# Patient Record
Sex: FEMALE | Race: WHITE | NOT HISPANIC OR LATINO | Employment: OTHER | URBAN - METROPOLITAN AREA
[De-identification: names, ages, dates, MRNs, and addresses within clinical notes are randomized per-mention and may not be internally consistent; named-entity substitution may affect disease eponyms.]

---

## 2017-01-25 ENCOUNTER — OFFICE VISIT - HEALTHEAST (OUTPATIENT)
Dept: INTERNAL MEDICINE | Facility: CLINIC | Age: 82
End: 2017-01-25

## 2017-01-25 DIAGNOSIS — I10 ESSENTIAL HYPERTENSION WITH GOAL BLOOD PRESSURE LESS THAN 140/90: ICD-10-CM

## 2017-01-25 DIAGNOSIS — E21.3 MILD HYPERPARATHYROIDISM (H): ICD-10-CM

## 2017-01-25 DIAGNOSIS — M47.816 OSTEOARTHRITIS OF LUMBAR SPINE: ICD-10-CM

## 2017-01-25 ASSESSMENT — MIFFLIN-ST. JEOR: SCORE: 981.21

## 2017-02-13 ENCOUNTER — COMMUNICATION - HEALTHEAST (OUTPATIENT)
Dept: INTERNAL MEDICINE | Facility: CLINIC | Age: 82
End: 2017-02-13

## 2017-03-06 ENCOUNTER — COMMUNICATION - HEALTHEAST (OUTPATIENT)
Dept: INTERNAL MEDICINE | Facility: CLINIC | Age: 82
End: 2017-03-06

## 2017-03-06 ENCOUNTER — RECORDS - HEALTHEAST (OUTPATIENT)
Dept: ADMINISTRATIVE | Facility: OTHER | Age: 82
End: 2017-03-06

## 2017-03-26 ENCOUNTER — COMMUNICATION - HEALTHEAST (OUTPATIENT)
Dept: INTERNAL MEDICINE | Facility: CLINIC | Age: 82
End: 2017-03-26

## 2017-03-27 ENCOUNTER — COMMUNICATION - HEALTHEAST (OUTPATIENT)
Dept: INTERNAL MEDICINE | Facility: CLINIC | Age: 82
End: 2017-03-27

## 2017-05-13 ENCOUNTER — COMMUNICATION - HEALTHEAST (OUTPATIENT)
Dept: INTERNAL MEDICINE | Facility: CLINIC | Age: 82
End: 2017-05-13

## 2017-05-28 ENCOUNTER — COMMUNICATION - HEALTHEAST (OUTPATIENT)
Dept: INTERNAL MEDICINE | Facility: CLINIC | Age: 82
End: 2017-05-28

## 2017-05-28 DIAGNOSIS — F43.21 SITUATIONAL DEPRESSION: ICD-10-CM

## 2017-07-17 ENCOUNTER — COMMUNICATION - HEALTHEAST (OUTPATIENT)
Dept: INTERNAL MEDICINE | Facility: CLINIC | Age: 82
End: 2017-07-17

## 2017-07-26 ENCOUNTER — OFFICE VISIT - HEALTHEAST (OUTPATIENT)
Dept: INTERNAL MEDICINE | Facility: CLINIC | Age: 82
End: 2017-07-26

## 2017-07-26 DIAGNOSIS — I10 ESSENTIAL HYPERTENSION WITH GOAL BLOOD PRESSURE LESS THAN 140/90: ICD-10-CM

## 2017-07-26 DIAGNOSIS — Z00.00 WELLNESS EXAMINATION: ICD-10-CM

## 2017-07-26 DIAGNOSIS — G45.3 AMAUROSIS FUGAX: ICD-10-CM

## 2017-07-26 DIAGNOSIS — H34.8192 RETINAL VEIN OCCLUSION (H): ICD-10-CM

## 2017-07-26 DIAGNOSIS — F41.9 CHRONIC ANXIETY: ICD-10-CM

## 2017-07-26 DIAGNOSIS — E03.9 HYPOTHYROIDISM: ICD-10-CM

## 2017-07-26 DIAGNOSIS — M85.80 OSTEOPENIA: ICD-10-CM

## 2017-07-26 DIAGNOSIS — E21.3 MILD HYPERPARATHYROIDISM (H): ICD-10-CM

## 2017-07-26 DIAGNOSIS — M83.9 ADULT OSTEOMALACIA: ICD-10-CM

## 2017-07-26 DIAGNOSIS — H61.22 CERUMINOSIS, LEFT: ICD-10-CM

## 2017-07-26 LAB
ATRIAL RATE - MUSE: 52 BPM
CHOLEST SERPL-MCNC: 244 MG/DL
DIASTOLIC BLOOD PRESSURE - MUSE: NORMAL MMHG
FASTING STATUS PATIENT QL REPORTED: YES
HDLC SERPL-MCNC: 48 MG/DL
INTERPRETATION ECG - MUSE: NORMAL
LDLC SERPL CALC-MCNC: 163 MG/DL
P AXIS - MUSE: 20 DEGREES
PR INTERVAL - MUSE: 152 MS
QRS DURATION - MUSE: 82 MS
QT - MUSE: 572 MS
QTC - MUSE: 531 MS
R AXIS - MUSE: -38 DEGREES
SYSTOLIC BLOOD PRESSURE - MUSE: NORMAL MMHG
T AXIS - MUSE: 75 DEGREES
TRIGL SERPL-MCNC: 163 MG/DL
VENTRICULAR RATE- MUSE: 52 BPM

## 2017-07-26 ASSESSMENT — MIFFLIN-ST. JEOR: SCORE: 996.54

## 2017-07-27 ENCOUNTER — COMMUNICATION - HEALTHEAST (OUTPATIENT)
Dept: INTERNAL MEDICINE | Facility: CLINIC | Age: 82
End: 2017-07-27

## 2017-07-31 ENCOUNTER — HOSPITAL ENCOUNTER (OUTPATIENT)
Dept: ULTRASOUND IMAGING | Facility: CLINIC | Age: 82
Discharge: HOME | End: 2017-07-31
Attending: INTERNAL MEDICINE

## 2017-07-31 DIAGNOSIS — H34.8192 RETINAL VEIN OCCLUSION (H): ICD-10-CM

## 2017-07-31 DIAGNOSIS — G45.3 AMAUROSIS FUGAX: ICD-10-CM

## 2017-07-31 DIAGNOSIS — H34.9 UNSPECIFIED RETINAL VASCULAR OCCLUSION: ICD-10-CM

## 2017-08-15 ENCOUNTER — COMMUNICATION - HEALTHEAST (OUTPATIENT)
Dept: INTERNAL MEDICINE | Facility: CLINIC | Age: 82
End: 2017-08-15

## 2017-08-17 ENCOUNTER — COMMUNICATION - HEALTHEAST (OUTPATIENT)
Dept: INTERNAL MEDICINE | Facility: CLINIC | Age: 82
End: 2017-08-17

## 2017-08-17 DIAGNOSIS — I10 HTN (HYPERTENSION): ICD-10-CM

## 2017-09-06 ENCOUNTER — OFFICE VISIT - HEALTHEAST (OUTPATIENT)
Dept: OTOLARYNGOLOGY | Facility: CLINIC | Age: 82
End: 2017-09-06

## 2017-09-06 DIAGNOSIS — H61.23 BILATERAL IMPACTED CERUMEN: ICD-10-CM

## 2017-09-08 ENCOUNTER — COMMUNICATION - HEALTHEAST (OUTPATIENT)
Dept: INTERNAL MEDICINE | Facility: CLINIC | Age: 82
End: 2017-09-08

## 2017-09-08 DIAGNOSIS — F43.21 SITUATIONAL DEPRESSION: ICD-10-CM

## 2017-10-14 ENCOUNTER — COMMUNICATION - HEALTHEAST (OUTPATIENT)
Dept: INTERNAL MEDICINE | Facility: CLINIC | Age: 82
End: 2017-10-14

## 2017-10-14 DIAGNOSIS — I10 ESSENTIAL HYPERTENSION: ICD-10-CM

## 2017-10-16 ENCOUNTER — COMMUNICATION - HEALTHEAST (OUTPATIENT)
Dept: INTERNAL MEDICINE | Facility: CLINIC | Age: 82
End: 2017-10-16

## 2017-11-11 ENCOUNTER — COMMUNICATION - HEALTHEAST (OUTPATIENT)
Dept: INTERNAL MEDICINE | Facility: CLINIC | Age: 82
End: 2017-11-11

## 2017-11-29 ENCOUNTER — OFFICE VISIT - HEALTHEAST (OUTPATIENT)
Dept: INTERNAL MEDICINE | Facility: CLINIC | Age: 82
End: 2017-11-29

## 2017-11-29 DIAGNOSIS — I10 ESSENTIAL HYPERTENSION: ICD-10-CM

## 2017-11-29 DIAGNOSIS — M54.50 LOW BACK PAIN: ICD-10-CM

## 2017-11-29 DIAGNOSIS — I10 HTN (HYPERTENSION): ICD-10-CM

## 2017-11-29 DIAGNOSIS — Z23 NEED FOR PROPHYLACTIC VACCINATION AND INOCULATION AGAINST INFLUENZA: ICD-10-CM

## 2017-11-29 ASSESSMENT — MIFFLIN-ST. JEOR: SCORE: 992.18

## 2018-01-21 ENCOUNTER — COMMUNICATION - HEALTHEAST (OUTPATIENT)
Dept: INTERNAL MEDICINE | Facility: CLINIC | Age: 83
End: 2018-01-21

## 2018-01-21 DIAGNOSIS — I10 ESSENTIAL HYPERTENSION: ICD-10-CM

## 2018-03-18 ENCOUNTER — COMMUNICATION - HEALTHEAST (OUTPATIENT)
Dept: INTERNAL MEDICINE | Facility: CLINIC | Age: 83
End: 2018-03-18

## 2018-03-18 DIAGNOSIS — I10 HTN (HYPERTENSION): ICD-10-CM

## 2018-05-14 ENCOUNTER — COMMUNICATION - HEALTHEAST (OUTPATIENT)
Dept: INTERNAL MEDICINE | Facility: CLINIC | Age: 83
End: 2018-05-14

## 2018-05-14 DIAGNOSIS — I10 HTN (HYPERTENSION): ICD-10-CM

## 2018-05-16 ENCOUNTER — OFFICE VISIT - HEALTHEAST (OUTPATIENT)
Dept: INTERNAL MEDICINE | Facility: CLINIC | Age: 83
End: 2018-05-16

## 2018-05-16 DIAGNOSIS — I10 ESSENTIAL HYPERTENSION: ICD-10-CM

## 2018-05-16 DIAGNOSIS — D58.2 ELEVATED HEMOGLOBIN (H): ICD-10-CM

## 2018-05-16 DIAGNOSIS — M54.50 LOW BACK PAIN: ICD-10-CM

## 2018-05-16 DIAGNOSIS — E21.3 MILD HYPERPARATHYROIDISM (H): ICD-10-CM

## 2018-05-16 DIAGNOSIS — F43.21 SITUATIONAL DEPRESSION: ICD-10-CM

## 2018-05-16 DIAGNOSIS — M47.816 OSTEOARTHRITIS OF LUMBAR SPINE: ICD-10-CM

## 2018-05-16 LAB
ALBUMIN SERPL-MCNC: 3.7 G/DL (ref 3.5–5)
ANION GAP SERPL CALCULATED.3IONS-SCNC: 13 MMOL/L (ref 5–18)
BASOPHILS # BLD AUTO: 0 THOU/UL (ref 0–0.2)
BASOPHILS NFR BLD AUTO: 0 % (ref 0–2)
BUN SERPL-MCNC: 15 MG/DL (ref 8–28)
CALCIUM SERPL-MCNC: 10.8 MG/DL (ref 8.5–10.5)
CHLORIDE BLD-SCNC: 104 MMOL/L (ref 98–107)
CO2 SERPL-SCNC: 21 MMOL/L (ref 22–31)
CREAT SERPL-MCNC: 1.03 MG/DL (ref 0.6–1.1)
EOSINOPHIL # BLD AUTO: 0.2 THOU/UL (ref 0–0.4)
EOSINOPHIL NFR BLD AUTO: 3 % (ref 0–6)
ERYTHROCYTE [DISTWIDTH] IN BLOOD BY AUTOMATED COUNT: 12.2 % (ref 11–14.5)
ERYTHROCYTE [SEDIMENTATION RATE] IN BLOOD BY WESTERGREN METHOD: 13 MM/HR (ref 0–20)
GFR SERPL CREATININE-BSD FRML MDRD: 51 ML/MIN/1.73M2
GLUCOSE BLD-MCNC: 121 MG/DL (ref 70–125)
HCT VFR BLD AUTO: 50.4 % (ref 35–47)
HGB BLD-MCNC: 17.3 G/DL (ref 12–16)
LYMPHOCYTES # BLD AUTO: 1.9 THOU/UL (ref 0.8–4.4)
LYMPHOCYTES NFR BLD AUTO: 27 % (ref 20–40)
MCH RBC QN AUTO: 31.1 PG (ref 27–34)
MCHC RBC AUTO-ENTMCNC: 34.3 G/DL (ref 32–36)
MCV RBC AUTO: 91 FL (ref 80–100)
MONOCYTES # BLD AUTO: 0.5 THOU/UL (ref 0–0.9)
MONOCYTES NFR BLD AUTO: 7 % (ref 2–10)
NEUTROPHILS # BLD AUTO: 4.3 THOU/UL (ref 2–7.7)
NEUTROPHILS NFR BLD AUTO: 62 % (ref 50–70)
PHOSPHATE SERPL-MCNC: 2.9 MG/DL (ref 2.5–4.5)
PLATELET # BLD AUTO: 290 THOU/UL (ref 140–440)
PMV BLD AUTO: 10.9 FL (ref 8.5–12.5)
POTASSIUM BLD-SCNC: 3.8 MMOL/L (ref 3.5–5)
RBC # BLD AUTO: 5.57 MILL/UL (ref 3.8–5.4)
SODIUM SERPL-SCNC: 138 MMOL/L (ref 136–145)
WBC: 7 THOU/UL (ref 4–11)

## 2018-05-16 ASSESSMENT — MIFFLIN-ST. JEOR: SCORE: 973.86

## 2018-06-03 ENCOUNTER — COMMUNICATION - HEALTHEAST (OUTPATIENT)
Dept: INTERNAL MEDICINE | Facility: CLINIC | Age: 83
End: 2018-06-03

## 2018-06-03 DIAGNOSIS — F43.21 SITUATIONAL DEPRESSION: ICD-10-CM

## 2018-10-01 ENCOUNTER — OFFICE VISIT - HEALTHEAST (OUTPATIENT)
Dept: INTERNAL MEDICINE | Facility: CLINIC | Age: 83
End: 2018-10-01

## 2018-10-01 DIAGNOSIS — E21.3 MILD HYPERPARATHYROIDISM (H): ICD-10-CM

## 2018-10-01 DIAGNOSIS — D58.2 ELEVATED HEMOGLOBIN (H): ICD-10-CM

## 2018-10-01 DIAGNOSIS — I10 ESSENTIAL HYPERTENSION: ICD-10-CM

## 2018-10-01 DIAGNOSIS — M47.816 OSTEOARTHRITIS OF LUMBAR SPINE: ICD-10-CM

## 2018-10-01 DIAGNOSIS — Z23 NEED FOR PROPHYLACTIC VACCINATION AND INOCULATION AGAINST INFLUENZA: ICD-10-CM

## 2018-10-01 DIAGNOSIS — M54.50 LOW BACK PAIN: ICD-10-CM

## 2018-10-01 LAB
ALBUMIN SERPL-MCNC: 3.7 G/DL (ref 3.5–5)
ANION GAP SERPL CALCULATED.3IONS-SCNC: 10 MMOL/L (ref 5–18)
BASOPHILS # BLD AUTO: 0.1 THOU/UL (ref 0–0.2)
BASOPHILS NFR BLD AUTO: 1 % (ref 0–2)
BUN SERPL-MCNC: 17 MG/DL (ref 8–28)
CALCIUM SERPL-MCNC: 10.9 MG/DL (ref 8.5–10.5)
CHLORIDE BLD-SCNC: 106 MMOL/L (ref 98–107)
CO2 SERPL-SCNC: 24 MMOL/L (ref 22–31)
CREAT SERPL-MCNC: 0.94 MG/DL (ref 0.6–1.1)
EOSINOPHIL # BLD AUTO: 0.2 THOU/UL (ref 0–0.4)
EOSINOPHIL NFR BLD AUTO: 3 % (ref 0–6)
ERYTHROCYTE [DISTWIDTH] IN BLOOD BY AUTOMATED COUNT: 11.5 % (ref 11–14.5)
GFR SERPL CREATININE-BSD FRML MDRD: 57 ML/MIN/1.73M2
GLUCOSE BLD-MCNC: 122 MG/DL (ref 70–125)
HCT VFR BLD AUTO: 47.5 % (ref 35–47)
HGB BLD-MCNC: 15.9 G/DL (ref 12–16)
LYMPHOCYTES # BLD AUTO: 2.2 THOU/UL (ref 0.8–4.4)
LYMPHOCYTES NFR BLD AUTO: 30 % (ref 20–40)
MCH RBC QN AUTO: 31 PG (ref 27–34)
MCHC RBC AUTO-ENTMCNC: 33.5 G/DL (ref 32–36)
MCV RBC AUTO: 93 FL (ref 80–100)
MONOCYTES # BLD AUTO: 0.6 THOU/UL (ref 0–0.9)
MONOCYTES NFR BLD AUTO: 8 % (ref 2–10)
NEUTROPHILS # BLD AUTO: 4.4 THOU/UL (ref 2–7.7)
NEUTROPHILS NFR BLD AUTO: 59 % (ref 50–70)
PHOSPHATE SERPL-MCNC: 3.2 MG/DL (ref 2.5–4.5)
PLATELET # BLD AUTO: 257 THOU/UL (ref 140–440)
PMV BLD AUTO: 7.9 FL (ref 7–10)
POTASSIUM BLD-SCNC: 4.3 MMOL/L (ref 3.5–5)
RBC # BLD AUTO: 5.13 MILL/UL (ref 3.8–5.4)
SODIUM SERPL-SCNC: 140 MMOL/L (ref 136–145)
WBC: 7.5 THOU/UL (ref 4–11)

## 2018-10-01 ASSESSMENT — MIFFLIN-ST. JEOR: SCORE: 987.65

## 2018-12-19 ENCOUNTER — COMMUNICATION - HEALTHEAST (OUTPATIENT)
Dept: INTERNAL MEDICINE | Facility: CLINIC | Age: 83
End: 2018-12-19

## 2018-12-19 DIAGNOSIS — I10 HTN (HYPERTENSION): ICD-10-CM

## 2019-01-20 ENCOUNTER — COMMUNICATION - HEALTHEAST (OUTPATIENT)
Dept: INTERNAL MEDICINE | Facility: CLINIC | Age: 84
End: 2019-01-20

## 2019-01-20 DIAGNOSIS — I10 ESSENTIAL HYPERTENSION: ICD-10-CM

## 2019-01-23 ENCOUNTER — COMMUNICATION - HEALTHEAST (OUTPATIENT)
Dept: INTERNAL MEDICINE | Facility: CLINIC | Age: 84
End: 2019-01-23

## 2019-01-23 DIAGNOSIS — M54.50 LOW BACK PAIN: ICD-10-CM

## 2019-01-23 DIAGNOSIS — I10 HTN (HYPERTENSION): ICD-10-CM

## 2019-03-26 ENCOUNTER — RECORDS - HEALTHEAST (OUTPATIENT)
Dept: ADMINISTRATIVE | Facility: OTHER | Age: 84
End: 2019-03-26

## 2019-04-03 ENCOUNTER — OFFICE VISIT - HEALTHEAST (OUTPATIENT)
Dept: INTERNAL MEDICINE | Facility: CLINIC | Age: 84
End: 2019-04-03

## 2019-04-03 DIAGNOSIS — H61.23 BILATERAL IMPACTED CERUMEN: ICD-10-CM

## 2019-04-03 DIAGNOSIS — F43.21 SITUATIONAL DEPRESSION: ICD-10-CM

## 2019-04-03 DIAGNOSIS — I10 ESSENTIAL HYPERTENSION: ICD-10-CM

## 2019-04-03 DIAGNOSIS — R41.89 COMPLAINT RELATED TO DREAMS: ICD-10-CM

## 2019-04-03 DIAGNOSIS — I95.1 ORTHOSTASIS: ICD-10-CM

## 2019-04-03 LAB
ALBUMIN SERPL-MCNC: 4.1 G/DL (ref 3.5–5)
ANION GAP SERPL CALCULATED.3IONS-SCNC: 12 MMOL/L (ref 5–18)
BASOPHILS # BLD AUTO: 0 THOU/UL (ref 0–0.2)
BASOPHILS NFR BLD AUTO: 0 % (ref 0–2)
BUN SERPL-MCNC: 22 MG/DL (ref 8–28)
CALCIUM SERPL-MCNC: 11.5 MG/DL (ref 8.5–10.5)
CHLORIDE BLD-SCNC: 102 MMOL/L (ref 98–107)
CO2 SERPL-SCNC: 26 MMOL/L (ref 22–31)
CREAT SERPL-MCNC: 1.25 MG/DL (ref 0.6–1.1)
EOSINOPHIL # BLD AUTO: 0 THOU/UL (ref 0–0.4)
EOSINOPHIL NFR BLD AUTO: 0 % (ref 0–6)
ERYTHROCYTE [DISTWIDTH] IN BLOOD BY AUTOMATED COUNT: 12.7 % (ref 11–14.5)
GFR SERPL CREATININE-BSD FRML MDRD: 41 ML/MIN/1.73M2
GLUCOSE BLD-MCNC: 109 MG/DL (ref 70–125)
HCT VFR BLD AUTO: 48.8 % (ref 35–47)
HGB BLD-MCNC: 16.8 G/DL (ref 12–16)
LYMPHOCYTES # BLD AUTO: 0 THOU/UL (ref 0.8–4.4)
LYMPHOCYTES NFR BLD AUTO: 0 % (ref 20–40)
MCH RBC QN AUTO: 30.4 PG (ref 27–34)
MCHC RBC AUTO-ENTMCNC: 34.5 G/DL (ref 32–36)
MCV RBC AUTO: 88 FL (ref 80–100)
MONOCYTES # BLD AUTO: 0 THOU/UL (ref 0–0.9)
MONOCYTES NFR BLD AUTO: 0 % (ref 2–10)
NEUTROPHILS # BLD AUTO: 0 THOU/UL (ref 2–7.7)
NEUTROPHILS NFR BLD AUTO: 0 % (ref 50–70)
PHOSPHATE SERPL-MCNC: 3.3 MG/DL (ref 2.5–4.5)
PLATELET # BLD AUTO: 289 THOU/UL (ref 140–440)
PMV BLD AUTO: 8.5 FL (ref 7–10)
POTASSIUM BLD-SCNC: 4.4 MMOL/L (ref 3.5–5)
RBC # BLD AUTO: 5.53 MILL/UL (ref 3.8–5.4)
SODIUM SERPL-SCNC: 140 MMOL/L (ref 136–145)
WBC: 7.7 THOU/UL (ref 4–11)

## 2019-04-03 RX ORDER — TIMOLOL MALEATE 5 MG/ML
SOLUTION/ DROPS OPHTHALMIC
Refills: 3 | Status: SHIPPED | COMMUNITY
Start: 2018-12-02

## 2019-04-03 ASSESSMENT — MIFFLIN-ST. JEOR: SCORE: 951.36

## 2019-04-05 ENCOUNTER — AMBULATORY - HEALTHEAST (OUTPATIENT)
Dept: INTERNAL MEDICINE | Facility: CLINIC | Age: 84
End: 2019-04-05

## 2019-04-05 DIAGNOSIS — E21.3 MILD HYPERPARATHYROIDISM (H): ICD-10-CM

## 2019-04-08 ENCOUNTER — COMMUNICATION - HEALTHEAST (OUTPATIENT)
Dept: INTERNAL MEDICINE | Facility: CLINIC | Age: 84
End: 2019-04-08

## 2019-04-10 ENCOUNTER — AMBULATORY - HEALTHEAST (OUTPATIENT)
Dept: LAB | Facility: CLINIC | Age: 84
End: 2019-04-10

## 2019-04-10 DIAGNOSIS — E21.3 MILD HYPERPARATHYROIDISM (H): ICD-10-CM

## 2019-04-10 LAB
CALCIUM SERPL-MCNC: 10.7 MG/DL (ref 8.5–10.5)
PHOSPHATE SERPL-MCNC: 3.6 MG/DL (ref 2.5–4.5)
PTH-INTACT SERPL-MCNC: 221 PG/ML (ref 10–86)

## 2019-04-17 ENCOUNTER — OFFICE VISIT - HEALTHEAST (OUTPATIENT)
Dept: OTOLARYNGOLOGY | Facility: CLINIC | Age: 84
End: 2019-04-17

## 2019-04-17 DIAGNOSIS — H61.23 BILATERAL IMPACTED CERUMEN: ICD-10-CM

## 2019-04-23 ENCOUNTER — COMMUNICATION - HEALTHEAST (OUTPATIENT)
Dept: INTERNAL MEDICINE | Facility: CLINIC | Age: 84
End: 2019-04-23

## 2019-04-23 ENCOUNTER — AMBULATORY - HEALTHEAST (OUTPATIENT)
Dept: INTERNAL MEDICINE | Facility: CLINIC | Age: 84
End: 2019-04-23

## 2019-04-23 DIAGNOSIS — M54.50 LOW BACK PAIN: ICD-10-CM

## 2019-04-23 DIAGNOSIS — E21.3 MILD HYPERPARATHYROIDISM (H): ICD-10-CM

## 2019-05-31 ENCOUNTER — COMMUNICATION - HEALTHEAST (OUTPATIENT)
Dept: INTERNAL MEDICINE | Facility: CLINIC | Age: 84
End: 2019-05-31

## 2019-05-31 ENCOUNTER — OFFICE VISIT - HEALTHEAST (OUTPATIENT)
Dept: INTERNAL MEDICINE | Facility: CLINIC | Age: 84
End: 2019-05-31

## 2019-05-31 DIAGNOSIS — E21.3 MILD HYPERPARATHYROIDISM (H): ICD-10-CM

## 2019-05-31 DIAGNOSIS — F43.21 SITUATIONAL DEPRESSION: ICD-10-CM

## 2019-05-31 DIAGNOSIS — M54.50 LOW BACK PAIN: ICD-10-CM

## 2019-05-31 DIAGNOSIS — D58.2 ELEVATED HEMOGLOBIN (H): ICD-10-CM

## 2019-05-31 DIAGNOSIS — I10 HTN (HYPERTENSION): ICD-10-CM

## 2019-05-31 DIAGNOSIS — E83.52 HYPERCALCEMIA: ICD-10-CM

## 2019-05-31 DIAGNOSIS — F32.A CHRONIC DEPRESSION: ICD-10-CM

## 2019-05-31 LAB
ALBUMIN SERPL-MCNC: 3.6 G/DL (ref 3.5–5)
ANION GAP SERPL CALCULATED.3IONS-SCNC: 10 MMOL/L (ref 5–18)
BUN SERPL-MCNC: 17 MG/DL (ref 8–28)
CALCIUM SERPL-MCNC: 10.6 MG/DL (ref 8.5–10.5)
CHLORIDE BLD-SCNC: 109 MMOL/L (ref 98–107)
CO2 SERPL-SCNC: 24 MMOL/L (ref 22–31)
CREAT SERPL-MCNC: 0.87 MG/DL (ref 0.6–1.1)
GFR SERPL CREATININE-BSD FRML MDRD: >60 ML/MIN/1.73M2
GLUCOSE BLD-MCNC: 110 MG/DL (ref 70–125)
PHOSPHATE SERPL-MCNC: 2.9 MG/DL (ref 2.5–4.5)
POTASSIUM BLD-SCNC: 3.7 MMOL/L (ref 3.5–5)
SODIUM SERPL-SCNC: 143 MMOL/L (ref 136–145)

## 2019-05-31 ASSESSMENT — MIFFLIN-ST. JEOR: SCORE: 983.29

## 2019-07-10 ENCOUNTER — OFFICE VISIT - HEALTHEAST (OUTPATIENT)
Dept: INTERNAL MEDICINE | Facility: CLINIC | Age: 84
End: 2019-07-10

## 2019-07-10 DIAGNOSIS — I10 HTN (HYPERTENSION): ICD-10-CM

## 2019-07-10 DIAGNOSIS — E21.3 MILD HYPERPARATHYROIDISM (H): ICD-10-CM

## 2019-07-10 DIAGNOSIS — D58.2 ELEVATED HEMOGLOBIN (H): ICD-10-CM

## 2019-07-10 DIAGNOSIS — I10 ESSENTIAL HYPERTENSION: ICD-10-CM

## 2019-07-10 DIAGNOSIS — R60.9 EDEMA, UNSPECIFIED TYPE: ICD-10-CM

## 2019-07-10 LAB
BASOPHILS # BLD AUTO: 0.1 THOU/UL (ref 0–0.2)
BASOPHILS NFR BLD AUTO: 1 % (ref 0–2)
CALCIUM SERPL-MCNC: 10.6 MG/DL (ref 8.5–10.5)
EOSINOPHIL # BLD AUTO: 0.2 THOU/UL (ref 0–0.4)
EOSINOPHIL NFR BLD AUTO: 2 % (ref 0–6)
ERYTHROCYTE [DISTWIDTH] IN BLOOD BY AUTOMATED COUNT: 11.5 % (ref 11–14.5)
HCT VFR BLD AUTO: 45 % (ref 35–47)
HGB BLD-MCNC: 15.3 G/DL (ref 12–16)
LYMPHOCYTES # BLD AUTO: 2.6 THOU/UL (ref 0.8–4.4)
LYMPHOCYTES NFR BLD AUTO: 34 % (ref 20–40)
MCH RBC QN AUTO: 30.9 PG (ref 27–34)
MCHC RBC AUTO-ENTMCNC: 33.9 G/DL (ref 32–36)
MCV RBC AUTO: 91 FL (ref 80–100)
MONOCYTES # BLD AUTO: 0.6 THOU/UL (ref 0–0.9)
MONOCYTES NFR BLD AUTO: 8 % (ref 2–10)
NEUTROPHILS # BLD AUTO: 4.2 THOU/UL (ref 2–7.7)
NEUTROPHILS NFR BLD AUTO: 55 % (ref 50–70)
PLATELET # BLD AUTO: 243 THOU/UL (ref 140–440)
PMV BLD AUTO: 8.8 FL (ref 7–10)
RBC # BLD AUTO: 4.95 MILL/UL (ref 3.8–5.4)
WBC: 7.6 THOU/UL (ref 4–11)

## 2019-07-10 ASSESSMENT — MIFFLIN-ST. JEOR: SCORE: 988.01

## 2019-07-12 ENCOUNTER — COMMUNICATION - HEALTHEAST (OUTPATIENT)
Dept: INTERNAL MEDICINE | Facility: CLINIC | Age: 84
End: 2019-07-12

## 2019-07-16 ENCOUNTER — COMMUNICATION - HEALTHEAST (OUTPATIENT)
Dept: INTERNAL MEDICINE | Facility: CLINIC | Age: 84
End: 2019-07-16

## 2019-07-16 DIAGNOSIS — F43.21 SITUATIONAL DEPRESSION: ICD-10-CM

## 2019-10-17 ENCOUNTER — OFFICE VISIT - HEALTHEAST (OUTPATIENT)
Dept: INTERNAL MEDICINE | Facility: CLINIC | Age: 84
End: 2019-10-17

## 2019-10-17 DIAGNOSIS — M47.816 OSTEOARTHRITIS OF LUMBAR SPINE, UNSPECIFIED SPINAL OSTEOARTHRITIS COMPLICATION STATUS: ICD-10-CM

## 2019-10-17 DIAGNOSIS — F43.21 SITUATIONAL DEPRESSION: ICD-10-CM

## 2019-10-17 DIAGNOSIS — E21.3 MILD HYPERPARATHYROIDISM (H): ICD-10-CM

## 2019-10-17 DIAGNOSIS — R60.9 EDEMA, UNSPECIFIED TYPE: ICD-10-CM

## 2019-10-17 DIAGNOSIS — I10 ESSENTIAL HYPERTENSION: ICD-10-CM

## 2019-10-17 LAB
ALBUMIN SERPL-MCNC: 3.9 G/DL (ref 3.5–5)
ANION GAP SERPL CALCULATED.3IONS-SCNC: 11 MMOL/L (ref 5–18)
BUN SERPL-MCNC: 15 MG/DL (ref 8–28)
CALCIUM SERPL-MCNC: 11.2 MG/DL (ref 8.5–10.5)
CHLORIDE BLD-SCNC: 102 MMOL/L (ref 98–107)
CO2 SERPL-SCNC: 28 MMOL/L (ref 22–31)
CREAT SERPL-MCNC: 0.92 MG/DL (ref 0.6–1.1)
GFR SERPL CREATININE-BSD FRML MDRD: 58 ML/MIN/1.73M2
GLUCOSE BLD-MCNC: 114 MG/DL (ref 70–125)
PHOSPHATE SERPL-MCNC: 3.1 MG/DL (ref 2.5–4.5)
POTASSIUM BLD-SCNC: 3.7 MMOL/L (ref 3.5–5)
SODIUM SERPL-SCNC: 141 MMOL/L (ref 136–145)
TSH SERPL DL<=0.005 MIU/L-ACNC: 0.45 UIU/ML (ref 0.3–5)

## 2019-10-17 ASSESSMENT — MIFFLIN-ST. JEOR: SCORE: 978.58

## 2019-10-18 ENCOUNTER — COMMUNICATION - HEALTHEAST (OUTPATIENT)
Dept: INTERNAL MEDICINE | Facility: CLINIC | Age: 84
End: 2019-10-18

## 2019-11-01 ENCOUNTER — COMMUNICATION - HEALTHEAST (OUTPATIENT)
Dept: INTERNAL MEDICINE | Facility: CLINIC | Age: 84
End: 2019-11-01

## 2019-11-01 DIAGNOSIS — I10 HTN (HYPERTENSION): ICD-10-CM

## 2020-01-27 ENCOUNTER — COMMUNICATION - HEALTHEAST (OUTPATIENT)
Dept: INTERNAL MEDICINE | Facility: CLINIC | Age: 85
End: 2020-01-27

## 2020-01-27 DIAGNOSIS — I10 ESSENTIAL HYPERTENSION: ICD-10-CM

## 2020-01-31 ENCOUNTER — COMMUNICATION - HEALTHEAST (OUTPATIENT)
Dept: INTERNAL MEDICINE | Facility: CLINIC | Age: 85
End: 2020-01-31

## 2020-01-31 DIAGNOSIS — F43.21 SITUATIONAL DEPRESSION: ICD-10-CM

## 2020-03-18 ENCOUNTER — COMMUNICATION - HEALTHEAST (OUTPATIENT)
Dept: INTERNAL MEDICINE | Facility: CLINIC | Age: 85
End: 2020-03-18

## 2020-03-24 ENCOUNTER — OFFICE VISIT - HEALTHEAST (OUTPATIENT)
Dept: INTERNAL MEDICINE | Facility: CLINIC | Age: 85
End: 2020-03-24

## 2020-03-24 DIAGNOSIS — E21.3 MILD HYPERPARATHYROIDISM (H): ICD-10-CM

## 2020-03-24 DIAGNOSIS — F43.21 SITUATIONAL DEPRESSION: ICD-10-CM

## 2020-03-24 DIAGNOSIS — M85.80 OSTEOPENIA, UNSPECIFIED LOCATION: ICD-10-CM

## 2020-03-24 DIAGNOSIS — I10 HTN (HYPERTENSION): ICD-10-CM

## 2020-03-24 DIAGNOSIS — E83.52 HYPERCALCEMIA: ICD-10-CM

## 2020-05-24 ENCOUNTER — COMMUNICATION - HEALTHEAST (OUTPATIENT)
Dept: INTERNAL MEDICINE | Facility: CLINIC | Age: 85
End: 2020-05-24

## 2020-05-24 DIAGNOSIS — I10 HTN (HYPERTENSION): ICD-10-CM

## 2020-06-08 ENCOUNTER — AMBULATORY - HEALTHEAST (OUTPATIENT)
Dept: INTERNAL MEDICINE | Facility: CLINIC | Age: 85
End: 2020-06-08

## 2020-06-08 ENCOUNTER — COMMUNICATION - HEALTHEAST (OUTPATIENT)
Dept: INTERNAL MEDICINE | Facility: CLINIC | Age: 85
End: 2020-06-08

## 2020-06-08 DIAGNOSIS — E21.3 MILD HYPERPARATHYROIDISM (H): ICD-10-CM

## 2020-06-08 DIAGNOSIS — M85.80 OSTEOPENIA: ICD-10-CM

## 2020-06-08 DIAGNOSIS — E83.52 HYPERCALCEMIA: ICD-10-CM

## 2020-06-08 DIAGNOSIS — M47.816 OSTEOARTHRITIS OF LUMBAR SPINE: ICD-10-CM

## 2020-07-30 ENCOUNTER — COMMUNICATION - HEALTHEAST (OUTPATIENT)
Dept: INTERNAL MEDICINE | Facility: CLINIC | Age: 85
End: 2020-07-30

## 2020-07-30 DIAGNOSIS — M54.50 LOW BACK PAIN: ICD-10-CM

## 2020-07-30 RX ORDER — CELECOXIB 200 MG/1
200 CAPSULE ORAL DAILY
Qty: 90 CAPSULE | Refills: 3 | Status: SHIPPED | OUTPATIENT
Start: 2020-07-30 | End: 2021-07-24

## 2020-09-02 ENCOUNTER — COMMUNICATION - HEALTHEAST (OUTPATIENT)
Dept: INTERNAL MEDICINE | Facility: CLINIC | Age: 85
End: 2020-09-02

## 2020-09-02 DIAGNOSIS — I10 ESSENTIAL HYPERTENSION: ICD-10-CM

## 2020-09-05 RX ORDER — FUROSEMIDE 20 MG
TABLET ORAL
Qty: 64 TABLET | Refills: 1 | Status: SHIPPED | OUTPATIENT
Start: 2020-09-05 | End: 2023-08-10

## 2020-09-22 ENCOUNTER — COMMUNICATION - HEALTHEAST (OUTPATIENT)
Dept: INTERNAL MEDICINE | Facility: CLINIC | Age: 85
End: 2020-09-22

## 2020-09-22 DIAGNOSIS — F43.21 SITUATIONAL DEPRESSION: ICD-10-CM

## 2020-10-25 ENCOUNTER — COMMUNICATION - HEALTHEAST (OUTPATIENT)
Dept: INTERNAL MEDICINE | Facility: CLINIC | Age: 85
End: 2020-10-25

## 2020-10-25 DIAGNOSIS — I10 HTN (HYPERTENSION): ICD-10-CM

## 2020-11-24 ENCOUNTER — COMMUNICATION - HEALTHEAST (OUTPATIENT)
Dept: INTERNAL MEDICINE | Facility: CLINIC | Age: 85
End: 2020-11-24

## 2020-11-24 DIAGNOSIS — I10 HTN (HYPERTENSION): ICD-10-CM

## 2020-12-12 ENCOUNTER — COMMUNICATION - HEALTHEAST (OUTPATIENT)
Dept: INTERNAL MEDICINE | Facility: CLINIC | Age: 85
End: 2020-12-12

## 2020-12-12 DIAGNOSIS — I10 HTN (HYPERTENSION): ICD-10-CM

## 2021-02-02 ENCOUNTER — COMMUNICATION - HEALTHEAST (OUTPATIENT)
Dept: INTERNAL MEDICINE | Facility: CLINIC | Age: 86
End: 2021-02-02

## 2021-02-02 DIAGNOSIS — I10 ESSENTIAL HYPERTENSION: ICD-10-CM

## 2021-02-05 ENCOUNTER — OFFICE VISIT - HEALTHEAST (OUTPATIENT)
Dept: INTERNAL MEDICINE | Facility: CLINIC | Age: 86
End: 2021-02-05

## 2021-02-05 DIAGNOSIS — M81.0 AGE-RELATED OSTEOPOROSIS WITHOUT CURRENT PATHOLOGICAL FRACTURE: ICD-10-CM

## 2021-02-05 DIAGNOSIS — M47.816 OSTEOARTHRITIS OF LUMBAR SPINE, UNSPECIFIED SPINAL OSTEOARTHRITIS COMPLICATION STATUS: ICD-10-CM

## 2021-02-05 DIAGNOSIS — E83.52 HYPERCALCEMIA: ICD-10-CM

## 2021-02-05 DIAGNOSIS — F43.21 SITUATIONAL DEPRESSION: ICD-10-CM

## 2021-02-05 DIAGNOSIS — N18.31 STAGE 3A CHRONIC KIDNEY DISEASE (H): ICD-10-CM

## 2021-02-05 DIAGNOSIS — I10 ESSENTIAL HYPERTENSION: ICD-10-CM

## 2021-02-05 DIAGNOSIS — E21.3 MILD HYPERPARATHYROIDISM (H): ICD-10-CM

## 2021-02-05 DIAGNOSIS — M89.9 DISORDER OF BONE, UNSPECIFIED: ICD-10-CM

## 2021-02-05 LAB
ALBUMIN SERPL-MCNC: 3.8 G/DL (ref 3.5–5)
ALP SERPL-CCNC: 158 U/L (ref 45–120)
ALT SERPL W P-5'-P-CCNC: 13 U/L (ref 0–45)
ANION GAP SERPL CALCULATED.3IONS-SCNC: 14 MMOL/L (ref 5–18)
AST SERPL W P-5'-P-CCNC: 17 U/L (ref 0–40)
BASOPHILS # BLD AUTO: 0.1 THOU/UL (ref 0–0.2)
BASOPHILS NFR BLD AUTO: 1 % (ref 0–2)
BILIRUB SERPL-MCNC: 1.4 MG/DL (ref 0–1)
BUN SERPL-MCNC: 15 MG/DL (ref 8–28)
CALCIUM SERPL-MCNC: 10.2 MG/DL (ref 8.5–10.5)
CHLORIDE BLD-SCNC: 104 MMOL/L (ref 98–107)
CO2 SERPL-SCNC: 25 MMOL/L (ref 22–31)
CREAT SERPL-MCNC: 1.06 MG/DL (ref 0.6–1.1)
EOSINOPHIL # BLD AUTO: 0.2 THOU/UL (ref 0–0.4)
EOSINOPHIL NFR BLD AUTO: 2 % (ref 0–6)
ERYTHROCYTE [DISTWIDTH] IN BLOOD BY AUTOMATED COUNT: 12.5 % (ref 11–14.5)
GFR SERPL CREATININE-BSD FRML MDRD: 49 ML/MIN/1.73M2
GLUCOSE BLD-MCNC: 124 MG/DL (ref 70–125)
HCT VFR BLD AUTO: 44.4 % (ref 35–47)
HGB BLD-MCNC: 15.1 G/DL (ref 12–16)
IMM GRANULOCYTES # BLD: 0 THOU/UL
IMM GRANULOCYTES NFR BLD: 0 %
LYMPHOCYTES # BLD AUTO: 2.5 THOU/UL (ref 0.8–4.4)
LYMPHOCYTES NFR BLD AUTO: 31 % (ref 20–40)
MCH RBC QN AUTO: 29.5 PG (ref 27–34)
MCHC RBC AUTO-ENTMCNC: 34 G/DL (ref 32–36)
MCV RBC AUTO: 87 FL (ref 80–100)
MONOCYTES # BLD AUTO: 0.7 THOU/UL (ref 0–0.9)
MONOCYTES NFR BLD AUTO: 8 % (ref 2–10)
NEUTROPHILS # BLD AUTO: 4.5 THOU/UL (ref 2–7.7)
NEUTROPHILS NFR BLD AUTO: 57 % (ref 50–70)
PHOSPHATE SERPL-MCNC: 2.9 MG/DL (ref 2.5–4.5)
PLATELET # BLD AUTO: 269 THOU/UL (ref 140–440)
PMV BLD AUTO: 10.2 FL (ref 7–10)
POTASSIUM BLD-SCNC: 3.3 MMOL/L (ref 3.5–5)
PROT SERPL-MCNC: 7.1 G/DL (ref 6–8)
PTH-INTACT SERPL-MCNC: 227 PG/ML (ref 10–86)
RBC # BLD AUTO: 5.12 MILL/UL (ref 3.8–5.4)
SODIUM SERPL-SCNC: 143 MMOL/L (ref 136–145)
TSH SERPL DL<=0.005 MIU/L-ACNC: 0.33 UIU/ML (ref 0.3–5)
WBC: 7.8 THOU/UL (ref 4–11)

## 2021-02-05 ASSESSMENT — PATIENT HEALTH QUESTIONNAIRE - PHQ9: SUM OF ALL RESPONSES TO PHQ QUESTIONS 1-9: 1

## 2021-02-05 ASSESSMENT — MIFFLIN-ST. JEOR: SCORE: 987.47

## 2021-02-06 ENCOUNTER — COMMUNICATION - HEALTHEAST (OUTPATIENT)
Dept: INTERNAL MEDICINE | Facility: CLINIC | Age: 86
End: 2021-02-06

## 2021-02-06 DIAGNOSIS — I10 HTN (HYPERTENSION): ICD-10-CM

## 2021-02-06 DIAGNOSIS — F43.21 SITUATIONAL DEPRESSION: ICD-10-CM

## 2021-02-06 RX ORDER — AMLODIPINE BESYLATE 10 MG/1
10 TABLET ORAL DAILY
Qty: 90 TABLET | Refills: 3 | Status: SHIPPED | OUTPATIENT
Start: 2021-02-06 | End: 2022-05-13

## 2021-02-06 RX ORDER — PAROXETINE 10 MG/1
10 TABLET, FILM COATED ORAL DAILY
Qty: 90 TABLET | Refills: 3 | Status: SHIPPED | OUTPATIENT
Start: 2021-02-06 | End: 2022-03-16

## 2021-02-08 LAB — 25(OH)D3 SERPL-MCNC: 10.2 NG/ML (ref 30–80)

## 2021-02-09 ENCOUNTER — COMMUNICATION - HEALTHEAST (OUTPATIENT)
Dept: INTERNAL MEDICINE | Facility: CLINIC | Age: 86
End: 2021-02-09

## 2021-02-09 ENCOUNTER — RECORDS - HEALTHEAST (OUTPATIENT)
Dept: BONE DENSITY | Facility: CLINIC | Age: 86
End: 2021-02-09

## 2021-02-09 DIAGNOSIS — E56.9 VITAMIN DEFICIENCY: ICD-10-CM

## 2021-02-09 DIAGNOSIS — M81.0 AGE-RELATED OSTEOPOROSIS WITHOUT CURRENT PATHOLOGICAL FRACTURE: ICD-10-CM

## 2021-02-09 DIAGNOSIS — M89.9 DISORDER OF BONE, UNSPECIFIED: ICD-10-CM

## 2021-02-09 DIAGNOSIS — E83.52 HYPERCALCEMIA: ICD-10-CM

## 2021-02-10 ENCOUNTER — RECORDS - HEALTHEAST (OUTPATIENT)
Dept: ADMINISTRATIVE | Facility: OTHER | Age: 86
End: 2021-02-10

## 2021-02-10 LAB
ALBUMIN PERCENT: 63.7 % (ref 51–67)
ALBUMIN SERPL ELPH-MCNC: 4.5 G/DL (ref 3.2–4.7)
ALPHA 1 PERCENT: 2.6 % (ref 2–4)
ALPHA 2 PERCENT: 9.4 % (ref 5–13)
ALPHA1 GLOB SERPL ELPH-MCNC: 0.2 G/DL (ref 0.1–0.3)
ALPHA2 GLOB SERPL ELPH-MCNC: 0.7 G/DL (ref 0.4–0.9)
B-GLOBULIN SERPL ELPH-MCNC: 1 G/DL (ref 0.7–1.2)
BETA PERCENT: 14.9 % (ref 10–17)
GAMMA GLOB SERPL ELPH-MCNC: 0.7 G/DL (ref 0.6–1.4)
GAMMA GLOBULIN PERCENT: 9.4 % (ref 9–20)
PATH ICD:: NORMAL
PROT PATTERN SERPL ELPH-IMP: NORMAL
PROT SERPL-MCNC: 7 G/DL (ref 6–8)
REVIEWING PATHOLOGIST: NORMAL

## 2021-02-19 ENCOUNTER — COMMUNICATION - HEALTHEAST (OUTPATIENT)
Dept: INTERNAL MEDICINE | Facility: CLINIC | Age: 86
End: 2021-02-19

## 2021-02-19 ENCOUNTER — AMBULATORY - HEALTHEAST (OUTPATIENT)
Dept: INTERNAL MEDICINE | Facility: CLINIC | Age: 86
End: 2021-02-19

## 2021-02-19 DIAGNOSIS — M81.0 OSTEOPOROSIS: ICD-10-CM

## 2021-02-19 DIAGNOSIS — M81.0 AGE-RELATED OSTEOPOROSIS WITHOUT CURRENT PATHOLOGICAL FRACTURE: ICD-10-CM

## 2021-02-19 DIAGNOSIS — Q78.2 OSTEOPETROSIS: ICD-10-CM

## 2021-02-19 RX ORDER — ALENDRONATE SODIUM 70 MG/1
70 TABLET ORAL
Qty: 12 TABLET | Refills: 3 | Status: SHIPPED | OUTPATIENT
Start: 2021-02-19 | End: 2023-08-10

## 2021-02-19 RX ORDER — ALENDRONATE SODIUM 70 MG/1
70 TABLET ORAL
Qty: 12 TABLET | Refills: 11 | Status: SHIPPED | OUTPATIENT
Start: 2021-02-19 | End: 2022-08-23

## 2021-04-17 ENCOUNTER — COMMUNICATION - HEALTHEAST (OUTPATIENT)
Dept: INTERNAL MEDICINE | Facility: CLINIC | Age: 86
End: 2021-04-17

## 2021-04-17 DIAGNOSIS — I10 HTN (HYPERTENSION): ICD-10-CM

## 2021-04-29 ENCOUNTER — COMMUNICATION - HEALTHEAST (OUTPATIENT)
Dept: INTERNAL MEDICINE | Facility: CLINIC | Age: 86
End: 2021-04-29

## 2021-04-29 DIAGNOSIS — E56.9 VITAMIN DEFICIENCY: ICD-10-CM

## 2021-04-30 RX ORDER — ERGOCALCIFEROL 1.25 MG/1
50000 CAPSULE ORAL WEEKLY
Qty: 12 CAPSULE | Refills: 0 | Status: SHIPPED | OUTPATIENT
Start: 2021-04-30 | End: 2021-07-17

## 2021-05-25 ENCOUNTER — RECORDS - HEALTHEAST (OUTPATIENT)
Dept: ADMINISTRATIVE | Facility: CLINIC | Age: 86
End: 2021-05-25

## 2021-05-26 ENCOUNTER — RECORDS - HEALTHEAST (OUTPATIENT)
Dept: ADMINISTRATIVE | Facility: CLINIC | Age: 86
End: 2021-05-26

## 2021-05-27 ENCOUNTER — RECORDS - HEALTHEAST (OUTPATIENT)
Dept: ADMINISTRATIVE | Facility: CLINIC | Age: 86
End: 2021-05-27

## 2021-05-27 ASSESSMENT — PATIENT HEALTH QUESTIONNAIRE - PHQ9: SUM OF ALL RESPONSES TO PHQ QUESTIONS 1-9: 1

## 2021-05-27 NOTE — PROGRESS NOTES
Call patient  Calcium quite high  Stop vitamin d  Stop any calcium tablets  Stop spironolactone    Come in Wednesday-any he office for lab only  Want to recheck calcium

## 2021-05-27 NOTE — PROGRESS NOTES
HPI: This patient presents to clinic today for cerumen removal. Has noticed some fullness of the ears and muffled hearing, but denies otalgia, otorrhea, vertigo, change in true hearing or tinnitus. Denies other symptoms.    Past medical history, surgical history, family history, social history, medications, and allergies have been reviewed and are documented above.     Review of Systems: a 10-system review was performed. Symptoms are noted in the HPI and on a scanned document in the computer chart.    Physical Examination:   GEN: no acute distress, alert and oriented  EYES: extraocular movements intact, pupils equal and round. Sclera clear.   EARS: bilateral cerumen impactions cleared under binocular microscopy using microdissection. The tympanic membranes are noted to be intact and clear with no signs of infections, effusions, perforations, or retractions.  PULM: breathing comfortably on room air with no stertor or stridor. Chest expansion symmetric.  HEART: regular rate and rhythm, no peripheral edema    MEDICAL DECISION-MAKING: cerumen impactions cleared under binocular microscopy without difficulty. Hearing restored to baseline. Follow-up PRN.

## 2021-05-27 NOTE — TELEPHONE ENCOUNTER
----- Message from Ron Crabtree MD sent at 4/5/2019  4:29 PM CDT -----  Call patient  Calcium quite high  Stop vitamin d  Stop any calcium tablets  Stop spironolactone    Come in Wednesday-any he office for lab only  Want to recheck calcium

## 2021-05-27 NOTE — PROGRESS NOTES
OFFICE VISIT NOTE  Alma Johns   83 y.o. female            Assessment/Plan for  Alma Johns is a 83 y.o. female.  No Patient Care Coordination Note on file.       1. Essential hypertension   Controlled without documented orthostasis.  Some clinically however.  Hydration.  Caution with position change  2. Situational depression  Excessive dreams-disturbing.  Decrease Paxil to 10 mg.  Update me 1 month  If dreaming still problematic to change Rx  - PARoxetine (PAXIL) 10 MG tablet; Take 1 tablet (10 mg total) by mouth daily.  Dispense: 90 tablet; Refill: 1    3. Bilateral impacted cerumen  Left greater than right   - Ambulatory referral to ENT    4. Orthostasis  Clinically.  Probably due to blood pressure medication.  No documentation.  Consider lowering amlodipine down to 5 mg    5. Complaint related to dreams  The above       Plan:  Same BP meds  Decrease Paxil  Update me 1 month  Consider change Wellbutrin  Laboratory  3-month RTC    There are no Patient Instructions on file for this visit.    Diagnoses and all orders for this visit:    Essential hypertension    Situational depression  -     PARoxetine (PAXIL) 10 MG tablet  Dispense: 90 tablet; Refill: 1    Bilateral impacted cerumen  -     Ambulatory referral to ENT    Orthostasis    Complaint related to dreams        Medications after visit  Current Outpatient Medications   Medication Sig Dispense Refill     amLODIPine (NORVASC) 10 MG tablet TAKE 1 TABLET BY MOUTH EVERY DAY 90 tablet 1     aspirin 81 MG EC tablet Take 81 mg by mouth daily.       celecoxib (CELEBREX) 200 MG capsule TAKE 1 CAPSULE(200 MG) BY MOUTH DAILY 90 capsule 0     cholecalciferol, vitamin D3, 1,000 unit tablet Take 2,000 Units by mouth daily.        doxazosin (CARDURA) 2 MG tablet TAKE 1 TABLET BY MOUTH EVERY NIGHT AT BEDTIME 90 tablet 2     furosemide (LASIX) 20 MG tablet TAKE 1 TABLET BY MOUTH 5 DAYS PER WEEK EXCEPT SUNDAY AND THURSDAY 64 tablet 2     latanoprost (XALATAN) 0.005 %  ophthalmic solution Administer 1 drop to both eyes bedtime.       PARoxetine (PAXIL) 20 MG tablet Take 1 tablet (20 mg total) by mouth daily. 90 tablet 3     spironolactone (ALDACTONE) 50 MG tablet TAKE 1 TABLET(50 MG) BY MOUTH DAILY 90 tablet 1     therapeutic multivitamin (THERAGRAN) tablet Take 1 tablet by mouth daily.       timolol maleate (TIMOPTIC) 0.5 % ophthalmic solution INSTILL 1 DROP INTO BOTH EYES Q EVENING  3     PARoxetine (PAXIL) 10 MG tablet Take 1 tablet (10 mg total) by mouth daily. 90 tablet 1     No current facility-administered medications for this visit.                       Ron Crabtree MD  Internal medicine  [facility]  [phone]  [email or web address]    Much or all of the text in this note was generated through the use of Dragon Dictate voice-to-text software. Errors in spelling or words which seem out of context are unintentional.   Sound alike errors, in particular, may have escaped editing.                 Subjective:   Chief Complaint:  Hypertension; Follow-up (Routine 6m visit); and Balance Issues (Gets dizzy with moving to fast)    Clinically doing well from a mood standpoint.  Depression well controlled on Paxil 20.  She is having disturbing dreams  Took her off Paxil once in the past.  Dreams resolved but depression recurred and went back on Paxil.  She is open other blood pressure medication if needed    Hypertension-There are no cardiovascular, respiratory, neurologic complaints.No claudication.There is mild orthostasis.Patient is compliant with medications.  Medications reviewed.   No side effects from medication.  Clinically he is describing some mild orthostasis.  There have been no falls.  No other neurologic symptomatology.    She is having some hearing issues  She is using Q-tips  Review of Systems:     Extensive 10-point review of systems was performed. Please see the HPI for problem specific pertinent review of systems.      Patient does note that her lavage in the past    Otherwise, the following systems are noncontributory including constitutional, eyes, ears, nose and throat, cardiovascular, respiratory, gastrointestinal, genitourinary, musculoskeletal,neurological, skin and/or breast, endocrine, hematologic/lymph, allergic/immunologic and psychiatric.              Medications:  Current Outpatient Medications on File Prior to Visit   Medication Sig     amLODIPine (NORVASC) 10 MG tablet TAKE 1 TABLET BY MOUTH EVERY DAY     aspirin 81 MG EC tablet Take 81 mg by mouth daily.     celecoxib (CELEBREX) 200 MG capsule TAKE 1 CAPSULE(200 MG) BY MOUTH DAILY     cholecalciferol, vitamin D3, 1,000 unit tablet Take 2,000 Units by mouth daily.      doxazosin (CARDURA) 2 MG tablet TAKE 1 TABLET BY MOUTH EVERY NIGHT AT BEDTIME     furosemide (LASIX) 20 MG tablet TAKE 1 TABLET BY MOUTH 5 DAYS PER WEEK EXCEPT SUNDAY AND THURSDAY     latanoprost (XALATAN) 0.005 % ophthalmic solution Administer 1 drop to both eyes bedtime.     PARoxetine (PAXIL) 20 MG tablet Take 1 tablet (20 mg total) by mouth daily.     spironolactone (ALDACTONE) 50 MG tablet TAKE 1 TABLET(50 MG) BY MOUTH DAILY     therapeutic multivitamin (THERAGRAN) tablet Take 1 tablet by mouth daily.     timolol maleate (TIMOPTIC) 0.5 % ophthalmic solution INSTILL 1 DROP INTO BOTH EYES Q EVENING     No current facility-administered medications on file prior to visit.             Allergies:  Allergies   Allergen Reactions     Dust [Other Environmental Allergy]      congestion     Narcotic Antagonist Other (See Comments)     Dysphoria, hallucinations     Pravastatin      myalgia     Triamterene Other (See Comments)     stones     Vasotec [Enalapril Maleate] Other (See Comments)     cough       PSFHx: Tobacco Status:  She  reports that  has never smoked. she has never used smokeless tobacco.   Alcohol Status:    Social History     Substance and Sexual Activity   Alcohol Use No       reports that   "has never smoked. she has never used smokeless tobacco. She reports that she does not drink alcohol or use drugs.    Objective:    /80 (Patient Position: Standing)   Pulse (!) 51   Ht 4' 10\" (1.473 m)   Wt 137 lb 0.6 oz (62.2 kg)   LMP 08/18/1985 (Approximate)   SpO2 96%   Breastfeeding? No   BMI 28.64 kg/m    Weight:   Wt Readings from Last 3 Encounters:   04/03/19 137 lb 0.6 oz (62.2 kg)   10/01/18 145 lb 0.6 oz (65.8 kg)   05/16/18 142 lb (64.4 kg)     BP Readings from Last 10 Encounters:   04/03/19 122/80   10/01/18 130/62   05/16/18 138/60   11/29/17 132/70   07/26/17 138/82   01/25/17 134/68   09/21/16 126/60   04/13/16 146/66   01/13/16 142/68   12/18/15 162/78         General-appears well, no acute distress.  j   Vitals:    04/03/19 1140 04/03/19 1152 04/03/19 1153   BP: 126/66 120/78 122/80   Patient Site: Left Arm     Patient Position: Sitting  Standing   Cuff Size: Adult Regular     Pulse: (!) 51     SpO2: 96%     Weight: 137 lb 0.6 oz (62.2 kg)     Height: 4' 10\" (1.473 m)       Pulse regular  Bilateral ceruminosis left ear greater than right  Neck without adenopathy  Clear lungs  No murmur  Tremor.  No rigidity  Gait unremarkable  No edema  No ataxia        Review of clinical lab tests  Lab Results   Component Value Date    WBC 7.5 10/01/2018    HGB 15.9 10/01/2018    HCT 47.5 (H) 10/01/2018     10/01/2018    CHOL 244 (H) 07/26/2017    TRIG 163 (H) 07/26/2017    HDL 48 (L) 07/26/2017    ALT 14 07/26/2017    AST 18 07/26/2017     10/01/2018    K 4.3 10/01/2018     10/01/2018    CREATININE 0.94 10/01/2018    BUN 17 10/01/2018    CO2 24 10/01/2018    TSH 1.08 07/26/2017    INR 1.05 08/18/2014       Glucose   Date/Time Value Ref Range Status   10/01/2018 08:53  70 - 125 mg/dL Final   05/16/2018 10:36  70 - 125 mg/dL Final   11/29/2017 10:11  70 - 125 mg/dL Final   07/26/2017 09:42  70 - 125 mg/dL Final   01/25/2017 10:01  74 - 125 mg/dL Final "   09/21/2016 09:41  74 - 125 mg/dL Final   04/13/2016 09:47  74 - 125 mg/dL Final   12/18/2015 09:21  74 - 125 mg/dL Final   08/18/2014 11:06  70 - 125 mg/dL Final     Comment:       Fasting Glucose reference range is 70-99 mg/dL per  American Diabetes Association (ADA) guidelines.   08/18/2014 05:58  (H) 70 - 125 mg/dL Final     Comment:       Fasting Glucose reference range is 70-99 mg/dL per  American Diabetes Association (ADA) guidelines.     No results found for this or any previous visit (from the past 24 hour(s)).    RADIOLOGY: No results found.    Review of recent consultation-

## 2021-05-28 ENCOUNTER — RECORDS - HEALTHEAST (OUTPATIENT)
Dept: ADMINISTRATIVE | Facility: CLINIC | Age: 86
End: 2021-05-28

## 2021-05-28 NOTE — TELEPHONE ENCOUNTER
RN cannot approve Refill Request    RN can NOT refill this medication med is not covered by policy/route to provider.    Napoleon Fernandez, [facility] Triage/Med Refill 4/23/2019    Requested Prescriptions   Pending Prescriptions Disp Refills     celecoxib (CELEBREX) 200 MG capsule [Pharmacy Med Name: CELECOXIB 200MG CAPSULES] 90 capsule 0     Sig: TAKE 1 CAPSULE(200 MG) BY MOUTH DAILY       There is no refill protocol information for this order

## 2021-05-28 NOTE — PROGRESS NOTES
Call      Calcium improved  Lets recheck calcium in 6 weeks  Stay off supplemental calcium  You have mild hyperparathyroidism which we are managing medically  If calcium stays above 11 would consider surgery    Ron Crabtree MD  Internal medicine  [facility]  [phone]  [email or web address]

## 2021-05-29 ENCOUNTER — RECORDS - HEALTHEAST (OUTPATIENT)
Dept: ADMINISTRATIVE | Facility: CLINIC | Age: 86
End: 2021-05-29

## 2021-05-29 NOTE — TELEPHONE ENCOUNTER
Reason contacted:  Results  Information relayed:  See below message from PCP  Additional questions:  No  Further follow-up needed:  No  Okay to leave a detailed message:  No

## 2021-05-29 NOTE — PROGRESS NOTES
OFFICE VISIT NOTE  Alma Johns   84 y.o. female            Assessment/Plan for  Alma Johns is a 84 y.o. female.  No Patient Care Coordination Note on file.              1.  Hypertension-needs improved control.  Increase doxazosin 4 mg S.  Off Aldactone due to hypercalcemia  2.  Mild hyperparathyroidism/hypercalcemia exacerbated by diuretic.  Patient is off spironolactone.  She has a history of triamterene stones.  She has no fracture.  She has low bone mass on last DEXA.  No clinical fracture.  No ulcer disease.  Off oral calcium her calcium is reverted to normal  3.  Chronic low back pain on Celebrex  4.  Situational depression with adverse reaction SSRI-dreams.  Will change to Effexor or.  Lower dose 10 mg Paxil did not help.  The past when she went off it totally her dreams went away but her depression  5.  Elevated hemoglobin without of apnea.  Is off diuretic now   -We will recheck hemoglobin.  I do not think she has a myeloproliferative disease.  Consider checking Fred status    plan:  As above  Laboratory-calcium  Clinic follow-up 6 weeks on Effexor    Cardura increase for  DC Paxil 10  Start Effexor 37.5  Follow-up 6 weeks    There are no Patient Instructions on file for this visit.    Diagnoses and all orders for this visit:    Hypercalcemia    HTN (hypertension)  -     amLODIPine (NORVASC) 10 MG tablet; TAKE 1 TABLET BY MOUTH EVERY DAY  Dispense: 90 tablet; Refill: 3  -     Renal Function Profile  -     doxazosin (CARDURA) 4 MG tablet; Take 1 tablet (4 mg total) by mouth daily.  Dispense: 30 tablet; Refill: 11    Situational depression  -     venlafaxine (EFFEXOR XR) 37.5 MG 24 hr capsule; Take 1 capsule (37.5 mg total) by mouth daily.  Dispense: 30 capsule; Refill: 2    Low back pain  -     celecoxib (CELEBREX) 200 MG capsule; Take 1 capsule (200 mg total) by mouth daily.  Dispense: 90 capsule; Refill: 3    Mild hyperparathyroidism (H)  -     DXA Bone Density Scan; Future; Expected date: 05/31/2019  -      Cancel: Calcium    Elevated hemoglobin (H)    Chronic depression    Other orders  -     Cancel: PARoxetine (PAXIL) 10 MG tablet; Take 1 tablet (10 mg total) by mouth daily.  Dispense: 90 tablet; Refill: 3  -     Cancel: doxazosin (CARDURA) 2 MG tablet; Take 1 tablet (2 mg total) by mouth at bedtime.  Dispense: 90 tablet; Refill: 3        Medications after visit  Current Outpatient Medications   Medication Sig Dispense Refill     aspirin 81 MG EC tablet Take 81 mg by mouth daily.       latanoprost (XALATAN) 0.005 % ophthalmic solution Administer 1 drop to both eyes bedtime.       therapeutic multivitamin (THERAGRAN) tablet Take 1 tablet by mouth daily.       timolol maleate (TIMOPTIC) 0.5 % ophthalmic solution INSTILL 1 DROP INTO BOTH EYES Q EVENING  3     amLODIPine (NORVASC) 10 MG tablet TAKE 1 TABLET BY MOUTH EVERY DAY 90 tablet 3     celecoxib (CELEBREX) 200 MG capsule Take 1 capsule (200 mg total) by mouth daily. 90 capsule 3     doxazosin (CARDURA) 4 MG tablet Take 1 tablet (4 mg total) by mouth daily. 30 tablet 11     venlafaxine (EFFEXOR XR) 37.5 MG 24 hr capsule Take 1 capsule (37.5 mg total) by mouth daily. 30 capsule 2     No current facility-administered medications for this visit.                       Ron Crabtree MD  Internal medicine  [facility]  [phone]  [email or web address]    Much or all of the text in this note was generated through the use of Dragon Dictate voice-to-text software. Errors in spelling or words which seem out of context are unintentional.   Sound alike errors, in particular, may have escaped editing.                 Subjective:   Chief Complaint:  Hypertension and Abnormal Lab (Elevated calcium at last visit. Discuss medications stopped.)    Patient for follow-up    Has mild hyperparathyroidism  -History of stones but triamterene  No history of peptic ulcer disease  No history of fracture low bone mass--2.5.  Last DEXA 2012  Does have  chronic depression.    Depression-stable on Paxil 10 which is a decrease from 20  This did not decrease her dreaming however-nightmares she wants them.  Recurrence of depression off Paxil in past  Is never been on Effexor.  Does have chronic depression with recurrence each time medicines have been discontinued    Hypertension-There are no cardiovascular, respiratory, neurologic complaints.No claudication.There is no orthostasis.Patient is compliant with medications.  Medications reviewed.   No side effects from medication.      Review of Systems:     Extensive 10-point review of systems was performed. Please see the HPI for problem specific pertinent review of systems.     Patient does note is been watching her calcium intake    Otherwise, the following systems are noncontributory including constitutional, eyes, ears, nose and throat, cardiovascular, respiratory, gastrointestinal, genitourinary, musculoskeletal,neurological, skin and/or breast, endocrine, hematologic/lymph, allergic/immunologic and psychiatric.              Medications:  Current Outpatient Medications on File Prior to Visit   Medication Sig     aspirin 81 MG EC tablet Take 81 mg by mouth daily.     latanoprost (XALATAN) 0.005 % ophthalmic solution Administer 1 drop to both eyes bedtime.     therapeutic multivitamin (THERAGRAN) tablet Take 1 tablet by mouth daily.     timolol maleate (TIMOPTIC) 0.5 % ophthalmic solution INSTILL 1 DROP INTO BOTH EYES Q EVENING     [DISCONTINUED] amLODIPine (NORVASC) 10 MG tablet TAKE 1 TABLET BY MOUTH EVERY DAY     [DISCONTINUED] celecoxib (CELEBREX) 200 MG capsule TAKE 1 CAPSULE(200 MG) BY MOUTH DAILY     [DISCONTINUED] doxazosin (CARDURA) 2 MG tablet TAKE 1 TABLET BY MOUTH EVERY NIGHT AT BEDTIME     [DISCONTINUED] PARoxetine (PAXIL) 10 MG tablet Take 1 tablet (10 mg total) by mouth daily.     [DISCONTINUED] furosemide (LASIX) 20 MG tablet TAKE 1 TABLET BY MOUTH 5 DAYS PER WEEK EXCEPT SUNDAY AND THURSDAY      "[DISCONTINUED] PARoxetine (PAXIL) 20 MG tablet Take 1 tablet (20 mg total) by mouth daily.     No current facility-administered medications on file prior to visit.             Allergies:  Allergies   Allergen Reactions     Dust [Other Environmental Allergy]      congestion     Narcotic Antagonist Other (See Comments)     Dysphoria, hallucinations     Pravastatin      myalgia     Triamterene Other (See Comments)     stones     Vasotec [Enalapril Maleate] Other (See Comments)     cough       PSFHx: Tobacco Status:  She  reports that she has never smoked. She has never used smokeless tobacco.   Alcohol Status:    Social History     Substance and Sexual Activity   Alcohol Use No       reports that she has never smoked. She has never used smokeless tobacco. She reports that she does not drink alcohol or use drugs.    Objective:    /80   Pulse (!) 52   Ht 4' 10\" (1.473 m)   Wt 144 lb 1.3 oz (65.4 kg)   LMP 08/18/1985 (Approximate)   SpO2 95%   Breastfeeding? No   BMI 30.11 kg/m    Weight:   Wt Readings from Last 3 Encounters:   05/31/19 144 lb 1.3 oz (65.4 kg)   04/03/19 137 lb 0.6 oz (62.2 kg)   10/01/18 145 lb 0.6 oz (65.8 kg)     BP Readings from Last 10 Encounters:   05/31/19 148/80   04/03/19 122/80   10/01/18 130/62   05/16/18 138/60   11/29/17 132/70   07/26/17 138/82   01/25/17 134/68   09/21/16 126/60   04/13/16 146/66   01/13/16 142/68         General-appears well, no acute distress.  Vitals:    05/31/19 0823 05/31/19 0842   BP: 146/68 148/80   Patient Site: Right Arm    Patient Position: Sitting    Cuff Size: Adult Regular    Pulse: (!) 52    SpO2: 95%    Weight: 144 lb 1.3 oz (65.4 kg)    Height: 4' 10\" (1.473 m)      Pulse regular  Lungs clear  No murmur  No edema  Pulses present      Review of clinical lab tests  Lab Results   Component Value Date    WBC 7.7 04/03/2019    HGB 16.8 (H) 04/03/2019    HCT 48.8 (H) 04/03/2019     04/03/2019    CHOL 244 (H) 07/26/2017    TRIG 163 (H) " 07/26/2017    HDL 48 (L) 07/26/2017    ALT 14 07/26/2017    AST 18 07/26/2017     04/03/2019    K 4.4 04/03/2019     04/03/2019    CREATININE 1.25 (H) 04/03/2019    BUN 22 04/03/2019    CO2 26 04/03/2019    TSH 1.08 07/26/2017    INR 1.05 08/18/2014       Calcium   Date/Time Value Ref Range Status   04/10/2019 08:51 AM 10.7 (H) 8.5 - 10.5 mg/dL Final   04/03/2019 12:09 PM 11.5 (H) 8.5 - 10.5 mg/dL Final   10/01/2018 08:53 AM 10.9 (H) 8.5 - 10.5 mg/dL Final   05/16/2018 10:36 AM 10.8 (H) 8.5 - 10.5 mg/dL Final   11/29/2017 10:11 AM 10.5 8.5 - 10.5 mg/dL Final   07/26/2017 09:42 AM 10.7 (H) 8.5 - 10.5 mg/dL Final   07/26/2017 09:42 AM 10.8 (H) 8.5 - 10.5 mg/dL Final   01/25/2017 10:01 AM 10.3 (H) 8.5 - 10.1 mg/dL Final   09/21/2016 09:41 AM 9.9 8.5 - 10.1 mg/dL Final     Lab Results   Component Value Date     (H) 04/10/2019    CALCIUM 10.7 (H) 04/10/2019    PHOS 3.6 04/10/2019     Vitamin D, Total (25-Hydroxy)   Date Value Ref Range Status   07/26/2017 18.5 (L) 30.0 - 80.0 ng/mL Final     No components found for: HGB9   Hemoglobin   Date/Time Value Ref Range Status   04/03/2019 12:09 PM 16.8 (H) 12.0 - 16.0 g/dL Final   10/01/2018 08:53 AM 15.9 12.0 - 16.0 g/dL Final   05/16/2018 10:36 AM 17.3 (H) 12.0 - 16.0 g/dL Final   07/26/2017 09:42 AM 16.6 (H) 12.0 - 16.0 g/dL Final   01/25/2017 10:01 AM 16.9 (H) 12.0 - 16.0 g/dL Final   09/21/2016 09:41 AM 16.0 12.0 - 16.0 g/dL Final   04/13/2016 09:47 AM 16.8 (H) 12.0 - 16.0 g/dL Final   01/13/2016 10:12 AM 16.7 (H) 12.0 - 16.0 g/dL Final   12/18/2015 09:21 AM 18.2 (H) 12.0 - 16.0 g/dL Final           No results found for this or any previous visit (from the past 240 hour(s))..la  Glucose   Date/Time Value Ref Range Status   04/03/2019 12:09  70 - 125 mg/dL Final   10/01/2018 08:53  70 - 125 mg/dL Final   05/16/2018 10:36  70 - 125 mg/dL Final   11/29/2017 10:11  70 - 125 mg/dL Final   07/26/2017 09:42  70 - 125 mg/dL Final    01/25/2017 10:01  74 - 125 mg/dL Final   09/21/2016 09:41  74 - 125 mg/dL Final   04/13/2016 09:47  74 - 125 mg/dL Final   12/18/2015 09:21  74 - 125 mg/dL Final   08/18/2014 11:06  70 - 125 mg/dL Final     Comment:       Fasting Glucose reference range is 70-99 mg/dL per  American Diabetes Association (ADA) guidelines.     No results found for this or any previous visit (from the past 24 hour(s)).    RADIOLOGY: No results found.    Review of recent consultation-

## 2021-05-29 NOTE — TELEPHONE ENCOUNTER
----- Message from Ron Crabtree MD sent at 5/31/2019  3:59 PM CDT -----  Please call  Calcium significantly improved at 10.6

## 2021-05-30 ENCOUNTER — RECORDS - HEALTHEAST (OUTPATIENT)
Dept: ADMINISTRATIVE | Facility: CLINIC | Age: 86
End: 2021-05-30

## 2021-05-30 VITALS — HEIGHT: 59 IN | WEIGHT: 140.12 LBS | BODY MASS INDEX: 28.25 KG/M2

## 2021-05-30 NOTE — TELEPHONE ENCOUNTER
Reason contacted:  Results  Information relayed:  See below information from PCP  Additional questions:  No  Further follow-up needed:  No  Okay to leave a detailed message:  No     Apurva Juarez, [facility]

## 2021-05-30 NOTE — PROGRESS NOTES
OFFICE VISIT NOTE  Alma Johns   84 y.o. female            Assessment/Plan for  Alma Johns is a 84 y.o. female.  No Patient Care Coordination Note on file.           1.  Hypertension-controlled however I think the amlodipine is causing her swelling.  She also takes a lot of sodium.  She did reinitiate the Lasix because of the edema  2.  Edema-improved the Lasix.  We will cut down the amlodipine  3.  Mild hyperparathyroidism with mild hypercalcemia.  Now back on Lasix.  Should be improved.  She is not taking calcium or vitamin D at this time.  She is off spironolactone  4.  Chronic anxiety back on Paxil 10.  GI issues-constipation and poor sleep with Effexor X R  5.  Elevated hemoglobin-recheck CBC    Plan:  Continue Paxil 10  Continue Lasix 20  Decrease amlodipine from current 10 mg down to 5 mg  Report if no change in edema  Decrease salt intake  CBC calcium- hemoglobin was slightly high last visit check  Clinic visit 3 months    There are no Patient Instructions on file for this visit.    Diagnoses and all orders for this visit:    Essential hypertension  -     HM1(CBC and Differential)  -     Calcium  -     HM1 (CBC with Diff)    Mild hyperparathyroidism (H)    HTN (hypertension)  -     amLODIPine (NORVASC) 10 MG tablet; Take 0.5 tablets (5 mg total) by mouth daily. TAKE 1 TABLET BY MOUTH EVERY DAY  Dispense: 90 tablet; Refill: 3    Edema, unspecified type        Medications after visit  Current Outpatient Medications   Medication Sig Dispense Refill     amLODIPine (NORVASC) 10 MG tablet Take 0.5 tablets (5 mg total) by mouth daily. TAKE 1 TABLET BY MOUTH EVERY DAY 90 tablet 3     aspirin 81 MG EC tablet Take 81 mg by mouth daily.       celecoxib (CELEBREX) 200 MG capsule Take 1 capsule (200 mg total) by mouth daily. 90 capsule 3     doxazosin (CARDURA) 4 MG tablet Take 1 tablet (4 mg total) by mouth daily. 30 tablet 11     furosemide (LASIX) 20 MG tablet Take 20 mg by mouth daily.       latanoprost (XALATAN)  0.005 % ophthalmic solution Administer 1 drop to both eyes bedtime.       PARoxetine (PAXIL) 10 MG tablet Take 10 mg by mouth every morning.       therapeutic multivitamin (THERAGRAN) tablet Take 1 tablet by mouth daily.       timolol maleate (TIMOPTIC) 0.5 % ophthalmic solution INSTILL 1 DROP INTO BOTH EYES Q EVENING  3     No current facility-administered medications for this visit.                       Ron Crabtree MD  Internal medicine  [facility]  [phone]  [email or web address]    Much or all of the text in this note was generated through the use of Dragon Dictate voice-to-text software. Errors in spelling or words which seem out of context are unintentional.   Sound alike errors, in particular, may have escaped editing.                 Subjective:   Chief Complaint:  Hypertension; Hypercalcemia; and Depression (Did not not like last medication given)    In for follow-up    Off the diuretic she developed edema  She did not restart the Spironolactone-she knows it elevates her calcium  She did restart furosemide  She takes 1 a day  This did help  She is eating a lot of pickles and olives and other salt containing foods  Hypertension-There are no cardiovascular, respiratory, neurologic complaints.No claudication.There is no orthostasis.Patient is compliant with medications.  Medications reviewed.   No side effects from medication.      Chronic anxiety.  Back on Paxil 10.  Good sleep slight dreams.  She had been on 20 mg    Effexor X are-GI side effect-constipation and poor sleep-discontinue    Edema-as noted above-improved with reinstitution of Lasix    Review of Systems:     Extensive 10-point review of systems was performed. Please see the HPI for problem specific pertinent review of systems.     Patient does note feels well    Otherwise, the following systems are noncontributory including constitutional, eyes, ears, nose and throat, cardiovascular, respiratory,  gastrointestinal, genitourinary, musculoskeletal,neurological, skin and/or breast, endocrine, hematologic/lymph, allergic/immunologic and psychiatric.              Medications:  Current Outpatient Medications on File Prior to Visit   Medication Sig     aspirin 81 MG EC tablet Take 81 mg by mouth daily.     celecoxib (CELEBREX) 200 MG capsule Take 1 capsule (200 mg total) by mouth daily.     doxazosin (CARDURA) 4 MG tablet Take 1 tablet (4 mg total) by mouth daily.     furosemide (LASIX) 20 MG tablet Take 20 mg by mouth daily.     latanoprost (XALATAN) 0.005 % ophthalmic solution Administer 1 drop to both eyes bedtime.     PARoxetine (PAXIL) 10 MG tablet Take 10 mg by mouth every morning.     therapeutic multivitamin (THERAGRAN) tablet Take 1 tablet by mouth daily.     timolol maleate (TIMOPTIC) 0.5 % ophthalmic solution INSTILL 1 DROP INTO BOTH EYES Q EVENING     [DISCONTINUED] amLODIPine (NORVASC) 10 MG tablet TAKE 1 TABLET BY MOUTH EVERY DAY     [DISCONTINUED] venlafaxine (EFFEXOR XR) 37.5 MG 24 hr capsule Take 1 capsule (37.5 mg total) by mouth daily.     No current facility-administered medications on file prior to visit.             Allergies:  Allergies   Allergen Reactions     Dust [Other Environmental Allergy]      congestion     Narcotic Antagonist Other (See Comments)     Dysphoria, hallucinations     Pravastatin      myalgia     Triamterene Other (See Comments)     stones     Vasotec [Enalapril Maleate] Other (See Comments)     cough     Effexor [Venlafaxine] Other (See Comments)     Adverse reaction -bowel       PSFHx: Tobacco Status:  She  reports that she has never smoked. She has never used smokeless tobacco.   Alcohol Status:    Social History     Substance and Sexual Activity   Alcohol Use No       reports that she has never smoked. She has never used smokeless tobacco. She reports that she does not drink alcohol or use drugs.    Objective:    /80 (Patient Position: Standing)   Pulse (!) 57   " Ht 4' 10\" (1.473 m)   Wt 145 lb 1.9 oz (65.8 kg)   LMP 08/18/1985 (Approximate)   SpO2 96%   Breastfeeding? No   BMI 30.33 kg/m    Weight:   Wt Readings from Last 3 Encounters:   07/10/19 145 lb 1.9 oz (65.8 kg)   05/31/19 144 lb 1.3 oz (65.4 kg)   04/03/19 137 lb 0.6 oz (62.2 kg)     BP Readings from Last 10 Encounters:   07/10/19 136/80   05/31/19 148/80   04/03/19 122/80   10/01/18 130/62   05/16/18 138/60   11/29/17 132/70   07/26/17 138/82   01/25/17 134/68   09/21/16 126/60   04/13/16 146/66         General-appears well, no acute distress.  Vitals:    07/10/19 1037 07/10/19 1054 07/10/19 1055   BP: 152/58 136/80 136/80   Patient Site: Right Arm     Patient Position: Sitting  Standing   Cuff Size: Adult Regular     Pulse: (!) 57     SpO2: 96%     Weight: 145 lb 1.9 oz (65.8 kg)     Height: 4' 10\" (1.473 m)       Clear lungs  No murmur  1-2+ edema bilateral      Review of clinical lab tests  Lab Results   Component Value Date    WBC 7.7 04/03/2019    HGB 16.8 (H) 04/03/2019    HCT 48.8 (H) 04/03/2019     04/03/2019    CHOL 244 (H) 07/26/2017    TRIG 163 (H) 07/26/2017    HDL 48 (L) 07/26/2017    ALT 14 07/26/2017    AST 18 07/26/2017     05/31/2019    K 3.7 05/31/2019     (H) 05/31/2019    CREATININE 0.87 05/31/2019    BUN 17 05/31/2019    CO2 24 05/31/2019    TSH 1.08 07/26/2017    INR 1.05 08/18/2014     Calcium   Date/Time Value Ref Range Status   05/31/2019 09:01 AM 10.6 (H) 8.5 - 10.5 mg/dL Final   04/10/2019 08:51 AM 10.7 (H) 8.5 - 10.5 mg/dL Final   04/03/2019 12:09 PM 11.5 (H) 8.5 - 10.5 mg/dL Final   10/01/2018 08:53 AM 10.9 (H) 8.5 - 10.5 mg/dL Final   05/16/2018 10:36 AM 10.8 (H) 8.5 - 10.5 mg/dL Final   11/29/2017 10:11 AM 10.5 8.5 - 10.5 mg/dL Final   07/26/2017 09:42 AM 10.7 (H) 8.5 - 10.5 mg/dL Final   07/26/2017 09:42 AM 10.8 (H) 8.5 - 10.5 mg/dL Final   01/25/2017 10:01 AM 10.3 (H) 8.5 - 10.1 mg/dL Final     Lab Results   Component Value Date     (H) 04/10/2019 "    CALCIUM 10.6 (H) 05/31/2019    PHOS 2.9 05/31/2019         Glucose   Date/Time Value Ref Range Status   05/31/2019 09:01  70 - 125 mg/dL Final   04/03/2019 12:09  70 - 125 mg/dL Final   10/01/2018 08:53  70 - 125 mg/dL Final   05/16/2018 10:36  70 - 125 mg/dL Final   11/29/2017 10:11  70 - 125 mg/dL Final   07/26/2017 09:42  70 - 125 mg/dL Final   01/25/2017 10:01  74 - 125 mg/dL Final   09/21/2016 09:41  74 - 125 mg/dL Final   04/13/2016 09:47  74 - 125 mg/dL Final   12/18/2015 09:21  74 - 125 mg/dL Final     No results found for this or any previous visit (from the past 24 hour(s)).    RADIOLOGY: No results found.    Review of recent consultation-

## 2021-05-30 NOTE — TELEPHONE ENCOUNTER
RN cannot approve Refill Request    RN can NOT refill this medication PCP messaged that patient is overdue for Labs. Last office visit: 7/10/2019 Ron Crabtree MD Last Physical: 7/26/2017 Last MTM visit: Visit date not found Last visit same specialty: 7/10/2019 Ron Crabtree MD.  Next visit within 3 mo: Visit date not found  Next physical within 3 mo: Visit date not found      Tori TIJERINA Destiny, [facility] Triage/Med Refill 7/16/2019    Requested Prescriptions   Pending Prescriptions Disp Refills     venlafaxine (EFFEXOR-XR) 37.5 MG 24 hr capsule [Pharmacy Med Name: VENLAFAXINE HCL ER 37.5 MG CAP] 30 capsule 2     Sig: TAKE 1 CAPSULE BY MOUTH EVERY DAY       Venlafaxine/Desvenlafaxine Refill Protocol Failed - 7/16/2019 11:35 AM        Failed - LFT or AST or ALT in last year     Albumin   Date Value Ref Range Status   05/31/2019 3.6 3.5 - 5.0 g/dL Final     Bilirubin, Total   Date Value Ref Range Status   07/26/2017 1.3 (H) 0.0 - 1.0 mg/dL Final     Bilirubin, Direct   Date Value Ref Range Status   07/26/2017 0.3 <=0.5 mg/dL Final     Alkaline Phosphatase   Date Value Ref Range Status   07/26/2017 139 (H) 45 - 120 U/L Final     AST   Date Value Ref Range Status   07/26/2017 18 0 - 40 U/L Final     ALT   Date Value Ref Range Status   07/26/2017 14 0 - 45 U/L Final     Protein, Total   Date Value Ref Range Status   07/26/2017 7.9 6.0 - 8.0 g/dL Final                Failed - Fasting lipid cascade in last year     Cholesterol   Date Value Ref Range Status   07/26/2017 244 (H) <=199 mg/dL Final     Triglycerides   Date Value Ref Range Status   07/26/2017 163 (H) <=149 mg/dL Final     HDL Cholesterol   Date Value Ref Range Status   07/26/2017 48 (L) >=50 mg/dL Final     LDL Calculated   Date Value Ref Range Status   07/26/2017 163 (H) <=129 mg/dL Final     Patient Fasting > 8hrs?   Date Value Ref Range Status   07/26/2017 Yes  Final             Passed - PCP or prescribing provider visit in last year     Last office  visit with prescriber/PCP: 7/10/2019 Ron Crabtree MD OR same dept: 7/10/2019 Ron Crabtree MD OR same specialty: 7/10/2019 Ron Crabtree MD  Last physical: 7/26/2017 Last MTM visit: Visit date not found   Next visit within 3 mo: Visit date not found  Next physical within 3 mo: Visit date not found  Prescriber OR PCP: Ron Crabtree MD  Last diagnosis associated with med order: 1. Situational depression  - venlafaxine (EFFEXOR-XR) 37.5 MG 24 hr capsule [Pharmacy Med Name: VENLAFAXINE HCL ER 37.5 MG CAP]; TAKE 1 CAPSULE BY MOUTH EVERY DAY  Dispense: 30 capsule; Refill: 2    If protocol passes may refill for 12 months if within 3 months of last provider visit (or a total of 15 months).             Passed - Blood Pressure in last year     BP Readings from Last 1 Encounters:   07/10/19 136/80

## 2021-05-30 NOTE — TELEPHONE ENCOUNTER
----- Message from Ron Crabtree MD sent at 7/10/2019  4:50 PM CDT -----  Call patient.  Calcium remains stable at 10.6

## 2021-05-31 ENCOUNTER — RECORDS - HEALTHEAST (OUTPATIENT)
Dept: ADMINISTRATIVE | Facility: CLINIC | Age: 86
End: 2021-05-31

## 2021-05-31 VITALS — WEIGHT: 147 LBS | BODY MASS INDEX: 30.86 KG/M2 | HEIGHT: 58 IN

## 2021-05-31 VITALS — HEIGHT: 58 IN | BODY MASS INDEX: 30.65 KG/M2 | WEIGHT: 146.04 LBS

## 2021-06-01 ENCOUNTER — RECORDS - HEALTHEAST (OUTPATIENT)
Dept: ADMINISTRATIVE | Facility: CLINIC | Age: 86
End: 2021-06-01

## 2021-06-01 VITALS — BODY MASS INDEX: 29.81 KG/M2 | HEIGHT: 58 IN | WEIGHT: 142 LBS

## 2021-06-02 ENCOUNTER — RECORDS - HEALTHEAST (OUTPATIENT)
Dept: ADMINISTRATIVE | Facility: CLINIC | Age: 86
End: 2021-06-02

## 2021-06-02 VITALS — BODY MASS INDEX: 30.45 KG/M2 | HEIGHT: 58 IN | WEIGHT: 145.04 LBS

## 2021-06-02 VITALS — HEIGHT: 58 IN | BODY MASS INDEX: 28.77 KG/M2 | WEIGHT: 137.04 LBS

## 2021-06-02 NOTE — PROGRESS NOTES
OFFICE VISIT NOTE  Alma Johns   84 y.o. female            Assessment/Plan for  Alma Johns is a 84 y.o. female.  No Patient Care Coordination Note on file.       1. Essential hypertension  Adequate control with mild orthostasis.  Continue current Rx  - Renal Function Profile    2. Mild hyperparathyroidism (H)  Mild hypercalcemia asymptomatic  - Renal Function Profile    3. Edema, unspecified type  Controlled    4. Situational depression  Excellent control on fluoxetine/low-dose Paxil    5. Osteoarthritis of lumbar spine, unspecified spinal osteoarthritis complication status  Chronic stable-unchanged        Plan:  Renal profile  Same meds  Flu shot today  Clinic visit March  Call any issues  Discussed Tamiflu    There are no Patient Instructions on file for this visit.    Diagnoses and all orders for this visit:    Essential hypertension  -     Renal Function Profile    Mild hyperparathyroidism (H)  -     Renal Function Profile    Edema, unspecified type    Situational depression    Osteoarthritis of lumbar spine, unspecified spinal osteoarthritis complication status        Medications after visit  Current Outpatient Medications   Medication Sig Dispense Refill     amLODIPine (NORVASC) 10 MG tablet Take 0.5 tablets (5 mg total) by mouth daily. TAKE 1 TABLET BY MOUTH EVERY DAY 90 tablet 3     aspirin 81 MG EC tablet Take 81 mg by mouth daily.       celecoxib (CELEBREX) 200 MG capsule Take 1 capsule (200 mg total) by mouth daily. 90 capsule 3     doxazosin (CARDURA) 4 MG tablet Take 1 tablet (4 mg total) by mouth daily. 30 tablet 11     furosemide (LASIX) 20 MG tablet Take 20 mg by mouth daily.       latanoprost (XALATAN) 0.005 % ophthalmic solution Administer 1 drop to both eyes bedtime.       PARoxetine (PAXIL) 10 MG tablet Take 10 mg by mouth every morning.       therapeutic multivitamin (THERAGRAN) tablet Take 1 tablet by mouth daily.       timolol maleate (TIMOPTIC) 0.5 % ophthalmic solution INSTILL 1 DROP INTO  BOTH EYES Q EVENING  3     venlafaxine (EFFEXOR-XR) 37.5 MG 24 hr capsule TAKE 1 CAPSULE BY MOUTH EVERY DAY 30 capsule 2     No current facility-administered medications for this visit.                       Ron Crabtree MD  Internal medicine  [facility]  [phone]  [email or web address]    Much or all of the text in this note was generated through the use of Dragon Dictate voice-to-text software. Errors in spelling or words which seem out of context are unintentional.   Sound alike errors, in particular, may have escaped editing.                 Subjective:   Chief Complaint:  Hypertension and Follow-up (Routine 3m visit)    Follow-up    Doing well    Patient spirits are fine  She is on the l Paxil and Effexor both low-dose  No side effects  Actually doing quite well    Hypertension-There are no cardiovascular, respiratory, neurologic complaints.No claudication.There is no orthostasis.Patient is compliant with medications.  Medications reviewed.   No side effects from medication.    Hyperlipoproteinemia-patient is tolerating medication.  There are no myalgia, arthralgia, weakness.  No Bowel issues.  Liver profile has been normal.  Patient has met cholesterol goals.      Review of Systems:     Extensive 10-point review of systems was performed. Please see the HPI for problem specific pertinent review of systems.     Patient does note her appetite is good.  She is not having any flank pain or GI symptoms    Otherwise, the following systems are noncontributory including constitutional, eyes, ears, nose and throat, cardiovascular, respiratory, gastrointestinal, genitourinary, musculoskeletal,neurological, skin and/or breast, endocrine, hematologic/lymph, allergic/immunologic and psychiatric.              Medications:  Current Outpatient Medications on File Prior to Visit   Medication Sig     amLODIPine (NORVASC) 10 MG tablet Take 0.5 tablets (5 mg total) by mouth daily. TAKE 1  "TABLET BY MOUTH EVERY DAY     aspirin 81 MG EC tablet Take 81 mg by mouth daily.     celecoxib (CELEBREX) 200 MG capsule Take 1 capsule (200 mg total) by mouth daily.     doxazosin (CARDURA) 4 MG tablet Take 1 tablet (4 mg total) by mouth daily.     furosemide (LASIX) 20 MG tablet Take 20 mg by mouth daily.     latanoprost (XALATAN) 0.005 % ophthalmic solution Administer 1 drop to both eyes bedtime.     PARoxetine (PAXIL) 10 MG tablet Take 10 mg by mouth every morning.     therapeutic multivitamin (THERAGRAN) tablet Take 1 tablet by mouth daily.     timolol maleate (TIMOPTIC) 0.5 % ophthalmic solution INSTILL 1 DROP INTO BOTH EYES Q EVENING     venlafaxine (EFFEXOR-XR) 37.5 MG 24 hr capsule TAKE 1 CAPSULE BY MOUTH EVERY DAY     No current facility-administered medications on file prior to visit.             Allergies:  Allergies   Allergen Reactions     Dust [Other Environmental Allergy]      congestion     Narcotic Antagonist Other (See Comments)     Dysphoria, hallucinations     Pravastatin      myalgia     Triamterene Other (See Comments)     stones     Vasotec [Enalapril Maleate] Other (See Comments)     cough     Effexor [Venlafaxine] Other (See Comments)     Adverse reaction -bowel       PSFHx: Tobacco Status:  She  reports that she has never smoked. She has never used smokeless tobacco.   Alcohol Status:    Social History     Substance and Sexual Activity   Alcohol Use No       reports that she has never smoked. She has never used smokeless tobacco. She reports that she does not drink alcohol or use drugs.    Objective:    /80   Pulse (!) 53   Ht 4' 10\" (1.473 m)   Wt 143 lb 0.6 oz (64.9 kg)   LMP 08/18/1985 (Approximate)   SpO2 98%   Breastfeeding? No   BMI 29.90 kg/m    Weight:   Wt Readings from Last 3 Encounters:   10/17/19 143 lb 0.6 oz (64.9 kg)   07/10/19 145 lb 1.9 oz (65.8 kg)   05/31/19 144 lb 1.3 oz (65.4 kg)     BP Readings from Last 10 Encounters:   10/17/19 145/80   07/10/19 136/80 " "  05/31/19 148/80   04/03/19 122/80   10/01/18 130/62   05/16/18 138/60   11/29/17 132/70   07/26/17 138/82   01/25/17 134/68   09/21/16 126/60         General-appears well, no acute distress.  Vitals:    10/17/19 0959 10/17/19 1011   BP: 146/70 145/80   Patient Site: Right Arm    Patient Position: Sitting    Cuff Size: Adult Regular    Pulse: (!) 53    SpO2: 98%    Weight: 143 lb 0.6 oz (64.9 kg)    Height: 4' 10\" (1.473 m)      Mild orthostasis  Skin: Normal. No rash or lesion  Head:  Normocephalic, symmetric  Speech-clear  Eyes: Eyes midline full EOM.  External exams normal.  No icterus  Neck:  No palpable masses, lymphadenopathy or tenderness. No thyromegaly or goiter  Carotid Arteries:  Equal pulsations bilateral.No Bruit, normal upstroke   .  Respiratory:  Normal respiratory effort.  Lungs are clear with good breath sounds.  No dullness.  No wheezing.  Heart: Regular rhythm.  Normal sounding S1, S2 without S3, S4, murmurs, rubs, or gallops.  Extremities-trace edema  Gait mildly arthritic.         Review of clinical lab tests  Lab Results   Component Value Date    WBC 7.6 07/10/2019    HGB 15.3 07/10/2019    HCT 45.0 07/10/2019     07/10/2019    CHOL 244 (H) 07/26/2017    TRIG 163 (H) 07/26/2017    HDL 48 (L) 07/26/2017    ALT 14 07/26/2017    AST 18 07/26/2017     05/31/2019    K 3.7 05/31/2019     (H) 05/31/2019    CREATININE 0.87 05/31/2019    BUN 17 05/31/2019    CO2 24 05/31/2019    TSH 1.08 07/26/2017    INR 1.05 08/18/2014     Calcium   Date/Time Value Ref Range Status   07/10/2019 11:14 AM 10.6 (H) 8.5 - 10.5 mg/dL Final   05/31/2019 09:01 AM 10.6 (H) 8.5 - 10.5 mg/dL Final   04/10/2019 08:51 AM 10.7 (H) 8.5 - 10.5 mg/dL Final   04/03/2019 12:09 PM 11.5 (H) 8.5 - 10.5 mg/dL Final   10/01/2018 08:53 AM 10.9 (H) 8.5 - 10.5 mg/dL Final   05/16/2018 10:36 AM 10.8 (H) 8.5 - 10.5 mg/dL Final   11/29/2017 10:11 AM 10.5 8.5 - 10.5 mg/dL Final   07/26/2017 09:42 AM 10.7 (H) 8.5 - 10.5 mg/dL " Final   07/26/2017 09:42 AM 10.8 (H) 8.5 - 10.5 mg/dL Final       Glucose   Date/Time Value Ref Range Status   05/31/2019 09:01  70 - 125 mg/dL Final   04/03/2019 12:09  70 - 125 mg/dL Final   10/01/2018 08:53  70 - 125 mg/dL Final   05/16/2018 10:36  70 - 125 mg/dL Final   11/29/2017 10:11  70 - 125 mg/dL Final   07/26/2017 09:42  70 - 125 mg/dL Final   01/25/2017 10:01  74 - 125 mg/dL Final   09/21/2016 09:41  74 - 125 mg/dL Final   04/13/2016 09:47  74 - 125 mg/dL Final   12/18/2015 09:21  74 - 125 mg/dL Final     No results found for this or any previous visit (from the past 24 hour(s)).    RADIOLOGY: No results found.    Review of recent consultation-

## 2021-06-02 NOTE — TELEPHONE ENCOUNTER
Spoke with the pharmacy and they stated that the prescription has been received from Dr. Crabtree.  They had no further questions at this time.  Danna ERAZO CMA/DES....................1:54 PM

## 2021-06-02 NOTE — TELEPHONE ENCOUNTER
Keily Dey for refill requested?  Refill has been set up for you to review.  Danna ERAZO, OWEN/CMT....................12:42 PM

## 2021-06-03 ENCOUNTER — COMMUNICATION - HEALTHEAST (OUTPATIENT)
Dept: INTERNAL MEDICINE | Facility: CLINIC | Age: 86
End: 2021-06-03

## 2021-06-03 VITALS
DIASTOLIC BLOOD PRESSURE: 80 MMHG | OXYGEN SATURATION: 98 % | HEIGHT: 58 IN | BODY MASS INDEX: 30.02 KG/M2 | WEIGHT: 143.04 LBS | HEART RATE: 53 BPM | SYSTOLIC BLOOD PRESSURE: 145 MMHG

## 2021-06-03 VITALS — BODY MASS INDEX: 30.46 KG/M2 | WEIGHT: 145.12 LBS | HEIGHT: 58 IN

## 2021-06-03 VITALS — HEIGHT: 58 IN | WEIGHT: 144.08 LBS | BODY MASS INDEX: 30.24 KG/M2

## 2021-06-03 DIAGNOSIS — I10 HTN (HYPERTENSION): ICD-10-CM

## 2021-06-04 RX ORDER — DOXAZOSIN 4 MG/1
TABLET ORAL
Qty: 90 TABLET | Refills: 2 | Status: SHIPPED | OUTPATIENT
Start: 2021-06-04 | End: 2022-03-02

## 2021-06-05 VITALS
SYSTOLIC BLOOD PRESSURE: 130 MMHG | OXYGEN SATURATION: 95 % | TEMPERATURE: 97.4 F | HEIGHT: 58 IN | HEART RATE: 64 BPM | BODY MASS INDEX: 30.44 KG/M2 | DIASTOLIC BLOOD PRESSURE: 60 MMHG | WEIGHT: 145 LBS

## 2021-06-05 NOTE — TELEPHONE ENCOUNTER
RN cannot approve Refill Request    RN can NOT refill this medication historical medication requested. Last office visit: 10/17/2019 Ron Crabtree MD Last Physical: 7/26/2017 Last MTM visit: Visit date not found Last visit same specialty: 10/17/2019 Ron Crabtere MD.  Next visit within 3 mo: Visit date not found  Next physical within 3 mo: Visit date not found      Brooklynn Houston, [facility] Triage/Med Refill 1/31/2020    Requested Prescriptions   Pending Prescriptions Disp Refills     PARoxetine (PAXIL) 10 MG tablet [Pharmacy Med Name: PAROXETINE HCL 10 MG TABLET] 90 tablet 1     Sig: TAKE 1 TABLET BY MOUTH EVERY DAY       [facility] Refill Protocol  Passed - 1/31/2020  1:50 AM        Passed - PCP or prescribing provider visit in last year     Last office visit with prescriber/PCP: 10/17/2019 Rno Crabtree MD OR same dept: 10/17/2019 Ron Crabtree MD OR same specialty: 10/17/2019 Ron Crabtree MD  Last physical: 7/26/2017 Last MTM visit: Visit date not found   Next visit within 3 mo: Visit date not found  Next physical within 3 mo: Visit date not found  Prescriber OR PCP: Ron Crabtree MD  Last diagnosis associated with med order: There are no diagnoses linked to this encounter.  If protocol passes may refill for 12 months if within 3 months of last provider visit (or a total of 15 months).

## 2021-06-05 NOTE — TELEPHONE ENCOUNTER
RN cannot approve Refill Request    RN can NOT refill this medication historical medication requested.     Heaven Calles, [facility] Triage/Med Refill 1/27/2020    Requested Prescriptions   Pending Prescriptions Disp Refills     furosemide (LASIX) 20 MG tablet [Pharmacy Med Name: FUROSEMIDE 20 MG TABLET] 64 tablet 2     Sig: TAKE 1 TAB BY MOUTH 5 DAYS PER WEEK EXCEPT SUNDAY AND THURSDAY       Diuretics/Combination Diuretics Refill Protocol  Passed - 1/27/2020 11:02 AM        Passed - Visit with PCP or prescribing provider visit in past 12 months     Last office visit with prescriber/PCP: 10/17/2019 Ron Crabtree MD OR same dept: 10/17/2019 Ron Crabtree MD OR same specialty: 10/17/2019 Ron Crabtree MD  Last physical: 7/26/2017 Last MTM visit: Visit date not found   Next visit within 3 mo: Visit date not found  Next physical within 3 mo: Visit date not found  Prescriber OR PCP: Ron Crabtree MD  Last diagnosis associated with med order: There are no diagnoses linked to this encounter.  If protocol passes may refill for 12 months if within 3 months of last provider visit (or a total of 15 months).             Passed - Serum Potassium in past 12 months      Lab Results   Component Value Date    Potassium 3.7 10/17/2019             Passed - Serum Sodium in past 12 months      Lab Results   Component Value Date    Sodium 141 10/17/2019             Passed - Blood pressure on file in past 12 months     BP Readings from Last 1 Encounters:   10/17/19 145/80             Passed - Serum Creatinine in past 12 months      Creatinine   Date Value Ref Range Status   10/17/2019 0.92 0.60 - 1.10 mg/dL Final

## 2021-06-06 NOTE — TELEPHONE ENCOUNTER
Who is calling:  Patient  Reason for Call:  Patient has concerns about getting a new PCP after Dr. Crabtree retires, patient would like a return call.   Date of last appointment with primary care: 10/17/19  Okay to leave a detailed message: Yes

## 2021-06-07 NOTE — PROGRESS NOTES
"Alma Johns is a 84 y.o. female who is being evaluated via a billable telephone visit.      The patient has been notified of following:     \"This telephone visit will be conducted via a call between you and your physician/provider. We have found that certain health care needs can be provided without the need for a physical exam.  This service lets us provide the care you need with a short phone conversation.  If a prescription is necessary we can send it directly to your pharmacy.  If lab work is needed we can place an order for that and you can then stop by our lab to have the test done at a later time.    If during the course of the call the physician/provider feels a telephone visit is not appropriate, you will not be charged for this service.\"     Alma Johns complains of    Chief Complaint   Patient presents with     Establish Care     I have reviewed and updated the patient's Past Medical History, Social History, Family History and Medication List.    ALLERGIES  Dust [other environmental allergy]; Narcotic antagonist; Pravastatin; Triamterene; Vasotec [enalapril maleate]; and Effexor [venlafaxine]    Additional provider notes:  Patient of Dr. Crabtree.  Hx of mild hyperparathyroidism with hypercalcemia, hypertension, situational depression, chronic anxiety, lumbar OA.  10/17/2019 BMP normal, calcium 11.2, TSH 0.45 and 7/10/2019 CBC normal.  DEXA scan ordered 5/31/2019 not done yet (noted to have mild osteopenia in 2017), last mammogram 3/25/2013 normal.  Last /80 on 10/17/2019.  Bilateral carotid ultrasounds 7/26/2017 with mild atheromatous plaque but no significant stenosis on either side.  7/26/2017 EKG sinus bradycardia with ?  Septal infarct of undetermined age.  From speaking with patient, she reports doing well with no concerns today.  Regarding the LE dependent edema, she is tolerating the 5 mg edema.  Regarding hypercalcemia, denies any history of diarrhea, abdominal pain, confusion recently.  " From a mental health standpoint, mood is good, despite the social distancing and isolation. Is tolerating the Paxil and Effexor. Feels safe at home. Tolerating the celebrex and never an empty stomach.    Assessment/Plan:  1. HTN (hypertension)  Ideally systolic goal less than 150 mmHg.  We will continue taking amlodipine and furosemide for now.  Informed her Dr. Luevano intended to cut down amlodipine to 5 mg in October 2019 however the patient was not aware of this.  Will reassess when he follow-up in the summer 2020.  - amLODIPine (NORVASC) 10 MG tablet; Take 1 tablet (10 mg total) by mouth daily. TAKE 1 TABLET BY MOUTH EVERY DAY    2. Osteopenia, unspecified location  4. Hypercalcemia  5. Hyperparathyroidism  Asymptomatic hypercalcemia.  Most recently noted in October 2019.  Will asked specialty scheduling to follow-up with patient regarding setting up DEXA scan in the early summer 2020.  Not currently on calcium or vitamin D supplementation.  Will order for future calcium and PTH levels when she schedules an ambulatory visit.  In the interim we will continue taking furosemide.    3. Situational depression  We will continue venlafaxine and paroxetine.  Noted that she should have run out of refills from venlafaxine dose however reviewed medications with patient and she acknowledged having capsules on hand.    Phone call duration: 14 minutes  1:17 pm to 1:31 PM

## 2021-06-08 NOTE — PROGRESS NOTES
OFFICE VISIT NOTE  Alma Johns   81 y.o. female      Subjective:   Chief Complaint:  Hypertension (Routine visit)    Follow-up multiple medical issues    Some orthostasis-mild.Hypertension-There are no cardiovascular, respiratory, neurologic complaints.There is no orthostasis.Patient is compliant with medications.  Medications reviewed.   No side effects from medication.    Mild hyperparathyroidism.  Is on Lasix.  Results for orders placed or performed in visit on 09/21/16   Basic Metabolic Panel   Result Value Ref Range    Sodium 141 136 - 145 mmol/L    Potassium 3.8 3.5 - 5.1 mmol/L    Chloride 102 98 - 107 mmol/L    CO2 28 21 - 32 mmol/L    Anion Gap, Calculation 11 5 - 18 mmol/L    Glucose 116 74 - 125 mg/dL    Calcium 9.9 8.5 - 10.1 mg/dL    BUN 15 8 - 28 mg/dL    Creatinine 1.08 0.60 - 1.10 mg/dL    GFR MDRD Af Amer 59 (L) >60 mL/min/1.73m2    GFR MDRD Non Af Amer 49 (L) >60 mL/min/1.73m2       Calcium is been well controlled    Patient is sleeping well.  Anxiety well controlled with Paxil.  Not on melatonin.  No diarrhea    History of mild polycythemia.   Lab Results   Component Value Date    WBC 8.8 09/21/2016    HGB 16.0 09/21/2016    HCT 47.7 (H) 09/21/2016    MCV 91 09/21/2016     09/21/2016         Celebrex helps greatly-her back and knee    Review of Systems:     Extensive 10-point review of systems was performed. Please see the HPI for problem specific pertinent review of systems.     Patient does note good appetite.  Regular bowel movement.     Otherwise, the following systems are noncontributory including constitutional, eyes, ears, nose and throat, cardiovascular, respiratory, gastrointestinal, genitourinary, musculoskeletal,neurological, skin and/or breast, endocrine, hematologic/lymph, allergic/immunologic and psychiatric.              Medications:  Current Outpatient Prescriptions   Medication Sig Dispense Refill     amLODIPine (NORVASC) 10 MG tablet TAKE 1 TABLET BY MOUTH ONCE DAILY 90  tablet 1     celecoxib (CELEBREX) 200 MG capsule Take 1 capsule (200 mg total) by mouth daily. 90 capsule 1     cholecalciferol, vitamin D3, 1,000 unit tablet Take 2,000 Units by mouth daily.        doxazosin (CARDURA) 2 MG tablet TAKE 1 TABLET BY MOUTH EVERY NIGHT AT BEDTIME 90 tablet 3     furosemide (LASIX) 20 MG tablet TAKE 1 TABLET BY MOUTH 5 DAYS A WEEK- EXCEPT SUNDAY AND THURSDAY. 64 tablet 3     latanoprost (XALATAN) 0.005 % ophthalmic solution Administer 1 drop to both eyes bedtime.       PARoxetine (PAXIL) 20 MG tablet Take 1 tablet (20 mg total) by mouth daily. 90 tablet 3     spironolactone (ALDACTONE) 50 MG tablet Take 1 tablet (50 mg total) by mouth daily. 90 tablet 3     therapeutic multivitamin (THERAGRAN) tablet Take 1 tablet by mouth daily.       No current facility-administered medications for this visit.      Current Outpatient Prescriptions on File Prior to Visit   Medication Sig     amLODIPine (NORVASC) 10 MG tablet TAKE 1 TABLET BY MOUTH ONCE DAILY     celecoxib (CELEBREX) 200 MG capsule Take 1 capsule (200 mg total) by mouth daily.     cholecalciferol, vitamin D3, 1,000 unit tablet Take 2,000 Units by mouth daily.      doxazosin (CARDURA) 2 MG tablet TAKE 1 TABLET BY MOUTH EVERY NIGHT AT BEDTIME     furosemide (LASIX) 20 MG tablet TAKE 1 TABLET BY MOUTH 5 DAYS A WEEK- EXCEPT SUNDAY AND THURSDAY.     latanoprost (XALATAN) 0.005 % ophthalmic solution Administer 1 drop to both eyes bedtime.     PARoxetine (PAXIL) 20 MG tablet Take 1 tablet (20 mg total) by mouth daily.     spironolactone (ALDACTONE) 50 MG tablet Take 1 tablet (50 mg total) by mouth daily.     therapeutic multivitamin (THERAGRAN) tablet Take 1 tablet by mouth daily.     [DISCONTINUED] melatonin 1 mg Tab tablet Take 2-4 mg by mouth bedtime as needed.     No current facility-administered medications on file prior to visit.        Allergies:  Allergies   Allergen Reactions     Dust [Other Environmental Allergy]      congestion      "Narcotic Antagonist Other (See Comments)     Dysphoria, hallucinations     Pravastatin      myalgia     Triamterene Other (See Comments)     stones     Vasotec [Enalapril Maleate] Other (See Comments)     cough       PSFHx: Tobacco Status:  She  reports that she has never smoked. She does not have any smokeless tobacco history on file.   Alcohol Status:    History   Alcohol Use No       reports that she has never smoked. She does not have any smokeless tobacco history on file. She reports that she does not drink alcohol or use illicit drugs.    Objective:      Visit Vitals     /68 (Patient Position: Standing)     Pulse 69     Ht 4' 11\" (1.499 m)     Wt 140 lb 1.9 oz (63.6 kg)     LMP 08/18/1985     SpO2 96%     BMI 28.3 kg/m2     Weight:   Wt Readings from Last 3 Encounters:   01/25/17 140 lb 1.9 oz (63.6 kg)   09/21/16 140 lb 0.6 oz (63.5 kg)   04/13/16 138 lb 0.6 oz (62.6 kg)     BP Readings from Last 3 Encounters:   01/25/17 134/68   09/21/16 126/60   04/13/16 146/66     .[email or web address]  Vitals:    01/25/17 0930 01/25/17 0959 01/25/17 1000   BP: 150/60 145/70 134/68   Patient Site: Left Arm     Patient Position: Sitting  Standing   Cuff Size: Adult Regular     Pulse: 69     SpO2: 96%     Weight: 140 lb 1.9 oz (63.6 kg)     Height: 4' 11\" (1.499 m)       Mild orthostasis demonstrated    General-appears well, no acute distress.  No bruits  Clear lungs  Murmur  No edema    Gait normal      Review of clinical lab tests  Lab Results   Component Value Date    WBC 8.8 09/21/2016    HGB 16.0 09/21/2016    HCT 47.7 (H) 09/21/2016     09/21/2016    ALT 24 04/13/2016    AST 18 04/13/2016     09/21/2016    K 3.8 09/21/2016     09/21/2016    CREATININE 1.08 09/21/2016    BUN 15 09/21/2016    CO2 28 09/21/2016    INR 1.05 08/18/2014         No results found for this or any previous visit (from the past 24 hour(s)).    RADIOLOGY: No results found.    Review of recent consultation-         Assessment/Plan for  " Alma Johns is a 81 y.o. female.  No Patient Care Coordination Note on file.       1. Osteoarthritis of lumbar spine  Stable on Celebrex  - HM1(CBC and Differential)  - HM1 (CBC with Diff)    2. Essential hypertension with goal blood pressure less than 140/90  Controlled with mild orthostasis  - Basic Metabolic Panel    3. Mild hyperparathyroidism  Controlled with Lasix     4.  Chronic anxiety depression-doing well  5.  Mild polycythemia possibly diuretic induced.    Plan:  Recheck calcium, hemoglobin  Continue same medication  Clinic visit 6 months-40 minute physical          Diagnoses and all orders for this visit:    Osteoarthritis of lumbar spine  -     HM1(CBC and Differential)  -     HM1 (CBC with Diff)    Essential hypertension with goal blood pressure less than 140/90  -     Basic Metabolic Panel    Mild hyperparathyroidism                Ron Crabtree MD  Internal medicine  [facility]  [phone]  [email or web address]      This is an electronically verified report by Ron Crabtree M.D.  (Note created with Dragon voice recognition and unintended spelling errors and word substitutions may occur)

## 2021-06-08 NOTE — TELEPHONE ENCOUNTER
Per message below, order has been set up for Dr. Alejandra to review.  Danna ERAZO, CMA/CMT....................11:20 AM

## 2021-06-08 NOTE — TELEPHONE ENCOUNTER
Please place new DXA order for patient. Previous order .    Message from Dr. Alejandra: Daquan Ricketts, Dr. Luevano ordered for DEXA scan for the patient on 2019.  This has not yet been set up.  Could you please reach out to her to help facilitate this for late spring/early summer.  Thank you

## 2021-06-08 NOTE — TELEPHONE ENCOUNTER
Refill Approved    Rx renewed per Medication Renewal Policy. Medication was last renewed on 3/24/20.    Heaven Calles, [facility] Triage/Med Refill 5/27/2020     Requested Prescriptions   Pending Prescriptions Disp Refills     amLODIPine (NORVASC) 10 MG tablet [Pharmacy Med Name: AMLODIPINE BESYLATE 10 MG TAB] 90 tablet 3     Sig: TAKE 1 TABLET BY MOUTH EVERY DAY       Calcium-Channel Blockers Protocol Passed - 5/24/2020  9:02 AM        Passed - PCP or prescribing provider visit in past 12 months or next 3 months     Last office visit with prescriber/PCP: 10/17/2019 Ron Crabtree MD OR same dept: 10/17/2019 Ron Crabtree MD OR same specialty: 10/17/2019 Ron Crabtree MD  Last physical: 7/26/2017 Last MTM visit: Visit date not found   Next visit within 3 mo: Visit date not found  Next physical within 3 mo: Visit date not found  Prescriber OR PCP: Ron Crabtree MD  Last diagnosis associated with med order: 1. HTN (hypertension)  - amLODIPine (NORVASC) 10 MG tablet [Pharmacy Med Name: AMLODIPINE BESYLATE 10 MG TAB]; TAKE 1 TABLET BY MOUTH EVERY DAY  Dispense: 90 tablet; Refill: 3    If protocol passes may refill for 12 months if within 3 months of last provider visit (or a total of 15 months).             Passed - Blood pressure filed in past 12 months     BP Readings from Last 1 Encounters:   10/17/19 145/80

## 2021-06-11 NOTE — TELEPHONE ENCOUNTER
Refill Approved    Rx renewed per Medication Renewal Policy. Medication was last renewed on 1/27/20, last OV 3/24/20.    Jeanna Parra, [facility] Triage/Med Refill 9/5/2020     Requested Prescriptions   Pending Prescriptions Disp Refills     furosemide (LASIX) 20 MG tablet [Pharmacy Med Name: Furosemide Oral Tablet 20 MG] 64 tablet 0     Sig: TAKE 1 TABLET BY MOUTH 5 DAYS A WEEK (NOT ON SUNDAY OR THURSDAY)       Diuretics/Combination Diuretics Refill Protocol  Passed - 9/2/2020  2:55 PM        Passed - Visit with PCP or prescribing provider visit in past 12 months     Last office visit with prescriber/PCP: Visit date not found OR same dept: 10/17/2019 Ron Crabtree MD OR same specialty: 10/17/2019 Ron Crabtree MD  Last physical: Visit date not found Last MTM visit: Visit date not found   Next visit within 3 mo: Visit date not found  Next physical within 3 mo: Visit date not found  Prescriber OR PCP: Lucy Ruano MD  Last diagnosis associated with med order: 1. Essential hypertension  - furosemide (LASIX) 20 MG tablet [Pharmacy Med Name: Furosemide Oral Tablet 20 MG]; TAKE 1 TABLET BY MOUTH 5 DAYS A WEEK (NOT ON SUNDAY OR THURSDAY)  Dispense: 64 tablet; Refill: 0    If protocol passes may refill for 12 months if within 3 months of last provider visit (or a total of 15 months).             Passed - Serum Potassium in past 12 months      Lab Results   Component Value Date    Potassium 3.7 10/17/2019             Passed - Serum Sodium in past 12 months      Lab Results   Component Value Date    Sodium 141 10/17/2019             Passed - Blood pressure on file in past 12 months     BP Readings from Last 1 Encounters:   10/17/19 145/80             Passed - Serum Creatinine in past 12 months      Creatinine   Date Value Ref Range Status   10/17/2019 0.92 0.60 - 1.10 mg/dL Final

## 2021-06-12 NOTE — PROGRESS NOTES
Assessment and Plan:        1. Wellness examination  Doing well    2. Essential hypertension with goal blood pressure less than 140/90  Controlled  - Electrocardiogram Perform - Clinic    3. Mild hyperparathyroidism  With mild osteopenia.  With history of kidney stones.  Her calcium levels well controlled.  Check vitamin D calcium.  She is supplementing vitamin D.  She could take more calcium    4. Hypothyroidism  Clinically euthyroid    5. Chronic anxiety  Doing well on Paxil    6. Retinal vein occlusion  Asymptomatic noted on ophthalmology visit recent follow-up unremarkable  - US Carotid Bilateral; Future    7. Amaurosis fugax   Noted recently retinal vein occlusion.  No johny loss of vision  - US Carotid Bilateral; Future      The patient's current medical problems were reviewed.    I have had an Advance Directives discussion with the patient.           Ron Crabtree MD  Internal medicine  [facility]  [phone]  [email or web address]      This provider spent greater than 25 min. face-to-face time with the patient and/or his family.  More than half this time was spent in counseling and or coordination of care with other providers or agencies which were consistent with the nature of this patient's problems which are listed and described in the assessment and plan.  We discussed her chronic medical issues.  We ordered additional lab testing and ultrasound therapy      The following health maintenance schedule was reviewed with the patient and provided in printed form in the after visit summary:   Health Maintenance   Topic Date Due     ZOSTER VACCINE  05/21/1995     DXA SCAN  08/28/2014     INFLUENZA VACCINE RULE BASED (1) 08/01/2017     FALL RISK ASSESSMENT  01/25/2018     ADVANCE DIRECTIVES DISCUSSED WITH PATIENT  12/18/2020     TD 18+ HE  03/29/2022     PNEUMOCOCCAL POLYSACCHARIDE VACCINE AGE 65 AND OVER  Completed     PNEUMOCOCCAL CONJUGATE VACCINE FOR ADULTS (PCV13 OR  PREVNAR)  Completed        Subjective:   Chief Complaint: Alma Johns is an 82 y.o. female here for an Annual Wellness visit.   HPI: In for wellness exam    She is a little bit anxious otherwise negative    Chronic anxiety doing well on Paxil    Taking her blood pressure medication regularly.  Hypertension-There are no cardiovascular, respiratory, neurologic complaints.No claudication.There is no orthostasis.Patient is compliant with medications.  Medications reviewed.   No side effects from medication.  Recent eye check showed asymptomatic retinal vein occlusion.  No loss of vision.  She is on aspirin.  She is not on statin therapy.  She is not having any TIA symptoms      HYPOTHYROIDISM -on replacement.  No symptoms of hypothyroidism-fatigue, temperature intolerance, dry skin, constipation, weight gain, edema, diplopia.  No symptoms of hyperthyroidism-tremor, weight loss, palpitations, diaphoresis, heat intolerance, diarrhea.  Current dose of thyroid medication noted on  medication list and verified.  Last TSH and T4 reviewed      Mild hyperparathyroidism with mild loss of bone mass and slight hypercalcemia.  She does take vitamin D.  She does not take much exogenous calcium.  She has had kidney stones.    Review of Systems: Unremarkable  Sleep good  Decreased hearing particularly left ear  Good swallowing  Occasional belching  Occasional use of Tums  Bowel movement regular     please see above.  The rest of the review of systems are negative for all systems.    Patient Care Team:  Ron Crabtree MD as PCP - General (Internal Medicine)     Patient Active Problem List   Diagnosis     Osteoarthritis of right hip     Osteoarthritis of lumbar spine     Essential hypertension     Hypercalcemia     Situational depression     Mild hyperparathyroidism     Elevated hemoglobin     Past Medical History:   Diagnosis Date     Dyslipidemia     dyslipoproteinemia     Glaucoma      Hyperparathyroidism      Hypertension       Hypothyroidism      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     OA (osteoarthritis)      Osteopenia      Postmenopausal      Retinal vein occlusion      Vitamin D deficiency       Past Surgical History:   Procedure Laterality Date     APPENDECTOMY      Incidental     BREAST BIOPSY        SECTION       CHOLECYSTECTOMY       DILATION AND CURETTAGE OF UTERUS       KIDNEY STONE SURGERY      extraction     LUMBAR LAMINECTOMY       TOTAL ABDOMINAL HYSTERECTOMY W/ BILATERAL SALPINGOOPHORECTOMY       TOTAL HIP ARTHROPLASTY Left 2009     TOTAL KNEE ARTHROPLASTY Right 2009     TUBAL LIGATION        No family history on file.   Social History     Social History     Marital status: Single     Spouse name: N/A     Number of children: N/A     Years of education: N/A     Occupational History     Not on file.     Social History Main Topics     Smoking status: Never Smoker     Smokeless tobacco: Never Used     Alcohol use No     Drug use: No     Sexual activity: Not on file     Other Topics Concern     Not on file     Social History Narrative      Current Outpatient Prescriptions   Medication Sig Dispense Refill     amLODIPine (NORVASC) 10 MG tablet TAKE 1 TABLET BY MOUTH ONCE DAILY 90 tablet 1     aspirin 81 MG EC tablet Take 81 mg by mouth daily.       celecoxib (CELEBREX) 200 MG capsule TAKE 1 CAPSULE(200 MG) BY MOUTH DAILY 90 capsule 0     cholecalciferol, vitamin D3, 1,000 unit tablet Take 2,000 Units by mouth daily.        doxazosin (CARDURA) 2 MG tablet TAKE 1 TABLET BY MOUTH EVERY NIGHT AT BEDTIME 90 tablet 3     furosemide (LASIX) 20 MG tablet TAKE 1 TABLET BY MOUTH 5 DAYS A WEEK. EXCEPT  AND THURSDAY. 64 tablet 0     latanoprost (XALATAN) 0.005 % ophthalmic solution Administer 1 drop to both eyes bedtime.       PARoxetine (PAXIL) 20 MG tablet TAKE 1 TABLET BY MOUTH DAILY 90 tablet 0     spironolactone (ALDACTONE) 50 MG tablet Take 1 tablet (50 mg total) by mouth daily. 90 tablet 3     therapeutic multivitamin  "(THERAGRAN) tablet Take 1 tablet by mouth daily.       No current facility-administered medications for this visit.       Objective:   Vital Signs:   Visit Vitals     /82     Pulse (!) 55     Resp 18     Ht 4' 10\" (1.473 m)     Wt 147 lb (66.7 kg)     LMP 08/18/1985 (Approximate)     SpO2 96%     Breastfeeding No     BMI 30.72 kg/m2        VisionScreening:  No exam data present     PHYSICAL EXAM  Skin: Normal. No rash or lesion  Lymph Nodes: None palpable-including neck, axilla, inguinal, epitrochlear.  Head:  Normocephalic.    Eyes: Midline.  Equal size., full ROM.  External exams normal.  No icterus  Ears:  Normal pinnae, canals, and TM's.    Nose:  Patent, without deformity.    Throat:  Moist mucous membranes without lesions, erythema, or exudate.  Patient has upper and lower denture plate.  Neck: No palpable masses, lymphadenopathy or tenderness.No thyromegaly or goiter.  No thyroid nodule.  Carotid Arteries:  No Bruit.  Carotid upstroke normal  Chest Wall: No deformity or pain elicited on compression.  Axilla negative adenopathy  Breast exam normal there is a scar on the right medial breast  No palpable mass  Respiratory:  Normal respiratory effort.  Lungs are clear with good breath sounds.  No dullness.  No wheezing.  Heart: Regular rhythm.  Normal sounding S1, S2 without S3, S4, murmurs, rubs, or gallops.    Abdomen:  The abdomen was flat, soft and nontender without guarding rebound or masses.  There are normal bowel sounds.  There is no hepatosplenomegaly.  There is no palpable enlargement of the aorta.    Extremities:  Full ROM without limitation, deformity or edema.    4+ pulses  Neurologic-intact- No focal deficit.  Speech clear.  Coordination normal.  Strength symmetric  Orthopedic-no arthropathy.      Assessment Results 7/26/2017   Activities of Daily Living No help needed   Instrumental Activities of Daily Living No help needed   Get Up and Go Score Less than 12 seconds   Mini Cog Total Score 3 "   Some recent data might be hidden     A Mini-Cog score of 0-2 suggests the possibility of dementia, score of 3-5 suggests no dementia    Identified Health Risks:     She is at risk for lack of exercise and has been provided with information to increase physical activity for the benefit of her well-being.  The patient was provided with written information regarding signs of hearing loss.  Patient's advanced directive was discussed and I am comfortable with the patient's wishes.

## 2021-06-12 NOTE — PROGRESS NOTES
HPI: Patient has noticed decreased hearing in the left ear and was noted on recent examination to have wax in the left ear.  Denies history of ear infections or surgeries.    Past medical history, surgical history, family history, social history, medications, and allergies have been reviewed and are documented above.     Review of Systems: a 10-system review was performed. Symptoms are noted in the HPI and on a scanned document in the computer chart.    Physical Examination:   GEN: no acute distress, alert and oriented  EYES: extraocular movements intact, pupils equal and round. Sclera clear.   EARS: bilateral cerumen impactions cleared under binocular microscopy using microdissection. The tympanic membranes are noted to be intact and clear with no signs of infections, effusions, perforations, or retractions.  PULM: breathing comfortably on room air with no stertor or stridor. Chest expansion symmetric.  HEART: regular rate and rhythm, no peripheral edema    MEDICAL DECISION-MAKING: cerumen impactions cleared under binocular microscopy without difficulty. Hearing restored to baseline. Follow-up PRN.

## 2021-06-12 NOTE — TELEPHONE ENCOUNTER
RN cannot approve Refill Request    RN can NOT refill this medication PCP messaged that patient is overdue for Labs. Last office visit: 10/17/2019 Ron Crabtree MD Last Physical: 7/26/2017 Last MTM visit: Visit date not found Last visit same specialty: 10/17/2019 Ron Crabtree MD.  Next visit within 3 mo: Visit date not found  Next physical within 3 mo: Visit date not found      Jeanna Parra, [facility] Triage/Med Refill 10/25/2020    Requested Prescriptions   Pending Prescriptions Disp Refills     doxazosin (CARDURA) 4 MG tablet [Pharmacy Med Name: Doxazosin Mesylate Oral Tablet 4 MG] 30 tablet 0     Sig: TAKE ONE TABLET BY MOUTH ONE TIME DAILY       Alpha Blockers Refill Protocol  Failed - 10/25/2020  3:39 PM        Failed - Blood pressure filed in past 12 months     BP Readings from Last 1 Encounters:   10/17/19 145/80             Passed - PCP or prescribing provider visit in past 12 months       Last office visit with prescriber/PCP: 10/17/2019 Ron Crabtree MD OR same dept: Visit date not found OR same specialty: 10/17/2019 Ron Crabtree MD  Last physical: 7/26/2017 Last MTM visit: Visit date not found   Next visit within 3 mo: Visit date not found  Next physical within 3 mo: Visit date not found  Prescriber OR PCP: Ron Crabtree MD  Last diagnosis associated with med order: 1. HTN (hypertension)  - doxazosin (CARDURA) 4 MG tablet [Pharmacy Med Name: Doxazosin Mesylate Oral Tablet 4 MG]; TAKE ONE TABLET BY MOUTH ONE TIME DAILY   Dispense: 30 tablet; Refill: 0    If protocol passes may refill for 12 months if within 3 months of last provider visit (or a total of 15 months).

## 2021-06-13 NOTE — TELEPHONE ENCOUNTER
RN cannot approve Refill Request    RN can NOT refill this medication PCP messaged that patient is overdue for BP check and Protocol failed and NO refill given. Last office visit: Visit date not found Last Physical: Visit date not found Last MTM visit: Visit date not found Last visit same specialty: 10/17/2019 Ron Crabtree MD.  Next visit within 3 mo: Visit date not found  Next physical within 3 mo: Visit date not found      Charmaine Aguayo, [facility] Triage/Med Refill 11/24/2020    Requested Prescriptions   Pending Prescriptions Disp Refills     doxazosin (CARDURA) 4 MG tablet [Pharmacy Med Name: Doxazosin Mesylate Oral Tablet 4 MG] 30 tablet 4     Sig: TAKE ONE TABLET BY MOUTH ONE TIME DAILY       Alpha Blockers Refill Protocol  Failed - 11/24/2020  2:00 AM        Failed - Blood pressure filed in past 12 months     BP Readings from Last 1 Encounters:   10/17/19 145/80             Passed - PCP or prescribing provider visit in past 12 months       Last office visit with prescriber/PCP: Visit date not found OR same dept: Visit date not found OR same specialty: 10/17/2019 Ron Crabtree MD  Last physical: Visit date not found Last MTM visit: Visit date not found   Next visit within 3 mo: Visit date not found  Next physical within 3 mo: Visit date not found  Prescriber OR PCP: Audi Alejandra MD  Last diagnosis associated with med order: 1. HTN (hypertension)  - doxazosin (CARDURA) 4 MG tablet [Pharmacy Med Name: Doxazosin Mesylate Oral Tablet 4 MG]; TAKE ONE TABLET BY MOUTH ONE TIME DAILY   Dispense: 30 tablet; Refill: 0    If protocol passes may refill for 12 months if within 3 months of last provider visit (or a total of 15 months).

## 2021-06-13 NOTE — TELEPHONE ENCOUNTER
RN cannot approve Refill Request    RN can NOT refill this medication PCP messaged that patient is overdue for Labs. Last office visit: 10/17/2019 Ron Crabtree MD Last Physical: 7/26/2017 Last MTM visit: Visit date not found Last visit same specialty: 10/17/2019 Ron Crabtree MD.  Next visit within 3 mo: Visit date not found  Next physical within 3 mo: Visit date not found      Jenana Parra, [facility] Triage/Med Refill 12/12/2020    Requested Prescriptions   Pending Prescriptions Disp Refills     amLODIPine (NORVASC) 10 MG tablet [Pharmacy Med Name: amLODIPine Besylate Oral Tablet 10 MG] 90 tablet 0     Sig: TAKE ONE TABLET BY MOUTH ONE TIME DAILY       Calcium-Channel Blockers Protocol Failed - 12/12/2020  2:00 AM        Failed - Blood pressure filed in past 12 months     BP Readings from Last 1 Encounters:   10/17/19 145/80             Passed - PCP or prescribing provider visit in past 12 months or next 3 months     Last office visit with prescriber/PCP: 10/17/2019 Ron Crabtree MD OR same dept: Visit date not found OR same specialty: 10/17/2019 Ron Crabtree MD  Last physical: 7/26/2017 Last MTM visit: Visit date not found   Next visit within 3 mo: Visit date not found  Next physical within 3 mo: Visit date not found  Prescriber OR PCP: Ron Crabtree MD  Last diagnosis associated with med order: 1. HTN (hypertension)  - amLODIPine (NORVASC) 10 MG tablet [Pharmacy Med Name: amLODIPine Besylate Oral Tablet 10 MG]; TAKE ONE TABLET BY MOUTH ONE TIME DAILY   Dispense: 90 tablet; Refill: 0    If protocol passes may refill for 12 months if within 3 months of last provider visit (or a total of 15 months).

## 2021-06-14 NOTE — PROGRESS NOTES
OFFICE VISIT NOTE  Alma Johns   82 y.o. female            Assessment/Plan for  Alma Johns is a 82 y.o. female.  No Patient Care Coordination Note on file.           1.  Hypertension-controlled  2.  Chronic low back pain-refill Celebrex  3.  Retinal vein occlusion-no sequelae.  Has seen ophthalmology in follow-up.  4.  Mild changes on carotid ultrasound.  No TIA symptoms.    Plan:  Continue same medication  Medication refill  Electrolytes  Return to clinic 6 months    There are no Patient Instructions on file for this visit.    Diagnoses and all orders for this visit:    Need for prophylactic vaccination and inoculation against influenza  -     Influenza High Dose, Seasonal 65+ yrs    Essential hypertension  -     furosemide (LASIX) 20 MG tablet; TAKE 1 TABLET BY MOUTH 5 DAYS A WEEK. EXCEPT SUNDAY AND THURSDAY.  Dispense: 64 tablet; Refill: 3    HTN (hypertension)  -     amLODIPine (NORVASC) 10 MG tablet; TAKE 1 TABLET BY MOUTH ONCE DAILY  Dispense: 90 tablet; Refill: 3    Other orders  -     celecoxib (CELEBREX) 200 MG capsule; TAKE 1 CAPSULE(200 MG) BY MOUTH DAILY  Dispense: 90 capsule; Refill: 3        Medications after visit  Current Outpatient Prescriptions   Medication Sig Dispense Refill     amLODIPine (NORVASC) 10 MG tablet TAKE 1 TABLET BY MOUTH ONCE DAILY 90 tablet 1     aspirin 81 MG EC tablet Take 81 mg by mouth daily.       celecoxib (CELEBREX) 200 MG capsule TAKE 1 CAPSULE(200 MG) BY MOUTH DAILY 90 capsule 0     cholecalciferol, vitamin D3, 1,000 unit tablet Take 2,000 Units by mouth daily.        doxazosin (CARDURA) 2 MG tablet TAKE 1 TABLET BY MOUTH EVERY NIGHT AT BEDTIME 90 tablet 3     furosemide (LASIX) 20 MG tablet TAKE 1 TABLET BY MOUTH 5 DAYS A WEEK. EXCEPT SUNDAY AND THURSDAY. 64 tablet 0     latanoprost (XALATAN) 0.005 % ophthalmic solution Administer 1 drop to both eyes bedtime.       PARoxetine (PAXIL) 20 MG tablet TAKE 1 TABLET BY MOUTH DAILY 90 tablet 2     spironolactone (ALDACTONE)  50 MG tablet Take 1 tablet (50 mg total) by mouth daily. 90 tablet 3     therapeutic multivitamin (THERAGRAN) tablet Take 1 tablet by mouth daily.       No current facility-administered medications for this visit.                       Ron Crabtree MD  Internal medicine  [facility]  [phone]  [email or web address]      This is an electronically verified report by Ron Crabtree M.D.  (Note created with Dragon voice recognition and unintended spelling errors and word substitutions may occur)               Subjective:   Chief Complaint:  Hypertension; Flu Vaccine; Follow-up (Routine visit); and Medication Refill    Follow-up    Doing well.  Fairly sedentary  Feels rested      Hypertension-There are no cardiovascular, respiratory, neurologic complaints.No claudication.There is no orthostasis.Patient is compliant with medications.  Medications reviewed.   No side effects from medication.    Chronic low back pain.  On Celebrex.  No side effects.  No progression of symptoms    Retinal vein occlusion.  Had a high follow-up.  No changes.  No visual issues.  No TIA symptomatology    Mild hyperlipidemia.  No history of stroke or myocardial infarction    Review of Systems:     Extensive 10-point review of systems was performed. Please see the HPI for problem specific pertinent review of systems.     Patient does note appetite is good bowels are regular    Otherwise, the following systems are noncontributory including constitutional, eyes, ears, nose and throat, cardiovascular, respiratory, gastrointestinal, genitourinary, musculoskeletal,neurological, skin and/or breast, endocrine, hematologic/lymph, allergic/immunologic and psychiatric.              Medications:  Current Outpatient Prescriptions on File Prior to Visit   Medication Sig     amLODIPine (NORVASC) 10 MG tablet TAKE 1 TABLET BY MOUTH ONCE DAILY     aspirin 81 MG EC tablet Take 81 mg by mouth daily.     celecoxib (CELEBREX)  "200 MG capsule TAKE 1 CAPSULE(200 MG) BY MOUTH DAILY     cholecalciferol, vitamin D3, 1,000 unit tablet Take 2,000 Units by mouth daily.      doxazosin (CARDURA) 2 MG tablet TAKE 1 TABLET BY MOUTH EVERY NIGHT AT BEDTIME     furosemide (LASIX) 20 MG tablet TAKE 1 TABLET BY MOUTH 5 DAYS A WEEK. EXCEPT SUNDAY AND THURSDAY.     latanoprost (XALATAN) 0.005 % ophthalmic solution Administer 1 drop to both eyes bedtime.     PARoxetine (PAXIL) 20 MG tablet TAKE 1 TABLET BY MOUTH DAILY     spironolactone (ALDACTONE) 50 MG tablet Take 1 tablet (50 mg total) by mouth daily.     therapeutic multivitamin (THERAGRAN) tablet Take 1 tablet by mouth daily.     No current facility-administered medications on file prior to visit.             Allergies:  Allergies   Allergen Reactions     Dust [Other Environmental Allergy]      congestion     Narcotic Antagonist Other (See Comments)     Dysphoria, hallucinations     Pravastatin      myalgia     Triamterene Other (See Comments)     stones     Vasotec [Enalapril Maleate] Other (See Comments)     cough       PSFHx: Tobacco Status:  She  reports that she has never smoked. She has never used smokeless tobacco.   Alcohol Status:    History   Alcohol Use No       reports that she has never smoked. She has never used smokeless tobacco. She reports that she does not drink alcohol or use illicit drugs.    Objective:    /70  Pulse 62  Ht 4' 10\" (1.473 m)  Wt 146 lb 0.6 oz (66.2 kg)  LMP 08/18/1985 (Approximate)  SpO2 95%  Breastfeeding? No  BMI 30.52 kg/m2  Weight:   Wt Readings from Last 3 Encounters:   11/29/17 146 lb 0.6 oz (66.2 kg)   07/26/17 147 lb (66.7 kg)   01/25/17 140 lb 1.9 oz (63.6 kg)     BP Readings from Last 3 Encounters:   11/29/17 132/70   07/26/17 138/82   01/25/17 134/68     Vitals:    11/29/17 0923 11/29/17 0943   BP: 160/68 132/70   Patient Site: Right Arm    Patient Position: Sitting    Cuff Size: Adult Regular    Pulse: 62    SpO2: 95%    Weight: 146 lb 0.6 oz " "(66.2 kg)    Height: 4' 10\" (1.473 m)          General-appears well, no acute distress.  Skin: Normal. No rash or lesion  Head:  Normocephalic, symmetric  Speech-clear  Eyes: Eyes midline full EOM.  External exams normal.  No icterus  Neck:  No palpable masses, lymphadenopathy or tenderness. No thyromegaly or goiter  Carotid Arteries:  Equal pulsations bilateral.No Bruit, normal upstroke  Chest Wall: No deformity or pain elicited on compression.  Respiratory:  Normal respiratory effort.  Lungs are clear with good breath sounds.  No dullness.  No wheezing.  Heart: Regular rhythm.  Normal sounding S1, S2 without S3, S4, murmurs, rubs, or gallops.  Extremities-no edema good pulses  Gait normal    Reviewed carotid ultrasound-mild changes.         Review of clinical lab tests  Lab Results   Component Value Date    WBC 8.6 07/26/2017    HGB 16.6 (H) 07/26/2017    HCT 49.4 (H) 07/26/2017     07/26/2017    CHOL 244 (H) 07/26/2017    TRIG 163 (H) 07/26/2017    HDL 48 (L) 07/26/2017    ALT 14 07/26/2017    AST 18 07/26/2017     07/26/2017    K 3.8 07/26/2017     07/26/2017    CREATININE 0.90 07/26/2017    CREATININE 0.93 07/26/2017    BUN 17 07/26/2017    CO2 25 07/26/2017    TSH 1.08 07/26/2017    INR 1.05 08/18/2014       Glucose   Date/Time Value Ref Range Status   07/26/2017 09:42  70 - 125 mg/dL Final   01/25/2017 10:01  74 - 125 mg/dL Final   09/21/2016 09:41  74 - 125 mg/dL Final   04/13/2016 09:47  74 - 125 mg/dL Final   12/18/2015 09:21  74 - 125 mg/dL Final   08/18/2014 11:06  70 - 125 mg/dL Final     Comment:       Fasting Glucose reference range is 70-99 mg/dL per  American Diabetes Association (ADA) guidelines.   08/18/2014 05:58  (H) 70 - 125 mg/dL Final     Comment:       Fasting Glucose reference range is 70-99 mg/dL per  American Diabetes Association (ADA) guidelines.   08/01/2009 10:57  70 - 125 mg/dL Final     Comment:        Glucose " reference range updated to reflect current American     Diabetic Association (ADA) guidelines for random glucose testing.     No results found for this or any previous visit (from the past 24 hour(s)).    RADIOLOGY: No results found.    Review of recent consultation-carotid artery ultrasound [place] radiology mild changes

## 2021-06-14 NOTE — TELEPHONE ENCOUNTER
Called patient, patient wants to go to [place] for med check    Scheduled appt Friday with Dr. Ruano

## 2021-06-14 NOTE — TELEPHONE ENCOUNTER
RN cannot approve Refill Request    RN can NOT refill this medication Protocol failed and NO refill given. Last office visit: Visit date not found Last Physical: Visit date not found Last MTM visit: Visit date not found Last visit same specialty: 10/17/2019 Ron Crabtree MD.  Next visit within 3 mo: Visit date not found  Next physical within 3 mo: Visit date not found      Heaven Calles, [facility] Triage/Med Refill 2/2/2021    Requested Prescriptions   Pending Prescriptions Disp Refills     furosemide (LASIX) 20 MG tablet [Pharmacy Med Name: Furosemide Oral Tablet 20 MG] 64 tablet 0     Sig: TAKE 1 TABLET BY MOUTH DAILY 5 DAYS A WEEK (NOT ON SUNDAY OR THURSDAY)       Diuretics/Combination Diuretics Refill Protocol  Failed - 2/2/2021 12:00 PM        Failed - Serum Potassium in past 12 months      No results found for: LN-POTASSIUM          Failed - Serum Sodium in past 12 months      No results found for: LN-SODIUM          Failed - Blood pressure on file in past 12 months     BP Readings from Last 1 Encounters:   10/17/19 145/80             Failed - Serum Creatinine in past 12 months      Creatinine   Date Value Ref Range Status   10/17/2019 0.92 0.60 - 1.10 mg/dL Final             Passed - Visit with PCP or prescribing provider visit in past 12 months     Last office visit with prescriber/PCP: Visit date not found OR same dept: Visit date not found OR same specialty: 10/17/2019 Ron Crabtree MD  Last physical: Visit date not found Last MTM visit: Visit date not found   Next visit within 3 mo: Visit date not found  Next physical within 3 mo: Visit date not found  Prescriber OR PCP: Lucy Ruano MD  Last diagnosis associated with med order: 1. Essential hypertension  - furosemide (LASIX) 20 MG tablet [Pharmacy Med Name: Furosemide Oral Tablet 20 MG]; TAKE 1 TABLET BY MOUTH DAILY 5 DAYS A WEEK (NOT ON SUNDAY OR THURSDAY)  Dispense: 64 tablet; Refill: 0    If protocol passes may refill for 12 months  if within 3 months of last provider visit (or a total of 15 months).

## 2021-06-15 NOTE — TELEPHONE ENCOUNTER
Patient notified results/recommendations below and verbalized understanding. She is willing to start fosamax at this time and would like Rx sent to [facility] on [address]. Rx forward to PCP for approval.

## 2021-06-15 NOTE — TELEPHONE ENCOUNTER
Refill Approved    Rx renewed per Medication Renewal Policy. Medication was last renewed on 12/14/20, last OV 2/5/21.    Jeanna Parra, [facility] Triage/Med Refill 2/6/2021     Requested Prescriptions   Pending Prescriptions Disp Refills     PARoxetine (PAXIL) 10 MG tablet [Pharmacy Med Name: PARoxetine HCl Oral Tablet 10 MG] 90 tablet 0     Sig: Take 1 tablet (10 mg total) by mouth daily.       SSRI Refill Protocol  Passed - 2/6/2021 11:42 AM        Passed - PCP or prescribing provider visit in last year     Last office visit with prescriber/PCP: Visit date not found OR same dept: Visit date not found OR same specialty: 2/5/2021 Lucy Ruano MD  Last physical: Visit date not found Last MTM visit: Visit date not found   Next visit within 3 mo: Visit date not found  Next physical within 3 mo: Visit date not found  Prescriber OR PCP: Audi Alejandra MD  Last diagnosis associated with med order: 1. Situational depression  - PARoxetine (PAXIL) 10 MG tablet [Pharmacy Med Name: PARoxetine HCl Oral Tablet 10 MG]; Take 1 tablet (10 mg total) by mouth daily.  Dispense: 90 tablet; Refill: 0    2. HTN (hypertension)  - amLODIPine (NORVASC) 10 MG tablet [Pharmacy Med Name: amLODIPine Besylate Oral Tablet 10 MG]; TAKE ONE TABLET BY MOUTH ONE TIME DAILY   Dispense: 90 tablet; Refill: 0    If protocol passes may refill for 12 months if within 3 months of last provider visit (or a total of 15 months).                amLODIPine (NORVASC) 10 MG tablet [Pharmacy Med Name: amLODIPine Besylate Oral Tablet 10 MG] 90 tablet 0     Sig: TAKE ONE TABLET BY MOUTH ONE TIME DAILY       Calcium-Channel Blockers Protocol Passed - 2/6/2021 11:42 AM        Passed - PCP or prescribing provider visit in past 12 months or next 3 months     Last office visit with prescriber/PCP: Visit date not found OR same dept: Visit date not found OR same specialty: 2/5/2021 Lucy Ruano MD  Last physical: Visit date not found Last  MTM visit: Visit date not found   Next visit within 3 mo: Visit date not found  Next physical within 3 mo: Visit date not found  Prescriber OR PCP: Audi Alejandra MD  Last diagnosis associated with med order: 1. Situational depression  - PARoxetine (PAXIL) 10 MG tablet [Pharmacy Med Name: PARoxetine HCl Oral Tablet 10 MG]; Take 1 tablet (10 mg total) by mouth daily.  Dispense: 90 tablet; Refill: 0    2. HTN (hypertension)  - amLODIPine (NORVASC) 10 MG tablet [Pharmacy Med Name: amLODIPine Besylate Oral Tablet 10 MG]; TAKE ONE TABLET BY MOUTH ONE TIME DAILY   Dispense: 90 tablet; Refill: 0    If protocol passes may refill for 12 months if within 3 months of last provider visit (or a total of 15 months).             Passed - Blood pressure filed in past 12 months     BP Readings from Last 1 Encounters:   02/05/21 130/60

## 2021-06-15 NOTE — TELEPHONE ENCOUNTER
Vit d really low . Probably aggravating PTH . calcium is normal . Recommend vit d sRX and rechecking in 3 months dexa pending . Pot low . She will stop the lasix

## 2021-06-15 NOTE — TELEPHONE ENCOUNTER
----- Message from Lucy Ruano MD sent at 2/18/2021 11:24 PM CST -----  Please tell patient the dexa scan showed osteoporosis . This is a high fracture risk. Recommend treatment . we can set up a telephone /virtual visit to discuss treatment ? Otherwise I can send in a prescription of fosamax  70mg once a week with an empty stomach with a full glass of water

## 2021-06-15 NOTE — PROGRESS NOTES
Office Visit - [facility]    Alma Johns   85 y.o.  female    Date of visit: 2/7/2021  Physician: Lucy Ruano MD     Assessment and Plan   1. Essential hypertension    Well controlled . Unclear why but probably due to allergies and low pulse rate , she is on amlodipine and cardura and lasix combo for bp . Consider stopping lasix as she has to urinate a lot . It may have helped her hypercalcemia . So of calcium goes up , will restart.   - Comprehensive Metabolic Panel    2. Stage 3a chronic kidney disease  Had a h/o mild elevation in creatinine     3. Mild hyperparathyroidism (H)  If she has osteoporosis , she would merit consideration for parathyrodectomy if PTH and calcium also high   - Phosphorus    4. Osteoarthritis of lumbar spine, unspecified spinal osteoarthritis complication status  Doing well on celebrex .     5. Situational depression  Doing well on paxil . Has been on it long term     6. Hypercalcemia    - Comprehensive Metabolic Panel  - DXA Bone Density Scan; Future  - Parathyroid Hormone Intact  - Electrophoresis, Protein, Serum, Cascade  - Vitamin D, Total (25-Hydroxy)  - HM1(CBC and Differential)  - Thyroid Stimulating Hormone (TSH)    7. Disorder of bone, unspecified     - DXA Bone Density Scan; Future    8. Age-related osteoporosis without current pathological fracture   Is due for dexa .   - DXA Bone Density Scan; Future      Other health maintenance and vacciantion is up to date     Doing well no concerns   Time:   Return in about 6 months (around 8/5/2021).       Patient Profile   Social History     Social History Narrative    Lives alone. 2 hip replacements and 1 knee replacement. Used to work in housekeeping at [employer]. Saw Dr Crabtree. Has a daughter and son, and grandchildren and great grandchildren.        Past Medical History   Past Medical History:   Diagnosis Date     Dyslipidemia     dyslipoproteinemia     Glaucoma      Hypercalcemia      Hyperparathyroidism (H)       Hypertension      Hypothyroidism      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     OA (osteoarthritis)      Osteopenia      Postmenopausal      Retinal vein occlusion      Vitamin D deficiency        Patient Active Problem List    Diagnosis Date Noted     Chronic kidney disease, stage 3 2021     Osteopenia      Bilateral impacted cerumen 2019     Orthostasis 2019     Complaint related to dreams 2019     Mild hyperparathyroidism (H) 2016     Elevated hemoglobin (H) 2016     Osteoarthritis of lumbar spine 2015     Essential hypertension 2015     Hypercalcemia 2015     Situational depression 2015     Osteoarthritis of right hip 2014           Past Surgical History  She has a past surgical history that includes Total abdominal hysterectomy w/ bilateral salpingoophorectomy; Appendectomy; Cholecystectomy; Tubal ligation; Dilation and curettage of uterus;  section; Breast biopsy; Lumbar laminectomy; Total knee arthroplasty (Right, ); Kidney stone surgery; and Total hip arthroplasty (Left, ).     History of Present Illness   This 85 y.o. old very pleasant patient who lives by herself , is very independent in her ADLS and IADLS here to establish care . She is in her usual state of health . She does have a slower gait since her hip replacements but has no h/o falls . She has a walker and cane but does not need to use it . No problems with IADLS , memory concerns .no chest pain on exertion , no shortness of breath .sleep is good     Review of Systems: A comprehensive review of systems was negative except as noted.     Medications and Allergies   Current Outpatient Medications   Medication Sig Dispense Refill     aspirin 81 MG EC tablet Take 81 mg by mouth daily.       celecoxib (CELEBREX) 200 MG capsule Take 1 capsule (200 mg total) by mouth daily. 90 capsule 3     doxazosin (CARDURA) 4 MG tablet TAKE ONE TABLET BY MOUTH ONE TIME DAILY  30 tablet 4  "    furosemide (LASIX) 20 MG tablet TAKE 1 TABLET BY MOUTH 5 DAYS A WEEK (NOT ON SUNDAY OR THURSDAY) 64 tablet 1     latanoprost (XALATAN) 0.005 % ophthalmic solution Administer 1 drop to both eyes bedtime.       therapeutic multivitamin (THERAGRAN) tablet Take 1 tablet by mouth daily.       timolol maleate (TIMOPTIC) 0.5 % ophthalmic solution INSTILL 1 DROP INTO BOTH EYES Q EVENING  3     amLODIPine (NORVASC) 10 MG tablet Take 1 tablet (10 mg total) by mouth daily. 90 tablet 3     PARoxetine (PAXIL) 10 MG tablet Take 1 tablet (10 mg total) by mouth daily. 90 tablet 3     No current facility-administered medications for this visit.      Allergies   Allergen Reactions     Dust [Other Environmental Allergy]      congestion     Narcotic Antagonist Other (See Comments)     Dysphoria, hallucinations     Pravastatin      myalgia     Triamterene Other (See Comments)     stones     Vasotec [Enalapril Maleate] Other (See Comments)     cough     Effexor [Venlafaxine] Other (See Comments)     Adverse reaction -bowel        Family and Social History   No family history on file.     Social History     Tobacco Use     Smoking status: Never Smoker     Smokeless tobacco: Never Used   Substance Use Topics     Alcohol use: No     Drug use: No          Physical Exam   General Appearance:       /60 (Patient Site: Left Arm, Patient Position: Sitting, Cuff Size: Adult Regular)   Pulse 64   Temp 97.4  F (36.3  C)   Ht 4' 10\" (1.473 m)   Wt 145 lb (65.8 kg)   LMP 08/18/1985 (Approximate)   SpO2 95%   BMI 30.31 kg/m      NECK: Neck appearance was normal. There were no neck masses and the thyroid was not enlarged.  RESPIRATORY: Breathing pattern was normal and the chest moved symmetrically.  Percussion/auscultatory percussion was normal.  Lung sounds were normal and there were no abnormal sounds.  CARDIOVASCULAR: Heart rate and rhythm were normal.  S1 and S2 were normal and there were no extra sounds or murmurs. Peripheral " pulses in arms and legs were normal.  Jugular venous pressure was normal.  There was no peripheral edema.  GASTROINTESTINAL: The abdomen was normal in contour.  Bowel sounds were present.  Percussion detected no organ enlargement or tenderness.  Palpation detected no tenderness, mass, or enlarged organs.   MUSCULOSKELETAL: Skeletal configuration was normal and muscle mass was normal for age. Joint appearance was overall normal.  LYMPHATIC: There were no enlarged nodes.  SKIN/HAIR/NAILS: Skin color was normal.  There were no skin lesions.  Hair and nails were normal.  NEUROLOGIC: The patient was alert and oriented to person, place, time, and circumstance. Speech was normal. Cranial nerves were normal. Motor strength was normal for age. The patient was normally coordinated.  PSYCHIATRIC:  Mood and affect were normal and the patient had normal recent and remote memory. The patient's judgment and insight were normal.       Additional Information        Lucy Ruano MD  Internal Medicine  Contact me at [phone]

## 2021-06-15 NOTE — PATIENT INSTRUCTIONS - HE
We are working hard to begin vaccinating more people against COVID-19. Currently, we are only vaccinating individuals age 75 and older and Phase 1a workers - healthcare workers who are unable to do their job remotely. Vaccine availability is very limited.    If you are 75 or older, or a healthcare worker who is unable to do your job remotely, please log in to [employer] using this link to schedule an appointment.  If there are no appointments left, you will be unable to schedule and need to check back later.  If you are a healthcare worker, you will be asked to provide proof of employment at your appointment. If you cannot, you will be turned away.    Vaccine appointments are being added as they become available. Please check your [employer] account frequently for availability. If you have technical difficulty using [employer], call [phone] for assistance.    You can learn more about the [facility]'s phased approach to administering the vaccine, with details on each phase, [email or web address].      Aa vaccine supply increases and we are able to open appointments to more groups, we will share that information on our website [email or web address]. Check this website to stay up to date on COVID-19 vaccination information.         you do need to do dexa scan . You do have high calcium and that may be causing osteoporosis and we need to check you for that       You can stop the furosemide ( water pill ) but if you have a lot of ankle sweling then you can take it back again

## 2021-06-16 PROBLEM — H61.23 BILATERAL IMPACTED CERUMEN: Status: ACTIVE | Noted: 2019-04-03

## 2021-06-16 PROBLEM — R41.89: Status: ACTIVE | Noted: 2019-04-03

## 2021-06-16 PROBLEM — N18.30 CHRONIC KIDNEY DISEASE, STAGE 3 (H): Status: ACTIVE | Noted: 2021-02-05

## 2021-06-16 PROBLEM — I95.1 ORTHOSTASIS: Status: ACTIVE | Noted: 2019-04-03

## 2021-06-17 NOTE — TELEPHONE ENCOUNTER
RN cannot approve Refill Request    RN can NOT refill this medication med is not covered by policy/route to provider. Last office visit: 2/5/2021 Lucy Ruano MD Last Physical: Visit date not found Last MTM visit: Visit date not found Last visit same specialty: 2/5/2021 Lucy Ruano MD.  Next visit within 3 mo: Visit date not found  Next physical within 3 mo: Visit date not found      Jeanna Parra, [facility] Triage/Med Refill 4/29/2021    Requested Prescriptions   Pending Prescriptions Disp Refills     ergocalciferol (ERGOCALCIFEROL) 1,250 mcg (50,000 unit) capsule [Pharmacy Med Name: VITAMIN D2 1.25MG(50,000 UNIT)] 12 capsule 0     Sig: TAKE 1 CAPSULE (50,000 UNITS TOTAL) BY MOUTH ONCE A WEEK FOR 12 DOSES.       There is no refill protocol information for this order

## 2021-06-18 NOTE — PROGRESS NOTES
OFFICE VISIT NOTE  Alma Johns   82 y.o. female            Assessment/Plan for  Alma Johns is a 82 y.o. female.  No Patient Care Coordination Note on file.       1. Essential hypertension  Controlled    2. Low back pain  Chronic on meloxicam.  Slowdown gait-fall risk    3. Mild hyperparathyroidism  Last calcium normal    4. Osteoarthritis of lumbar spine  On meloxicam    5. Situational depression  Controlled on Paxil     Plan:  Same medication  Laboratory  Physical in October/wellness    There are no Patient Instructions on file for this visit.    Diagnoses and all orders for this visit:    Essential hypertension    Low back pain  -     Erythrocyte Sedimentation Rate    Mild hyperparathyroidism  -     Renal Function Profile    Osteoarthritis of lumbar spine    Situational depression    Elevated hemoglobin  -     HM1(CBC and Differential)  -     HM1 (CBC with Diff)        Medications after visit  Current Outpatient Prescriptions   Medication Sig Dispense Refill     amLODIPine (NORVASC) 10 MG tablet TAKE 1 TABLET BY MOUTH ONCE DAILY 90 tablet 3     aspirin 81 MG EC tablet Take 81 mg by mouth daily.       celecoxib (CELEBREX) 200 MG capsule TAKE 1 CAPSULE(200 MG) BY MOUTH DAILY 90 capsule 3     cholecalciferol, vitamin D3, 1,000 unit tablet Take 2,000 Units by mouth daily.        doxazosin (CARDURA) 2 MG tablet TAKE 1 TABLET BY MOUTH EVERY NIGHT AT BEDTIME 90 tablet 2     furosemide (LASIX) 20 MG tablet TAKE 1 TABLET BY MOUTH 5 DAYS A WEEK. EXCEPT SUNDAY AND THURSDAY. 64 tablet 3     latanoprost (XALATAN) 0.005 % ophthalmic solution Administer 1 drop to both eyes bedtime.       PARoxetine (PAXIL) 20 MG tablet TAKE 1 TABLET BY MOUTH DAILY 90 tablet 2     spironolactone (ALDACTONE) 50 MG tablet Take 1 tablet (50 mg total) by mouth daily. 90 tablet 2     therapeutic multivitamin (THERAGRAN) tablet Take 1 tablet by mouth daily.       No current facility-administered medications for this visit.                        Ron Crabtree MD  Internal medicine  [facility]  [phone]  [email or web address]    Much or all of the text in this note was generated through the use of Dragon Dictate voice-to-text software. Errors in spelling or words which seem out of context are unintentional.   Sound alike errors, in particular, may have escaped editing.                 Subjective:   Chief Complaint:  Hypertension and Follow-up    In for follow-up    She is doing well    She is active.    Hypertension-There are no cardiovascular, respiratory, neurologic complaints.No claudication.There is no orthostasis.Patient is compliant with medications.  Medications reviewed.   No side effects from medication.    She is active  Her back does hurt    She limps a little  She has had no falls  She has a tendency to walk quite quickly  No dyspepsia on the chronic meloxicam.        Review of Systems:     Extensive 10-point review of systems was performed. Please see the HPI for problem specific pertinent review of systems.     Patient does note appetite is good, bowels are regular    Breathing has been okay    Spirits are good.  No side effects from the Paxil.    Otherwise, the following systems are noncontributory including constitutional, eyes, ears, nose and throat, cardiovascular, respiratory, gastrointestinal, genitourinary, musculoskeletal,neurological, skin and/or breast, endocrine, hematologic/lymph, allergic/immunologic and psychiatric.              Medications:  Current Outpatient Prescriptions on File Prior to Visit   Medication Sig     amLODIPine (NORVASC) 10 MG tablet TAKE 1 TABLET BY MOUTH ONCE DAILY     aspirin 81 MG EC tablet Take 81 mg by mouth daily.     celecoxib (CELEBREX) 200 MG capsule TAKE 1 CAPSULE(200 MG) BY MOUTH DAILY     cholecalciferol, vitamin D3, 1,000 unit tablet Take 2,000 Units by mouth daily.      doxazosin (CARDURA) 2 MG tablet TAKE 1 TABLET BY MOUTH EVERY NIGHT AT BEDTIME  "    furosemide (LASIX) 20 MG tablet TAKE 1 TABLET BY MOUTH 5 DAYS A WEEK. EXCEPT SUNDAY AND THURSDAY.     latanoprost (XALATAN) 0.005 % ophthalmic solution Administer 1 drop to both eyes bedtime.     PARoxetine (PAXIL) 20 MG tablet TAKE 1 TABLET BY MOUTH DAILY     spironolactone (ALDACTONE) 50 MG tablet Take 1 tablet (50 mg total) by mouth daily.     therapeutic multivitamin (THERAGRAN) tablet Take 1 tablet by mouth daily.     No current facility-administered medications on file prior to visit.             Allergies:  Allergies   Allergen Reactions     Dust [Other Environmental Allergy]      congestion     Narcotic Antagonist Other (See Comments)     Dysphoria, hallucinations     Pravastatin      myalgia     Triamterene Other (See Comments)     stones     Vasotec [Enalapril Maleate] Other (See Comments)     cough       PSFHx: Tobacco Status:  She  reports that she has never smoked. She has never used smokeless tobacco.   Alcohol Status:    History   Alcohol Use No       reports that she has never smoked. She has never used smokeless tobacco. She reports that she does not drink alcohol or use illicit drugs.    Objective:    /60  Pulse 63  Ht 4' 10\" (1.473 m)  Wt 142 lb (64.4 kg)  LMP 08/18/1985 (Approximate)  SpO2 96%  Breastfeeding? No  BMI 29.68 kg/m2  Weight:   Wt Readings from Last 3 Encounters:   05/16/18 142 lb (64.4 kg)   11/29/17 146 lb 0.6 oz (66.2 kg)   07/26/17 147 lb (66.7 kg)     BP Readings from Last 3 Encounters:   05/16/18 138/60   11/29/17 132/70   07/26/17 138/82         General-appears well, no acute distress.  Skin: Normal. No rash or lesion  Head:  Normocephalic, symmetric  Speech-clear  Eyes: Eyes midline full EOM.  External exams normal.  No icterus  Neck:  No palpable masses, lymphadenopathy or tenderness. No thyromegaly or goiter  Carotid Arteries:  Equal pulsations bilateral.No Bruit, normal upstroke  Chest Wall: No deformity or pain elicited on compression.  Respiratory:  " Normal respiratory effort.  Lungs are clear with good breath sounds.  No dullness.  No wheezing.  Heart: Regular rhythm.  Normal sounding S1, S2 without S3, S4, murmurs, rubs, or gallops.  Extremities-no edema  Gait does walks with a slight limp  She tends to walk quickly.         Review of clinical lab tests  Lab Results   Component Value Date    WBC 8.6 07/26/2017    HGB 16.6 (H) 07/26/2017    HCT 49.4 (H) 07/26/2017     07/26/2017    CHOL 244 (H) 07/26/2017    TRIG 163 (H) 07/26/2017    HDL 48 (L) 07/26/2017    ALT 14 07/26/2017    AST 18 07/26/2017     11/29/2017    K 3.6 11/29/2017     11/29/2017    CREATININE 0.90 11/29/2017    BUN 14 11/29/2017    CO2 27 11/29/2017    TSH 1.08 07/26/2017    INR 1.05 08/18/2014     Lab Results   Component Value Date    CALCIUM 10.5 11/29/2017    PHOS 3.0 07/26/2017       Glucose   Date/Time Value Ref Range Status   11/29/2017 10:11  70 - 125 mg/dL Final   07/26/2017 09:42  70 - 125 mg/dL Final   01/25/2017 10:01  74 - 125 mg/dL Final   09/21/2016 09:41  74 - 125 mg/dL Final   04/13/2016 09:47  74 - 125 mg/dL Final   12/18/2015 09:21  74 - 125 mg/dL Final   08/18/2014 11:06  70 - 125 mg/dL Final     Comment:       Fasting Glucose reference range is 70-99 mg/dL per  American Diabetes Association (ADA) guidelines.   08/18/2014 05:58  (H) 70 - 125 mg/dL Final     Comment:       Fasting Glucose reference range is 70-99 mg/dL per  American Diabetes Association (ADA) guidelines.   08/01/2009 10:57  70 - 125 mg/dL Final     Comment:        Glucose reference range updated to reflect current American     Diabetic Association (ADA) guidelines for random glucose testing.     Recent Results (from the past 24 hour(s))   Erythrocyte Sedimentation Rate   Result Value Ref Range    Sed Rate 13 0 - 20 mm/hr       RADIOLOGY: No results found.    Review of recent consultation-

## 2021-06-19 NOTE — LETTER
Letter by Ron Crabtree MD at      Author: Ron Crabtree MD Service: -- Author Type: --    Filed:  Encounter Date: 10/18/2019 Status: Signed         Alma Johns  [address]  [place] [address]             October 18, 2019         Dear Ms. Johns,    Below are the results from your recent visit:    Resulted Orders   Renal Function Profile   Result Value Ref Range    Albumin 3.9 3.5 - 5.0 g/dL    Calcium 11.2 (H) 8.5 - 10.5 mg/dL    Phosphorus 3.1 2.5 - 4.5 mg/dL    Glucose 114 70 - 125 mg/dL    BUN 15 8 - 28 mg/dL    Creatinine 0.92 0.60 - 1.10 mg/dL    Sodium 141 136 - 145 mmol/L    Potassium 3.7 3.5 - 5.0 mmol/L    Chloride 102 98 - 107 mmol/L    CO2 28 22 - 31 mmol/L    Anion Gap, Calculation 11 5 - 18 mmol/L    GFR MDRD Af Amer >60 >60 mL/min/1.73m2    GFR MDRD Non Af Amer 58 (L) >60 mL/min/1.73m2    Narrative    Fasting Glucose reference range is 70-99 mg/dL per  American Diabetes Association (ADA) guidelines.   Thyroid Cascade   Result Value Ref Range    TSH 0.45 0.30 - 5.00 uIU/mL       I enjoyed seeing you in the office at your appointment.  I am pleased with the results of your lab tests.    There have been no changes  Your calcium is a little high but it has been high and we follow this carefully.  We should recheck this in 3 months.  This is due to your hyperparathyroidism.  Thyroid looks fine   .      Please call with questions or contact us using [employer].    Sincerely,        Electronically signed by Ron Crabtree MD

## 2021-06-20 NOTE — PROGRESS NOTES
OFFICE VISIT NOTE  Alma Johns   83 y.o. female            Assessment/Plan for  Alma Johns is a 83 y.o. female.  No Patient Care Coordination Note on file.       1. Need for prophylactic vaccination and inoculation against influenza     - Influenza High Dose, Seasonal 65+ yrs    2. Essential hypertension  Excellent control without orthostasis  - HM1(CBC and Differential)  - Renal Function Profile  - HM1 (CBC with Diff)    3. Low back pain  Chronic on Celebrex without renal dysfunction or edema    4. Mild hyperparathyroidism (H)  Mild-following  - Renal Function Profile    5. Osteoarthritis of lumbar spine  On Celebrex with good benefit    6.  Elevated hemoglobin.  Stable.  On diuretic.  No sleep apnea symptomatology.  Stop bang-suggest mild.  She declines a sleep study.  Recommend hydration.      Plan:  Same medication  Hydration  If hemoglobin increases consider sleep study  Laboratory  Clinic visit return in 6 months.    There are no Patient Instructions on file for this visit.    Diagnoses and all orders for this visit:    Need for prophylactic vaccination and inoculation against influenza  -     Influenza High Dose, Seasonal 65+ yrs    Essential hypertension  -     HM1(CBC and Differential)  -     Renal Function Profile  -     HM1 (CBC with Diff)    Low back pain    Mild hyperparathyroidism (H)  -     Renal Function Profile    Osteoarthritis of lumbar spine        Medications after visit  Current Outpatient Prescriptions   Medication Sig Dispense Refill     amLODIPine (NORVASC) 10 MG tablet TAKE 1 TABLET BY MOUTH ONCE DAILY 90 tablet 3     aspirin 81 MG EC tablet Take 81 mg by mouth daily.       celecoxib (CELEBREX) 200 MG capsule TAKE 1 CAPSULE(200 MG) BY MOUTH DAILY 90 capsule 3     cholecalciferol, vitamin D3, 1,000 unit tablet Take 2,000 Units by mouth daily.        doxazosin (CARDURA) 2 MG tablet TAKE 1 TABLET BY MOUTH EVERY NIGHT AT BEDTIME 90 tablet 2     furosemide (LASIX) 20 MG tablet TAKE 1 TABLET  BY MOUTH 5 DAYS A WEEK. EXCEPT  AND THURSDAY. 64 tablet 3     latanoprost (XALATAN) 0.005 % ophthalmic solution Administer 1 drop to both eyes bedtime.       PARoxetine (PAXIL) 20 MG tablet Take 1 tablet (20 mg total) by mouth daily. 90 tablet 3     spironolactone (ALDACTONE) 50 MG tablet Take 1 tablet (50 mg total) by mouth daily. 90 tablet 2     therapeutic multivitamin (THERAGRAN) tablet Take 1 tablet by mouth daily.       No current facility-administered medications for this visit.                       Ron Crabtree MD  Internal medicine  [facility]  [phone]  [email or web address]    Much or all of the text in this note was generated through the use of Dragon Dictate voice-to-text software. Errors in spelling or words which seem out of context are unintentional.   Sound alike errors, in particular, may have escaped editing.                 Subjective:   Chief Complaint:  Hypertension; Follow-up (Routine 4 month visit); and Flu Vaccine    Doing well    15 years since Rodo     She has low back pain  Her granddaughter now is doing a lot of the housekeeping chores which is been very beneficial  She remains taking Celebrex  No GI  side effects.  No swelling    Hypertension-There are no cardiovascular, respiratory, neurologic complaints.No claudication.There is no orthostasis.Patient is compliant with medications.  Medications reviewed.   No side effects from medication.     Aspirin monitoring-taking daily as directed.  No dyspepsia, abdominal pain, no melena, hematochezia.  No bleeding.  No excessive bruising.    She is doing well on her Paxil.  Rodo  15 years ago.  She is not depressed.  No side effects from Paxil.    Review of Systems:     Extensive 10-point review of systems was performed. Please see the HPI for problem specific pertinent review of systems.     Patient does note appetite is excellent.  Bowels regular.  No melena no hematochezia    She  has an elevated hemoglobin.  There is no family history of polycythemia.  She is not a smoker.  She sleeps very well although she sleeps 12 hours.  She is rested.  There is no daytime sleeping.  This is been her pattern for a number of years.  Her son has sleep apnea.  She does not desire to be tested.  She is on a diuretic.    Otherwise, the following systems are noncontributory including constitutional, eyes, ears, nose and throat, cardiovascular, respiratory, gastrointestinal, genitourinary, musculoskeletal,neurological, skin and/or breast, endocrine, hematologic/lymph, allergic/immunologic and psychiatric.              Medications:  Current Outpatient Prescriptions on File Prior to Visit   Medication Sig     amLODIPine (NORVASC) 10 MG tablet TAKE 1 TABLET BY MOUTH ONCE DAILY     aspirin 81 MG EC tablet Take 81 mg by mouth daily.     celecoxib (CELEBREX) 200 MG capsule TAKE 1 CAPSULE(200 MG) BY MOUTH DAILY     cholecalciferol, vitamin D3, 1,000 unit tablet Take 2,000 Units by mouth daily.      doxazosin (CARDURA) 2 MG tablet TAKE 1 TABLET BY MOUTH EVERY NIGHT AT BEDTIME     furosemide (LASIX) 20 MG tablet TAKE 1 TABLET BY MOUTH 5 DAYS A WEEK. EXCEPT SUNDAY AND THURSDAY.     latanoprost (XALATAN) 0.005 % ophthalmic solution Administer 1 drop to both eyes bedtime.     PARoxetine (PAXIL) 20 MG tablet Take 1 tablet (20 mg total) by mouth daily.     spironolactone (ALDACTONE) 50 MG tablet Take 1 tablet (50 mg total) by mouth daily.     therapeutic multivitamin (THERAGRAN) tablet Take 1 tablet by mouth daily.     No current facility-administered medications on file prior to visit.             Allergies:  Allergies   Allergen Reactions     Dust [Other Environmental Allergy]      congestion     Narcotic Antagonist Other (See Comments)     Dysphoria, hallucinations     Pravastatin      myalgia     Triamterene Other (See Comments)     stones     Vasotec [Enalapril Maleate] Other (See Comments)     cough       PSFHx:  "Tobacco Status:  She  reports that she has never smoked. She has never used smokeless tobacco.   Alcohol Status:    History   Alcohol Use No       reports that she has never smoked. She has never used smokeless tobacco. She reports that she does not drink alcohol or use illicit drugs.    Objective:    /62  Pulse (!) 58  Ht 4' 10\" (1.473 m)  Wt 145 lb 0.6 oz (65.8 kg)  LMP 08/18/1985 (Approximate)  SpO2 94%  Breastfeeding? No  BMI 30.31 kg/m2  Weight:   Wt Readings from Last 3 Encounters:   10/01/18 145 lb 0.6 oz (65.8 kg)   05/16/18 142 lb (64.4 kg)   11/29/17 146 lb 0.6 oz (66.2 kg)     BP Readings from Last 10 Encounters:   10/01/18 130/62   05/16/18 138/60   11/29/17 132/70   07/26/17 138/82   01/25/17 134/68   09/21/16 126/60   04/13/16 146/66   01/13/16 142/68   12/18/15 162/78   08/20/14 128/60         General-appears well, no acute distress.  Delightful person  Weight stable  Pulse regular throughout    Skin-normal-not flushed.  No peripheral cyanosis  Skin: Normal. No rash or lesion  Head:  Normocephalic, symmetric  Speech-clear  Eyes: Eyes midline full EOM.  External exams normal.  No icterus  Neck:  No palpable masses, lymphadenopathy or tenderness. No thyromegaly or goiter  Carotid Arteries:  Equal pulsations bilateral.No Bruit, normal upstroke  Chest Wall: No deformity or pain elicited on compression.  Respiratory:  Normal respiratory effort.  Lungs are clear with good breath sounds.  No dullness.  No wheezing.  Heart: Regular rhythm.  Normal sounding S1, S2 without S3, S4, murmurs, rubs, or gallops.  Extremities- no edema  Pulses intact    Gait mildly arthritic.  She does walk with a slight limp.  She has decreased spinal flexion..         Review of clinical lab tests  Lab Results   Component Value Date    WBC 7.0 05/16/2018    HGB 17.3 (H) 05/16/2018    HCT 50.4 (H) 05/16/2018     05/16/2018    CHOL 244 (H) 07/26/2017    TRIG 163 (H) 07/26/2017    HDL 48 (L) 07/26/2017    ALT 14 " 07/26/2017    AST 18 07/26/2017     05/16/2018    K 3.8 05/16/2018     05/16/2018    CREATININE 1.03 05/16/2018    BUN 15 05/16/2018    CO2 21 (L) 05/16/2018    TSH 1.08 07/26/2017    INR 1.05 08/18/2014     Hemoglobin   Date/Time Value Ref Range Status   05/16/2018 10:36 AM 17.3 (H) 12.0 - 16.0 g/dL Final   07/26/2017 09:42 AM 16.6 (H) 12.0 - 16.0 g/dL Final   01/25/2017 10:01 AM 16.9 (H) 12.0 - 16.0 g/dL Final   09/21/2016 09:41 AM 16.0 12.0 - 16.0 g/dL Final   04/13/2016 09:47 AM 16.8 (H) 12.0 - 16.0 g/dL Final   01/13/2016 10:12 AM 16.7 (H) 12.0 - 16.0 g/dL Final   12/18/2015 09:21 AM 18.2 (H) 12.0 - 16.0 g/dL Final   08/20/2014 06:37 AM 12.6 12.0 - 16.0 g/dL Final   08/19/2014 06:09 AM 12.7 12.0 - 16.0 g/dL Final       Glucose   Date/Time Value Ref Range Status   05/16/2018 10:36  70 - 125 mg/dL Final   11/29/2017 10:11  70 - 125 mg/dL Final   07/26/2017 09:42  70 - 125 mg/dL Final   01/25/2017 10:01  74 - 125 mg/dL Final   09/21/2016 09:41  74 - 125 mg/dL Final   04/13/2016 09:47  74 - 125 mg/dL Final   12/18/2015 09:21  74 - 125 mg/dL Final   08/18/2014 11:06  70 - 125 mg/dL Final     Comment:       Fasting Glucose reference range is 70-99 mg/dL per  American Diabetes Association (ADA) guidelines.   08/18/2014 05:58  (H) 70 - 125 mg/dL Final     Comment:       Fasting Glucose reference range is 70-99 mg/dL per  American Diabetes Association (ADA) guidelines.   08/01/2009 10:57  70 - 125 mg/dL Final     Comment:        Glucose reference range updated to reflect current American     Diabetic Association (ADA) guidelines for random glucose testing.     No results found for this or any previous visit (from the past 24 hour(s)).    RADIOLOGY: No results found.    Review of recent consultation-

## 2021-06-23 NOTE — TELEPHONE ENCOUNTER
RN cannot approve Refill Request    RN can NOT refill this medication med is not covered by policy/route to provider. Last office visit: 10/1/2018 Ron Crabtree MD Last Physical: 7/26/2017 Last MTM visit: Visit date not found Last visit same specialty: 10/1/2018 Ron Crabtree MD.  Next visit within 3 mo: Visit date not found  Next physical within 3 mo: Visit date not found      [facility], [facility] Connection Triage/Med Refill 1/25/2019    Requested Prescriptions   Pending Prescriptions Disp Refills     celecoxib (CELEBREX) 200 MG capsule [Pharmacy Med Name: CELECOXIB 200MG CAPSULES] 90 capsule 0     Sig: TAKE 1 CAPSULE(200 MG) BY MOUTH DAILY    There is no refill protocol information for this order      Signed Prescriptions Disp Refills     amLODIPine (NORVASC) 10 MG tablet 90 tablet 1     Sig: TAKE 1 TABLET BY MOUTH EVERY DAY    Calcium-Channel Blockers Protocol Passed - 1/23/2019  5:05 AM       Passed - PCP or prescribing provider visit in past 12 months or next 3 months    Last office visit with prescriber/PCP: 10/1/2018 Ron Crabtree MD OR same dept: 10/1/2018 Ron Crabtree MD OR same specialty: 10/1/2018 Ron Crabtree MD  Last physical: 7/26/2017 Last MTM visit: Visit date not found   Next visit within 3 mo: Visit date not found  Next physical within 3 mo: Visit date not found  Prescriber OR PCP: Ron Crbatree MD  Last diagnosis associated with med order: 1. HTN (hypertension)  - amLODIPine (NORVASC) 10 MG tablet; TAKE 1 TABLET BY MOUTH EVERY DAY  Dispense: 90 tablet; Refill: 1  - spironolactone (ALDACTONE) 50 MG tablet; TAKE 1 TABLET(50 MG) BY MOUTH DAILY  Dispense: 90 tablet; Refill: 1    2. Low back pain  - celecoxib (CELEBREX) 200 MG capsule [Pharmacy Med Name: CELECOXIB 200MG CAPSULES]; TAKE 1 CAPSULE(200 MG) BY MOUTH DAILY  Dispense: 90 capsule; Refill: 0    If protocol passes may refill for 12 months if within 3 months of last provider visit (or a total of 15 months).             Passed - Blood pressure filed in past 12 months    BP Readings from Last 1 Encounters:   10/01/18 130/62             spironolactone (ALDACTONE) 50 MG tablet 90 tablet 1     Sig: TAKE 1 TABLET(50 MG) BY MOUTH DAILY    Diuretics/Combination Diuretics Refill Protocol  Passed - 1/23/2019  5:05 AM       Passed - Visit with PCP or prescribing provider visit in past 12 months    Last office visit with prescriber/PCP: 10/1/2018 Ron Crabtree MD OR same dept: 10/1/2018 Ron Crabtree MD OR same specialty: 10/1/2018 Ron Crabtree MD  Last physical: 7/26/2017 Last MTM visit: Visit date not found   Next visit within 3 mo: Visit date not found  Next physical within 3 mo: Visit date not found  Prescriber OR PCP: Ron Crabtree MD  Last diagnosis associated with med order: 1. HTN (hypertension)  - amLODIPine (NORVASC) 10 MG tablet; TAKE 1 TABLET BY MOUTH EVERY DAY  Dispense: 90 tablet; Refill: 1  - spironolactone (ALDACTONE) 50 MG tablet; TAKE 1 TABLET(50 MG) BY MOUTH DAILY  Dispense: 90 tablet; Refill: 1    2. Low back pain  - celecoxib (CELEBREX) 200 MG capsule [Pharmacy Med Name: CELECOXIB 200MG CAPSULES]; TAKE 1 CAPSULE(200 MG) BY MOUTH DAILY  Dispense: 90 capsule; Refill: 0    If protocol passes may refill for 12 months if within 3 months of last provider visit (or a total of 15 months).            Passed - Serum Potassium in past 12 months     Lab Results   Component Value Date    Potassium 4.3 10/01/2018            Passed - Serum Sodium in past 12 months     Lab Results   Component Value Date    Sodium 140 10/01/2018            Passed - Blood pressure on file in past 12 months    BP Readings from Last 1 Encounters:   10/01/18 130/62            Passed - Serum Creatinine in past 12 months     Creatinine   Date Value Ref Range Status   10/01/2018 0.94 0.60 - 1.10 mg/dL Final

## 2021-06-23 NOTE — TELEPHONE ENCOUNTER
Refill Approved (aldactone and amlodipine)     Rx renewed per Medication Renewal Policy. Medication was last renewed on 5/15/18 and 11/29/17    [facility], [facility] Connection Triage/Med Refill 1/25/2019     Requested Prescriptions   Pending Prescriptions Disp Refills     amLODIPine (NORVASC) 10 MG tablet [Pharmacy Med Name: AMLODIPINE BESYLATE 10MG TABLETS] 90 tablet 0     Sig: TAKE 1 TABLET BY MOUTH EVERY DAY    Calcium-Channel Blockers Protocol Passed - 1/23/2019  5:05 AM       Passed - PCP or prescribing provider visit in past 12 months or next 3 months    Last office visit with prescriber/PCP: 10/1/2018 Ron Crabtree MD OR same dept: 10/1/2018 Ron Crabtree MD OR same specialty: 10/1/2018 Ron Crabtree MD  Last physical: 7/26/2017 Last MTM visit: Visit date not found   Next visit within 3 mo: Visit date not found  Next physical within 3 mo: Visit date not found  Prescriber OR PCP: Ron Crabtree MD  Last diagnosis associated with med order: 1. HTN (hypertension)  - amLODIPine (NORVASC) 10 MG tablet [Pharmacy Med Name: AMLODIPINE BESYLATE 10MG TABLETS]; TAKE 1 TABLET BY MOUTH EVERY DAY  Dispense: 90 tablet; Refill: 0  - spironolactone (ALDACTONE) 50 MG tablet [Pharmacy Med Name: SPIRONOLACTONE 50MG TABLETS]; TAKE 1 TABLET(50 MG) BY MOUTH DAILY  Dispense: 90 tablet; Refill: 0    2. Low back pain  - celecoxib (CELEBREX) 200 MG capsule [Pharmacy Med Name: CELECOXIB 200MG CAPSULES]; TAKE 1 CAPSULE(200 MG) BY MOUTH DAILY  Dispense: 90 capsule; Refill: 0    If protocol passes may refill for 12 months if within 3 months of last provider visit (or a total of 15 months).            Passed - Blood pressure filed in past 12 months    BP Readings from Last 1 Encounters:   10/01/18 130/62             spironolactone (ALDACTONE) 50 MG tablet [Pharmacy Med Name: SPIRONOLACTONE 50MG TABLETS] 90 tablet 0     Sig: TAKE 1 TABLET(50 MG) BY MOUTH DAILY    Diuretics/Combination Diuretics Refill Protocol  Passed - 1/23/2019   5:05 AM       Passed - Visit with PCP or prescribing provider visit in past 12 months    Last office visit with prescriber/PCP: 10/1/2018 Ron Crabtree MD OR same dept: 10/1/2018 Ron Crabtree MD OR same specialty: 10/1/2018 Ron Crabtree MD  Last physical: 7/26/2017 Last MTM visit: Visit date not found   Next visit within 3 mo: Visit date not found  Next physical within 3 mo: Visit date not found  Prescriber OR PCP: Ron Crabtree MD  Last diagnosis associated with med order: 1. HTN (hypertension)  - amLODIPine (NORVASC) 10 MG tablet [Pharmacy Med Name: AMLODIPINE BESYLATE 10MG TABLETS]; TAKE 1 TABLET BY MOUTH EVERY DAY  Dispense: 90 tablet; Refill: 0  - spironolactone (ALDACTONE) 50 MG tablet [Pharmacy Med Name: SPIRONOLACTONE 50MG TABLETS]; TAKE 1 TABLET(50 MG) BY MOUTH DAILY  Dispense: 90 tablet; Refill: 0    2. Low back pain  - celecoxib (CELEBREX) 200 MG capsule [Pharmacy Med Name: CELECOXIB 200MG CAPSULES]; TAKE 1 CAPSULE(200 MG) BY MOUTH DAILY  Dispense: 90 capsule; Refill: 0    If protocol passes may refill for 12 months if within 3 months of last provider visit (or a total of 15 months).            Passed - Serum Potassium in past 12 months     Lab Results   Component Value Date    Potassium 4.3 10/01/2018            Passed - Serum Sodium in past 12 months     Lab Results   Component Value Date    Sodium 140 10/01/2018            Passed - Blood pressure on file in past 12 months    BP Readings from Last 1 Encounters:   10/01/18 130/62            Passed - Serum Creatinine in past 12 months     Creatinine   Date Value Ref Range Status   10/01/2018 0.94 0.60 - 1.10 mg/dL Final             celecoxib (CELEBREX) 200 MG capsule [Pharmacy Med Name: CELECOXIB 200MG CAPSULES] 90 capsule 0     Sig: TAKE 1 CAPSULE(200 MG) BY MOUTH DAILY    There is no refill protocol information for this order

## 2021-06-23 NOTE — TELEPHONE ENCOUNTER
Refill Approved    Rx renewed per Medication Renewal Policy. Medication was last renewed on 11/29/17.    [facility], [facility] Triage/Med Refill 1/22/2019     Requested Prescriptions   Pending Prescriptions Disp Refills     furosemide (LASIX) 20 MG tablet [Pharmacy Med Name: FUROSEMIDE 20MG TABLETS] 64 tablet 0     Sig: TAKE 1 TABLET BY MOUTH 5 DAYS PER WEEK EXCEPT SUNDAY AND THURSDAY    Diuretics/Combination Diuretics Refill Protocol  Passed - 1/20/2019  1:11 PM       Passed - Visit with PCP or prescribing provider visit in past 12 months    Last office visit with prescriber/PCP: 10/1/2018 Ron Crabtree MD OR same dept: 10/1/2018 Ron Crabtree MD OR same specialty: 10/1/2018 Ron Crabtree MD  Last physical: 7/26/2017 Last MTM visit: Visit date not found   Next visit within 3 mo: Visit date not found  Next physical within 3 mo: Visit date not found  Prescriber OR PCP: Ron Crabtree MD  Last diagnosis associated with med order: 1. Essential hypertension  - furosemide (LASIX) 20 MG tablet [Pharmacy Med Name: FUROSEMIDE 20MG TABLETS]; TAKE 1 TABLET BY MOUTH 5 DAYS PER WEEK EXCEPT SUNDAY AND THURSDAY  Dispense: 64 tablet; Refill: 0    If protocol passes may refill for 12 months if within 3 months of last provider visit (or a total of 15 months).            Passed - Serum Potassium in past 12 months     Lab Results   Component Value Date    Potassium 4.3 10/01/2018            Passed - Serum Sodium in past 12 months     Lab Results   Component Value Date    Sodium 140 10/01/2018            Passed - Blood pressure on file in past 12 months    BP Readings from Last 1 Encounters:   10/01/18 130/62            Passed - Serum Creatinine in past 12 months     Creatinine   Date Value Ref Range Status   10/01/2018 0.94 0.60 - 1.10 mg/dL Final

## 2021-06-25 NOTE — TELEPHONE ENCOUNTER
Refill Approved    Rx renewed per Medication Renewal Policy. Medication was last renewed on 4/20/21.    [facility] Robin, [facility] Connection Triage/Med Refill 6/4/2021     Requested Prescriptions   Pending Prescriptions Disp Refills     doxazosin (CARDURA) 4 MG tablet [Pharmacy Med Name: Doxazosin Mesylate Oral Tablet 4 MG] 30 tablet 0     Sig: TAKE ONE TABLET BY MOUTH ONE TIME DAILY       Alpha Blockers Refill Protocol  Passed - 6/3/2021 10:30 AM        Passed - PCP or prescribing provider visit in past 12 months       Last office visit with prescriber/PCP: 2/5/2021 Lucy Ruano MD OR same dept: Visit date not found OR same specialty: 2/5/2021 Lucy Ruano MD  Last physical: Visit date not found Last MTM visit: Visit date not found   Next visit within 3 mo: Visit date not found  Next physical within 3 mo: Visit date not found  Prescriber OR PCP: Lucy Ruano MD  Last diagnosis associated with med order: 1. HTN (hypertension)  - doxazosin (CARDURA) 4 MG tablet [Pharmacy Med Name: Doxazosin Mesylate Oral Tablet 4 MG]; TAKE ONE TABLET BY MOUTH ONE TIME DAILY   Dispense: 30 tablet; Refill: 0    If protocol passes may refill for 12 months if within 3 months of last provider visit (or a total of 15 months).             Passed - Blood pressure filed in past 12 months     BP Readings from Last 1 Encounters:   02/05/21 130/60

## 2021-07-03 NOTE — ADDENDUM NOTE
Addendum Note by Roland Mills MLT at 7/26/2017  3:12 PM     Author: Roland Mills MLT Service: -- Author Type:     Filed: 7/26/2017  3:12 PM Encounter Date: 7/26/2017 Status: Signed    : Roland Mills MLT ()    Addended by: ROLAND MILLS on: 7/26/2017 03:12 PM        Modules accepted: Orders

## 2021-07-13 ENCOUNTER — RECORDS - HEALTHEAST (OUTPATIENT)
Dept: ADMINISTRATIVE | Facility: CLINIC | Age: 86
End: 2021-07-13

## 2021-08-06 ENCOUNTER — OFFICE VISIT (OUTPATIENT)
Dept: INTERNAL MEDICINE | Facility: CLINIC | Age: 86
End: 2021-08-06
Payer: MEDICARE

## 2021-08-06 VITALS
HEART RATE: 49 BPM | OXYGEN SATURATION: 96 % | DIASTOLIC BLOOD PRESSURE: 64 MMHG | SYSTOLIC BLOOD PRESSURE: 138 MMHG | BODY MASS INDEX: 30.51 KG/M2 | WEIGHT: 146 LBS

## 2021-08-06 DIAGNOSIS — E55.9 VITAMIN D DEFICIENCY: ICD-10-CM

## 2021-08-06 DIAGNOSIS — I10 ESSENTIAL HYPERTENSION: Primary | ICD-10-CM

## 2021-08-06 DIAGNOSIS — N18.31 STAGE 3A CHRONIC KIDNEY DISEASE (H): ICD-10-CM

## 2021-08-06 DIAGNOSIS — E21.3 MILD HYPERPARATHYROIDISM (H): ICD-10-CM

## 2021-08-06 DIAGNOSIS — M81.0 AGE-RELATED OSTEOPOROSIS WITHOUT CURRENT PATHOLOGICAL FRACTURE: ICD-10-CM

## 2021-08-06 LAB
ALBUMIN SERPL-MCNC: 3.7 G/DL (ref 3.5–5)
ALP SERPL-CCNC: 116 U/L (ref 45–120)
ALT SERPL W P-5'-P-CCNC: 13 U/L (ref 0–45)
ANION GAP SERPL CALCULATED.3IONS-SCNC: 12 MMOL/L (ref 5–18)
AST SERPL W P-5'-P-CCNC: 16 U/L (ref 0–40)
BILIRUB SERPL-MCNC: 1 MG/DL (ref 0–1)
BUN SERPL-MCNC: 13 MG/DL (ref 8–28)
CALCIUM SERPL-MCNC: 10.2 MG/DL (ref 8.5–10.5)
CHLORIDE BLD-SCNC: 107 MMOL/L (ref 98–107)
CO2 SERPL-SCNC: 23 MMOL/L (ref 22–31)
CREAT SERPL-MCNC: 0.85 MG/DL (ref 0.6–1.1)
GFR SERPL CREATININE-BSD FRML MDRD: 62 ML/MIN/1.73M2
GLUCOSE BLD-MCNC: 104 MG/DL (ref 70–125)
POTASSIUM BLD-SCNC: 4.3 MMOL/L (ref 3.5–5)
PROT SERPL-MCNC: 6.9 G/DL (ref 6–8)
PTH-INTACT SERPL-MCNC: 261 PG/ML (ref 10–86)
SODIUM SERPL-SCNC: 142 MMOL/L (ref 136–145)

## 2021-08-06 PROCEDURE — 82306 VITAMIN D 25 HYDROXY: CPT | Performed by: INTERNAL MEDICINE

## 2021-08-06 PROCEDURE — 80053 COMPREHEN METABOLIC PANEL: CPT | Performed by: INTERNAL MEDICINE

## 2021-08-06 PROCEDURE — 99213 OFFICE O/P EST LOW 20 MIN: CPT | Performed by: INTERNAL MEDICINE

## 2021-08-06 PROCEDURE — 36415 COLL VENOUS BLD VENIPUNCTURE: CPT | Performed by: INTERNAL MEDICINE

## 2021-08-06 PROCEDURE — 83970 ASSAY OF PARATHORMONE: CPT | Performed by: INTERNAL MEDICINE

## 2021-08-06 NOTE — PROGRESS NOTES
"  Assess/plan  1. Essential hypertension  Well-controlled he did not elevate off after stopping Lasix and she feels a lot better  - Comprehensive metabolic panel; Future  - Comprehensive metabolic panel    2. Vitamin D deficiency  She did take the ergocalciferol but did not take maintenance afterwards we will recheck and give her a refill but told her to definitely take maintenance over-the-counter dosing.  - Vitamin D Deficiency; Future  - Vitamin D Deficiency    3. Mild hyperparathyroidism (H)  Unclear whether she has true hyperparathyroid is the primary or secondary will see her parathyroid levels.  At any rate she will not agree to the parathyroidectomy and will probably not need one if her calcium level is normal.  - Parathyroid Hormone Intact; Future  - Parathyroid Hormone Intact    4. Age-related osteoporosis without current pathological fracture  She is taking the Fosamax and has been compliant and tolerating it well.  Her DEXA scan in February confirmed osteoporosis.  And she is aware  5. Stage 3a chronic kidney disease  Kidney tests have been stable.  She has still not gotten the COVID vaccine.  Again counseled about it and she will \"think about it\"  She was asking whether she needs the Celebrex is that she needs it for pain and she can try to taking herself off of it and seeing how her pain does.  Pain is primarily in her left knee.      Chau Marquez is a 86 year old who presents for the following health issues     HPI   Very independent patient who lives alone was supposed to come for wellness visit but declined.  Is here for follow-up.  She was recently started on Fosamax, for diagnosis of osteoporosis and also her Lasix was stopped.  You do need to follow her labs.  She is tolerating medication changes  She says she feels absolutely well and does not really know why she is here      Review of Systems   Constitutional, HEENT, cardiovascular, pulmonary, gi and gu systems are negative, except as " otherwise noted.      Objective    /64 (BP Location: Left arm, Patient Position: Sitting, Cuff Size: Adult Regular)   Pulse (!) 49   Wt 66.2 kg (146 lb)   SpO2 96%   BMI 30.51 kg/m    Body mass index is 30.51 kg/m .  Physical Exam   GENERAL: healthy, alert and no distress  RESP: lungs clear to auscultation - no rales, rhonchi or wheezes  CV: regular rate and rhythm, normal S1 S2, no S3 or S4, no murmur, click or rub, no peripheral edema and peripheral pulses strong

## 2021-08-06 NOTE — LETTER
August 9, 2021      Alma Johns  [address] [place] [address]        Dear ,    We are writing to inform you of your test results.    The electrolytes, kidney tests are all normal . Liver tests are normal   Calcium level is normal   parathyroid hormone is high but stable   Overall very good results       Resulted Orders   Comprehensive metabolic panel   Result Value Ref Range    Sodium 142 136 - 145 mmol/L    Potassium 4.3 3.5 - 5.0 mmol/L    Chloride 107 98 - 107 mmol/L    Carbon Dioxide (CO2) 23 22 - 31 mmol/L    Anion Gap 12 5 - 18 mmol/L    Urea Nitrogen 13 8 - 28 mg/dL    Creatinine 0.85 0.60 - 1.10 mg/dL    Calcium 10.2 8.5 - 10.5 mg/dL    Glucose 104 70 - 125 mg/dL    Alkaline Phosphatase 116 45 - 120 U/L    AST 16 0 - 40 U/L    ALT 13 0 - 45 U/L    Protein Total 6.9 6.0 - 8.0 g/dL    Albumin 3.7 3.5 - 5.0 g/dL    Bilirubin Total 1.0 0.0 - 1.0 mg/dL    GFR Estimate 62 >60 mL/min/1.73m2      Comment:      As of July 11, 2021, eGFR is calculated by the CKD-EPI creatinine equation, without race adjustment. eGFR can be influenced by muscle mass, exercise, and diet. The reported eGFR is an estimation only and is only applicable if the renal function is stable.   Parathyroid Hormone Intact   Result Value Ref Range    Parathyroid Hormone Intact 261 (H) 10 - 86 pg/mL       If you have any questions or concerns, please call the clinic at the number listed above.       Sincerely,      Lucy Ruano MD

## 2021-08-06 NOTE — LETTER
August 18, 2021      Alma Johns  [address] [place] [address]        Dear ,    We are writing to inform you of your test results.    Vitamin d level is normal .The electrolytes, kidney tests are all normal .   Liver tests are normal  Overall excellent results     Resulted Orders   Comprehensive metabolic panel   Result Value Ref Range    Sodium 142 136 - 145 mmol/L    Potassium 4.3 3.5 - 5.0 mmol/L    Chloride 107 98 - 107 mmol/L    Carbon Dioxide (CO2) 23 22 - 31 mmol/L    Anion Gap 12 5 - 18 mmol/L    Urea Nitrogen 13 8 - 28 mg/dL    Creatinine 0.85 0.60 - 1.10 mg/dL    Calcium 10.2 8.5 - 10.5 mg/dL    Glucose 104 70 - 125 mg/dL    Alkaline Phosphatase 116 45 - 120 U/L    AST 16 0 - 40 U/L    ALT 13 0 - 45 U/L    Protein Total 6.9 6.0 - 8.0 g/dL    Albumin 3.7 3.5 - 5.0 g/dL    Bilirubin Total 1.0 0.0 - 1.0 mg/dL    GFR Estimate 62 >60 mL/min/1.73m2      Comment:      As of July 11, 2021, eGFR is calculated by the CKD-EPI creatinine equation, without race adjustment. eGFR can be influenced by muscle mass, exercise, and diet. The reported eGFR is an estimation only and is only applicable if the renal function is stable.   Vitamin D Deficiency   Result Value Ref Range    Vitamin D, Total (25-Hydroxy) 41 30 - 80 ug/L    Narrative    Deficiency <10.0 ug/L  Insufficiency 10.0-29.9 ug/L  Sufficiency 30.0-80.0 ug/L  Toxicity (possible) >100.0 ug/L    Parathyroid Hormone Intact   Result Value Ref Range    Parathyroid Hormone Intact 261 (H) 10 - 86 pg/mL       If you have any questions or concerns, please call the clinic at the number listed above.       Sincerely,      Lucy Ruano MD

## 2021-08-09 LAB — DEPRECATED CALCIDIOL+CALCIFEROL SERPL-MC: 41 UG/L (ref 30–80)

## 2022-01-17 DIAGNOSIS — M81.0 AGE-RELATED OSTEOPOROSIS WITHOUT CURRENT PATHOLOGICAL FRACTURE: Primary | ICD-10-CM

## 2022-01-19 RX ORDER — ALENDRONATE SODIUM 70 MG/1
TABLET ORAL
Qty: 12 TABLET | Refills: 0 | Status: SHIPPED | OUTPATIENT
Start: 2022-01-19 | End: 2022-05-31

## 2022-01-19 NOTE — TELEPHONE ENCOUNTER
"Last Written Prescription Date:  2/19/2021  Last Fill Quantity: 12,  # refills: 3   Last office visit provider:  8/6/2021     Requested Prescriptions   Pending Prescriptions Disp Refills     alendronate (FOSAMAX) 70 MG tablet [Pharmacy Med Name: Alendronate Sodium Oral Tablet 70 MG] 12 tablet 0     Sig: Take 1 tablet (70 mg total) by mouth every 7 days. Take in the morning on an empty stomach with a full glass of water 30 minutes before food       Bisphosphonates Passed - 1/17/2022  2:00 AM        Passed - Recent (12 mo) or future (30 days) visit within the authorizing provider's specialty     Patient has had an office visit with the authorizing provider or a provider within the authorizing providers department within the previous 12 mos or has a future within next 30 days. See \"Patient Info\" tab in inbasket, or \"Choose Columns\" in Meds & Orders section of the refill encounter.              Passed - Dexa on file within past 2 years     Please review last Dexa result.           Passed - Medication is active on med list        Passed - Patient is age 18 or older        Passed - Normal serum creatinine on file within past 12 months     Recent Labs   Lab Test 08/06/21  1154   CR 0.85       Ok to refill medication if creatinine is low               Pamela Crocker RN 01/19/22 6:36 AM  "

## 2022-02-15 NOTE — TELEPHONE ENCOUNTER
Medication Question or Clarification  Who is calling: Pharmacy: [facility]  What medication are you calling about? (include dose and sig) see below  Who prescribed the medication?: Dr Crabtree  What is your question/concern?: The order from 5/29/19 has been transferred too many times.  It is inactive.  A new order is needed.  Pharmacy:   tablet  4 mg, Oral, DAILY         Summary: Take 1 tablet (4 mg total) by mouth daily., Starting Fri 5/31/2019, Normal   Dose, Frequency: 4 mg, DAILY  Start: 5/31/2019  Ord/Sold: 5/31/2019 (O)  Report  Adh:   Taking:   Long-term:   Pharmacy: [facility]/pharmacy #[id number] - [place], [place] - [address] AT [facility] Dose History        Okay to leave a detailed message?: Yes  Site CMT - Please call the pharmacy to obtain any additional needed information.   Writer spoke with Autumn. Spouse states would like refill and does not think a prior auth is needed; spouse requested 1 inhaler with 1 refill.    Preferred pharmacy verified and script sent.

## 2022-03-09 ENCOUNTER — OFFICE VISIT (OUTPATIENT)
Dept: INTERNAL MEDICINE | Facility: CLINIC | Age: 87
End: 2022-03-09
Payer: MEDICARE

## 2022-03-09 VITALS
OXYGEN SATURATION: 96 % | DIASTOLIC BLOOD PRESSURE: 72 MMHG | SYSTOLIC BLOOD PRESSURE: 136 MMHG | WEIGHT: 149.4 LBS | HEART RATE: 60 BPM | HEIGHT: 58 IN | BODY MASS INDEX: 31.36 KG/M2

## 2022-03-09 DIAGNOSIS — H90.3 BILATERAL SENSORINEURAL HEARING LOSS: Primary | ICD-10-CM

## 2022-03-09 DIAGNOSIS — H61.23 BILATERAL IMPACTED CERUMEN: ICD-10-CM

## 2022-03-09 PROCEDURE — 99213 OFFICE O/P EST LOW 20 MIN: CPT | Performed by: INTERNAL MEDICINE

## 2022-03-09 NOTE — PROGRESS NOTES
[facility] Follow Up Note    Assessment/Plan:    1. Bilateral sensorineural hearing loss  She has bilateral earwax impaction which was flushed out with good results today.  Discussed the next step to have audiology screening and consultation for hearing aids based on results.  Referrals placed.  - Adult Audiology Referral; Future    2. Bilateral impacted cerumen  Both ears were flushed out today with removal of majority of wax.      Patient Instructions   1. Ears are full of wax: we will try to flush them out today, if we are not able to, will need to see ENT for earwax removal.    2. Once wax is out: schedule appointment for hearing test (audiology) and hearing aid consult: can do on the same day in [facility].       Minal Carbone MD, MD    Chief Complaint:    Chief Complaint   Patient presents with     Referral     hearing aids, patient cannot hear       History of Present Illness:  Alma is a 86 year old female with history of osteoporosis, CKD, hypertension and depression.  She is currently here for acute visit due to decreased hearing and would like a referral for audiology and hearing aids.    She has had history of earwax impaction in the past and previously has had ears cleaned out in the past.  Currently reports that hearing problem is a long-term issue and currently it has gotten worse.  She denies any ear pain.  She has not had hearing aids in the past.    Review of Systems:  A comprehensive review of systems was performed and was otherwise negative    PFSH:  Social History: Reviewed  History   Smoking Status     Never Smoker   Smokeless Tobacco     Never Used     Social History     Social History Narrative    Lives alone. 2 hip replacements and 1 knee replacement. Used to work in housekeeping at [employer]. Saw Dr Crabtree. Has a daughter and son, and grandchildren and great grandchildren.       Past History: Reviewed  Current Outpatient Medications   Medication Sig Dispense Refill      alendronate (FOSAMAX) 70 MG tablet Take 1 tablet (70 mg total) by mouth every 7 days. Take in the morning on an empty stomach with a full glass of water 30 minutes before food 12 tablet 0     alendronate (FOSAMAX) 70 MG tablet [ALENDRONATE (FOSAMAX) 70 MG TABLET] Take 1 tablet (70 mg total) by mouth every 7 days. Take in the morning on an empty stomach with a full glass of water 30 minutes before food 12 tablet 11     alendronate (FOSAMAX) 70 MG tablet [ALENDRONATE (FOSAMAX) 70 MG TABLET] Take 1 tablet (70 mg total) by mouth every 7 days. Take in the morning on an empty stomach with a full glass of water 30 minutes before food 12 tablet 3     amLODIPine (NORVASC) 10 MG tablet [AMLODIPINE (NORVASC) 10 MG TABLET] Take 1 tablet (10 mg total) by mouth daily. 90 tablet 3     aspirin 81 MG EC tablet [ASPIRIN 81 MG EC TABLET] Take 81 mg by mouth daily.       celecoxib (CELEBREX) 200 MG capsule Take 1 capsule (200 mg) by mouth daily 90 capsule 3     doxazosin (CARDURA) 4 MG tablet TAKE ONE TABLET BY MOUTH ONE TIME DAILY 90 tablet 3     furosemide (LASIX) 20 MG tablet [FUROSEMIDE (LASIX) 20 MG TABLET] TAKE 1 TABLET BY MOUTH 5 DAYS A WEEK (NOT ON SUNDAY OR THURSDAY) 64 tablet 1     latanoprost (XALATAN) 0.005 % ophthalmic solution [LATANOPROST (XALATAN) 0.005 % OPHTHALMIC SOLUTION] Administer 1 drop to both eyes bedtime.       PARoxetine (PAXIL) 10 MG tablet [PAROXETINE (PAXIL) 10 MG TABLET] Take 1 tablet (10 mg total) by mouth daily. 90 tablet 3     therapeutic multivitamin (THERAGRAN) tablet [THERAPEUTIC MULTIVITAMIN (THERAGRAN) TABLET] Take 1 tablet by mouth daily.       timolol maleate (TIMOPTIC) 0.5 % ophthalmic solution [TIMOLOL MALEATE (TIMOPTIC) 0.5 % OPHTHALMIC SOLUTION] INSTILL 1 DROP INTO BOTH EYES Q EVENING  3       Family History: Reviewed    Physical Exam:    Vitals:    03/09/22 0824   BP: 136/72   BP Location: Left arm   Patient Position: Sitting   Cuff Size: Adult Regular   Pulse: 60   SpO2: 96%   Weight:  "67.8 kg (149 lb 6.4 oz)   Height: 1.473 m (4' 10\")     Wt Readings from Last 3 Encounters:   03/09/22 67.8 kg (149 lb 6.4 oz)   08/06/21 66.2 kg (146 lb)   02/05/21 65.8 kg (145 lb)     Body mass index is 31.22 kg/m .    Constitutional:  Reveals a pleasant female, slightly hard of hearing.  Vitals:  Per nursing notes.  HEENT:No cervical LAD, no thyromegaly,  conjunctiva is pink, no scleral icterus, TMs are obstructed by wax bilaterally, oropharynx is clear, no exudates,   Cardiac:  Regular rate and rhythm,no murmurs, rubs, or gallops.  Legs without edema. Palpation of the radial pulse regular.  Lungs: Clear to auscultation bl.  Respiratory effort normal.  Neurologic:  Cranial nerves II-XII intact.     Psychiatric: affect appropriate, memory intact.     Data Review:    Analysis and Summary of Old Records (2): yes      Records Requested (1): no      Other History Summarized (from other people in the room) (2): no    Radiology Tests Summarized (XRAY/CT/MRI/DXA) (1): no    Labs Reviewed (1): no    Medicine Tests Reviewed (EKG/ECHO/COLONOSCOPY/EGD) (1): no    Independent Review of EKG or X-RAY (2): no      "

## 2022-03-09 NOTE — PATIENT INSTRUCTIONS
1. Ears are full of wax: we will try to flush them out today, if we are not able to, will need to see ENT for earwax removal.    2. Once wax is out: schedule appointment for hearing test (audiology) and hearing aid consult: can do on the same day in [facility].

## 2022-03-16 ENCOUNTER — TELEPHONE (OUTPATIENT)
Dept: INTERNAL MEDICINE | Facility: CLINIC | Age: 87
End: 2022-03-16
Payer: MEDICARE

## 2022-03-16 DIAGNOSIS — F43.21 SITUATIONAL DEPRESSION: ICD-10-CM

## 2022-03-16 RX ORDER — PAROXETINE 10 MG/1
10 TABLET, FILM COATED ORAL DAILY
Qty: 90 TABLET | Refills: 3 | Status: SHIPPED | OUTPATIENT
Start: 2022-03-16 | End: 2022-08-08

## 2022-03-16 NOTE — TELEPHONE ENCOUNTER
Reason for Call:  Other call back    Detailed comments: pt very low on medication for:  Paxil    Pharm:  [facility] [place]    Phone Number Patient can be reached at: Home number on file [phone] (home)    Best Time: anytime    Can we leave a detailed message on this number? YES    Call taken on 3/16/2022 at 11:19 AM by Monica De Paz

## 2022-05-13 ENCOUNTER — TELEPHONE (OUTPATIENT)
Dept: INTERNAL MEDICINE | Facility: CLINIC | Age: 87
End: 2022-05-13
Payer: MEDICARE

## 2022-05-13 DIAGNOSIS — I10 PRIMARY HYPERTENSION: ICD-10-CM

## 2022-05-13 RX ORDER — AMLODIPINE BESYLATE 10 MG/1
10 TABLET ORAL DAILY
Qty: 90 TABLET | Refills: 3 | Status: SHIPPED | OUTPATIENT
Start: 2022-05-13 | End: 2023-05-12

## 2022-05-13 NOTE — TELEPHONE ENCOUNTER
Reason for Call:  Medication or medication refill:    Do you use a [facility]?  Name of the pharmacy and phone number for the current request:  [email or web address]    Name of the medication requested:   amLODIPine (NORVASC) 10 MG tablet 90 tablet 3 2/6/2021  No   Sig - Route: [AMLODIPINE (NORVASC) 10 MG TABLET] Take 1 tablet (10 mg total) by mouth daily. - Oral   Class: Normal         Other request: no response from [facility] per pt    Can we leave a detailed message on this number? YES    Phone number patient can be reached at: Home number on file [phone] (home)    Best Time: any    Call taken on 5/13/2022 at 11:20 AM by Pam J. Behr

## 2022-05-30 DIAGNOSIS — M81.0 AGE-RELATED OSTEOPOROSIS WITHOUT CURRENT PATHOLOGICAL FRACTURE: ICD-10-CM

## 2022-05-31 RX ORDER — ALENDRONATE SODIUM 70 MG/1
TABLET ORAL
Qty: 12 TABLET | Refills: 0 | Status: SHIPPED | OUTPATIENT
Start: 2022-05-31 | End: 2023-08-10

## 2022-05-31 NOTE — TELEPHONE ENCOUNTER
"Last Written Prescription Date:  1/19/22  Last Fill Quantity: 12,  # refills: 0   Last office visit provider:  3/9/22     Requested Prescriptions   Pending Prescriptions Disp Refills     alendronate (FOSAMAX) 70 MG tablet [Pharmacy Med Name: Alendronate Sodium Oral Tablet 70 MG] 12 tablet 0     Sig: Take 1 tablet (70 mg total) by mouth every 7 days. Take in the morning on an empty stomach with a full glass of water 30 minutes before food       Bisphosphonates Passed - 5/30/2022  3:12 PM        Passed - Recent (12 mo) or future (30 days) visit within the authorizing provider's specialty     Patient has had an office visit with the authorizing provider or a provider within the authorizing providers department within the previous 12 mos or has a future within next 30 days. See \"Patient Info\" tab in inbasket, or \"Choose Columns\" in Meds & Orders section of the refill encounter.              Passed - Dexa on file within past 2 years     Please review last Dexa result.           Passed - Medication is active on med list        Passed - Patient is age 18 or older        Passed - Normal serum creatinine on file within past 12 months     Recent Labs   Lab Test 08/06/21  1154   CR 0.85       Ok to refill medication if creatinine is low               Heaven Calles RN 05/31/22 11:33 AM  "

## 2022-08-08 ENCOUNTER — OFFICE VISIT (OUTPATIENT)
Dept: INTERNAL MEDICINE | Facility: CLINIC | Age: 87
End: 2022-08-08
Payer: MEDICARE

## 2022-08-08 VITALS
BODY MASS INDEX: 28.61 KG/M2 | OXYGEN SATURATION: 97 % | HEART RATE: 71 BPM | SYSTOLIC BLOOD PRESSURE: 132 MMHG | DIASTOLIC BLOOD PRESSURE: 58 MMHG | RESPIRATION RATE: 16 BRPM | WEIGHT: 145.7 LBS | HEIGHT: 60 IN | TEMPERATURE: 97.5 F

## 2022-08-08 DIAGNOSIS — Z13.220 SCREENING FOR HYPERLIPIDEMIA: Primary | ICD-10-CM

## 2022-08-08 DIAGNOSIS — E21.3 MILD HYPERPARATHYROIDISM (H): ICD-10-CM

## 2022-08-08 DIAGNOSIS — N18.31 STAGE 3A CHRONIC KIDNEY DISEASE (H): ICD-10-CM

## 2022-08-08 DIAGNOSIS — E83.52 HYPERCALCEMIA: ICD-10-CM

## 2022-08-08 DIAGNOSIS — I10 ESSENTIAL HYPERTENSION: ICD-10-CM

## 2022-08-08 DIAGNOSIS — F43.21 SITUATIONAL DEPRESSION: ICD-10-CM

## 2022-08-08 LAB
ANION GAP SERPL CALCULATED.3IONS-SCNC: 14 MMOL/L (ref 7–15)
BUN SERPL-MCNC: 14.8 MG/DL (ref 8–23)
CALCIUM SERPL-MCNC: 10.5 MG/DL (ref 8.8–10.2)
CHLORIDE SERPL-SCNC: 106 MMOL/L (ref 98–107)
CREAT SERPL-MCNC: 0.98 MG/DL (ref 0.51–0.95)
CREAT UR-MCNC: 166 MG/DL
DEPRECATED CALCIDIOL+CALCIFEROL SERPL-MC: 43 UG/L (ref 20–75)
DEPRECATED HCO3 PLAS-SCNC: 21 MMOL/L (ref 22–29)
ERYTHROCYTE [DISTWIDTH] IN BLOOD BY AUTOMATED COUNT: 12.7 % (ref 10–15)
GFR SERPL CREATININE-BSD FRML MDRD: 56 ML/MIN/1.73M2
GLUCOSE SERPL-MCNC: 110 MG/DL (ref 70–99)
HCT VFR BLD AUTO: 43.1 % (ref 35–47)
HGB BLD-MCNC: 14.6 G/DL (ref 11.7–15.7)
MCH RBC QN AUTO: 29.7 PG (ref 26.5–33)
MCHC RBC AUTO-ENTMCNC: 33.9 G/DL (ref 31.5–36.5)
MCV RBC AUTO: 88 FL (ref 78–100)
MICROALBUMIN UR-MCNC: 25.2 MG/L
MICROALBUMIN/CREAT UR: 15.18 MG/G CR (ref 0–25)
PLATELET # BLD AUTO: 261 10E3/UL (ref 150–450)
POTASSIUM SERPL-SCNC: 4 MMOL/L (ref 3.4–5.3)
PTH-INTACT SERPL-MCNC: 106 PG/ML (ref 15–65)
RBC # BLD AUTO: 4.92 10E6/UL (ref 3.8–5.2)
SODIUM SERPL-SCNC: 141 MMOL/L (ref 136–145)
TOTAL PROTEIN SERUM FOR ELP: 7.4 G/DL (ref 6.4–8.3)
TSH SERPL DL<=0.005 MIU/L-ACNC: 0.41 UIU/ML (ref 0.3–4.2)
WBC # BLD AUTO: 8 10E3/UL (ref 4–11)

## 2022-08-08 PROCEDURE — 84443 ASSAY THYROID STIM HORMONE: CPT | Performed by: INTERNAL MEDICINE

## 2022-08-08 PROCEDURE — 82043 UR ALBUMIN QUANTITATIVE: CPT | Performed by: INTERNAL MEDICINE

## 2022-08-08 PROCEDURE — 85027 COMPLETE CBC AUTOMATED: CPT | Performed by: INTERNAL MEDICINE

## 2022-08-08 PROCEDURE — 90677 PCV20 VACCINE IM: CPT | Performed by: INTERNAL MEDICINE

## 2022-08-08 PROCEDURE — G0009 ADMIN PNEUMOCOCCAL VACCINE: HCPCS | Performed by: INTERNAL MEDICINE

## 2022-08-08 PROCEDURE — 99214 OFFICE O/P EST MOD 30 MIN: CPT | Mod: 25 | Performed by: INTERNAL MEDICINE

## 2022-08-08 PROCEDURE — 36415 COLL VENOUS BLD VENIPUNCTURE: CPT | Performed by: INTERNAL MEDICINE

## 2022-08-08 PROCEDURE — 84155 ASSAY OF PROTEIN SERUM: CPT | Performed by: INTERNAL MEDICINE

## 2022-08-08 PROCEDURE — 80048 BASIC METABOLIC PNL TOTAL CA: CPT | Performed by: INTERNAL MEDICINE

## 2022-08-08 PROCEDURE — 84165 PROTEIN E-PHORESIS SERUM: CPT

## 2022-08-08 PROCEDURE — 83970 ASSAY OF PARATHORMONE: CPT | Performed by: INTERNAL MEDICINE

## 2022-08-08 PROCEDURE — G0439 PPPS, SUBSEQ VISIT: HCPCS | Performed by: INTERNAL MEDICINE

## 2022-08-08 PROCEDURE — 82306 VITAMIN D 25 HYDROXY: CPT | Performed by: INTERNAL MEDICINE

## 2022-08-08 NOTE — PROGRESS NOTES
SUBJECTIVE:   Alma Johns is a 87 year old female who presents for Preventive Visit.  She has a history of mild essential hypertension mild hypercalcemia thought to be due to mild hyperparathyroidism.  She did have a history of depression  She lives independently in a multilevel home that she has been living in for 40 years  Patient has been advised of split billing requirements and indicates understanding: Yes  Are you in the first 12 months of your Medicare coverage?  No    HPI  Do you feel safe in your environment? Yes    Have you ever done Advance Care Planning? (For example, a Health Directive, POLST, or a discussion with a medical provider or your loved ones about your wishes): No, advance care planning information given to patient to review.  Patient declined advance care planning discussion at this time.       Fall risk       Cognitive Screening   1) Repeat 3 items (Leader, Season, Table)    2) Clock draw: ABNORMAL none  3) 3 item recall: Recalls 3 objects  Results: NORMAL clock, 1-2 items recalled: COGNITIVE IMPAIRMENT LESS LIKELY     Six Item Cognitive Impairment Test   (6CIT):      What year is it?                               Correct - 0 points    What month is it?                               Correct - 0 points      Give the patient an address to remember with five components:   [place] ( first and last name - 2 components)   [address]  (number and name of street - 2 components)   [place] ( city - 1 component)      About what time is it (within the hour)? Correct - 0 points    Count backwards from 20 to 1:   Correct - 0 points    Say the months of the year in reverse: Correct - 0 points    Repeat the address phrase:   2 errors - 4 points    Total 6CIT Score:      2/28    Interpretation: The 6CIT uses an inverse score and questions are weighted to produce a total out of 28. Scores of 0-7 are considered normal and 8 or more significant.    Advantages The test has high sensitivity without  compromising specificity even in mild dementia. It is easy to translate linguistically and culturally.  Disadvantages The main disadvantage is in the scoring and weighting of the test, which is initially confusing, however computer models have simplified this greatly.    Probability Statistics: At the 7/8 cut off: Overall figures sensitivity 90% specificity 100%, in mild dementia sensitivity = 78% , specificity = 100%    Copyright 2000 [facility], [facility]. Courtesy of Dr. Shai Marquez , bhupinder grass   Mini-CogTM Copyright S Rubens. Licensed by the author for use in [facility]; reprinted with permission (soob@[facility]). All rights reserved.      Do you have sleep apnea, excessive snoring or daytime drowsiness?: no    Reviewed and updated as needed this visit by clinical staff     Meds                Reviewed and updated as needed this visit by Provider                   Social History     Tobacco Use     Smoking status: Never Smoker     Smokeless tobacco: Never Used   Substance Use Topics     Alcohol use: No         No flowsheet data found.            Current providers sharing in care for this patient include:   Patient Care Team:  Lucy Ruano MD as PCP - General (Internal Medicine)  Lucy Ruano MD as Assigned PCP    The following health maintenance items are reviewed in Epic and correct as of today:  Health Maintenance Due   Topic Date Due     MICROALBUMIN  Never done     COVID-19 Vaccine (1) Never done     URINALYSIS  Never done     ZOSTER IMMUNIZATION (1 of 2) Never done     DTAP/TDAP/TD IMMUNIZATION (1 - Tdap) 03/30/2012     MEDICARE ANNUAL WELLNESS VISIT  07/26/2018     LIPID  07/26/2018     HEMOGLOBIN  02/05/2022     ADVANCE CARE PLANNING  07/26/2022     BMP  08/06/2022     ANNUAL REVIEW OF HM ORDERS  08/06/2022         Current Outpatient Medications   Medication     alendronate (FOSAMAX) 70 MG tablet     alendronate (FOSAMAX) 70 MG tablet      "alendronate (FOSAMAX) 70 MG tablet     amLODIPine (NORVASC) 10 MG tablet     aspirin 81 MG EC tablet     celecoxib (CELEBREX) 200 MG capsule     doxazosin (CARDURA) 4 MG tablet     furosemide (LASIX) 20 MG tablet     latanoprost (XALATAN) 0.005 % ophthalmic solution     PARoxetine (PAXIL) 10 MG tablet     therapeutic multivitamin (THERAGRAN) tablet     timolol maleate (TIMOPTIC) 0.5 % ophthalmic solution     No current facility-administered medications for this visit.         Pertinent mammograms are reviewed under the imaging tab.    Review of Systems  no shortness of breath ,no  chest pain ,no  Falls, no urinary incontinence , no weight changes , sleep and moodhave been good . Independent in ADLS and IADLS       OBJECTIVE:   /58 (BP Location: Left arm, Patient Position: Sitting, Cuff Size: Adult Regular)   Pulse 71   Temp 97.5  F (36.4  C) (Tympanic)   Resp 16   Ht 1.511 m (4' 11.5\")   Wt 66.1 kg (145 lb 11.2 oz)   SpO2 97%   BMI 28.94 kg/m   Estimated body mass index is 31.22 kg/m  as calculated from the following:    Height as of 3/9/22: 1.473 m (4' 10\").    Weight as of 3/9/22: 67.8 kg (149 lb 6.4 oz).  Physical Exam  GENERAL: healthy, alert and no distress  EYES: Eyes grossly normal to inspection, PERRL and conjunctivae and sclerae normal  HENT: ear canals and TM's normal, nose and mouth without ulcers or lesions  NECK: no adenopathy, no asymmetry, masses, or scars and thyroid normal to palpation  RESP: lungs clear to auscultation - no rales, rhonchi or wheezes  BREAST: normal without masses, tenderness or nipple discharge and no palpable axillary masses or adenopathy  CV: regular rate and rhythm, normal S1 S2, no S3 or S4, no murmur, click or rub, no peripheral edema and peripheral pulses strong  ABDOMEN: soft, nontender, no hepatosplenomegaly, no masses and bowel sounds normal  MS: no gross musculoskeletal defects noted, no edema  SKIN: no suspicious lesions or rashes  NEURO: Normal strength and " tone, mentation intact and speech normal  PSYCH: mentation appears normal, affect normal/bright    Diagnostic Test Results:  Labs reviewed in Epic    ASSESSMENT / PLAN:   (Z13.220) Screening for hyperlipidemia  (primary encounter diagnosis)  Comment:   Plan: At this age will not start cholesterol pills so will defer testing her lipids    (E83.52) Hypercalcemia  Comment: She is not on any calcium supplements but should be on some calcium.  Her last 2 calcium levels were normal.  We will recommend half the daily amount of 600 mg a day  Plan: Albumin Random Urine Quantitative with Creat         Ratio, CBC with platelets, TSH, Basic metabolic        panel  (Ca, Cl, CO2, Creat, Gluc, K, Na, BUN),         Parathyroid Hormone Intact, Protein         Electrophoresis with IELP Reflex, Vitamin D         Deficiency            (E21.3) Mild hyperparathyroidism (H)  Comment: We will check calcium and PTH levels again.  Plan:     (I10) Essential hypertension  Comment: Well-controlled.  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  She has never had these before.  There have been no new situational stressors.  She is wondering if it could be her paroxetine.  I do believe the safety profile of paroxetine is less than desirable at her age.  Meant slow taper over 2 weeks if she, if she continues to have nightmares we may give her Minipress at night.  Cognitive assessment is normal she did not do the clock test appropriately but she did the 6 cit test perfectly I do not believe that she is has no evidence of dementia at this time.  She is safe in her home right now she has a , somebody that cuts glass.  She is planning to move to assisted living in [place] if necessary.  Plan:     (N18.31) Stage 3a chronic kidney disease (H)  Comment: We will monitor her kidney test she does not need to take aspirin.  She has no prior  "history of stroke or ischemic heart disease.  She can continue to take Celebrex as it helps her.  Unless creatinine goes over 1.5  Plan:     Given that her Pneumo 23 was given more than 20 years ago we will give her Prevnar 20 today.  She is tolerating Fosamax started 1 year ago.  Explained to her what osteoporosis is we will repeat bone density scan next year.  Did not really understand why she was taking the once a week medicine.    COUNSELING:  Reviewed preventive health counseling, as reflected in patient instructions    Estimated body mass index is 31.22 kg/m  as calculated from the following:    Height as of 3/9/22: 1.473 m (4' 10\").    Weight as of 3/9/22: 67.8 kg (149 lb 6.4 oz).        She reports that she has never smoked. She has never used smokeless tobacco.      Appropriate preventive services were discussed with this patient, including applicable screening as appropriate for cardiovascular disease, diabetes, osteopenia/osteoporosis, and glaucoma.  As appropriate for age/gender, discussed screening for colorectal cancer, prostate cancer, breast cancer, and cervical cancer. Checklist reviewing preventive services available has been given to the patient.    Reviewed patients plan of care and provided an AVS. The Basic Care Plan (routine screening as documented in Health Maintenance) for Alma meets the Care Plan requirement. This Care Plan has been established and reviewed with the Patient.    Counseling Resources:  ATP IV Guidelines  Pooled Cohorts Equation Calculator  Breast Cancer Risk Calculator  Breast Cancer: Medication to Reduce Risk  FRAX Risk Assessment  ICSI Preventive Guidelines  Dietary Guidelines for Americans, 2010  [employer] MyPlate  ASA Prophylaxis  Lung CA Screening    Lucy Ruano MD  [facility]    Identified Health Risks:  "

## 2022-08-08 NOTE — PATIENT INSTRUCTIONS
Taper off paroxetine ( depression med ) by going every other day for a week   Then go to twice a week for a week and then stop   Stop aspirin   Calcium 1200mg a day   TDAP is tetanus /whooping cough shot is optional but recommended and is given at the [employer] store due to cost    Shingles vaccine is optional given at the drug store also

## 2022-08-08 NOTE — LETTER
August 13, 2022      Alma Johns  [address] [place] [address]        Dear ,    We are writing to inform you of your test results.      Resulted Orders   Albumin Random Urine Quantitative with Creat Ratio   Result Value Ref Range    Albumin Urine mg/L 25.2 mg/L      Comment:      The reference ranges have not been established in urine albumin. The results should be integrated into the clinical context for interpretation.    Albumin Urine mg/g Cr 15.18 0.00 - 25.00 mg/g Cr      Comment:      Microalbuminuria is defined as an albumin:creatinine ratio of 17 to 299 for males and 25 to 299 for females. A ratio of albumin:creatinine of 300 or higher is indicative of overt proteinuria.  Due to biologic variability, positive results should be confirmed by a second, first-morning random or 24-hour timed urine specimen. If there is discrepancy, a third specimen is recommended. When 2 out of 3 results are in the microalbuminuria range, this is evidence for incipient nephropathy and warrants increased efforts at glucose control, blood pressure control, and institution of therapy with an angiotensin-converting-enzyme (ACE) inhibitor (if the patient can tolerate it).      Creatinine Urine mg/dL 166.0 mg/dL      Comment:      The reference ranges have not been established in urine creatinine. The results should be integrated into the clinical context for interpretation.   CBC with platelets   Result Value Ref Range    WBC Count 8.0 4.0 - 11.0 10e3/uL    RBC Count 4.92 3.80 - 5.20 10e6/uL    Hemoglobin 14.6 11.7 - 15.7 g/dL    Hematocrit 43.1 35.0 - 47.0 %    MCV 88 78 - 100 fL    MCH 29.7 26.5 - 33.0 pg    MCHC 33.9 31.5 - 36.5 g/dL    RDW 12.7 10.0 - 15.0 %    Platelet Count 261 150 - 450 10e3/uL   TSH   Result Value Ref Range    TSH 0.41 0.30 - 4.20 uIU/mL   Basic metabolic panel  (Ca, Cl, CO2, Creat, Gluc, K, Na, BUN)   Result Value Ref Range    Creatinine 0.98 (H) 0.51 - 0.95 mg/dL    Sodium 141 136 - 145  mmol/L    Potassium 4.0 3.4 - 5.3 mmol/L    Urea Nitrogen 14.8 8.0 - 23.0 mg/dL    Chloride 106 98 - 107 mmol/L    Carbon Dioxide (CO2) 21 (L) 22 - 29 mmol/L    Anion Gap 14 7 - 15 mmol/L    Glucose 110 (H) 70 - 99 mg/dL    GFR Estimate 56 (L) >60 mL/min/1.73m2      Comment:      Effective December 21, 2021 eGFRcr in adults is calculated using the 2021 CKD-EPI creatinine equation which includes age and gender (Raul et al., NEJ, DOI: 10.1056/EWBMab7627874)    Calcium 10.5 (H) 8.8 - 10.2 mg/dL   Parathyroid Hormone Intact   Result Value Ref Range    Parathyroid Hormone Intact 106 (H) 15 - 65 pg/mL    Narrative    This result was obtained with the Roche Elecsys PTH STAT assay.   This reference range differs from PTH assays used in other [facility] laboratories.   Vitamin D Deficiency   Result Value Ref Range    Vitamin D, Total (25-Hydroxy) 43 20 - 75 ug/L    Narrative    Season, race, dietary intake, and treatment affect the concentration of 25-hydroxy-Vitamin D. Values may decrease during winter months and increase during summer months. Values 20-29 ug/L may indicate Vitamin D insufficiency and values <20 ug/L may indicate Vitamin D deficiency.    Vitamin D determination is routinely performed by an immunoassay specific for 25 hydroxyvitamin D3.  If an individual is on vitamin D2(ergocalciferol) supplementation, please specify 25 OH vitamin D2 and D3 level determination by LCMSMS test VITD23.     Protein Electrophoresis Serum with Reflex   Result Value Ref Range    Albumin 4.3 3.7 - 5.1 g/dL    Alpha 1 0.3 0.2 - 0.4 g/dL    Alpha 2 0.7 0.5 - 0.9 g/dL    Beta Globulin 1.2 (H) 0.6 - 1.0 g/dL    Gamma Globulin 0.9 0.7 - 1.6 g/dL    Monoclonal Peak 0.0 <=0.0 g/dL    ELP Interpretation       Increased beta fraction with an otherwise essentially normal electrophoretic pattern. No monoclonal protein seen. Pathologic significance requires clinical correlation. Eugene Morgan MD   Total Protein, Serum for ELP    Result Value Ref Range    Total Protein Serum for ELP 7.4 6.4 - 8.3 g/dL     Blood counts , kidney and liver tests are all normal . Protein and calcium levels and parathyroid levels are slightly high .  This can be seen mild hyperparathyroidism . The parathyroid glands are small glands that lie ontop of the thyroid gland . However , at this time the blood work can  be monitored surgical removal of the parathyroid gland is not indicated   If you have any questions or concerns, please call the clinic at the number listed above.       Sincerely,      Lucy Ruano MD

## 2022-08-10 LAB
ALBUMIN SERPL ELPH-MCNC: 4.3 G/DL (ref 3.7–5.1)
ALPHA1 GLOB SERPL ELPH-MCNC: 0.3 G/DL (ref 0.2–0.4)
ALPHA2 GLOB SERPL ELPH-MCNC: 0.7 G/DL (ref 0.5–0.9)
B-GLOBULIN SERPL ELPH-MCNC: 1.2 G/DL (ref 0.6–1)
GAMMA GLOB SERPL ELPH-MCNC: 0.9 G/DL (ref 0.7–1.6)
M PROTEIN SERPL ELPH-MCNC: 0 G/DL
PROT PATTERN SERPL ELPH-IMP: ABNORMAL

## 2022-08-22 DIAGNOSIS — M81.0 AGE-RELATED OSTEOPOROSIS WITHOUT CURRENT PATHOLOGICAL FRACTURE: ICD-10-CM

## 2022-08-23 RX ORDER — ALENDRONATE SODIUM 70 MG/1
70 TABLET ORAL
Qty: 12 TABLET | Refills: 11 | Status: SHIPPED | OUTPATIENT
Start: 2022-08-23 | End: 2023-10-09

## 2022-08-23 NOTE — TELEPHONE ENCOUNTER
"Routing refill request to provider for review/approval because:  Labs out of range:  creatinine    Last Written Prescription Date:  2/19/2021  Last Fill Quantity: 12,  # refills: 11   Last office visit provider:  8/8/2022     Requested Prescriptions   Pending Prescriptions Disp Refills     alendronate (FOSAMAX) 70 MG tablet 12 tablet 11     Sig: Take 1 tablet (70 mg) by mouth every 7 days       Bisphosphonates Failed - 8/22/2022  9:23 AM        Failed - Normal serum creatinine on file within past 12 months     Recent Labs   Lab Test 08/08/22  1206   CR 0.98*       Ok to refill medication if creatinine is low          Passed - Recent (12 mo) or future (30 days) visit within the authorizing provider's specialty     Patient has had an office visit with the authorizing provider or a provider within the authorizing providers department within the previous 12 mos or has a future within next 30 days. See \"Patient Info\" tab in inbasket, or \"Choose Columns\" in Meds & Orders section of the refill encounter.              Passed - Dexa on file within past 2 years     Please review last Dexa result.           Passed - Medication is active on med list        Passed - Patient is age 18 or older             Anat Monreal RN 08/22/22 10:37 PM  "

## 2023-02-07 DIAGNOSIS — I10 HTN (HYPERTENSION): ICD-10-CM

## 2023-02-07 NOTE — TELEPHONE ENCOUNTER
"Routing refill request to provider for review/approval because:  Labs not current:  cr  bp out of range    Last Written Prescription Date:  3/2/22  Last Fill Quantity: 90,  # refills: 3   Last office visit provider:  8/8/22     Requested Prescriptions   Pending Prescriptions Disp Refills     doxazosin (CARDURA) 4 MG tablet [Pharmacy Med Name: Doxazosin Mesylate Oral Tablet 4 MG] 90 tablet 0     Sig: TAKE ONE TABLET BY MOUTH ONE TIME DAILY       Antiadrenergic Antihypertensives Failed - 2/7/2023  2:00 AM        Failed - Blood pressure less than 140/90 in past 6 months     BP Readings from Last 3 Encounters:   08/08/22 132/58   03/09/22 136/72   08/06/21 138/64                 Failed - Normal serum creatinine on file in past 12 months     Recent Labs   Lab Test 08/08/22  1206   CR 0.98*       Ok to refill medication if creatinine is low          Failed - Recent (6 mo) or future (30 days) visit within the authorizing provider's specialty     Patient had office visit in the last 6 months or has a visit in the next 30 days with authorizing provider or within the authorizing provider's specialty.  See \"Patient Info\" tab in inbasket, or \"Choose Columns\" in Meds & Orders section of the refill encounter.            Passed - Medication is active on med list        Passed - Patient is age 18 or older        Passed - No active pregnancy on record        Passed - No positive pregnancy test within past 12 months       Alpha Blockers Passed - 2/7/2023  2:00 AM        Passed - Blood pressure under 140/90 in past 12 months     BP Readings from Last 3 Encounters:   08/08/22 132/58   03/09/22 136/72   08/06/21 138/64                 Passed - Recent (12 mo) or future (30 days) visit within the authorizing provider's specialty     Patient has had an office visit with the authorizing provider or a provider within the authorizing providers department within the previous 12 mos or has a future within next 30 days. See \"Patient Info\" tab in " "inbasket, or \"Choose Columns\" in Meds & Orders section of the refill encounter.              Passed - Patient does not have Tadalafil, Vardenafil, or Sildenafil on their medication list        Passed - Medication is active on med list        Passed - Patient is 18 years of age or older        Passed - No active pregnancy on record        Passed - No positive pregnancy test in past 12 months             Heaven Calles RN 02/07/23 3:24 PM  "

## 2023-02-08 RX ORDER — DOXAZOSIN 4 MG/1
TABLET ORAL
Qty: 90 TABLET | Refills: 0 | Status: SHIPPED | OUTPATIENT
Start: 2023-02-08 | End: 2023-05-21

## 2023-02-13 DIAGNOSIS — I10 HTN (HYPERTENSION): ICD-10-CM

## 2023-02-14 RX ORDER — DOXAZOSIN 4 MG/1
TABLET ORAL
Qty: 90 TABLET | Refills: 0 | OUTPATIENT
Start: 2023-02-14

## 2023-02-16 ENCOUNTER — OFFICE VISIT (OUTPATIENT)
Dept: INTERNAL MEDICINE | Facility: CLINIC | Age: 88
End: 2023-02-16
Payer: MEDICARE

## 2023-02-16 VITALS
BODY MASS INDEX: 29.04 KG/M2 | OXYGEN SATURATION: 93 % | TEMPERATURE: 97 F | DIASTOLIC BLOOD PRESSURE: 52 MMHG | WEIGHT: 147.9 LBS | HEART RATE: 77 BPM | RESPIRATION RATE: 20 BRPM | HEIGHT: 60 IN | SYSTOLIC BLOOD PRESSURE: 126 MMHG

## 2023-02-16 DIAGNOSIS — H61.21 IMPACTED CERUMEN OF RIGHT EAR: Primary | ICD-10-CM

## 2023-02-16 PROCEDURE — 99213 OFFICE O/P EST LOW 20 MIN: CPT | Performed by: INTERNAL MEDICINE

## 2023-02-16 NOTE — NURSING NOTE
Alma has her right ear washed, small amount of wax came out. She was slightly dizzy when done, but after sitting a few mines was fine    Dayana Santoyo CMA ([facility])

## 2023-02-16 NOTE — PROGRESS NOTES
"  Assessment & Plan     Impacted cerumen of right ear  Ear was cleaned out today by my medical assistant with improvement of symptoms.       BMI:   Estimated body mass index is 29.37 kg/m  as calculated from the following:    Height as of this encounter: 1.511 m (4' 11.5\").    Weight as of this encounter: 67.1 kg (147 lb 14.4 oz).     Minal Carbone MD  [facility]    Subjective   Alma is a 87 year old accompanied by her SELF, presenting for the following health issues:  Recheck Medication and Ear Problem (WOULD LIKE BOTH EARS CLEANED TODAY. 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  She is currently here for ear cleaning.    She wears hearing aids and was seen at [facility] yesterday due to decrease in her hearing and they mention to her that her right ear was impacted by wax.  The last time she had her ears cleaned in our office was over a year ago.  Currently she denies any ear pain, she is not on any blood thinners .  She does occasionally has some sinus congestion but has not had any sinus problems allergies recently.    Review of Systems   As above      Objective    /52   Pulse 77   Temp 97  F (36.1  C) (Oral)   Resp 20   Ht 1.511 m (4' 11.5\")   Wt 67.1 kg (147 lb 14.4 oz)   SpO2 93%   BMI 29.37 kg/m    Body mass index is 29.37 kg/m .  Physical Exam   General: well appearing elderly female, alert and oriented x3  EYES: Eyelids, conjunctiva, and sclera were normal. Pupils were normal.   HEAD, EARS, NOSE, MOUTH, AND THROAT: no cervical LAD, no thyromegaly or nodules appreciated. TMs are visualized and normal on the left, right tympanic membrane is obstructed by wax, oropharynx is clear, she wears dentures, oropharynx is somewhat crowded.  RESPIRATORY: respirations non labored, CTA bl, no wheezes, rales, no forced expiratory " wheezing.  CARDIOVASCULAR: Heart rate and rhythm were normal. No murmurs, rubs,gallops. There was no peripheral edema.   NEUROLOGIC: The patient was alert and oriented.  Speech was normal.  There is no facial asymmetry.   PSYCHIATRIC:  Mood and affect were normal.

## 2023-05-11 DIAGNOSIS — I10 PRIMARY HYPERTENSION: ICD-10-CM

## 2023-05-11 NOTE — TELEPHONE ENCOUNTER
"Routing refill request to provider for review/approval because:  Labs out of range:  Creatinine    Last Written Prescription Date:  5/13/2022  Last Fill Quantity: 90,  # refills: 3   Last office visit provider:  2/16/2023     Requested Prescriptions   Pending Prescriptions Disp Refills     amLODIPine (NORVASC) 10 MG tablet [Pharmacy Med Name: amLODIPine Besylate Oral Tablet 10 MG] 90 tablet 0     Sig: TAKE ONE TABLET BY MOUTH ONE TIME DAILY       Calcium Channel Blockers Protocol  Failed - 5/11/2023  2:00 AM        Failed - Normal serum creatinine on file in past 12 months     Recent Labs   Lab Test 08/08/22  1206   CR 0.98*       Ok to refill medication if creatinine is low          Passed - Blood pressure under 140/90 in past 12 months     BP Readings from Last 3 Encounters:   02/16/23 126/52   08/08/22 132/58   03/09/22 136/72                 Passed - Recent (12 mo) or future (30 days) visit within the authorizing provider's specialty     Patient has had an office visit with the authorizing provider or a provider within the authorizing providers department within the previous 12 mos or has a future within next 30 days. See \"Patient Info\" tab in inbasket, or \"Choose Columns\" in Meds & Orders section of the refill encounter.              Passed - Medication is active on med list        Passed - Patient is age 18 or older        Passed - No active pregnancy on record        Passed - No positive pregnancy test in past 12 months             Pamela Crocker RN 05/11/23 3:01 PM  "

## 2023-05-12 RX ORDER — AMLODIPINE BESYLATE 10 MG/1
TABLET ORAL
Qty: 90 TABLET | Refills: 3 | Status: SHIPPED | OUTPATIENT
Start: 2023-05-12 | End: 2024-05-08

## 2023-07-22 DIAGNOSIS — M54.42 CHRONIC BILATERAL LOW BACK PAIN WITH LEFT-SIDED SCIATICA: ICD-10-CM

## 2023-07-22 DIAGNOSIS — G89.29 CHRONIC BILATERAL LOW BACK PAIN WITH LEFT-SIDED SCIATICA: ICD-10-CM

## 2023-07-22 NOTE — TELEPHONE ENCOUNTER
"Routing refill request to provider for review/approval because:  Labs out of range:  CR  Labs not current:  ALT, AST  Patient over the age of 65    Last Written Prescription Date:  7/26/22  Last Fill Quantity: 90,  # refills: 3   Last office visit provider:   2/16/23    Requested Prescriptions   Pending Prescriptions Disp Refills    celecoxib (CELEBREX) 200 MG capsule [Pharmacy Med Name: Celecoxib Oral Capsule 200 MG] 90 capsule 0     Sig: Take 1 capsule (200 mg) by mouth daily       NSAID Medications Failed - 7/22/2023 10:14 AM        Failed - Normal ALT on file in past 12 months     Recent Labs   Lab Test 08/06/21  1154   ALT 13             Failed - Normal AST on file in past 12 months     Recent Labs   Lab Test 08/06/21  1154   AST 16             Failed - Patient is age 6-64 years        Failed - Normal serum creatinine on file in past 12 months     Recent Labs   Lab Test 08/08/22  1206   CR 0.98*       Ok to refill medication if creatinine is low          Passed - Blood pressure under 140/90 in past 12 months     BP Readings from Last 3 Encounters:   02/16/23 126/52   08/08/22 132/58   03/09/22 136/72                 Passed - Recent (12 mo) or future (30 days) visit within the authorizing provider's specialty     Patient has had an office visit with the authorizing provider or a provider within the authorizing providers department within the previous 12 mos or has a future within next 30 days. See \"Patient Info\" tab in inbasket, or \"Choose Columns\" in Meds & Orders section of the refill encounter.              Passed - Normal CBC on file in past 12 months     Recent Labs   Lab Test 08/08/22  1206   WBC 8.0   RBC 4.92   HGB 14.6   HCT 43.1                    Passed - Medication is active on med list        Passed - No active pregnancy on record        Passed - No positive pregnancy test in past 12 months             Fern Singh RN 07/22/23 6:33 PM  "

## 2023-07-24 RX ORDER — CELECOXIB 200 MG/1
200 CAPSULE ORAL DAILY
Qty: 90 CAPSULE | Refills: 0 | Status: SHIPPED | OUTPATIENT
Start: 2023-07-24 | End: 2023-11-14

## 2023-08-10 ENCOUNTER — OFFICE VISIT (OUTPATIENT)
Dept: INTERNAL MEDICINE | Facility: CLINIC | Age: 88
End: 2023-08-10
Payer: MEDICARE

## 2023-08-10 VITALS
WEIGHT: 144.3 LBS | RESPIRATION RATE: 14 BRPM | SYSTOLIC BLOOD PRESSURE: 116 MMHG | HEIGHT: 59 IN | OXYGEN SATURATION: 94 % | DIASTOLIC BLOOD PRESSURE: 64 MMHG | TEMPERATURE: 97.9 F | HEART RATE: 59 BPM | BODY MASS INDEX: 29.09 KG/M2

## 2023-08-10 DIAGNOSIS — I10 ESSENTIAL HYPERTENSION: ICD-10-CM

## 2023-08-10 DIAGNOSIS — Z23 NEED FOR DIPHTHERIA-TETANUS-PERTUSSIS (TDAP) VACCINE: ICD-10-CM

## 2023-08-10 DIAGNOSIS — N18.31 STAGE 3A CHRONIC KIDNEY DISEASE (H): Primary | ICD-10-CM

## 2023-08-10 DIAGNOSIS — R79.9 ABNORMAL FINDING OF BLOOD CHEMISTRY, UNSPECIFIED: ICD-10-CM

## 2023-08-10 DIAGNOSIS — M81.0 AGE-RELATED OSTEOPOROSIS WITHOUT CURRENT PATHOLOGICAL FRACTURE: ICD-10-CM

## 2023-08-10 DIAGNOSIS — Z23 NEED FOR SHINGLES VACCINE: ICD-10-CM

## 2023-08-10 DIAGNOSIS — Z00.00 ENCOUNTER FOR MEDICARE ANNUAL WELLNESS EXAM: ICD-10-CM

## 2023-08-10 DIAGNOSIS — F41.9 ANXIETY DISORDER, UNSPECIFIED TYPE: ICD-10-CM

## 2023-08-10 DIAGNOSIS — H61.23 BILATERAL IMPACTED CERUMEN: ICD-10-CM

## 2023-08-10 DIAGNOSIS — Z23 NEED FOR PROPHYLACTIC VACCINATION WITH COMBINED DIPHTHERIA-TETANUS-PERTUSSIS (DTP) VACCINE: ICD-10-CM

## 2023-08-10 DIAGNOSIS — E21.3 MILD HYPERPARATHYROIDISM (H): ICD-10-CM

## 2023-08-10 LAB
ALBUMIN SERPL BCG-MCNC: 4 G/DL (ref 3.5–5.2)
ALBUMIN UR-MCNC: 30 MG/DL
ALP SERPL-CCNC: 80 U/L (ref 35–104)
ALT SERPL W P-5'-P-CCNC: 10 U/L (ref 0–50)
ANION GAP SERPL CALCULATED.3IONS-SCNC: 13 MMOL/L (ref 7–15)
APPEARANCE UR: CLEAR
AST SERPL W P-5'-P-CCNC: 25 U/L (ref 0–45)
BACTERIA #/AREA URNS HPF: ABNORMAL /HPF
BILIRUB SERPL-MCNC: 1.6 MG/DL
BILIRUB UR QL STRIP: NEGATIVE
BUN SERPL-MCNC: 24.4 MG/DL (ref 8–23)
CALCIUM SERPL-MCNC: 10.1 MG/DL (ref 8.8–10.2)
CHLORIDE SERPL-SCNC: 104 MMOL/L (ref 98–107)
CHOLEST SERPL-MCNC: 209 MG/DL
COLOR UR AUTO: YELLOW
CREAT SERPL-MCNC: 1.86 MG/DL (ref 0.51–0.95)
CREAT UR-MCNC: 171 MG/DL
DEPRECATED HCO3 PLAS-SCNC: 23 MMOL/L (ref 22–29)
ERYTHROCYTE [DISTWIDTH] IN BLOOD BY AUTOMATED COUNT: 13.2 % (ref 10–15)
GFR SERPL CREATININE-BSD FRML MDRD: 26 ML/MIN/1.73M2
GLUCOSE SERPL-MCNC: 118 MG/DL (ref 70–99)
GLUCOSE UR STRIP-MCNC: NEGATIVE MG/DL
HCT VFR BLD AUTO: 45.1 % (ref 35–47)
HDLC SERPL-MCNC: 38 MG/DL
HGB BLD-MCNC: 14.7 G/DL (ref 11.7–15.7)
HGB UR QL STRIP: ABNORMAL
KETONES UR STRIP-MCNC: NEGATIVE MG/DL
LDLC SERPL CALC-MCNC: 133 MG/DL
LEUKOCYTE ESTERASE UR QL STRIP: ABNORMAL
MCH RBC QN AUTO: 28.3 PG (ref 26.5–33)
MCHC RBC AUTO-ENTMCNC: 32.6 G/DL (ref 31.5–36.5)
MCV RBC AUTO: 87 FL (ref 78–100)
MICROALBUMIN UR-MCNC: 62.8 MG/L
MICROALBUMIN/CREAT UR: 36.73 MG/G CR (ref 0–25)
NITRATE UR QL: NEGATIVE
NONHDLC SERPL-MCNC: 171 MG/DL
PH UR STRIP: 6 [PH] (ref 5–8)
PLATELET # BLD AUTO: 263 10E3/UL (ref 150–450)
POTASSIUM SERPL-SCNC: 3.7 MMOL/L (ref 3.4–5.3)
PROT SERPL-MCNC: 7.3 G/DL (ref 6.4–8.3)
PTH-INTACT SERPL-MCNC: 22 PG/ML (ref 15–65)
RBC # BLD AUTO: 5.2 10E6/UL (ref 3.8–5.2)
RBC #/AREA URNS AUTO: ABNORMAL /HPF
SODIUM SERPL-SCNC: 140 MMOL/L (ref 136–145)
SP GR UR STRIP: 1.01 (ref 1–1.03)
SQUAMOUS #/AREA URNS AUTO: ABNORMAL /LPF
TRIGL SERPL-MCNC: 191 MG/DL
TSH SERPL DL<=0.005 MIU/L-ACNC: 0.33 UIU/ML (ref 0.3–4.2)
UROBILINOGEN UR STRIP-ACNC: 1 E.U./DL
WBC # BLD AUTO: 10.6 10E3/UL (ref 4–11)
WBC #/AREA URNS AUTO: ABNORMAL /HPF

## 2023-08-10 PROCEDURE — 81001 URINALYSIS AUTO W/SCOPE: CPT | Performed by: INTERNAL MEDICINE

## 2023-08-10 PROCEDURE — 82043 UR ALBUMIN QUANTITATIVE: CPT | Performed by: INTERNAL MEDICINE

## 2023-08-10 PROCEDURE — 87086 URINE CULTURE/COLONY COUNT: CPT | Performed by: INTERNAL MEDICINE

## 2023-08-10 PROCEDURE — 69209 REMOVE IMPACTED EAR WAX UNI: CPT | Performed by: INTERNAL MEDICINE

## 2023-08-10 PROCEDURE — 83970 ASSAY OF PARATHORMONE: CPT | Performed by: INTERNAL MEDICINE

## 2023-08-10 PROCEDURE — 80061 LIPID PANEL: CPT | Performed by: INTERNAL MEDICINE

## 2023-08-10 PROCEDURE — 85027 COMPLETE CBC AUTOMATED: CPT | Performed by: INTERNAL MEDICINE

## 2023-08-10 PROCEDURE — 80053 COMPREHEN METABOLIC PANEL: CPT | Performed by: INTERNAL MEDICINE

## 2023-08-10 PROCEDURE — 84443 ASSAY THYROID STIM HORMONE: CPT | Performed by: INTERNAL MEDICINE

## 2023-08-10 PROCEDURE — G0439 PPPS, SUBSEQ VISIT: HCPCS | Performed by: INTERNAL MEDICINE

## 2023-08-10 PROCEDURE — 82570 ASSAY OF URINE CREATININE: CPT | Performed by: INTERNAL MEDICINE

## 2023-08-10 PROCEDURE — 36415 COLL VENOUS BLD VENIPUNCTURE: CPT | Performed by: INTERNAL MEDICINE

## 2023-08-10 PROCEDURE — 99214 OFFICE O/P EST MOD 30 MIN: CPT | Mod: 25 | Performed by: INTERNAL MEDICINE

## 2023-08-10 ASSESSMENT — ENCOUNTER SYMPTOMS
CONSTIPATION: 0
BREAST MASS: 0
HEMATURIA: 0
HEMATOCHEZIA: 0
PARESTHESIAS: 0
SORE THROAT: 0
EYE PAIN: 0
HEADACHES: 0
DIZZINESS: 0
DIARRHEA: 0
COUGH: 0
ABDOMINAL PAIN: 0
CHILLS: 0
WEAKNESS: 0
DYSURIA: 0
JOINT SWELLING: 1
PALPITATIONS: 0
NAUSEA: 0
FREQUENCY: 0
HEARTBURN: 0
FEVER: 0
MYALGIAS: 0
ARTHRALGIAS: 1
NERVOUS/ANXIOUS: 1
SHORTNESS OF BREATH: 0

## 2023-08-10 ASSESSMENT — PAIN SCALES - GENERAL: PAINLEVEL: MODERATE PAIN (5)

## 2023-08-10 ASSESSMENT — ACTIVITIES OF DAILY LIVING (ADL)
CURRENT_FUNCTION: HOUSEWORK REQUIRES ASSISTANCE
CURRENT_FUNCTION: LAUNDRY REQUIRES ASSISTANCE

## 2023-08-10 NOTE — PROGRESS NOTES
"SUBJECTIVE:   Alma is a 88 year old who presents for Preventive Visit.      8/10/2023     8:14 AM   Additional Questions   Roomed by SANDI TORRES       Are you in the first 12 months of your Medicare coverage?  No    Healthy Habits:     In general, how would you rate your overall health?  Good    Frequency of exercise:  None    Do you usually eat at least 4 servings of fruit and vegetables a day, include whole grains    & fiber and avoid regularly eating high fat or \"junk\" foods?  Yes    Taking medications regularly:  Yes    Medication side effects:  None    Ability to successfully perform activities of daily living:  Housework requires assistance and laundry requires assistance    Home Safety:  No safety concerns identified    Hearing Impairment:  Need to ask people to speak up or repeat themselves    In the past 6 months, have you been bothered by leaking of urine?  No    In general, how would you rate your overall mental or emotional health?  Good    Additional concerns today:  No        Have you ever done Advance Care Planning? (For example, a Health Directive, POLST, or a discussion with a medical provider or your loved ones about your wishes): Yes, advance care planning is on file.       Fall risk  Fallen 2 or more times in the past year?: No  Any fall with injury in the past year?: No    Cognitive Screening   1) Repeat 3 items (Leader, Season, Table)    2) Clock draw: NORMAL  3) 3 item recall:   Recalls 2 objects   Results: NORMAL clock, 1-2 items recalled: COGNITIVE IMPAIRMENT LESS LIKELY    Mini-CogTM Copyright [employer]. Licensed by the author for use in [facility]; reprinted with permission (kim@.[email or web address]). All rights reserved.      Do you have sleep apnea, excessive snoring or daytime drowsiness? : no    Reviewed and updated as needed this visit by clinical staff   Tobacco  Allergies  Meds            Current Outpatient Medications   Medication     alendronate (FOSAMAX) 70 MG tablet     amLODIPine " (NORVASC) 10 MG tablet     celecoxib (CELEBREX) 200 MG capsule     doxazosin (CARDURA) 4 MG tablet     latanoprost (XALATAN) 0.005 % ophthalmic solution     Tdap, tetanus-diptheria-acell pertussis, (BOOSTRIX) 5-2.5-18.5 LF-MCG/0.5 SD injection     therapeutic multivitamin (THERAGRAN) tablet     timolol maleate (TIMOPTIC) 0.5 % ophthalmic solution     zoster vaccine recombinant adjuvanted (SHINGRIX) injection     alendronate (FOSAMAX) 70 MG tablet     alendronate (FOSAMAX) 70 MG tablet     furosemide (LASIX) 20 MG tablet     No current facility-administered medications for this visit.       Reviewed and updated as needed this visit by Provider                 Social History     Tobacco Use     Smoking status: Never     Smokeless tobacco: Never   Substance Use Topics     Alcohol use: No             8/10/2023     8:14 AM   Alcohol Use   Prescreen: >3 drinks/day or >7 drinks/week? No     Do you have a current opioid prescription? No  Do you use any other controlled substances or medications that are not prescribed by a provider? None              Current providers sharing in care for this patient include:   Patient Care Team:  Lucy Ruano MD as PCP - General (Internal Medicine)  Lucy Ruano MD as Assigned PCP    The following health maintenance items are reviewed in Epic and correct as of today:  Health Maintenance   Topic Date Due     COVID-19 Vaccine (1) Never done     URINALYSIS  Never done     ZOSTER IMMUNIZATION (1 of 2) Never done     DTAP/TDAP/TD IMMUNIZATION (1 - Tdap) 03/30/2012     LIPID  07/26/2018     BMP  08/08/2023     MICROALBUMIN  08/08/2023     ANNUAL REVIEW OF HM ORDERS  08/08/2023     MEDICARE ANNUAL WELLNESS VISIT  08/08/2023     HEMOGLOBIN  08/08/2023     FALL RISK ASSESSMENT  08/10/2024     ADVANCE CARE PLANNING  08/08/2027     PHQ-2 (once per calendar year)  Completed     Pneumococcal Vaccine: 65+ Years  Completed     IPV IMMUNIZATION  Aged Out     MENINGITIS IMMUNIZATION  Aged Out      "INFLUENZA VACCINE  Discontinued             Pertinent mammograms are reviewed under the imaging tab.    Review of Systems   Constitutional:  Negative for chills and fever.   HENT:  Positive for hearing loss. Negative for congestion, ear pain and sore throat.    Eyes:  Negative for pain and visual disturbance.   Respiratory:  Negative for cough and shortness of breath.    Cardiovascular:  Positive for peripheral edema. Negative for chest pain and palpitations.   Gastrointestinal:  Negative for abdominal pain, constipation, diarrhea, heartburn, hematochezia and nausea.   Breasts:  Negative for tenderness, breast mass and discharge.   Genitourinary:  Negative for dysuria, frequency, genital sores, hematuria, pelvic pain, urgency, vaginal bleeding and vaginal discharge.   Musculoskeletal:  Positive for arthralgias and joint swelling. Negative for myalgias.   Skin:  Negative for rash.   Neurological:  Negative for dizziness, weakness, headaches and paresthesias.   Psychiatric/Behavioral:  Negative for mood changes. The patient is nervous/anxious.          OBJECTIVE:   /64 (BP Location: Right arm, Patient Position: Sitting, Cuff Size: Adult Large)   Pulse 59   Temp 97.9  F (36.6  C)   Resp 14   Ht 1.499 m (4' 11\")   Wt 65.5 kg (144 lb 4.8 oz)   LMP  (LMP Unknown)   SpO2 94%   BMI 29.15 kg/m   Estimated body mass index is 29.15 kg/m  as calculated from the following:    Height as of this encounter: 1.499 m (4' 11\").    Weight as of this encounter: 65.5 kg (144 lb 4.8 oz).  Physical Exam  GENERAL: healthy, alert and no distress  EYES: Eyes grossly normal to inspection, PERRL and conjunctivae and sclerae normal  HENT: normal cephalic/atraumaticl, nose and mouth without ulcers or lesions, oropharynx clear, oral mucous membranes moist and impacted by wax  NECK: no adenopathy, no asymmetry, masses, or scars and thyroid normal to palpation  RESP: lungs clear to auscultation - no rales, rhonchi or wheezes  BREAST: " normal without masses, tenderness or nipple discharge and no palpable axillary masses or adenopathy  CV: regular rate and rhythm, normal S1 S2, no S3 or S4, no murmur, click or rub, no peripheral edema and peripheral pulses strong  ABDOMEN: soft, nontender, no hepatosplenomegaly, no masses and bowel sounds normal  MS: no gross musculoskeletal defects noted, no edema  SKIN: no suspicious lesions or rashes  NEURO: Normal strength and tone, mentation intact and speech normal  PSYCH: mentation appears normal, affect normal/bright        ASSESSMENT / PLAN:   (N18.31) Stage 3a chronic kidney disease (H)  (primary encounter diagnosis)  Comment:   Creatinine   Date Value Ref Range Status   08/08/2022 0.98 (H) 0.51 - 0.95 mg/dL Final     Stable   Plan: UA Macroscopic with reflex to Microscopic and         Culture, Albumin Random Urine Quantitative with        Creat Ratio, UA Microscopic with Reflex to         Culture, Urine Culture            (Z23) Need for shingles vaccine  Comment: she declines all vaccines inclduing flu   Plan: DISCONTINUED: zoster vaccine recombinant         adjuvanted (SHINGRIX) injection            (Z23) Need for diphtheria-tetanus-pertussis (Tdap) vaccine  Comment:   Plan: DISCONTINUED: Tdap, tetanus-diptheria-acell         pertussis, (BOOSTRIX) 5-2.5-18.5 LF-MCG/0.5         SD injection            (Z23) Need for prophylactic vaccination with combined diphtheria-tetanus-pertussis (DTP) vaccine  Comment:   Plan:     (E21.3) Mild hyperparathyroidism (H)  Comment:   Plan: Lipid panel reflex to direct LDL Non-fasting,         Comprehensive metabolic panel, CBC with         platelets, Parathyroid Hormone Intact, TSH            (I10) Essential hypertension  Comment: well controlled   Plan:     (M81.0) Age-related osteoporosis without current pathological fracture  Comment: tolerating fiosamax   Plan:     (R79.9) Abnormal finding of blood chemistry, unspecified  Comment:   Plan: Lipid panel reflex to  "direct LDL Non-fasting            (F41.9) Anxiety disorder, unspecified type  Comment: doing better . Living alone very well . Has support from family . Has lived in current home for more than 50 Years   Plan: TSH            (H61.23) Bilateral impacted cerumen  Comment:   Plan: REMOVE IMPACTED CERUMEN            (Z00.00) Encounter for Medicare annual wellness exam  Comment:   Plan:           COUNSELING:  Reviewed preventive health counseling, as reflected in patient instructions      BMI:   Estimated body mass index is 29.15 kg/m  as calculated from the following:    Height as of this encounter: 1.499 m (4' 11\").    Weight as of this encounter: 65.5 kg (144 lb 4.8 oz).         She reports that she has never smoked. She has never used smokeless tobacco.      Appropriate preventive services were discussed with this patient, including applicable screening as appropriate for cardiovascular disease, diabetes, osteopenia/osteoporosis, and glaucoma.  As appropriate for age/gender, discussed screening for colorectal cancer, prostate cancer, breast cancer, and cervical cancer. Checklist reviewing preventive services available has been given to the patient.    Reviewed patients plan of care and provided an AVS. The Basic Care Plan (routine screening as documented in Health Maintenance) for Alma meets the Care Plan requirement. This Care Plan has been established and reviewed with the Patient.          Lucy Ruano MD  [facility]  Declines vaccines   Identified Health Risks:    "

## 2023-08-10 NOTE — LETTER
August 25, 2023      Alma Johns  [address]  [place] [address]        Dear ,    We are writing to inform you of your test results.    I apologize for the delay in sending him to you    Resulted Orders   Lipid panel reflex to direct LDL Non-fasting   Result Value Ref Range    Cholesterol 209 (H) <200 mg/dL    Triglycerides 191 (H) <150 mg/dL    Direct Measure HDL 38 (L) >=50 mg/dL    LDL Cholesterol Calculated 133 (H) <=100 mg/dL    Non HDL Cholesterol 171 (H) <130 mg/dL    Narrative    Cholesterol  Desirable:  <200 mg/dL    Triglycerides  Normal:  Less than 150 mg/dL  Borderline High:  150-199 mg/dL  High:  200-499 mg/dL  Very High:  Greater than or equal to 500 mg/dL    Direct Measure HDL  Female:  Greater than or equal to 50 mg/dL   Male:  Greater than or equal to 40 mg/dL    LDL Cholesterol  Desirable:  <100mg/dL  Above Desirable:  100-129 mg/dL   Borderline High:  130-159 mg/dL   High:  160-189 mg/dL   Very High:  >= 190 mg/dL    Non HDL Cholesterol  Desirable:  130 mg/dL  Above Desirable:  130-159 mg/dL  Borderline High:  160-189 mg/dL  High:  190-219 mg/dL  Very High:  Greater than or equal to 220 mg/dL   Comprehensive metabolic panel   Result Value Ref Range    Sodium 140 136 - 145 mmol/L    Potassium 3.7 3.4 - 5.3 mmol/L    Chloride 104 98 - 107 mmol/L    Carbon Dioxide (CO2) 23 22 - 29 mmol/L    Anion Gap 13 7 - 15 mmol/L    Urea Nitrogen 24.4 (H) 8.0 - 23.0 mg/dL    Creatinine 1.86 (H) 0.51 - 0.95 mg/dL    Calcium 10.1 8.8 - 10.2 mg/dL    Glucose 118 (H) 70 - 99 mg/dL    Alkaline Phosphatase 80 35 - 104 U/L    AST 25 0 - 45 U/L      Comment:      Reference intervals for this test were updated on 6/12/2023 to more accurately reflect our healthy population. There may be differences in the flagging of prior results with similar values performed with this method. Interpretation of those prior results can be made in the context of the updated reference intervals.    ALT 10 0 - 50 U/L       Comment:      Reference intervals for this test were updated on 6/12/2023 to more accurately reflect our healthy population. There may be differences in the flagging of prior results with similar values performed with this method. Interpretation of those prior results can be made in the context of the updated reference intervals.      Protein Total 7.3 6.4 - 8.3 g/dL    Albumin 4.0 3.5 - 5.2 g/dL    Bilirubin Total 1.6 (H) <=1.2 mg/dL    GFR Estimate 26 (L) >60 mL/min/1.73m2   CBC with platelets   Result Value Ref Range    WBC Count 10.6 4.0 - 11.0 10e3/uL    RBC Count 5.20 3.80 - 5.20 10e6/uL    Hemoglobin 14.7 11.7 - 15.7 g/dL    Hematocrit 45.1 35.0 - 47.0 %    MCV 87 78 - 100 fL    MCH 28.3 26.5 - 33.0 pg    MCHC 32.6 31.5 - 36.5 g/dL    RDW 13.2 10.0 - 15.0 %    Platelet Count 263 150 - 450 10e3/uL   Parathyroid Hormone Intact   Result Value Ref Range    Parathyroid Hormone Intact 22 15 - 65 pg/mL    Narrative    This result was obtained with the Roche Elecsys PTH STAT assay.   This reference range differs from PTH assays used in other [facility] laboratories.   TSH   Result Value Ref Range    TSH 0.33 0.30 - 4.20 uIU/mL   UA Macroscopic with reflex to Microscopic and Culture   Result Value Ref Range    Color Urine Yellow Colorless, Straw, Light Yellow, Yellow    Appearance Urine Clear Clear    Glucose Urine Negative Negative mg/dL    Bilirubin Urine Negative Negative    Ketones Urine Negative Negative mg/dL    Specific Gravity Urine 1.010 1.005 - 1.030    Blood Urine Large (A) Negative    pH Urine 6.0 5.0 - 8.0    Protein Albumin Urine 30 (A) Negative mg/dL    Urobilinogen Urine 1.0 0.2, 1.0 E.U./dL    Nitrite Urine Negative Negative    Leukocyte Esterase Urine Small (A) Negative   Albumin Random Urine Quantitative with Creat Ratio   Result Value Ref Range    Creatinine Urine mg/dL 171.0 mg/dL      Comment:      The reference ranges have not been established in urine creatinine. The results should be  integrated into the clinical context for interpretation.    Albumin Urine mg/L 62.8 mg/L      Comment:      The reference ranges have not been established in urine albumin. The results should be integrated into the clinical context for interpretation.    Albumin Urine mg/g Cr 36.73 (H) 0.00 - 25.00 mg/g Cr      Comment:      Microalbuminuria is defined as an albumin:creatinine ratio of 17 to 299 for males and 25 to 299 for females. A ratio of albumin:creatinine of 300 or higher is indicative of overt proteinuria.  Due to biologic variability, positive results should be confirmed by a second, first-morning random or 24-hour timed urine specimen. If there is discrepancy, a third specimen is recommended. When 2 out of 3 results are in the microalbuminuria range, this is evidence for incipient nephropathy and warrants increased efforts at glucose control, blood pressure control, and institution of therapy with an angiotensin-converting-enzyme (ACE) inhibitor (if the patient can tolerate it).     UA Microscopic with Reflex to Culture   Result Value Ref Range    Bacteria Urine Few (A) None Seen /HPF    RBC Urine 10-25 (A) 0-2 /HPF /HPF    WBC Urine 10-25 (A) 0-5 /HPF /HPF    Squamous Epithelials Urine Moderate (A) None Seen /LPF   Urine Culture   Result Value Ref Range    Culture 50,000-100,000 CFU/mL Mixture of urogenital shaun      Overall your tests are stable and at good levels   A couple of minor problems were noted.  The urine sample did not seem to be a clear sample as there are some blood cells noted in the urine.  No overt bacteria grew out.  And your kidney test is slightly higher than it has been in the past.  This could be due to dehydration.  Normally I see you once a year but I would like you to come in between 1 to 3 months just for lab to recheck your urine and kidney tests after you start drinking more water every day .  This would be an lab only visit.  If you have any questions or concerns, please call  the clinic at the number listed above.       Sincerely,      Lucy Ruano MD

## 2023-08-10 NOTE — PATIENT INSTRUCTIONS
"You can go off the celebrex , ( pain meds ) and take tyelenol as needed   Put a drop of baby oil in each oil in both ears at night to stop ear wax hardening and build up   Patient Education   Personalized Prevention Plan  You are due for the preventive services outlined below.  Your care team is available to assist you in scheduling these services.  If you have already completed any of these items, please share that information with your care team to update in your medical record.  Health Maintenance Due   Topic Date Due     COVID-19 Vaccine (1) Never done     Urine Test  Never done     Zoster (Shingles) Vaccine (1 of 2) Never done     Diptheria Tetanus Pertussis (DTAP/TDAP/TD) Vaccine (1 - Tdap) 03/30/2012     Cholesterol Lab  07/26/2018     Basic Metabolic Panel  08/08/2023     Kidney Microalbumin Urine Test  08/08/2023     Annual Wellness Visit  08/08/2023     Hemoglobin  08/08/2023     Learning About Being Physically Active  What is physical activity?     Being physically active means doing any kind of activity that gets your body moving.  The types of physical activity that can help you get fit and stay healthy include:  Aerobic or \"cardio\" activities. These make your heart beat faster and make you breathe harder, such as brisk walking, riding a bike, or running. They strengthen your heart and lungs and build up your endurance.  Strength training activities. These make your muscles work against, or \"resist,\" something. Examples include lifting weights or doing push-ups. These activities help tone and strengthen your muscles and bones.  Stretches. These let you move your joints and muscles through their full range of motion. Stretching helps you be more flexible.  Reaching a balance between these three types of physical activity is important because each one contributes to your overall fitness.  What are the benefits of being active?  Being active is one of the best things you can do for your health. It helps " "you to:  Feel stronger and have more energy to do all the things you like to do.  Focus better at school or work.  Feel, think, and sleep better.  Reach and stay at a healthy weight.  Lose fat and build lean muscle.  Lower your risk for serious health problems, including diabetes, heart attack, high blood pressure, and some cancers.  Keep your heart, lungs, bones, muscles, and joints strong and healthy.  How can you make being active part of your life?  Start slowly. Make it your long-term goal to get at least 30 minutes of exercise on most days of the week. Walking is a good choice. You also may want to do other activities, such as running, swimming, cycling, or playing tennis or team sports.  Pick activities that you like--ones that make your heart beat faster, your muscles stronger, and your muscles and joints more flexible. If you find more than one thing you like doing, do them all. You don't have to do the same thing every day.  Get your heart pumping every day. Any activity that makes your heart beat faster and keeps it at that rate for a while counts.  Here are some great ways to get your heart beating faster:  Go for a brisk walk, run, or bike ride.  Go for a hike or swim.  Go in-line skating.  Play a game of touch football, basketball, or soccer.  Ride a bike.  Play tennis or racquetball.  Climb stairs.  Even some household chores can be aerobic--just do them at a faster pace. Vacuuming, raking or mowing the lawn, sweeping the garage, and washing and waxing the car all can help get your heart rate up.  Strengthen your muscles during the week. You don't have to lift heavy weights or grow big, bulky muscles to get stronger. Doing a few simple activities that make your muscles work against, or \"resist,\" something can help you get stronger.  For example, you can:  Do push-ups or sit-ups, which use your own body weight as resistance.  Lift weights or dumbbells or use stretch bands at home or in a gym or " "[facility].  Stretch your muscles often. Stretching will help you as you become more active. It can help you stay flexible, loosen tight muscles, and avoid injury. It can also help improve your balance and posture and can be a great way to relax.  Be sure to stretch the muscles you'll be using when you work out. It's best to warm your muscles slightly before you stretch them. Walk or do some other light aerobic activity for a few minutes, and then start stretching.  When you stretch your muscles:  Do it slowly. Stretching is not about going fast or making sudden movements.  Don't push or bounce during a stretch.  Hold each stretch for at least 15 to 30 seconds, if you can. You should feel a stretch in the muscle, but not pain.  Breathe out as you do the stretch. Then breathe in as you hold the stretch. Don't hold your breath.  If you're worried about how more activity might affect your health, have a checkup before you start. Follow any special advice your doctor gives you for getting a smart start.  Where can you learn more?  Go to https://www.[employer].net/patiented  Enter W332 in the search box to learn more about \"Learning About Being Physically Active.\"  Current as of: October 10, 2022               Content Version: 13.7    5290-8007 [employer].   Care instructions adapted under license by your healthcare professional. If you have questions about a medical condition or this instruction, always ask your healthcare professional. [employer] disclaims any warranty or liability for your use of this information.      Activities of Daily Living    Your Health Risk Assessment indicates you have difficulties with activities of daily living such as housework, bathing, preparing meals, taking medication, etc. Please make a follow up appointment for us to address this issue in more detail.  Hearing Loss: Care Instructions  Overview     Hearing loss is a sudden or slow decrease in how well " you hear. It can range from slight to profound. Permanent hearing loss can occur with aging. It also can happen when you are exposed long-term to loud noise. Examples include listening to loud music, riding motorcycles, or being around other loud machines.  Hearing loss can affect your work and home life. It can make you feel lonely or depressed. You may feel that you have lost your independence. But hearing aids and other devices can help you hear better and feel connected to others.  Follow-up care is a key part of your treatment and safety. Be sure to make and go to all appointments, and call your doctor if you are having problems. It's also a good idea to know your test results and keep a list of the medicines you take.  How can you care for yourself at home?  Avoid loud noises whenever possible. This helps keep your hearing from getting worse.  Always wear hearing protection around loud noises.  Wear a hearing aid as directed.  A professional can help you pick a hearing aid that will work best for you.  You can also get hearing aids over the counter for mild to moderate hearing loss.  Have hearing tests as your doctor suggests. They can show whether your hearing has changed. Your hearing aid may need to be adjusted.  Use other devices as needed. These may include:  Telephone amplifiers and hearing aids that can connect to a television, stereo, radio, or microphone.  Devices that use lights or vibrations. These alert you to the doorbell, a ringing telephone, or a baby monitor.  Television closed-captioning. This shows the words at the bottom of the screen. Most new TVs can do this.  TTY (text telephone). This lets you type messages back and forth on the telephone instead of talking or listening. These devices are also called TDD. When messages are typed on the keyboard, they are sent over the phone line to a receiving TTY. The message is shown on a monitor.  Use text messaging, social media, and email if it is  "hard for you to communicate by telephone.  Try to learn a listening technique called speechreading. It is not lipreading. You pay attention to people's gestures, expressions, posture, and tone of voice. These clues can help you understand what a person is saying. Face the person you are talking to, and have them face you. Make sure the lighting is good. You need to see the other person's face clearly.  Think about counseling if you need help to adjust to your hearing loss.  When should you call for help?  Watch closely for changes in your health, and be sure to contact your doctor if:    You think your hearing is getting worse.     You have new symptoms, such as dizziness or nausea.   Where can you learn more?  Go to https://www.[employer].net/patiented  Enter R798 in the search box to learn more about \"Hearing Loss: Care Instructions.\"  Current as of: March 1, 2023               Content Version: 13.7    1781-4304 [employer].   Care instructions adapted under license by your healthcare professional. If you have questions about a medical condition or this instruction, always ask your healthcare professional. [employer] disclaims any warranty or liability for your use of this information.         "

## 2023-08-10 NOTE — NURSING NOTE
Ear Wash  Patient identified using two patient identifiers.  Ear exam showing bilateral wax occlusion completed by RN and MD prior to ear irrigation.      Solution: warm water was placed in the left ear and right ear via irrigation tool: rhinoceros ear.      RN ear assessment completed prior to ear irrigation. Otoscopic exam reveals cerumen present and irrigation advised by MD.   Post Ear Irrigation exam completed and cerumen plug removed.  Pain assessment completed. No discomfort or pain reported. No trauma to external ear canal    Patient tolerated procedure:  yes

## 2023-08-12 LAB — BACTERIA UR CULT: NORMAL

## 2023-10-08 DIAGNOSIS — M81.0 AGE-RELATED OSTEOPOROSIS WITHOUT CURRENT PATHOLOGICAL FRACTURE: ICD-10-CM

## 2023-10-09 RX ORDER — ALENDRONATE SODIUM 70 MG/1
TABLET ORAL
Qty: 12 TABLET | Refills: 0 | Status: SHIPPED | OUTPATIENT
Start: 2023-10-09 | End: 2024-01-08

## 2023-11-14 DIAGNOSIS — M54.42 CHRONIC BILATERAL LOW BACK PAIN WITH LEFT-SIDED SCIATICA: ICD-10-CM

## 2023-11-14 DIAGNOSIS — G89.29 CHRONIC BILATERAL LOW BACK PAIN WITH LEFT-SIDED SCIATICA: ICD-10-CM

## 2023-11-14 RX ORDER — CELECOXIB 200 MG/1
200 CAPSULE ORAL DAILY
Qty: 90 CAPSULE | Refills: 0 | Status: SHIPPED | OUTPATIENT
Start: 2023-11-14 | End: 2024-02-12

## 2024-01-08 DIAGNOSIS — M81.0 AGE-RELATED OSTEOPOROSIS WITHOUT CURRENT PATHOLOGICAL FRACTURE: ICD-10-CM

## 2024-01-08 RX ORDER — ALENDRONATE SODIUM 70 MG/1
TABLET ORAL
Qty: 12 TABLET | Refills: 0 | Status: SHIPPED | OUTPATIENT
Start: 2024-01-08 | End: 2024-04-05

## 2024-02-11 DIAGNOSIS — G89.29 CHRONIC BILATERAL LOW BACK PAIN WITH LEFT-SIDED SCIATICA: ICD-10-CM

## 2024-02-11 DIAGNOSIS — R79.89 ELEVATED SERUM CREATININE: Primary | ICD-10-CM

## 2024-02-11 DIAGNOSIS — M54.42 CHRONIC BILATERAL LOW BACK PAIN WITH LEFT-SIDED SCIATICA: ICD-10-CM

## 2024-02-12 ENCOUNTER — TELEPHONE (OUTPATIENT)
Dept: INTERNAL MEDICINE | Facility: CLINIC | Age: 89
End: 2024-02-12
Payer: MEDICARE

## 2024-02-12 DIAGNOSIS — H61.20 IMPACTED CERUMEN, UNSPECIFIED LATERALITY: Primary | ICD-10-CM

## 2024-02-12 PROCEDURE — 99207 PR REMOVE IMPACTED CERUMEN: CPT | Performed by: INTERNAL MEDICINE

## 2024-02-12 RX ORDER — CELECOXIB 200 MG/1
200 CAPSULE ORAL DAILY
Qty: 90 CAPSULE | Refills: 0 | Status: SHIPPED | OUTPATIENT
Start: 2024-02-12 | End: 2024-05-08

## 2024-02-12 NOTE — TELEPHONE ENCOUNTER
Spoke with patient who stated she has hearing aids and when she went in to get her hearing aids adjusted they stated that they can not make adjustments until patients ears are cleaned out.       Patient requesting both ears be washed out.      Denies ear pain. Reports having ears flushed in August at annual wellness and reports no complications from procedure.

## 2024-02-12 NOTE — TELEPHONE ENCOUNTER
Reason for Call:  Appointment Request    Patient requesting this type of appt:  ear wash with nurse     Requested provider: RN Visit    Reason patient unable to be scheduled: Appointment requires orders that are not found in chart    When does patient want to be seen/preferred time: 1-2 days    Comments: patient has appt for labs on Weds and would like to be seen by nurse right after for ear wash     Okay to leave a detailed message?: Yes at Cell number on file:           Call taken on 2/12/2024 at 10:17 AM by Miriam Early

## 2024-02-12 NOTE — TELEPHONE ENCOUNTER
I accidentally refilled the Celebrex.  This patient needs her kidney function tested before she can have a refill.  Please call her to have her labs done I will order them.  Her kidney test had gone up over the last few years and needs to be monitored closely.

## 2024-02-14 ENCOUNTER — OFFICE VISIT (OUTPATIENT)
Dept: FAMILY MEDICINE | Facility: CLINIC | Age: 89
End: 2024-02-14
Payer: MEDICARE

## 2024-02-14 ENCOUNTER — LAB (OUTPATIENT)
Dept: LAB | Facility: CLINIC | Age: 89
End: 2024-02-14
Payer: MEDICARE

## 2024-02-14 VITALS
RESPIRATION RATE: 18 BRPM | TEMPERATURE: 97.8 F | BODY MASS INDEX: 28.4 KG/M2 | DIASTOLIC BLOOD PRESSURE: 81 MMHG | SYSTOLIC BLOOD PRESSURE: 165 MMHG | WEIGHT: 140.9 LBS | OXYGEN SATURATION: 97 % | HEART RATE: 64 BPM | HEIGHT: 59 IN

## 2024-02-14 DIAGNOSIS — R79.89 ELEVATED SERUM CREATININE: ICD-10-CM

## 2024-02-14 DIAGNOSIS — I10 ESSENTIAL HYPERTENSION: ICD-10-CM

## 2024-02-14 DIAGNOSIS — H61.23 BILATERAL IMPACTED CERUMEN: Primary | ICD-10-CM

## 2024-02-14 LAB
ANION GAP SERPL CALCULATED.3IONS-SCNC: 11 MMOL/L (ref 7–15)
BUN SERPL-MCNC: 15.5 MG/DL (ref 8–23)
CALCIUM SERPL-MCNC: 10.2 MG/DL (ref 8.8–10.2)
CHLORIDE SERPL-SCNC: 106 MMOL/L (ref 98–107)
CREAT SERPL-MCNC: 1.18 MG/DL (ref 0.51–0.95)
CREAT UR-MCNC: 129 MG/DL
DEPRECATED HCO3 PLAS-SCNC: 24 MMOL/L (ref 22–29)
EGFRCR SERPLBLD CKD-EPI 2021: 44 ML/MIN/1.73M2
GLUCOSE SERPL-MCNC: 126 MG/DL (ref 70–99)
MICROALBUMIN UR-MCNC: 19.2 MG/L
MICROALBUMIN/CREAT UR: 14.88 MG/G CR (ref 0–25)
POTASSIUM SERPL-SCNC: 3.8 MMOL/L (ref 3.4–5.3)
SODIUM SERPL-SCNC: 141 MMOL/L (ref 135–145)

## 2024-02-14 PROCEDURE — 82570 ASSAY OF URINE CREATININE: CPT

## 2024-02-14 PROCEDURE — 82043 UR ALBUMIN QUANTITATIVE: CPT

## 2024-02-14 PROCEDURE — 99213 OFFICE O/P EST LOW 20 MIN: CPT | Mod: 25

## 2024-02-14 PROCEDURE — 36415 COLL VENOUS BLD VENIPUNCTURE: CPT

## 2024-02-14 PROCEDURE — 80048 BASIC METABOLIC PNL TOTAL CA: CPT

## 2024-02-14 PROCEDURE — 69209 REMOVE IMPACTED EAR WAX UNI: CPT

## 2024-02-14 RX ORDER — RESPIRATORY SYNCYTIAL VIRUS VACCINE 120MCG/0.5
0.5 KIT INTRAMUSCULAR ONCE
Qty: 1 EACH | Refills: 0 | Status: CANCELLED | OUTPATIENT
Start: 2024-02-14 | End: 2024-02-14

## 2024-02-14 NOTE — NURSING NOTE
Ear Wash  Patient identified using two patient identifiers.  Ear exam showing wax occlusion completed by RN and MD prior to ear irrigation.    Solution: warm water was placed in the left ear and right ear via irrigation tool: rhinoceros ear.      RN ear assessment completed prior to ear irrigation. Otoscopic exam reveals cerumen present and irrigation advised by MD.   Post Ear Irrigation exam completed and cerumen plug removed bilaterally.  Pain assessment completed. No discomfort or pain reported. No trauma to external ear canal    Patient tolerated procedure:  yes    Sharon Wheeler RN on 2/14/2024 at 9:54 AM

## 2024-02-14 NOTE — PATIENT INSTRUCTIONS
You have extra earwax in your ears.  We will use an ear washing technique to help remove this earwax so that you are able to be fitted for your hearing aids.    On top of this, I would like for you to begin some daily eardrops called in Debrox drops.  These drops will help to make your earwax a consistency that allows it to fall out on its own.  Additionally, please avoid the use of Q-tips, or putting small objects into your ears to remove wax.  You can use the Debrox drops up to twice a day in both ears.    Follow-up with your primary care provider at your next visit for further evaluation and management of your impacted earwax.    Seek immediate medical attention with symptoms including hearing loss, persistent headaches, pain in your ears, bloody or colorful drainage from your ears, or high fever

## 2024-02-14 NOTE — PROGRESS NOTES
Assessment & Plan     (H61.23) Bilateral impacted cerumen  (primary encounter diagnosis)  Comment: Ongoing concern.  Cerumen obstructing view of bilateral TMs.  Ear wash performed today by RN to help remove cerumen successfully.  Considering this is an ongoing concern for patient, Debrox drops ordered to help entice removal of earwax more naturally.  No risk at this time for ear wash for cerumen removal considering lack of ear pain, history of TM perforation, or previous drainage from the ears.  Plan: PA REMOVAL IMPACTED CERUMEN IRRIGATION/LVG         UNILAT, carbamide peroxide (DEBROX) 6.5 % otic         solution      (I10) Essential Hypertension  Comment: Patient's blood pressure is elevated today with an initial assessment of 151/71, and a repeat assessment of 165/81.  Patient currently takes 10 mg of Norvasc to manage her blood pressure.  She takes this daily without missing any doses.  She notes that her blood pressure is generally closer to 120s over 70s.  Patient declines being symptomatic today, and declines any chest pain, shortness of breath, headache, lightheadedness or dizziness, or peripheral edema.  Patient has not taken her blood pressure medication today, which likely explains her more elevated blood pressure.  Considering that patient has not taken her medication today, it is not justifiable at this time to adjust the dosing of her blood pressure medication, as this may cause unintentional hypotension.  Encourage patient to follow-up with her primary care provider for further assessment and management.  Plan: Continue on previous amlodipine dosing  Follow-up with primary care provider for further evaluation and management    I spent a total of 12 minutes on the day of the visit.   Time spent by me doing chart review, history and exam, documentation and further activities per the note    FUTURE APPOINTMENTS:       - Follow-up visit at your next scheduled appointment with your primary care  provider.       - Follow-up for annual visit or as needed  There are no Patient Instructions on file for this visit.    Subjective   Alma is a 88 year old, presenting for the following health issues:  Cerumen (impacted) (The patient is in need of a hearing aid adjustment. They are unable to perform adjustment until the patient has ear canal cleaned. )      2/14/2024     8:45 AM   Additional Questions   Roomed by Marlen BOYD CMA   Alma is an 88-year-old female with past medical history significant for bilateral impacted cerumen, hypercalcemia, hyperparathyroidism, hypertension, osteoarthritis, and depression who presents today for bilateral cerumen impaction.  Patient reports that she was recently seen for hearing aid adjustment, and provider noted that she was unable to have her hearing aids adjusted due to impaction of cerumen in bilateral canals.  Patient reports that this is an ongoing concern for her, and she tries to manage this by running warm water over her ears and allowing this to hopefully remove earwax successfully.  She declines the use of Q-tips, or putting any small objects into her ears.  She declines any ear pain, increased hearing loss, tinnitus, muffled hearing, drainage from her ears, or history of perforated eardrums.  History of Present Illness       Reason for visit:  Bilateral cerumen impaction    She eats 0-1 servings of fruits and vegetables daily.She consumes 0 sweetened beverage(s) daily.She exercises with enough effort to increase her heart rate 9 or less minutes per day.  She exercises with enough effort to increase her heart rate 3 or less days per week.   She is taking medications regularly.      Review of Systems  Constitutional, HEENT, cardiovascular, pulmonary, gi and gu systems are negative, except as otherwise noted.      Objective    LMP  (LMP Unknown)   There is no height or weight on file to calculate BMI.  Physical Exam   GENERAL: alert and no distress  HENT: normal  cephalic/atraumatic, both ears: occluded with wax, nose and mouth without ulcers or lesions, oropharynx clear, and oral mucous membranes moist  NECK: no adenopathy, no asymmetry, masses, or scars  RESP: lungs clear to auscultation - no rales, rhonchi or wheezes  CV: regular rate and rhythm, normal S1 S2, no S3 or S4, no murmur, click or rub, no peripheral edema  ABDOMEN: soft, nontender, no hepatosplenomegaly, no masses and bowel sounds normal  MS: no gross musculoskeletal defects noted, no edema  NEURO: Normal strength and tone, mentation intact and speech normal    Ear irrigation completed by nurse.  Bilateral ear canals irrigated with standard peroxide and water irrigation.  Irrigation was successful in removing cerumen from bilateral ear canals entirely.  TMs readily appreciated without any concerning findings post procedurally.    Angela Estevez DNP FNP-C  Family Nurse Practitioner - Same Day Provider  [facility]          Signed Electronically by: VICKY Chamberlain CNP

## 2024-04-05 DIAGNOSIS — M81.0 AGE-RELATED OSTEOPOROSIS WITHOUT CURRENT PATHOLOGICAL FRACTURE: ICD-10-CM

## 2024-04-05 RX ORDER — ALENDRONATE SODIUM 70 MG/1
TABLET ORAL
Qty: 12 TABLET | Refills: 0 | Status: SHIPPED | OUTPATIENT
Start: 2024-04-05 | End: 2024-06-27

## 2024-05-08 DIAGNOSIS — M54.42 CHRONIC BILATERAL LOW BACK PAIN WITH LEFT-SIDED SCIATICA: ICD-10-CM

## 2024-05-08 DIAGNOSIS — I10 PRIMARY HYPERTENSION: ICD-10-CM

## 2024-05-08 DIAGNOSIS — G89.29 CHRONIC BILATERAL LOW BACK PAIN WITH LEFT-SIDED SCIATICA: ICD-10-CM

## 2024-05-08 DIAGNOSIS — I10 HTN (HYPERTENSION): ICD-10-CM

## 2024-05-08 RX ORDER — DOXAZOSIN 4 MG/1
TABLET ORAL
Qty: 90 TABLET | Refills: 0 | Status: SHIPPED | OUTPATIENT
Start: 2024-05-08 | End: 2024-08-13

## 2024-05-08 RX ORDER — CELECOXIB 200 MG/1
200 CAPSULE ORAL DAILY
Qty: 90 CAPSULE | Refills: 0 | Status: SHIPPED | OUTPATIENT
Start: 2024-05-08 | End: 2024-08-28

## 2024-05-08 RX ORDER — AMLODIPINE BESYLATE 10 MG/1
TABLET ORAL
Qty: 90 TABLET | Refills: 0 | Status: SHIPPED | OUTPATIENT
Start: 2024-05-08 | End: 2024-08-12

## 2024-06-27 DIAGNOSIS — M81.0 AGE-RELATED OSTEOPOROSIS WITHOUT CURRENT PATHOLOGICAL FRACTURE: ICD-10-CM

## 2024-06-27 RX ORDER — ALENDRONATE SODIUM 70 MG/1
TABLET ORAL
Qty: 12 TABLET | Refills: 0 | Status: SHIPPED | OUTPATIENT
Start: 2024-06-27 | End: 2024-08-12

## 2024-08-05 ENCOUNTER — OFFICE VISIT (OUTPATIENT)
Dept: FAMILY MEDICINE | Facility: CLINIC | Age: 89
End: 2024-08-05
Payer: MEDICARE

## 2024-08-05 VITALS
HEART RATE: 65 BPM | RESPIRATION RATE: 14 BRPM | HEIGHT: 59 IN | DIASTOLIC BLOOD PRESSURE: 73 MMHG | WEIGHT: 138.4 LBS | SYSTOLIC BLOOD PRESSURE: 169 MMHG | OXYGEN SATURATION: 96 % | BODY MASS INDEX: 27.9 KG/M2 | TEMPERATURE: 97.1 F

## 2024-08-05 DIAGNOSIS — I10 ESSENTIAL HYPERTENSION: ICD-10-CM

## 2024-08-05 DIAGNOSIS — H61.23 BILATERAL IMPACTED CERUMEN: Primary | ICD-10-CM

## 2024-08-05 PROCEDURE — 69209 REMOVE IMPACTED EAR WAX UNI: CPT

## 2024-08-05 PROCEDURE — 99214 OFFICE O/P EST MOD 30 MIN: CPT | Mod: 25

## 2024-08-05 RX ORDER — DOXAZOSIN 4 MG/1
4 TABLET ORAL DAILY
Qty: 90 TABLET | Refills: 0 | Status: CANCELLED | OUTPATIENT
Start: 2024-08-05

## 2024-08-05 RX ORDER — ALENDRONATE SODIUM 70 MG/1
TABLET ORAL
Qty: 12 TABLET | Refills: 0 | Status: CANCELLED | OUTPATIENT
Start: 2024-08-05

## 2024-08-05 RX ORDER — CELECOXIB 200 MG/1
200 CAPSULE ORAL DAILY
Qty: 90 CAPSULE | Refills: 0 | Status: CANCELLED | OUTPATIENT
Start: 2024-08-05

## 2024-08-05 RX ORDER — AMLODIPINE BESYLATE 10 MG/1
10 TABLET ORAL DAILY
Qty: 90 TABLET | Refills: 0 | Status: CANCELLED | OUTPATIENT
Start: 2024-08-05

## 2024-08-05 ASSESSMENT — PAIN SCALES - GENERAL: PAINLEVEL: NO PAIN (0)

## 2024-08-05 NOTE — PATIENT INSTRUCTIONS
Your ears are occluded with wax.  This is likely the cause of your muffled hearing, feelings of fullness in your ear.    Considering that there are no concerns for infection or history of eardrum rupture, we will wash your ears out in the clinic today.  Removing the excess wax will likely help to resolve your symptoms.    I have prescribed a medication called Debrox.  This is an eardrop that can help to make your earwax the consistency that helps it to fall out on its own.  You can use 1 drop in each ear once daily and clean dry ears for ongoing wax management.    Avoid putting anything into the ears including fingers, Q-tips, or other wax removal devices.  Do your best to keep the ears clean and dry, and if manual wax removal is necessary, please do not insert anything into the ear that is larger than the tip of the pinky finger.  You can wipe away wax on the outside of the ear canal with warm washcloth or a wet wipe.    Follow-up 2 weeks with your primary care provider if symptoms worsen or do not improve    Seek immediate medical attention with symptoms including high fever, bloody discharge from the ears, severe pain that is not helped by pain medication, impaired hearing.

## 2024-08-05 NOTE — NURSING NOTE
Ear Wash  Patient identified using two patient identifiers.    Ear exam showing wax occlusion completed by RN and MD prior to ear irrigation.    Solution: warm water was placed in the left ear and right ear via irrigation tool: rhinoceros ear.      RN ear assessment completed prior to ear irrigation. Otoscopic exam reveals cerumen present and irrigation advised by MD.   Post Ear Irrigation exam completed and cerumen plugs removed.    Pain assessment completed. No discomfort or pain reported. No trauma to external ear canal    Patient tolerated procedure:  yes    Sharon Wheeler RN on 8/5/2024 at 9:43 AM

## 2024-08-05 NOTE — PROGRESS NOTES
Assessment & Plan     (H61.23) Bilateral impacted cerumen  (primary encounter diagnosis)  Comment: Chronic with acute episode.  Both ears appreciated to be completely occluded with wax today.  Has tolerated lavage in the past, and is a good candidate considering lack of concerns for ear infection, or history of rupture.  Wax removal successful in the clinic today, so patient will be able to have her hearing aids adjusted at her appointment tomorrow.  And again sending patient home with Debrox drops considering frequency of cerumen impaction.  Educated her that I would like her to try these for earwax management, so that we need to rinse her ears out in the clinic less often. Offered education on medications including appropriate dosing, possible side effects, and possible adverse effects.  Education given on return to clinic instructions as well as alarm signs that would require the need for immediate medical attention.  Patient attested to understanding.  Plan: carbamide peroxide (DEBROX) 6.5 % otic         solution, [place] REMOVAL IMPACTED CERUMEN         IRRIGATION/LVG UNILAT    (I10) Essential hypertension  Comment: Chronic.  Blood pressure is quite elevated in the clinic today with readings of 175/69 and 169/73.  Patient's reading last time she was in the clinic in February 2024 were also elevated.  Patient had not taken her amlodipine either of those days.  She is insistent that her blood pressure at home is quite normal with readings close to 120s over 80s.  She is asymptomatic, so there is no concerns for hypertensive urgency or emergency, but of course these elevated readings warrant the need for ongoing evaluation to ensure that her medication dosing is correct.  Quite hesitant to increase her dosing today considering that she is reportedly normotensive at home, and do not want to run the risk of unintentional hypotension which would likely increase her risk for fall at this age.  Would very much like her to  follow-up with her PCP within the next 2 weeks with reported blood pressure readings at home after she has taken her medications so we can make sure that additional medication management is not necessary. Offered education on medications including appropriate dosing, possible side effects, and possible adverse effects.  Education given on return to clinic instructions as well as alarm signs that would require the need for immediate medical attention.  Patient attested to understanding.  Plan: Follow-up with PCP in the next 2 weeks with ongoing at home blood pressure readings after medication    Prescription drug management  I spent a total of 13 minutes on the day of the visit.   Time spent by me doing chart review, history and exam, documentation and further activities per the note    FUTURE APPOINTMENTS:       - Follow-up visit in 2 weeks with PCP       - Follow-up for annual visit or as needed  Patient Instructions   Your ears are occluded with wax.  This is likely the cause of your muffled hearing, feelings of fullness in your ear.    Considering that there are no concerns for infection or history of eardrum rupture, we will wash your ears out in the clinic today.  Removing the excess wax will likely help to resolve your symptoms.    I have prescribed a medication called Debrox.  This is an eardrop that can help to make your earwax the consistency that helps it to fall out on its own.  You can use 1 drop in each ear once daily and clean dry ears for ongoing wax management.    Avoid putting anything into the ears including fingers, Q-tips, or other wax removal devices.  Do your best to keep the ears clean and dry, and if manual wax removal is necessary, please do not insert anything into the ear that is larger than the tip of the pinky finger.  You can wipe away wax on the outside of the ear canal with warm washcloth or a wet wipe.    Follow-up 2 weeks with your primary care provider if symptoms worsen or do not  improve    Seek immediate medical attention with symptoms including high fever, bloody discharge from the ears, severe pain that is not helped by pain medication, impaired hearing.     Subjective   Alma is a 89 year old, presenting for the following health issues:  Ear Lavage        8/5/2024     8:17 AM   Additional Questions   Roomed by angela bonilla     History of Present Illness       Reason for visit:  Ear lavage    She eats 2-3 servings of fruits and vegetables daily.She consumes 1 sweetened beverage(s) daily.She exercises with enough effort to increase her heart rate 9 or less minutes per day.  She exercises with enough effort to increase her heart rate 3 or less days per week.   She is taking medications regularly.   Alma is an 89-year-old female with a past medical history significant for bilateral cerumen impaction, hyper calcium Camilla, hyperparathyroidism, hypertension, orthostasis, osteoarthritis of the hips and spine, and situational depression who presents today with concerns for muffled hearing and ongoing cerumen impaction.  Patient last had her ears washed out in the clinic in February 2024.  She notes that she felt quite well since that time, but about a month ago she began to experience increased wax in her ears and some new onset muffled hearing.  She does wear hearing aids bilaterally, but is able to tell when the ear wax is building up, as her hearing aids are less effective.  She feels as though the right side is much more significant than the left, but she knows that there is wax in both of them.  She was sent home previously with Debrox drops, but declines that she has used these outside of the clinic.  She declines use of Q-tips or putting anything into her ears for earwax management, and has largely had her earwax managed via in clinic lavage.  She feels completely well otherwise, and declines any drainage from her ears, redness or swelling in her ears or face, sinus congestion, nasal congestion,  "sore throat, headache, blurry or double vision, lightheadedness or dizziness, ringing in her ears, chest congestion, cough, chest pain, shortness of breath, fever, chills, body aches, or GI upset.  Patient has tolerated ear lavage quite well in the clinic previously.    Patient does have a history of hypertension for which she takes amlodipine 10 mg daily.  She notes that she has not yet taken her amlodipine today, but blood pressures in the clinic are quite elevated at 175/69 and 169/73.  This was also the case when she was in the clinic in February, as she had not taken her blood pressure medication that day, and readings were quite elevated at that time as well.  She insists that blood pressures at home are closer to 120s over 80s, and declines persistent blood pressures higher than 140/90 when she is at home and taking her medication.  She takes her medication daily without missed days or doses, and declines any cardiac symptom concerns including chest pain, shortness of breath, lightheadedness or dizziness, blurry or double vision, or worsening peripheral edema.      Review of Systems  Constitutional, HEENT, cardiovascular, pulmonary, gi and gu systems are negative, except as otherwise noted.      Objective    BP (!) 169/73 (BP Location: Left arm, Patient Position: Sitting, Cuff Size: Adult Large)   Pulse 65   Temp 97.1  F (36.2  C)   Resp 14   Ht 1.499 m (4' 11\")   Wt 62.8 kg (138 lb 6.4 oz)   LMP  (LMP Unknown)   SpO2 96%   BMI 27.95 kg/m    Body mass index is 27.95 kg/m .  Physical Exam   GENERAL: alert and no distress  EYES: Eyes grossly normal to inspection, PERRL and conjunctivae and sclerae normal  HENT: normal cephalic/atraumatic, both ears: occluded with wax, nose and mouth without ulcers or lesions, oropharynx clear, and oral mucous membranes moist  NECK: no adenopathy, no asymmetry, masses, or scars  RESP: lungs clear to auscultation - no rales, rhonchi or wheezes  CV: regular rate and " rhythm, normal S1 S2, no S3 or S4, no murmur, click or rub, no peripheral edema  ABDOMEN: soft, nontender, no hepatosplenomegaly, no masses and bowel sounds normal  MS: no gross musculoskeletal defects noted, no edema  NEURO: Normal strength and tone, mentation intact and speech normal    Angela Toro DNP FNP-C  Family Nurse Practitioner - Same Day Provider  [facility] [place]    Both ears appreciated to be completely occluded with wax with inability to view TMs.  Patient does not have concerns for pain, a history of eardrum rupture, bleeding from the ears canals or other drainage from the ears, or presence of acute illness putting fever, swelling, sinus pressure, nasal discharge, headache, blurry or double vision, or lightheadedness or dizziness.  Informed consent offered to patient regarding risks and benefits of ear washing in the clinic, and after collaborative discussion about procedure patient elected to have ear washing done.  Wax removal in the clinic utilizing ear irrigation with equal parts peroxide and water.  Ears successfully flushed to completely remove earwax, and TMs appreciated to be within normal limits bilaterally.  Patient is asymptomatic upon leaving the clinic, and declines any worsened muffled hearing, feelings of excess pressure in her ears, ear pain, or ear drainage.  Education given on follow-up parameters, and patient attested to understanding.               Signed Electronically by: VICKY Olmstead CNP

## 2024-08-12 ENCOUNTER — OFFICE VISIT (OUTPATIENT)
Dept: INTERNAL MEDICINE | Facility: CLINIC | Age: 89
End: 2024-08-12
Payer: MEDICARE

## 2024-08-12 VITALS
WEIGHT: 139.6 LBS | OXYGEN SATURATION: 96 % | HEART RATE: 66 BPM | DIASTOLIC BLOOD PRESSURE: 72 MMHG | TEMPERATURE: 98.3 F | RESPIRATION RATE: 28 BRPM | HEIGHT: 58 IN | BODY MASS INDEX: 29.3 KG/M2 | SYSTOLIC BLOOD PRESSURE: 156 MMHG

## 2024-08-12 DIAGNOSIS — I10 HTN (HYPERTENSION): ICD-10-CM

## 2024-08-12 DIAGNOSIS — I10 PRIMARY HYPERTENSION: ICD-10-CM

## 2024-08-12 DIAGNOSIS — Z29.11 NEED FOR VACCINATION AGAINST RESPIRATORY SYNCYTIAL VIRUS: ICD-10-CM

## 2024-08-12 DIAGNOSIS — E21.3 MILD HYPERPARATHYROIDISM (H): ICD-10-CM

## 2024-08-12 DIAGNOSIS — Z23 NEED FOR TDAP VACCINATION: ICD-10-CM

## 2024-08-12 DIAGNOSIS — M81.0 OSTEOPOROSIS, UNSPECIFIED OSTEOPOROSIS TYPE, UNSPECIFIED PATHOLOGICAL FRACTURE PRESENCE: ICD-10-CM

## 2024-08-12 DIAGNOSIS — E78.2 MIXED HYPERLIPIDEMIA: ICD-10-CM

## 2024-08-12 DIAGNOSIS — M81.0 AGE-RELATED OSTEOPOROSIS WITHOUT CURRENT PATHOLOGICAL FRACTURE: ICD-10-CM

## 2024-08-12 DIAGNOSIS — R93.89 ABNORMAL FINDINGS ON DIAGNOSTIC IMAGING OF OTHER SPECIFIED BODY STRUCTURES: ICD-10-CM

## 2024-08-12 DIAGNOSIS — N18.31 STAGE 3A CHRONIC KIDNEY DISEASE (H): Primary | ICD-10-CM

## 2024-08-12 DIAGNOSIS — Z23 NEED FOR SHINGLES VACCINE: ICD-10-CM

## 2024-08-12 LAB
ALBUMIN SERPL BCG-MCNC: 4.1 G/DL (ref 3.5–5.2)
ALP SERPL-CCNC: 80 U/L (ref 40–150)
ALT SERPL W P-5'-P-CCNC: <5 U/L (ref 0–50)
ANION GAP SERPL CALCULATED.3IONS-SCNC: 12 MMOL/L (ref 7–15)
AST SERPL W P-5'-P-CCNC: 21 U/L (ref 0–45)
BILIRUB SERPL-MCNC: 1 MG/DL
BUN SERPL-MCNC: 11.2 MG/DL (ref 8–23)
CALCIUM SERPL-MCNC: 10.2 MG/DL (ref 8.8–10.4)
CHLORIDE SERPL-SCNC: 107 MMOL/L (ref 98–107)
CHOLEST SERPL-MCNC: 214 MG/DL
CREAT SERPL-MCNC: 1 MG/DL (ref 0.51–0.95)
EGFRCR SERPLBLD CKD-EPI 2021: 54 ML/MIN/1.73M2
ERYTHROCYTE [DISTWIDTH] IN BLOOD BY AUTOMATED COUNT: 14 % (ref 10–15)
FASTING STATUS PATIENT QL REPORTED: ABNORMAL
FASTING STATUS PATIENT QL REPORTED: ABNORMAL
GLUCOSE SERPL-MCNC: 130 MG/DL (ref 70–99)
HCO3 SERPL-SCNC: 25 MMOL/L (ref 22–29)
HCT VFR BLD AUTO: 43.9 % (ref 35–47)
HDLC SERPL-MCNC: 49 MG/DL
HGB BLD-MCNC: 14.6 G/DL (ref 11.7–15.7)
LDLC SERPL CALC-MCNC: 136 MG/DL
MCH RBC QN AUTO: 28.1 PG (ref 26.5–33)
MCHC RBC AUTO-ENTMCNC: 33.3 G/DL (ref 31.5–36.5)
MCV RBC AUTO: 84 FL (ref 78–100)
NONHDLC SERPL-MCNC: 165 MG/DL
PLATELET # BLD AUTO: 265 10E3/UL (ref 150–450)
POTASSIUM SERPL-SCNC: 3.4 MMOL/L (ref 3.4–5.3)
PROT SERPL-MCNC: 7.6 G/DL (ref 6.4–8.3)
PTH-INTACT SERPL-MCNC: 75 PG/ML (ref 15–65)
RBC # BLD AUTO: 5.2 10E6/UL (ref 3.8–5.2)
SODIUM SERPL-SCNC: 144 MMOL/L (ref 135–145)
TRIGL SERPL-MCNC: 143 MG/DL
TSH SERPL DL<=0.005 MIU/L-ACNC: 0.4 UIU/ML (ref 0.3–4.2)
VIT D+METAB SERPL-MCNC: 39 NG/ML (ref 20–50)
WBC # BLD AUTO: 7.5 10E3/UL (ref 4–11)

## 2024-08-12 PROCEDURE — 83970 ASSAY OF PARATHORMONE: CPT | Performed by: INTERNAL MEDICINE

## 2024-08-12 PROCEDURE — G0439 PPPS, SUBSEQ VISIT: HCPCS | Performed by: INTERNAL MEDICINE

## 2024-08-12 PROCEDURE — 80053 COMPREHEN METABOLIC PANEL: CPT | Performed by: INTERNAL MEDICINE

## 2024-08-12 PROCEDURE — 36415 COLL VENOUS BLD VENIPUNCTURE: CPT | Performed by: INTERNAL MEDICINE

## 2024-08-12 PROCEDURE — 85027 COMPLETE CBC AUTOMATED: CPT | Performed by: INTERNAL MEDICINE

## 2024-08-12 PROCEDURE — 84443 ASSAY THYROID STIM HORMONE: CPT | Performed by: INTERNAL MEDICINE

## 2024-08-12 PROCEDURE — 99214 OFFICE O/P EST MOD 30 MIN: CPT | Mod: 25 | Performed by: INTERNAL MEDICINE

## 2024-08-12 PROCEDURE — 82306 VITAMIN D 25 HYDROXY: CPT | Performed by: INTERNAL MEDICINE

## 2024-08-12 PROCEDURE — 80061 LIPID PANEL: CPT | Performed by: INTERNAL MEDICINE

## 2024-08-12 RX ORDER — LOSARTAN POTASSIUM 25 MG/1
25 TABLET ORAL DAILY
Qty: 90 TABLET | Refills: 2 | Status: SHIPPED | OUTPATIENT
Start: 2024-08-12

## 2024-08-12 RX ORDER — AMLODIPINE BESYLATE 10 MG/1
10 TABLET ORAL DAILY
Qty: 90 TABLET | Refills: 0 | Status: SHIPPED | OUTPATIENT
Start: 2024-08-12

## 2024-08-12 RX ORDER — ALENDRONATE SODIUM 70 MG/1
70 TABLET ORAL
Qty: 12 TABLET | Refills: 0 | Status: SHIPPED | OUTPATIENT
Start: 2024-08-12

## 2024-08-12 RX ORDER — AMLODIPINE BESYLATE 10 MG/1
10 TABLET ORAL DAILY
Qty: 90 TABLET | Refills: 0 | OUTPATIENT
Start: 2024-08-12

## 2024-08-12 SDOH — HEALTH STABILITY: PHYSICAL HEALTH: ON AVERAGE, HOW MANY MINUTES DO YOU ENGAGE IN EXERCISE AT THIS LEVEL?: 0 MIN

## 2024-08-12 SDOH — HEALTH STABILITY: PHYSICAL HEALTH: ON AVERAGE, HOW MANY DAYS PER WEEK DO YOU ENGAGE IN MODERATE TO STRENUOUS EXERCISE (LIKE A BRISK WALK)?: 0 DAYS

## 2024-08-12 ASSESSMENT — PATIENT HEALTH QUESTIONNAIRE - PHQ9
SUM OF ALL RESPONSES TO PHQ QUESTIONS 1-9: 7
10. IF YOU CHECKED OFF ANY PROBLEMS, HOW DIFFICULT HAVE THESE PROBLEMS MADE IT FOR YOU TO DO YOUR WORK, TAKE CARE OF THINGS AT HOME, OR GET ALONG WITH OTHER PEOPLE: NOT DIFFICULT AT ALL
SUM OF ALL RESPONSES TO PHQ QUESTIONS 1-9: 7

## 2024-08-12 ASSESSMENT — SOCIAL DETERMINANTS OF HEALTH (SDOH): HOW OFTEN DO YOU GET TOGETHER WITH FRIENDS OR RELATIVES?: ONCE A WEEK

## 2024-08-12 ASSESSMENT — PAIN SCALES - GENERAL: PAINLEVEL: WORST PAIN (10)

## 2024-08-12 NOTE — PROGRESS NOTES
Preventive Care Visit  [facility]  Lucy Ruano MD, Internal Medicine  Aug 12, 2024      Assessment & Plan     Need for shingles vaccine  She denies further vaccines   - zoster vaccine recombinant adjuvanted (SHINGRIX) injection; Inject 0.5 mLs into the muscle once for 1 dose Pharmacist administered    Need for Tdap vaccination    - Tdap, tetanus-diptheria-acell pertussis, (BOOSTRIX) 5-2.5-18.5 LF-MCG/0.5 SD injection; Inject 0.5 mLs into the muscle once for 1 dose    Need for vaccination against respiratory syncytial virus    - RSV vaccine, bivalent, ABRYSVO, injection; Inject 0.5 mLs into the muscle once for 1 dose Pharmacist administered    Stage 3a chronic kidney disease (H)  Creatinine   Date Value Ref Range Status   02/14/2024 1.18 (H) 0.51 - 0.95 mg/dL Final     Improved after triamterene and celebrex was stopped but she restarted the celebrex .  Did tell her Tylenol Extra Strength will not affect her kidneys and she feels that it helps her to.  - Comprehensive metabolic panel; Future  - CBC with platelets; Future  - TSH; Future  - Vitamin D Deficiency; Future  - Comprehensive metabolic panel  - CBC with platelets  - TSH  - Vitamin D Deficiency    Mild hyperparathyroidism (H24)    - Vitamin D Deficiency; Future  - Parathyroid Hormone Intact; Future  - Vitamin D Deficiency  - Parathyroid Hormone Intact    Mixed hyperlipidemia  Due to age she has opted out of statins   - Lipid panel reflex to direct LDL Non-fasting; Future  - Lipid panel reflex to direct LDL Non-fasting    Abnormal findings on diagnostic imaging of other specified body structures    - TSH; Future  - TSH    Osteoporosis, unspecified osteoporosis type, unspecified pathological fracture presence  Diagnosed 2021 . She is tolerating fosamax . She is willing to check bone density scan   - DX Bone Density; Future    Age-related osteoporosis without current pathological fracture  If GFR falls below 35 reconsider Fosamax.  -  "alendronate (FOSAMAX) 70 MG tablet; Take 1 tablet (70 mg) by mouth every 7 days    Primary hypertension  Suboptimal control.  Recommend incremental treatment due to her kidney disease she is willing to add losartan to her current regimen . She was intolerant to ACE inhibitors.  The blockers may lower her heart rate.  - losartan (COZAAR) 25 MG tablet; Take 1 tablet (25 mg) by mouth daily  - amLODIPine (NORVASC) 10 MG tablet; Take 1 tablet (10 mg) by mouth daily            BMI  Estimated body mass index is 29.46 kg/m  as calculated from the following:    Height as of this encounter: 1.466 m (4' 9.72\").    Weight as of this encounter: 63.3 kg (139 lb 9.6 oz).       Counseling  Appropriate preventive services were addressed with this patient via screening, questionnaire, or discussion as appropriate for fall prevention, nutrition, physical activity, Tobacco-use cessation, weight loss and cognition.  Checklist reviewing preventive services available has been given to the patient.  Reviewed patient's diet, addressing concerns and/or questions.   The patient was instructed to see the dentist every 6 months.   She is at risk for psychosocial distress and has been provided with information to reduce risk.   The patient was provided with written information regarding signs of hearing loss.   The patient's PHQ-9 score is consistent with mild depression. She was provided with information regarding depression.           Chau Marquez is a 89 year old, presenting for the following:  Wellness Visit (Pt would like to discuss frequency of ear washes and when to use debrox. Just came in on 8/5/24 and 2/2024.  Pt [facility] to [facility] for hearing aides. )          Health Care Directive  Patient does not have a Health Care Directive or Living Will: Patient states has Advance Directive and will bring in a copy to clinic.    HPI              8/10/2023   General Health   How would you rate your overall physical health? Good            " 8/10/2023   Nutrition   At least 4 servings of fruits and vegetables/day Yes            8/10/2023   Exercise   Frequency of exercise: None      Do you have a current opioid prescription? NO    - Do you use any other controlled substances or medications that are not prescribed by a provider? NO            8/12/2024   Fall Risk   Fallen 2 or more times in the past year? No   Trouble with walking or balance? No             8/10/2023   Activities of Daily Living- Home Safety   Needs help with the following daily activites Housework    Laundry   Safety concerns in the home None of the above       Multiple values from one day are sorted in reverse-chronological order          No data to display                  8/10/2023   Hearing Screening   Hearing concerns? Need to ask people to speak up or repeat themselves               No data to display                   Today's PHQ-9 Score:       8/12/2024     8:48 AM   PHQ-9 SCORE   PHQ-9 Total Score MyChart 7 (Mild depression)   PHQ-9 Total Score 7         8/10/2023   Substance Use   Alcohol more than 3/day or more than 7/wk No        Social History     Tobacco Use    Smoking status: Never    Smokeless tobacco: Never   Vaping Use    Vaping status: Never Used   Substance Use Topics    Alcohol use: No    Drug use: No                    Reviewed and updated as needed this visit by Provider                      Current providers sharing in care for this patient include:  Patient Care Team:  Lucy Ruano MD as PCP - General (Internal Medicine)  Lucy Ruano MD as Assigned PCP    The following health maintenance items are reviewed in Epic and correct as of today:  Health Maintenance   Topic Date Due    ZOSTER IMMUNIZATION (1 of 2) Never done    RSV VACCINE (Pregnancy & 60+) (1 - 1-dose 60+ series) Never done    DTAP/TDAP/TD IMMUNIZATION (1 - Tdap) 03/30/2012    COVID-19 Vaccine (1 - 2023-24 season) Never done    LIPID  08/10/2024    ANNUAL REVIEW OF HM ORDERS  08/10/2024     "MEDICARE ANNUAL WELLNESS VISIT  08/10/2024    HEMOGLOBIN  08/10/2024    BMP  02/14/2025    MICROALBUMIN  02/14/2025    FALL RISK ASSESSMENT  08/12/2025    ADVANCE CARE PLANNING  08/10/2028    PHQ-2 (once per calendar year)  Completed    Pneumococcal Vaccine: 65+ Years  Completed    URINALYSIS  Completed    IPV IMMUNIZATION  Aged Out    HPV IMMUNIZATION  Aged Out    MENINGITIS IMMUNIZATION  Aged Out    RSV MONOCLONAL ANTIBODY  Aged Out    DEXA  Discontinued    INFLUENZA VACCINE  Discontinued            Objective    Exam  BP (!) 156/72 (BP Location: Left arm, Patient Position: Sitting, Cuff Size: Adult Regular)   Pulse 66   Temp 98.3  F (36.8  C) (Tympanic)   Resp 28   Ht 1.466 m (4' 9.72\")   Wt 63.3 kg (139 lb 9.6 oz)   LMP  (LMP Unknown)   SpO2 96%   BMI 29.46 kg/m     Estimated body mass index is 29.46 kg/m  as calculated from the following:    Height as of this encounter: 1.466 m (4' 9.72\").    Weight as of this encounter: 63.3 kg (139 lb 9.6 oz).    Physical Exam  GENERAL: alert and no distress  HENT: ear canals and TM's normal, nose and mouth without ulcers or lesions  NECK: no adenopathy, no asymmetry, masses, or scars  RESP: lungs clear to auscultation - no rales, rhonchi or wheezes  CV: regular rate and rhythm, normal S1 S2, no S3 or S4, no murmur, click or rub, no peripheral edema  ABDOMEN: soft, nontender, no hepatosplenomegaly, no masses and bowel sounds normal  MS: no gross musculoskeletal defects noted, no edema         8/12/2024   Mini Cog   Clock Draw Score 0 Abnormal   3 Item Recall 3 objects recalled   Mini Cog Total Score 3                 Signed Electronically by: Lucy Ruano MD    .undefined[^^  "

## 2024-08-12 NOTE — LETTER
August 18, 2024      Alma Johns  [address] [place] [address]        Dear ,    We are writing to inform you of your test results.        Resulted Orders   Lipid panel reflex to direct LDL Non-fasting   Result Value Ref Range    Cholesterol 214 (H) <200 mg/dL    Triglycerides 143 <150 mg/dL    Direct Measure HDL 49 (L) >=50 mg/dL    LDL Cholesterol Calculated 136 (H) <=100 mg/dL    Non HDL Cholesterol 165 (H) <130 mg/dL    Patient Fasting > 8hrs? Unknown     Narrative    Cholesterol  Desirable:  <200 mg/dL    Triglycerides  Normal:  Less than 150 mg/dL  Borderline High:  150-199 mg/dL  High:  200-499 mg/dL  Very High:  Greater than or equal to 500 mg/dL    Direct Measure HDL  Female:  Greater than or equal to 50 mg/dL   Male:  Greater than or equal to 40 mg/dL    LDL Cholesterol  Desirable:  <100mg/dL  Above Desirable:  100-129 mg/dL   Borderline High:  130-159 mg/dL   High:  160-189 mg/dL   Very High:  >= 190 mg/dL    Non HDL Cholesterol  Desirable:  130 mg/dL  Above Desirable:  130-159 mg/dL  Borderline High:  160-189 mg/dL  High:  190-219 mg/dL  Very High:  Greater than or equal to 220 mg/dL   Comprehensive metabolic panel   Result Value Ref Range    Sodium 144 135 - 145 mmol/L    Potassium 3.4 3.4 - 5.3 mmol/L    Carbon Dioxide (CO2) 25 22 - 29 mmol/L    Anion Gap 12 7 - 15 mmol/L    Urea Nitrogen 11.2 8.0 - 23.0 mg/dL    Creatinine 1.00 (H) 0.51 - 0.95 mg/dL    GFR Estimate 54 (L) >60 mL/min/1.73m2      Comment:      eGFR calculated using 2021 CKD-EPI equation.    Calcium 10.2 8.8 - 10.4 mg/dL      Comment:      Reference intervals for this test were updated on 7/16/2024 to reflect our healthy population more accurately. There may be differences in the flagging of prior results with similar values performed with this method. Those prior results can be interpreted in the context of the updated reference intervals.    Chloride 107 98 - 107 mmol/L    Glucose 130 (H) 70 - 99 mg/dL    Alkaline  Phosphatase 80 40 - 150 U/L    AST 21 0 - 45 U/L    ALT <5 0 - 50 U/L    Protein Total 7.6 6.4 - 8.3 g/dL    Albumin 4.1 3.5 - 5.2 g/dL    Bilirubin Total 1.0 <=1.2 mg/dL    Patient Fasting > 8hrs? Unknown    CBC with platelets   Result Value Ref Range    WBC Count 7.5 4.0 - 11.0 10e3/uL    RBC Count 5.20 3.80 - 5.20 10e6/uL    Hemoglobin 14.6 11.7 - 15.7 g/dL    Hematocrit 43.9 35.0 - 47.0 %    MCV 84 78 - 100 fL    MCH 28.1 26.5 - 33.0 pg    MCHC 33.3 31.5 - 36.5 g/dL    RDW 14.0 10.0 - 15.0 %    Platelet Count 265 150 - 450 10e3/uL   TSH   Result Value Ref Range    TSH 0.40 0.30 - 4.20 uIU/mL   Vitamin D Deficiency   Result Value Ref Range    Vitamin D, Total (25-Hydroxy) 39 20 - 50 ng/mL      Comment:      optimum levels    Narrative    Season, race, dietary intake, and treatment affect the concentration of 25-hydroxy-Vitamin D. Values may decrease during winter months and increase during summer months.    Vitamin D determination is routinely performed by an immunoassay specific for 25 hydroxyvitamin D3.  If an individual is on vitamin D2(ergocalciferol) supplementation, please specify 25 OH vitamin D2 and D3 level determination by LCMSMS test VITD23.     Parathyroid Hormone Intact   Result Value Ref Range    Parathyroid Hormone Intact 75 (H) 15 - 65 pg/mL    Narrative    This result was obtained with the Roche Elecsys PTH STAT assay.   This reference range differs from PTH assays used in other [facility] laboratories.       If you have any questions or concerns, please call the clinic at the number listed above.       Sincerely,      Lucy Ruano MD

## 2024-08-12 NOTE — PATIENT INSTRUCTIONS
Celebrex can hurt the kidney consider cutting it off   Tylenol doesn't hurt the kidney   ( 2 tablets twice a day )  Add losartan ( a blood pressure pill ) to help your overall blood pressure    Continue taking the other meds  Dexa scan is to check on your osteoporosis and to see

## 2024-08-13 RX ORDER — DOXAZOSIN 4 MG/1
TABLET ORAL
Qty: 90 TABLET | Refills: 3 | Status: SHIPPED | OUTPATIENT
Start: 2024-08-13

## 2024-08-28 DIAGNOSIS — M54.42 CHRONIC BILATERAL LOW BACK PAIN WITH LEFT-SIDED SCIATICA: ICD-10-CM

## 2024-08-28 DIAGNOSIS — G89.29 CHRONIC BILATERAL LOW BACK PAIN WITH LEFT-SIDED SCIATICA: ICD-10-CM

## 2024-08-28 RX ORDER — CELECOXIB 200 MG/1
200 CAPSULE ORAL DAILY
Qty: 90 CAPSULE | Refills: 3 | Status: SHIPPED | OUTPATIENT
Start: 2024-08-28

## 2024-10-04 ENCOUNTER — OFFICE VISIT (OUTPATIENT)
Dept: INTERNAL MEDICINE | Facility: CLINIC | Age: 89
End: 2024-10-04
Payer: MEDICARE

## 2024-10-04 ENCOUNTER — ANCILLARY PROCEDURE (OUTPATIENT)
Dept: GENERAL RADIOLOGY | Facility: CLINIC | Age: 89
End: 2024-10-04
Attending: INTERNAL MEDICINE
Payer: MEDICARE

## 2024-10-04 VITALS
HEIGHT: 58 IN | SYSTOLIC BLOOD PRESSURE: 120 MMHG | DIASTOLIC BLOOD PRESSURE: 48 MMHG | WEIGHT: 135.7 LBS | HEART RATE: 76 BPM | OXYGEN SATURATION: 98 % | RESPIRATION RATE: 15 BRPM | TEMPERATURE: 97.3 F | BODY MASS INDEX: 28.48 KG/M2

## 2024-10-04 DIAGNOSIS — F43.21 SITUATIONAL DEPRESSION: ICD-10-CM

## 2024-10-04 DIAGNOSIS — M17.12 ARTHRITIS OF LEFT KNEE: ICD-10-CM

## 2024-10-04 DIAGNOSIS — I10 ESSENTIAL HYPERTENSION: ICD-10-CM

## 2024-10-04 DIAGNOSIS — Z29.11 NEED FOR VACCINATION AGAINST RESPIRATORY SYNCYTIAL VIRUS: ICD-10-CM

## 2024-10-04 DIAGNOSIS — N18.31 STAGE 3A CHRONIC KIDNEY DISEASE (H): ICD-10-CM

## 2024-10-04 DIAGNOSIS — Z23 NEED FOR TDAP VACCINATION: ICD-10-CM

## 2024-10-04 DIAGNOSIS — Z23 NEED FOR SHINGLES VACCINE: ICD-10-CM

## 2024-10-04 DIAGNOSIS — M81.0 AGE-RELATED OSTEOPOROSIS WITHOUT CURRENT PATHOLOGICAL FRACTURE: Primary | ICD-10-CM

## 2024-10-04 PROCEDURE — 99214 OFFICE O/P EST MOD 30 MIN: CPT | Performed by: INTERNAL MEDICINE

## 2024-10-04 PROCEDURE — 73562 X-RAY EXAM OF KNEE 3: CPT | Mod: TC | Performed by: RADIOLOGY

## 2024-10-04 NOTE — PATIENT INSTRUCTIONS
If your blood pressure is less than 110 the top readings then let me know we can cahnge the readings    Ok to take celebrex   Nurse only visit in 3 months for blood pressure check     Tylenol with codeine or tramadol; for pain can be added on   Please get a bone density scan done to check if alendronate   6 months for repeat check up

## 2024-10-04 NOTE — PROGRESS NOTES
Assessment & Plan     Need for shingles vaccine  Discussed   - zoster vaccine recombinant adjuvanted (SHINGRIX) injection; Inject 0.5 mLs into the muscle once for 1 dose. Pharmacist administered    Need for Tdap vaccination      Need for vaccination against respiratory syncytial virus    - RSV vaccine, bivalent, ABRYSVO, injection; Inject 0.5 mLs into the muscle once for 1 dose. Pharmacist administered    Age-related osteoporosis without current pathological fracture  She is due for surveillance on allendronate    Now that kidney function has improved she can continue alendronate of dexa shows stability   - DX Bone Density; Future    Arthritis of left knee  She says its 'bone on bone'she doenst have an orthopedist.  She is open to coming to [place] will get baseline x-rays and referral for steroid shot.  She said it really helped her other knee in the past.  Celebrex helps about 50% of pain reduction.  Tylenol does not help  We discussed that even with her mild kidney disease now that her GFR is improved she can take Celebrex if it helps her quality of life we could supplement for with oxycodone as needed she has taken that in the past and has tolerated it.  - XR Knee Left 3 Views; Future  - Orthopedic  Referral; Future    Essential hypertension  Much better with the addition of losartan.  Daughter was asking if her stress is improved with situational stressors could be stopped.  I would urge her to continue as blood pressure maintenance will help protect kidney function    Situational depression  She is feeling better    Stage 3a chronic kidney disease (H)  glomerular filtration rate  GFR Estimate   Date Value Ref Range Status   08/12/2024 54 (L) >60 mL/min/1.73m2 Final     Comment:     eGFR calculated using 2021 CKD-EPI equation.   02/14/2024 44 (L) >60 mL/min/1.73m2 Final   08/10/2023 26 (L) >60 mL/min/1.73m2 Final   02/05/2021 49 (L) >60 mL/min/1.73m2 Final   10/17/2019 58 (L) >60 mL/min/1.73m2 Final  "  05/31/2019 >60 >60 mL/min/1.73m2 Final     Discussed in detail trajectory for kidney disease and triggering and exacerbating factors discussed      Patient declines vaccines    BMI  Estimated body mass index is 28.36 kg/m  as calculated from the following:    Height as of this encounter: 1.473 m (4' 10\").    Weight as of this encounter: 61.6 kg (135 lb 11.2 oz).             Chua Marquez is a 89 year old, presenting for the following health issues:  Kidney Problem (Wants to discuss CKD dx)      10/4/2024    11:13 AM   Additional Questions   Roomed by KETAN Vázquez     Kidney Problem    History of Present Illness       Reason for visit:  CKD   She is taking medications regularly.                     Objective    /48 (BP Location: Left arm, Patient Position: Sitting, Cuff Size: Adult Regular)   Pulse 76   Temp 97.3  F (36.3  C) (Tympanic)   Resp 15   Ht 1.473 m (4' 10\")   Wt 61.6 kg (135 lb 11.2 oz)   LMP  (LMP Unknown)   SpO2 98%   BMI 28.36 kg/m    Body mass index is 28.36 kg/m .  Physical Exam               Signed Electronically by: Lucy Ruano MD    "

## 2024-10-14 ENCOUNTER — DOCUMENTATION ONLY (OUTPATIENT)
Dept: OTHER | Facility: CLINIC | Age: 89
End: 2024-10-14
Payer: MEDICARE

## 2024-10-16 ENCOUNTER — OFFICE VISIT (OUTPATIENT)
Dept: ORTHOPEDICS | Facility: CLINIC | Age: 89
End: 2024-10-16
Attending: INTERNAL MEDICINE
Payer: MEDICARE

## 2024-10-16 VITALS — SYSTOLIC BLOOD PRESSURE: 104 MMHG | DIASTOLIC BLOOD PRESSURE: 60 MMHG

## 2024-10-16 DIAGNOSIS — M17.12 PRIMARY OSTEOARTHRITIS OF LEFT KNEE: Primary | ICD-10-CM

## 2024-10-16 PROCEDURE — 99203 OFFICE O/P NEW LOW 30 MIN: CPT | Mod: 25 | Performed by: STUDENT IN AN ORGANIZED HEALTH CARE EDUCATION/TRAINING PROGRAM

## 2024-10-16 PROCEDURE — 20611 DRAIN/INJ JOINT/BURSA W/US: CPT | Mod: LT | Performed by: STUDENT IN AN ORGANIZED HEALTH CARE EDUCATION/TRAINING PROGRAM

## 2024-10-16 RX ORDER — LIDOCAINE HYDROCHLORIDE 10 MG/ML
3 INJECTION, SOLUTION INFILTRATION; PERINEURAL
Status: COMPLETED | OUTPATIENT
Start: 2024-10-16 | End: 2024-10-16

## 2024-10-16 RX ORDER — BETAMETHASONE SODIUM PHOSPHATE AND BETAMETHASONE ACETATE 3; 3 MG/ML; MG/ML
6 INJECTION, SUSPENSION INTRA-ARTICULAR; INTRALESIONAL; INTRAMUSCULAR; SOFT TISSUE
Status: COMPLETED | OUTPATIENT
Start: 2024-10-16 | End: 2024-10-16

## 2024-10-16 RX ORDER — ROPIVACAINE HYDROCHLORIDE 5 MG/ML
2 INJECTION, SOLUTION EPIDURAL; INFILTRATION; PERINEURAL
Status: COMPLETED | OUTPATIENT
Start: 2024-10-16 | End: 2024-10-16

## 2024-10-16 RX ORDER — LIDOCAINE HYDROCHLORIDE 10 MG/ML
2 INJECTION, SOLUTION INFILTRATION; PERINEURAL
Status: COMPLETED | OUTPATIENT
Start: 2024-10-16 | End: 2024-10-16

## 2024-10-16 RX ADMIN — LIDOCAINE HYDROCHLORIDE 3 ML: 10 INJECTION, SOLUTION INFILTRATION; PERINEURAL at 13:43

## 2024-10-16 RX ADMIN — LIDOCAINE HYDROCHLORIDE 2 ML: 10 INJECTION, SOLUTION INFILTRATION; PERINEURAL at 13:43

## 2024-10-16 RX ADMIN — ROPIVACAINE HYDROCHLORIDE 2 ML: 5 INJECTION, SOLUTION EPIDURAL; INFILTRATION; PERINEURAL at 13:43

## 2024-10-16 RX ADMIN — BETAMETHASONE SODIUM PHOSPHATE AND BETAMETHASONE ACETATE 6 MG: 3; 3 INJECTION, SUSPENSION INTRA-ARTICULAR; INTRALESIONAL; INTRAMUSCULAR; SOFT TISSUE at 13:43

## 2024-10-16 NOTE — LETTER
10/16/2024      Alma Johns  [address]  [place] [address]      Dear Colleague,    Thank you for referring your patient, Alma Johns, to the [facility]. Please see a copy of my visit note below.    ASSESSMENT & PLAN    Alma was seen today for pain.    Diagnoses and all orders for this visit:    Primary osteoarthritis of left knee  -     Orthopedic  Referral  -     Home Care Referral  -     Large Joint Injection/Arthocentesis: L knee joint      This issue is chronic and Unchanged. Alma presents our clinic today to discuss her chronic left knee pain.  Her history, exam findings, and imaging findings are consistent with a radiographically severe osteoarthritis of the knee is the main  of her symptoms.  We discussed these findings and the treatment options including anti-inflammatory medicines, physical therapy, corticosteroid injections, hyaluronic acid injections, and surgical intervention in the form of a knee replacement.  Given the patient's severe end-stage disease, she would be a candidate for a knee replacement, however the patient wishes to trial conservative management first, which is reasonable.  We discussed utilizing corticosteroid injections for both pain relief and to better participate in physical therapy, the patient wished to proceed with this today.  We also discussed utilizing physical therapy to help strengthen the muscles and maximize the function of the joint with her degenerative changes.  Given the patient lives alone and is reliant upon others for any transportation to other facilities, home therapy referral was placed today.  We determined the following plan:  - CSI to the left knee performed today under ultrasound guidance, see procedure note below for details  - Home therapy referral placed  - She can otherwise use over-the-counter pain medicines, ice, heat as needed  - She can follow-up in our clinic as  needed      Large Joint Injection/Arthocentesis: L knee joint    Date/Time: 10/16/2024 1:43 PM    Performed by: Robert Frederick DO  Authorized by: Robert Frederick DO    Indications:  Pain and osteoarthritis  Needle Size:  25 G  Guidance: ultrasound    Approach:  Anterior  Location:  Knee   Location comment:  L knee joint      Medications:  2 mL lidocaine 1 %; 3 mL lidocaine 1 %; 6 mg betamethasone acet & sod phos 6 (3-3) MG/ML; 2 mL ROPivacaine 5 MG/ML  Outcome:  Tolerated well, no immediate complications  Procedure discussed: discussed risks, benefits, and alternatives    Consent Given by:  Patient  Timeout: timeout called immediately prior to procedure    Prep: patient was prepped and draped in usual sterile fashion     Ultrasound was used to ensure safe and accurate needle placement and injection. Ultrasound images of the procedure were permanently stored. 1ml of 8.4% Sodium Bicarbonate solution was used to buffer the local numbing agent for today's injection          Robert Frederick DO  [facility]    -----  Chief Complaint   Patient presents with     Left Knee - Pain       SUBJECTIVE  Alma Johns is a/an 89 year old female who is seen in consultation at the request of  Lucy Ruano M.D. for evaluation of left knee arthritis.     The patient is seen with their daughter.    Onset: 5+ years(s) ago. Reports insidious onset without acute precipitating event.  Location of Pain: left knee diffusely  Worsened by: walking  Better with: heat  Treatments tried: heat and other medications: Celebrex  Associated symptoms: no distal numbness or tingling; denies swelling or warmth    Orthopedic/Surgical history: NO  Social History/Occupation: Retired      REVIEW OF SYSTEMS:  Review of systems negative unless mentioned in HPI     OBJECTIVE:  /60   LMP  (LMP Unknown)    General: healthy, alert and in no distress  Skin: no suspicious lesions or rash.  CV: distal  perfusion intact  Resp: normal respiratory effort without conversational dyspnea   Psych: normal mood and affect  Gait: NORMAL  Neuro: Normal light sensory exam of LL extremity      Knee exam  Gait: Normal  Alignment:  [x] Normal  [] Anatomic valgus  [] Anatomic varus  Inspection: [x] Normal  [] Ecchymosis present []Other   Palpation:  Joint Tenderness: [] No Tenderness  [x] MJL [x] LJL [] MCL Margin [] LCL Margin [] Pes Anserine [] Distal Quadricep  [] Other   Peripatellar Tenderness: [x] None [] Lateral pole [] Medial pole [] Superior pole [] Inferior pole    Patellar tendon pain: [x] None [] Present    Patellar apprehension: [x] None [] Present    Patellar motion: [x] Normal [] Abnormal  Crepitus: [x] None [] Present  Effusion: [x] None [] Trace [] 1+ [] 2+ [] 3+  Range of motion:  Flexion: 135, Extension: 0  Strength:  [x] Full in all planes, including intact extensor mechanism [] Limited as described  Neurologic: Normal sensation   Vascular: Normal pulses   Special tests:   Lachman: Negative  Anterior Drawer: Negative  Sag/quad Activation: NP  Posterior Drawer: Negative  Valgus Stress: Negative at 0/30  Varus Stress: Negative at 0/30  Kayla's: painful   Thessaly: NP     Other notable findings/comments: None       RADIOLOGY:  Final results and radiologist's interpretation, available in the [facility] health record.  Images were reviewed with the patient in the office today.  My personal interpretation of the performed imaging: Severe joint space narrowing of the medial patellofemoral compartment of the knee.  Severe osteophytosis of all 3 compartments of the knee.  No acute fractures.      Again, thank you for allowing me to participate in the care of your patient.        Sincerely,        Robert Frederick DO

## 2024-10-16 NOTE — PROGRESS NOTES
ASSESSMENT & PLAN    Alma was seen today for pain.    Diagnoses and all orders for this visit:    Primary osteoarthritis of left knee  -     Orthopedic  Referral  -     Home Care Referral  -     Large Joint Injection/Arthocentesis: L knee joint      This issue is chronic and Unchanged. Alma presents our clinic today to discuss her chronic left knee pain.  Her history, exam findings, and imaging findings are consistent with a radiographically severe osteoarthritis of the knee is the main  of her symptoms.  We discussed these findings and the treatment options including anti-inflammatory medicines, physical therapy, corticosteroid injections, hyaluronic acid injections, and surgical intervention in the form of a knee replacement.  Given the patient's severe end-stage disease, she would be a candidate for a knee replacement, however the patient wishes to trial conservative management first, which is reasonable.  We discussed utilizing corticosteroid injections for both pain relief and to better participate in physical therapy, the patient wished to proceed with this today.  We also discussed utilizing physical therapy to help strengthen the muscles and maximize the function of the joint with her degenerative changes.  Given the patient lives alone and is reliant upon others for any transportation to other facilities, home therapy referral was placed today.  We determined the following plan:  - CSI to the left knee performed today under ultrasound guidance, see procedure note below for details  - Home therapy referral placed  - She can otherwise use over-the-counter pain medicines, ice, heat as needed  - She can follow-up in our clinic as needed      Large Joint Injection/Arthocentesis: L knee joint    Date/Time: 10/16/2024 1:43 PM    Performed by: Robert Frederick DO  Authorized by: Robert Frederick DO    Indications:  Pain and osteoarthritis  Needle Size:  25 G  Guidance: ultrasound    Approach:   Anterior  Location:  Knee   Location comment:  L knee joint      Medications:  2 mL lidocaine 1 %; 3 mL lidocaine 1 %; 6 mg betamethasone acet & sod phos 6 (3-3) MG/ML; 2 mL ROPivacaine 5 MG/ML  Outcome:  Tolerated well, no immediate complications  Procedure discussed: discussed risks, benefits, and alternatives    Consent Given by:  Patient  Timeout: timeout called immediately prior to procedure    Prep: patient was prepped and draped in usual sterile fashion     Ultrasound was used to ensure safe and accurate needle placement and injection. Ultrasound images of the procedure were permanently stored. 1ml of 8.4% Sodium Bicarbonate solution was used to buffer the local numbing agent for today's injection          Robert Frederick DO  [facility]    -----  Chief Complaint   Patient presents with    Left Knee - Pain       SUBJECTIVE  Alma Johns is a/an 89 year old female who is seen in consultation at the request of  Lucy Ruano M.D. for evaluation of left knee arthritis.     The patient is seen with their daughter.    Onset: 5+ years(s) ago. Reports insidious onset without acute precipitating event.  Location of Pain: left knee diffusely  Worsened by: walking  Better with: heat  Treatments tried: heat and other medications: Celebrex  Associated symptoms: no distal numbness or tingling; denies swelling or warmth    Orthopedic/Surgical history: NO  Social History/Occupation: Retired      REVIEW OF SYSTEMS:  Review of systems negative unless mentioned in HPI     OBJECTIVE:  /60   LMP  (LMP Unknown)    General: healthy, alert and in no distress  Skin: no suspicious lesions or rash.  CV: distal perfusion intact  Resp: normal respiratory effort without conversational dyspnea   Psych: normal mood and affect  Gait: NORMAL  Neuro: Normal light sensory exam of LL extremity      Knee exam  Gait: Normal  Alignment:  [x] Normal  [] Anatomic valgus  [] Anatomic  varus  Inspection: [x] Normal  [] Ecchymosis present []Other   Palpation:  Joint Tenderness: [] No Tenderness  [x] MJL [x] LJL [] MCL Margin [] LCL Margin [] Pes Anserine [] Distal Quadricep  [] Other   Peripatellar Tenderness: [x] None [] Lateral pole [] Medial pole [] Superior pole [] Inferior pole    Patellar tendon pain: [x] None [] Present    Patellar apprehension: [x] None [] Present    Patellar motion: [x] Normal [] Abnormal  Crepitus: [x] None [] Present  Effusion: [x] None [] Trace [] 1+ [] 2+ [] 3+  Range of motion:  Flexion: 135, Extension: 0  Strength:  [x] Full in all planes, including intact extensor mechanism [] Limited as described  Neurologic: Normal sensation   Vascular: Normal pulses   Special tests:   Lachman: Negative  Anterior Drawer: Negative  Sag/quad Activation: NP  Posterior Drawer: Negative  Valgus Stress: Negative at 0/30  Varus Stress: Negative at 0/30  Kayla's: painful   Thessaly: NP     Other notable findings/comments: None       RADIOLOGY:  Final results and radiologist's interpretation, available in the [facility] health record.  Images were reviewed with the patient in the office today.  My personal interpretation of the performed imaging: Severe joint space narrowing of the medial patellofemoral compartment of the knee.  Severe osteophytosis of all 3 compartments of the knee.  No acute fractures.

## 2024-10-18 ENCOUNTER — TELEPHONE (OUTPATIENT)
Dept: INTERNAL MEDICINE | Facility: CLINIC | Age: 89
End: 2024-10-18
Payer: MEDICARE

## 2024-10-18 NOTE — TELEPHONE ENCOUNTER
Home Health Care    Reason for call:  Request for verbal orders.     Orders are needed for this patient.  PT: Delay in care: PT eval and treat start of care on 10/22/24 per patient request.     Pt Provider: Dr. Ruano.     Phone Number Homecare Nurse can be reached at: Cami from [facility].     Okay to leave a detailed message?: Yes at Other phone number:  [phone]

## 2024-10-21 NOTE — TELEPHONE ENCOUNTER
Left verbal orders on Cami's confidential voicemail for delay in care for physical therapy eval and treat with start of care on 10/22/24.

## 2024-10-22 ENCOUNTER — TELEPHONE (OUTPATIENT)
Dept: INTERNAL MEDICINE | Facility: CLINIC | Age: 89
End: 2024-10-22
Payer: MEDICARE

## 2024-10-22 NOTE — TELEPHONE ENCOUNTER
Venita would like clarification on celebrex. Patient told her she should be weaning off due to her kidneys but office visit note states she is ok to continue her celebrex.

## 2024-10-22 NOTE — TELEPHONE ENCOUNTER
"Notified Venita of verbal orders approved by Dr. Ruano for physical therapy once weekly for four weeks then every other week for four weeks. Venita verbalized understanding.     Notified Venita of Dr. Ruano's response regarding Celebrex, \"If Celebrex is helping her she should continue it.\" Venita verbalized understanding.   "

## 2024-10-22 NOTE — TELEPHONE ENCOUNTER
Home Care is calling regarding an established patient with [facility].       Requesting orders from: Lucy Ruano  Provider is following patient: Yes  Is this a 60-day recertification request?  No    Orders Requested    Physical Therapy  PT today 1x a week for 4 weeks then every other for 4 weeks.     Information was gathered and will be sent to provider for review.  RN will contact Home Care with information after provider review.    Sally Caro RN

## 2024-11-04 ENCOUNTER — TELEPHONE (OUTPATIENT)
Dept: INTERNAL MEDICINE | Facility: CLINIC | Age: 89
End: 2024-11-04

## 2024-11-04 ENCOUNTER — ANCILLARY PROCEDURE (OUTPATIENT)
Dept: BONE DENSITY | Facility: CLINIC | Age: 89
End: 2024-11-04
Attending: INTERNAL MEDICINE
Payer: MEDICARE

## 2024-11-04 DIAGNOSIS — M81.0 AGE-RELATED OSTEOPOROSIS WITHOUT CURRENT PATHOLOGICAL FRACTURE: ICD-10-CM

## 2024-11-04 PROCEDURE — 77080 DXA BONE DENSITY AXIAL: CPT | Mod: TC | Performed by: RADIOLOGY

## 2024-11-04 PROCEDURE — 77081 DXA BONE DENSITY APPENDICULR: CPT | Mod: TC | Performed by: RADIOLOGY

## 2024-11-04 PROCEDURE — 77091 TBS TECHL CALCULATION ONLY: CPT | Performed by: RADIOLOGY

## 2024-11-04 NOTE — TELEPHONE ENCOUNTER
November 4, 2024    Home health orders was received via fax for Dr. Ruano.  Patient label was attached to paperwork and placed in provider's inbox to be signed.    Monica De Paz

## 2024-11-05 DIAGNOSIS — I10 PRIMARY HYPERTENSION: ICD-10-CM

## 2024-11-05 RX ORDER — AMLODIPINE BESYLATE 10 MG/1
10 TABLET ORAL DAILY
Qty: 90 TABLET | Refills: 3 | Status: SHIPPED | OUTPATIENT
Start: 2024-11-05

## 2024-11-05 NOTE — TELEPHONE ENCOUNTER
November 5, 2024    Home health orders was picked up from outbox of Dr. Ruano and sent via fax to [phone].    Monica De Paz

## 2024-11-08 ENCOUNTER — TELEPHONE (OUTPATIENT)
Dept: INTERNAL MEDICINE | Facility: CLINIC | Age: 89
End: 2024-11-08
Payer: MEDICARE

## 2024-11-08 DIAGNOSIS — Z53.9 DIAGNOSIS NOT YET DEFINED: Primary | ICD-10-CM

## 2024-11-08 PROCEDURE — G0180 MD CERTIFICATION HHA PATIENT: HCPCS | Performed by: INTERNAL MEDICINE

## 2024-11-08 NOTE — TELEPHONE ENCOUNTER
November 8, 2024    Home health orders was received via fax for Dr. Ruano.  Patient label was attached to paperwork and placed in provider's inbox to be signed.    Marilee Leonard

## 2024-11-11 NOTE — TELEPHONE ENCOUNTER
November 11, 2024    Home health orders was picked up from outbox of Dr. Ruano and sent via fax to [phone].    Monica De Paz

## 2024-11-14 ENCOUNTER — TELEPHONE (OUTPATIENT)
Dept: INTERNAL MEDICINE | Facility: CLINIC | Age: 89
End: 2024-11-14
Payer: MEDICARE

## 2024-11-14 NOTE — TELEPHONE ENCOUNTER
November 14, 2024    Home health orders was received via fax for Dr. Ruano.  Patient label was attached to paperwork and placed in provider's inbox to be signed.    Monica De Paz

## 2024-11-18 NOTE — TELEPHONE ENCOUNTER
November 18, 2024    Home health orders was picked up from outbox of Dr. Ruano and sent via fax to [phone].    Monica De Paz

## 2024-11-19 ENCOUNTER — OFFICE VISIT (OUTPATIENT)
Dept: INTERNAL MEDICINE | Facility: CLINIC | Age: 89
End: 2024-11-19
Payer: MEDICARE

## 2024-11-19 VITALS
HEIGHT: 58 IN | HEART RATE: 77 BPM | OXYGEN SATURATION: 98 % | RESPIRATION RATE: 22 BRPM | WEIGHT: 129 LBS | DIASTOLIC BLOOD PRESSURE: 50 MMHG | BODY MASS INDEX: 27.08 KG/M2 | TEMPERATURE: 96.8 F | SYSTOLIC BLOOD PRESSURE: 110 MMHG

## 2024-11-19 DIAGNOSIS — Z29.11 NEED FOR VACCINATION AGAINST RESPIRATORY SYNCYTIAL VIRUS: ICD-10-CM

## 2024-11-19 DIAGNOSIS — F33.9 RECURRENT MAJOR DEPRESSIVE DISORDER, REMISSION STATUS UNSPECIFIED (H): ICD-10-CM

## 2024-11-19 DIAGNOSIS — R63.4 WEIGHT LOSS: Primary | ICD-10-CM

## 2024-11-19 DIAGNOSIS — R11.0 NAUSEA: ICD-10-CM

## 2024-11-19 DIAGNOSIS — Z23 NEED FOR TDAP VACCINATION: ICD-10-CM

## 2024-11-19 DIAGNOSIS — Z23 NEED FOR SHINGLES VACCINE: ICD-10-CM

## 2024-11-19 DIAGNOSIS — I10 ESSENTIAL HYPERTENSION: ICD-10-CM

## 2024-11-19 LAB
ALBUMIN SERPL BCG-MCNC: 3.7 G/DL (ref 3.5–5.2)
ALBUMIN UR-MCNC: 30 MG/DL
ALP SERPL-CCNC: 90 U/L (ref 40–150)
ALT SERPL W P-5'-P-CCNC: 9 U/L (ref 0–50)
AMYLASE SERPL-CCNC: 108 U/L (ref 28–100)
ANION GAP SERPL CALCULATED.3IONS-SCNC: 12 MMOL/L (ref 7–15)
APPEARANCE UR: CLEAR
AST SERPL W P-5'-P-CCNC: 23 U/L (ref 0–45)
BACTERIA #/AREA URNS HPF: ABNORMAL /HPF
BILIRUB SERPL-MCNC: 0.6 MG/DL
BILIRUB UR QL STRIP: NEGATIVE
BUN SERPL-MCNC: 21.1 MG/DL (ref 8–23)
CALCIUM SERPL-MCNC: 10.7 MG/DL (ref 8.8–10.4)
CHLORIDE SERPL-SCNC: 107 MMOL/L (ref 98–107)
COLOR UR AUTO: YELLOW
CREAT SERPL-MCNC: 1.62 MG/DL (ref 0.51–0.95)
EGFRCR SERPLBLD CKD-EPI 2021: 30 ML/MIN/1.73M2
GLUCOSE SERPL-MCNC: 121 MG/DL (ref 70–99)
GLUCOSE UR STRIP-MCNC: NEGATIVE MG/DL
HCO3 SERPL-SCNC: 21 MMOL/L (ref 22–29)
HGB UR QL STRIP: ABNORMAL
KETONES UR STRIP-MCNC: NEGATIVE MG/DL
LEUKOCYTE ESTERASE UR QL STRIP: ABNORMAL
LIPASE SERPL-CCNC: 36 U/L (ref 13–60)
NITRATE UR QL: NEGATIVE
PH UR STRIP: 5.5 [PH] (ref 5–8)
POTASSIUM SERPL-SCNC: 4.9 MMOL/L (ref 3.4–5.3)
PREALB SERPL-MCNC: 19 MG/DL (ref 20–40)
PROT SERPL-MCNC: 7.5 G/DL (ref 6.4–8.3)
RBC #/AREA URNS AUTO: ABNORMAL /HPF
SODIUM SERPL-SCNC: 140 MMOL/L (ref 135–145)
SP GR UR STRIP: 1.02 (ref 1–1.03)
SQUAMOUS #/AREA URNS AUTO: ABNORMAL /LPF
TSH SERPL DL<=0.005 MIU/L-ACNC: 0.13 UIU/ML (ref 0.3–4.2)
UROBILINOGEN UR STRIP-ACNC: 1 E.U./DL
WBC #/AREA URNS AUTO: ABNORMAL /HPF
WBC CLUMPS #/AREA URNS HPF: PRESENT /HPF

## 2024-11-19 PROCEDURE — 84443 ASSAY THYROID STIM HORMONE: CPT | Performed by: INTERNAL MEDICINE

## 2024-11-19 PROCEDURE — 83690 ASSAY OF LIPASE: CPT | Performed by: INTERNAL MEDICINE

## 2024-11-19 PROCEDURE — 80053 COMPREHEN METABOLIC PANEL: CPT | Performed by: INTERNAL MEDICINE

## 2024-11-19 PROCEDURE — 84134 ASSAY OF PREALBUMIN: CPT | Performed by: INTERNAL MEDICINE

## 2024-11-19 PROCEDURE — 99214 OFFICE O/P EST MOD 30 MIN: CPT | Performed by: INTERNAL MEDICINE

## 2024-11-19 PROCEDURE — 82150 ASSAY OF AMYLASE: CPT | Performed by: INTERNAL MEDICINE

## 2024-11-19 PROCEDURE — 36415 COLL VENOUS BLD VENIPUNCTURE: CPT | Performed by: INTERNAL MEDICINE

## 2024-11-19 PROCEDURE — 81001 URINALYSIS AUTO W/SCOPE: CPT | Performed by: INTERNAL MEDICINE

## 2024-11-19 PROCEDURE — G2211 COMPLEX E/M VISIT ADD ON: HCPCS | Performed by: INTERNAL MEDICINE

## 2024-11-19 RX ORDER — ONDANSETRON 4 MG/1
4 TABLET, FILM COATED ORAL EVERY 8 HOURS PRN
Qty: 90 TABLET | Refills: 3 | Status: SHIPPED | OUTPATIENT
Start: 2024-11-19

## 2024-11-19 RX ORDER — ESCITALOPRAM OXALATE 5 MG/1
5 TABLET ORAL DAILY
Qty: 90 TABLET | Refills: 3 | Status: SHIPPED | OUTPATIENT
Start: 2024-11-19

## 2024-11-19 ASSESSMENT — ENCOUNTER SYMPTOMS: FATIGUE: 1

## 2024-11-19 NOTE — PATIENT INSTRUCTIONS
Start depression medication lexapro   Start nausea med as needed    CT scan abdomen if you dont improve in the next few weeks  Stop losartan
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
## 2024-11-21 LAB
BACTERIA UR CULT: NORMAL
BASOPHILS # BLD AUTO: 0 10E3/UL (ref 0–0.2)
BASOPHILS NFR BLD AUTO: 0 %
EOSINOPHIL # BLD AUTO: 0.2 10E3/UL (ref 0–0.7)
EOSINOPHIL NFR BLD AUTO: 2 %
ERYTHROCYTE [DISTWIDTH] IN BLOOD BY AUTOMATED COUNT: 12.7 % (ref 10–15)
HCT VFR BLD AUTO: 35.5 % (ref 35–47)
HGB BLD-MCNC: 11.6 G/DL (ref 11.7–15.7)
IMM GRANULOCYTES # BLD: 0 10E3/UL
IMM GRANULOCYTES NFR BLD: 0 %
LYMPHOCYTES # BLD AUTO: 2 10E3/UL (ref 0.8–5.3)
LYMPHOCYTES NFR BLD AUTO: 23 %
MCH RBC QN AUTO: 28.6 PG (ref 26.5–33)
MCHC RBC AUTO-ENTMCNC: 32.7 G/DL (ref 31.5–36.5)
MCV RBC AUTO: 88 FL (ref 78–100)
MONOCYTES # BLD AUTO: 0.6 10E3/UL (ref 0–1.3)
MONOCYTES NFR BLD AUTO: 7 %
NEUTROPHILS # BLD AUTO: 6 10E3/UL (ref 1.6–8.3)
NEUTROPHILS NFR BLD AUTO: 68 %
PLATELET # BLD AUTO: 315 10E3/UL (ref 150–450)
RBC # BLD AUTO: 4.05 10E6/UL (ref 3.8–5.2)
WBC # BLD AUTO: 8.8 10E3/UL (ref 4–11)

## 2024-12-10 ENCOUNTER — HOSPITAL ENCOUNTER (OUTPATIENT)
Dept: CT IMAGING | Facility: CLINIC | Age: 89
Discharge: HOME | End: 2024-12-10
Attending: INTERNAL MEDICINE
Payer: MEDICARE

## 2024-12-10 DIAGNOSIS — R11.0 NAUSEA: ICD-10-CM

## 2024-12-10 DIAGNOSIS — R63.4 WEIGHT LOSS: ICD-10-CM

## 2024-12-10 PROCEDURE — 74176 CT ABD & PELVIS W/O CONTRAST: CPT | Mod: MG

## 2024-12-10 PROCEDURE — G1010 CDSM STANSON: HCPCS

## 2024-12-11 LAB — RADIOLOGIST FLAGS: ABNORMAL

## 2024-12-12 ENCOUNTER — TELEPHONE (OUTPATIENT)
Dept: INTERNAL MEDICINE | Facility: CLINIC | Age: 89
End: 2024-12-12
Payer: MEDICARE

## 2024-12-12 ENCOUNTER — PATIENT OUTREACH (OUTPATIENT)
Dept: ONCOLOGY | Facility: CLINIC | Age: 89
End: 2024-12-12
Payer: MEDICARE

## 2024-12-12 DIAGNOSIS — C76.2 CANCER OF ABDOMEN (H): Primary | ICD-10-CM

## 2024-12-12 DIAGNOSIS — N20.0 STAGHORN CALCULUS: ICD-10-CM

## 2024-12-12 DIAGNOSIS — N18.32 STAGE 3B CHRONIC KIDNEY DISEASE (H): ICD-10-CM

## 2024-12-12 DIAGNOSIS — F41.9 ANXIETY DUE TO INVASIVE PROCEDURE: ICD-10-CM

## 2024-12-12 DIAGNOSIS — C17.0 DUODENAL ADENOCARCINOMA (H): Primary | ICD-10-CM

## 2024-12-12 RX ORDER — LORAZEPAM 0.5 MG/1
0.5 TABLET ORAL EVERY 6 HOURS PRN
Qty: 5 TABLET | Refills: 0 | Status: SHIPPED | OUTPATIENT
Start: 2024-12-12

## 2024-12-12 NOTE — PROGRESS NOTES
New Patient Oncology Nurse Navigator Note     Referring provider: Dr Ruano, PCP    Referring Clinic/Organization: [facility]     Referred to: Medical Oncology    Requested provider (if applicable): First available - did not specify     Referral Received: 12/12/24       Evaluation for : CT concerning for colonic and pancreatic malignancies     Clinical History (per Nurse review of records provided):      12/10/2024 CT Abd/Pelvis ([facility])  IMPRESSION:   1.  There is a short segment of the right transverse colon which shows wall thickening. Although this could reflect a colonic contraction, colonic neoplasm cannot be excluded. There also couple suspicious pericolonic lymph nodes noted in this region as   well as lymphadenopathy in the retroperitoneum and central mesentery which may be metastatic in nature. Direct visualization is recommended to exclude colonic neoplasm.  2.  There are 2 low-attenuation cystic masses involving the pancreas. A contrast enhanced MRI or CT is recommended for further characterization and to exclude cystic pancreatic neoplasm.  3.  There is an large stone in the proximal right ureter measuring 13 x 8 mm. Despite the size of this does not result in significant hydronephrosis. There is also a large staghorn calculus left kidney. Urology consultation is recommended.       Clinical Assessment / Barriers to Care (Per Nurse):    I spoke to pt and family (daughter Va). We reviewed PCP's referral to Onc for further evaluation of  findings on CT showing abnormal pancreatic mass and colonic thickening. Pt lives in [facility] [facility]; she would prefer appts at [facility] if possible. Per [facility] leadership, OK to offer 60min slot with Dr Friedell at [facility] 12/26/24 Thurs at 9am, in person. Pt/family accepts this appt. They understand that MD will review information and discuss options/ next steps at this appt.    PCP ordered MR Abd to further evaluate the pancreatic masses and we will help direct  them to imaging dept for that scan. Prefer scan prior to Onc visit, but if not possible then ASAP after the visit. PCP is also referring pt to Urology for a kidney stone seen on CT. Uro will call them directly to schedule that consult.    Pt agrees & verbalizes understanding of this plan.      Records Location: [facility]     Records Needed:     N/A    Additional testing needed prior to consult:     MR Chavez preferred      Stephen Fernandes, RN, BSN, OCN  Oncology New Patient Nurse Navigator   [facility]  [phone]

## 2024-12-12 NOTE — TELEPHONE ENCOUNTER
Order/Referral Request    Who is requesting: Dr Ruano    Orders being requested: MRI stomach/pancrease and colon    Reason service is needed/diagnosis: Possible cancer    When are orders needed by: ASAP    Has this been discussed with Provider: Yes    Does patient have a preference on a Group/Provider/Facility?  [facility]     Does patient have an appointment scheduled?: No    Where to send orders: Place orders within Epic    Could we send this information to you in [email or web address] or would you prefer to receive a phone call?:   Patient would prefer a phone call   Okay to leave a detailed message?: Yes at Cell number on file:    Telephone Information:   Mobile [phone]

## 2024-12-13 ENCOUNTER — HOSPITAL ENCOUNTER (OUTPATIENT)
Dept: MRI IMAGING | Facility: HOSPITAL | Age: 89
Discharge: HOME | End: 2024-12-13
Attending: INTERNAL MEDICINE | Admitting: INTERNAL MEDICINE
Payer: MEDICARE

## 2024-12-13 DIAGNOSIS — N20.0 STAGHORN CALCULUS: ICD-10-CM

## 2024-12-13 DIAGNOSIS — C76.2 CANCER OF ABDOMEN (H): ICD-10-CM

## 2024-12-13 DIAGNOSIS — N18.32 STAGE 3B CHRONIC KIDNEY DISEASE (H): ICD-10-CM

## 2024-12-13 PROCEDURE — A9585 GADOBUTROL INJECTION: HCPCS | Performed by: INTERNAL MEDICINE

## 2024-12-13 PROCEDURE — G1010 CDSM STANSON: HCPCS

## 2024-12-13 PROCEDURE — 255N000002 HC RX 255 OP 636: Performed by: INTERNAL MEDICINE

## 2024-12-13 RX ORDER — GADOBUTROL 604.72 MG/ML
0.1 INJECTION INTRAVENOUS ONCE
Status: COMPLETED | OUTPATIENT
Start: 2024-12-13 | End: 2024-12-13

## 2024-12-13 RX ADMIN — GADOBUTROL 5.85 ML: 604.72 INJECTION INTRAVENOUS at 16:46

## 2024-12-16 ENCOUNTER — TELEPHONE (OUTPATIENT)
Dept: GASTROENTEROLOGY | Facility: CLINIC | Age: 89
End: 2024-12-16
Payer: MEDICARE

## 2024-12-16 ENCOUNTER — PREP FOR PROCEDURE (OUTPATIENT)
Dept: GASTROENTEROLOGY | Facility: CLINIC | Age: 89
End: 2024-12-16
Payer: MEDICARE

## 2024-12-16 DIAGNOSIS — R11.0 NAUSEA: ICD-10-CM

## 2024-12-16 DIAGNOSIS — R93.3 ABNORMAL FINDING ON GI TRACT IMAGING: ICD-10-CM

## 2024-12-16 DIAGNOSIS — R63.4 WEIGHT LOSS: Primary | ICD-10-CM

## 2024-12-16 NOTE — TELEPHONE ENCOUNTER
Per Dr Hogan  Procedure/Imaging/Clinic: Upper EUS, colonoscopy   Physician: Samira   Timin/19  Scope time needed: OR, 50 minutes endoscopic time   Anesthesia:MAC   Dx: Wgt loss, nausea, abnormal CT and MRI, suspicious transverse colon lesion, possible duodenal/ampullary thickening, significant adenopathy   Tier:Tier 2 - Not life/limb threatening but potential for  patient morbidity/mortality or adverse  patient/disease outcome if  surgery/procedure not done within 30 day   Location: OR  OK to schedule while attending: Yes   Header of letter for pt communication:    Colonoscopy and then examination of the upper abdomen using ultrasound from inside the gastrointestinal tract.       Comments:  Reviewed CT and MRI. The colon abnormality is seen on both and is concerning for neoplasm. There is marked retroperitoneal adenopathy greater than mentioned in reports, also possible thickening of third portion of duodenum.       Called to discuss with patient.     Explained they can expect a call from  for date of procedure, OR will call 1-2 days prior to procedure date with arrival time, will need a , someone to stay with them for 24 hours and should stay in town for 24 hours (within 45 min of Hospital) post procedure    Patient needs to get pre-op physical completed. If outside [facility] system will need physical faxed to number [phone]     If you do not get a preop physical, your procedure could be cancelled, patient voiced understanding*    Preop Plan:see office visit note from 24    Does patient have any history of gastric bypass/gastric surgery/altered panc/bili anatomy?no    Any recent Covid symptoms or positive covid test?no    Does patient have [employer] insurance?:no    Med Review    Blood thinner -  no  ASA - no  Diabetic - no  Any meds by injection or mouth for weight loss or diabetes-no    Patient Education r/t procedure:done    A pre-op nurse will call 1-2 days prior to the  procedure.    NPO/Prep:   Adults and Children of all ages may consume solids up to 8 hours prior to arrival time - may consume clear liquids up to 1 hour prior to arrival time.    Other specific details/comments:none     Verbalized understanding of all instructions. All questions answered.     Procedure order placed, message routed to OR / Endo

## 2024-12-16 NOTE — TELEPHONE ENCOUNTER
Creatinine   Date Value Ref Range Status   11/19/2024 1.62 (H) 0.51 - 0.95 mg/dL Final     Called brian her daugther explained MRI result . They have urology and oncology appointments set up . Will refer to GI for possible endoscopic biopsy

## 2024-12-16 NOTE — TELEPHONE ENCOUNTER
Advanced Endoscopy     Referring provider:   Lucy Ruano MD    Location: [facility]       Referred to: Advanced Endoscopy Provider Group     Provider Requested: na     Referral Received: 12/16/24     Records received: EPIC     Images received: PACS    Insurance Coverage: Medicare    Evaluation for: periampullary lesion seen on imaging, weight loss/nausea     Clinical History (per RN review):     89 year old female presents to clinic with weight loss, nausea. Imaging showing periampullary lesion. LFTs and lipase WNL when checked in November 2024.   Clinic note 11/19/24      MRCP 12/13/24  IMPRESSION:  1.  Ill-defined enhancing tissue with diffusion restriction within the second duodenal segment adjacent to the ampulla could represent a periampullary malignancy and would likely be amenable to endoscopic evaluation. Recommend GI consult.  2.  Focal mural thickening proximal transverse colon observed at recent CT is not well visualized on the current MRI study but remains indeterminate.  3.  Mild intrahepatic and extra hepatic bile duct dilatation to the level of the ampulla where there is a small rounded filling defect that may relate to the abnormal soft tissue in the second duodenal segment .  4.  Bilateral para-aortic retroperitoneal lymphadenopathy. A 2 cm left para-aortic lymph node just inferior to the left renal vein with likely be amenable to CT-guided percutaneous biopsy if necessary.  5.  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   6.  The 1.0 x 0.8 x 0.6 cm proximal right ureteral stone and the 3.2 x 3.0 x 2.5 cm left staghorn calculus are unchanged.  Recommend urology consult.       CT 11-10-24  Narrative & Impression      [Access Center: ]     This report will be copied to the [facility]  to ensure a provider acknowledges the finding. Access Center is available Monday through Friday 8am-3:30 pm.         EXAM: CT ABDOMEN PELVIS W/O CONTRAST  LOCATION: [facility]  DATE: 12/10/2024     INDICATION: Weight loss, Nausea.  COMPARISON: CT 2/28/2009.  TECHNIQUE: CT scan of the abdomen and pelvis was performed without IV contrast. Multiplanar reformats were obtained. Dose reduction techniques were used.  CONTRAST: None.     FINDINGS:   LOWER CHEST: Normal.     HEPATOBILIARY: 2.4 cm lipoma centrally within the liver is unchanged. There is a new fluid density low-attenuation lesion in the left hepatic lobe measuring 14 mm most likely representing a cyst or cystic lesion. Cholecystectomy. No biliary dilatation.     PANCREAS: There are 2 cystic masses identified within the pancreas. The largest involves the pancreatic head region measuring approximately 4.5 x 3.8 cm. The second lesion involves the pancreatic body measuring 1.8 x 1.6 cm. Pancreatic duct does not   appear to be dilated. No surrounding inflammation. Detail is limited without IV contrast therefore further workup with a contrast-enhanced MRI or CT is recommended to exclude cystic neoplasm.     SPLEEN: Normal.     ADRENAL GLANDS: Normal.     KIDNEYS/BLADDER: 13 x 8 mm stone in the proximal right ureter. Despite the size of the stone there is only minimal prominence of the right renal pelvis without johny hydronephrosis. The stone has an average Hounsfield unit of 1252. There is a large   staghorn like calculus in the left kidney measuring 37 x 31 cm. The stone has an average Hounsfield unit of 1593. There is dilatation of the upper caliceal system compatible with mild hydronephrosis from the stone. Cortical cyst lower pole of the left   kidney requires no additional follow-up. Urinary bladder is completely obscured by streak artifact from the patient's bilateral hip replacements.     BOWEL: There is a short segment of apparent  colonic wall thickening involving the right aspect of the transverse colon. This extends for approximately 3.5 cm. While this could reflect a area of contraction, a colonic mass cannot be excluded on these   noncontrast images. There are few suspicious lymph nodes surrounding this thickened segment therefore colonic neoplasm is suspected. Moderate formed stool in the colon. Scattered colonic diverticula without diverticulitis.     LYMPH NODES: Lymphadenopathy noted within the retroperitoneum as well as in the central mesentery. Largest node lies in the aortocaval region measuring 16 mm in short axis diameter. This is concerning for metastatic disease.     VASCULATURE: Atherosclerotic changes of the aorta without aneurysm.     PELVIC ORGANS: Detail is completely obscured by streak artifact related to the patient's bilateral hip replacements.     MUSCULOSKELETAL: Bilateral hip replacements. Degenerative changes lumbar spine.                                                                      IMPRESSION:   1.  There is a short segment of the right transverse colon which shows wall thickening. Although this could reflect a colonic contraction, colonic neoplasm cannot be excluded. There also couple suspicious pericolonic lymph nodes noted in this region as   well as lymphadenopathy in the retroperitoneum and central mesentery which may be metastatic in nature. Direct visualization is recommended to exclude colonic neoplasm.  2.  There are 2 low-attenuation cystic masses involving the pancreas. A contrast enhanced MRI or CT is recommended for further characterization and to exclude cystic pancreatic neoplasm.  3.  There is an large stone in the proximal right ureter measuring 13 x 8 mm. Despite the size of this does not result in significant hydronephrosis. There is also a large staghorn calculus left kidney. Urology consultation is recommended.       Provider review date: 12/16/24    Provider Decision for clinic  consultation/Orders:            Referral updates/Patient contacted:

## 2024-12-18 ENCOUNTER — ANESTHESIA EVENT (OUTPATIENT)
Dept: SURGERY | Facility: CLINIC | Age: 89
End: 2024-12-18
Payer: MEDICARE

## 2024-12-19 ENCOUNTER — HOSPITAL ENCOUNTER (OUTPATIENT)
Facility: CLINIC | Age: 89
Discharge: HOME | End: 2024-12-19
Attending: INTERNAL MEDICINE | Admitting: INTERNAL MEDICINE
Payer: MEDICARE

## 2024-12-19 ENCOUNTER — ANESTHESIA (OUTPATIENT)
Dept: SURGERY | Facility: CLINIC | Age: 89
End: 2024-12-19
Payer: MEDICARE

## 2024-12-19 VITALS
OXYGEN SATURATION: 96 % | WEIGHT: 128.75 LBS | TEMPERATURE: 97.5 F | BODY MASS INDEX: 25.96 KG/M2 | HEART RATE: 79 BPM | HEIGHT: 59 IN | RESPIRATION RATE: 20 BRPM | DIASTOLIC BLOOD PRESSURE: 85 MMHG | SYSTOLIC BLOOD PRESSURE: 122 MMHG

## 2024-12-19 PROCEDURE — 250N000011 HC RX IP 250 OP 636: Performed by: NURSE ANESTHETIST, CERTIFIED REGISTERED

## 2024-12-19 PROCEDURE — 88342 IMHCHEM/IMCYTCHM 1ST ANTB: CPT | Mod: 26 | Performed by: PATHOLOGY

## 2024-12-19 PROCEDURE — 88305 TISSUE EXAM BY PATHOLOGIST: CPT | Mod: 26 | Performed by: PATHOLOGY

## 2024-12-19 PROCEDURE — 370N000017 HC ANESTHESIA TECHNICAL FEE, PER MIN: Performed by: INTERNAL MEDICINE

## 2024-12-19 PROCEDURE — 250N000009 HC RX 250: Performed by: NURSE ANESTHETIST, CERTIFIED REGISTERED

## 2024-12-19 PROCEDURE — 360N000076 HC SURGERY LEVEL 3, PER MIN: Performed by: INTERNAL MEDICINE

## 2024-12-19 PROCEDURE — 710N000012 HC RECOVERY PHASE 2, PER MINUTE: Performed by: INTERNAL MEDICINE

## 2024-12-19 PROCEDURE — 999N000141 HC STATISTIC PRE-PROCEDURE NURSING ASSESSMENT: Performed by: INTERNAL MEDICINE

## 2024-12-19 PROCEDURE — 88172 CYTP DX EVAL FNA 1ST EA SITE: CPT | Mod: 26 | Performed by: PATHOLOGY

## 2024-12-19 PROCEDURE — 88341 IMHCHEM/IMCYTCHM EA ADD ANTB: CPT | Mod: 26 | Performed by: PATHOLOGY

## 2024-12-19 PROCEDURE — 88305 TISSUE EXAM BY PATHOLOGIST: CPT | Mod: TC | Performed by: INTERNAL MEDICINE

## 2024-12-19 PROCEDURE — 88173 CYTOPATH EVAL FNA REPORT: CPT | Mod: TC | Performed by: INTERNAL MEDICINE

## 2024-12-19 PROCEDURE — 258N000003 HC RX IP 258 OP 636: Performed by: NURSE ANESTHETIST, CERTIFIED REGISTERED

## 2024-12-19 PROCEDURE — 272N000001 HC OR GENERAL SUPPLY STERILE: Performed by: INTERNAL MEDICINE

## 2024-12-19 PROCEDURE — 88173 CYTOPATH EVAL FNA REPORT: CPT | Mod: 26 | Performed by: PATHOLOGY

## 2024-12-19 RX ORDER — ONDANSETRON 2 MG/ML
4 INJECTION INTRAMUSCULAR; INTRAVENOUS EVERY 30 MIN PRN
Status: DISCONTINUED | OUTPATIENT
Start: 2024-12-19 | End: 2024-12-19

## 2024-12-19 RX ORDER — LIDOCAINE 40 MG/G
CREAM TOPICAL
Status: DISCONTINUED | OUTPATIENT
Start: 2024-12-19 | End: 2024-12-19 | Stop reason: HOSPADM

## 2024-12-19 RX ORDER — PROPOFOL 10 MG/ML
INJECTION, EMULSION INTRAVENOUS PRN
Status: DISCONTINUED | OUTPATIENT
Start: 2024-12-19 | End: 2024-12-19

## 2024-12-19 RX ORDER — HYDROMORPHONE HCL IN WATER/PF 6 MG/30 ML
0.4 PATIENT CONTROLLED ANALGESIA SYRINGE INTRAVENOUS EVERY 5 MIN PRN
Status: DISCONTINUED | OUTPATIENT
Start: 2024-12-19 | End: 2024-12-19 | Stop reason: HOSPADM

## 2024-12-19 RX ORDER — SODIUM CHLORIDE, SODIUM LACTATE, POTASSIUM CHLORIDE, CALCIUM CHLORIDE 600; 310; 30; 20 MG/100ML; MG/100ML; MG/100ML; MG/100ML
INJECTION, SOLUTION INTRAVENOUS CONTINUOUS
Status: DISCONTINUED | OUTPATIENT
Start: 2024-12-19 | End: 2024-12-19 | Stop reason: HOSPADM

## 2024-12-19 RX ORDER — FENTANYL CITRATE 50 UG/ML
50 INJECTION, SOLUTION INTRAMUSCULAR; INTRAVENOUS EVERY 5 MIN PRN
Status: DISCONTINUED | OUTPATIENT
Start: 2024-12-19 | End: 2024-12-19 | Stop reason: HOSPADM

## 2024-12-19 RX ORDER — ONDANSETRON 4 MG/1
4 TABLET, ORALLY DISINTEGRATING ORAL EVERY 30 MIN PRN
Status: DISCONTINUED | OUTPATIENT
Start: 2024-12-19 | End: 2024-12-19

## 2024-12-19 RX ORDER — NALOXONE HYDROCHLORIDE 0.4 MG/ML
0.4 INJECTION, SOLUTION INTRAMUSCULAR; INTRAVENOUS; SUBCUTANEOUS
Status: DISCONTINUED | OUTPATIENT
Start: 2024-12-19 | End: 2024-12-19 | Stop reason: HOSPADM

## 2024-12-19 RX ORDER — NALOXONE HYDROCHLORIDE 0.4 MG/ML
0.1 INJECTION, SOLUTION INTRAMUSCULAR; INTRAVENOUS; SUBCUTANEOUS
Status: DISCONTINUED | OUTPATIENT
Start: 2024-12-19 | End: 2024-12-19 | Stop reason: HOSPADM

## 2024-12-19 RX ORDER — ONDANSETRON 2 MG/ML
4 INJECTION INTRAMUSCULAR; INTRAVENOUS EVERY 6 HOURS PRN
Status: DISCONTINUED | OUTPATIENT
Start: 2024-12-19 | End: 2024-12-19 | Stop reason: HOSPADM

## 2024-12-19 RX ORDER — LIDOCAINE HYDROCHLORIDE 20 MG/ML
INJECTION, SOLUTION INFILTRATION; PERINEURAL PRN
Status: DISCONTINUED | OUTPATIENT
Start: 2024-12-19 | End: 2024-12-19

## 2024-12-19 RX ORDER — HYDROMORPHONE HCL IN WATER/PF 6 MG/30 ML
0.2 PATIENT CONTROLLED ANALGESIA SYRINGE INTRAVENOUS EVERY 5 MIN PRN
Status: DISCONTINUED | OUTPATIENT
Start: 2024-12-19 | End: 2024-12-19 | Stop reason: HOSPADM

## 2024-12-19 RX ORDER — NALOXONE HYDROCHLORIDE 0.4 MG/ML
0.2 INJECTION, SOLUTION INTRAMUSCULAR; INTRAVENOUS; SUBCUTANEOUS
Status: DISCONTINUED | OUTPATIENT
Start: 2024-12-19 | End: 2024-12-19 | Stop reason: HOSPADM

## 2024-12-19 RX ORDER — FLUMAZENIL 0.1 MG/ML
0.2 INJECTION, SOLUTION INTRAVENOUS
Status: DISCONTINUED | OUTPATIENT
Start: 2024-12-19 | End: 2024-12-19 | Stop reason: HOSPADM

## 2024-12-19 RX ORDER — EPHEDRINE SULFATE 50 MG/ML
INJECTION, SOLUTION INTRAMUSCULAR; INTRAVENOUS; SUBCUTANEOUS PRN
Status: DISCONTINUED | OUTPATIENT
Start: 2024-12-19 | End: 2024-12-19

## 2024-12-19 RX ORDER — ONDANSETRON 2 MG/ML
4 INJECTION INTRAMUSCULAR; INTRAVENOUS EVERY 30 MIN PRN
Status: DISCONTINUED | OUTPATIENT
Start: 2024-12-19 | End: 2024-12-19 | Stop reason: HOSPADM

## 2024-12-19 RX ORDER — FENTANYL CITRATE 50 UG/ML
25 INJECTION, SOLUTION INTRAMUSCULAR; INTRAVENOUS EVERY 5 MIN PRN
Status: DISCONTINUED | OUTPATIENT
Start: 2024-12-19 | End: 2024-12-19 | Stop reason: HOSPADM

## 2024-12-19 RX ORDER — ONDANSETRON 4 MG/1
4 TABLET, ORALLY DISINTEGRATING ORAL EVERY 30 MIN PRN
Status: DISCONTINUED | OUTPATIENT
Start: 2024-12-19 | End: 2024-12-19 | Stop reason: HOSPADM

## 2024-12-19 RX ORDER — PROPOFOL 10 MG/ML
INJECTION, EMULSION INTRAVENOUS CONTINUOUS PRN
Status: DISCONTINUED | OUTPATIENT
Start: 2024-12-19 | End: 2024-12-19

## 2024-12-19 RX ORDER — SODIUM CHLORIDE, SODIUM LACTATE, POTASSIUM CHLORIDE, CALCIUM CHLORIDE 600; 310; 30; 20 MG/100ML; MG/100ML; MG/100ML; MG/100ML
INJECTION, SOLUTION INTRAVENOUS CONTINUOUS PRN
Status: DISCONTINUED | OUTPATIENT
Start: 2024-12-19 | End: 2024-12-19

## 2024-12-19 RX ORDER — ONDANSETRON 4 MG/1
4 TABLET, ORALLY DISINTEGRATING ORAL EVERY 6 HOURS PRN
Status: DISCONTINUED | OUTPATIENT
Start: 2024-12-19 | End: 2024-12-19 | Stop reason: HOSPADM

## 2024-12-19 RX ORDER — PROCHLORPERAZINE MALEATE 5 MG/1
5 TABLET ORAL EVERY 6 HOURS PRN
Status: DISCONTINUED | OUTPATIENT
Start: 2024-12-19 | End: 2024-12-19 | Stop reason: HOSPADM

## 2024-12-19 RX ORDER — OXYCODONE HYDROCHLORIDE 5 MG/1
5 TABLET ORAL
Status: DISCONTINUED | OUTPATIENT
Start: 2024-12-19 | End: 2024-12-19 | Stop reason: HOSPADM

## 2024-12-19 RX ORDER — DEXAMETHASONE SODIUM PHOSPHATE 4 MG/ML
4 INJECTION, SOLUTION INTRA-ARTICULAR; INTRALESIONAL; INTRAMUSCULAR; INTRAVENOUS; SOFT TISSUE
Status: DISCONTINUED | OUTPATIENT
Start: 2024-12-19 | End: 2024-12-19 | Stop reason: HOSPADM

## 2024-12-19 RX ADMIN — PROPOFOL 20 MG: 10 INJECTION, EMULSION INTRAVENOUS at 13:37

## 2024-12-19 RX ADMIN — EPHEDRINE SULFATE 5 MG: 5 INJECTION INTRAVENOUS at 14:08

## 2024-12-19 RX ADMIN — EPHEDRINE SULFATE 5 MG: 5 INJECTION INTRAVENOUS at 15:11

## 2024-12-19 RX ADMIN — PROPOFOL 20 MG: 10 INJECTION, EMULSION INTRAVENOUS at 13:28

## 2024-12-19 RX ADMIN — PROPOFOL 20 MG: 10 INJECTION, EMULSION INTRAVENOUS at 15:02

## 2024-12-19 RX ADMIN — PROPOFOL 30 MG: 10 INJECTION, EMULSION INTRAVENOUS at 13:33

## 2024-12-19 RX ADMIN — PROPOFOL 20 MG: 10 INJECTION, EMULSION INTRAVENOUS at 13:31

## 2024-12-19 RX ADMIN — PHENYLEPHRINE HYDROCHLORIDE 50 MCG: 10 INJECTION INTRAVENOUS at 14:02

## 2024-12-19 RX ADMIN — SODIUM CHLORIDE, POTASSIUM CHLORIDE, SODIUM LACTATE AND CALCIUM CHLORIDE: 600; 310; 30; 20 INJECTION, SOLUTION INTRAVENOUS at 14:58

## 2024-12-19 RX ADMIN — PROPOFOL 20 MG: 10 INJECTION, EMULSION INTRAVENOUS at 13:34

## 2024-12-19 RX ADMIN — EPHEDRINE SULFATE 5 MG: 5 INJECTION INTRAVENOUS at 14:58

## 2024-12-19 RX ADMIN — EPHEDRINE SULFATE 5 MG: 5 INJECTION INTRAVENOUS at 14:30

## 2024-12-19 RX ADMIN — SODIUM CHLORIDE, POTASSIUM CHLORIDE, SODIUM LACTATE AND CALCIUM CHLORIDE: 600; 310; 30; 20 INJECTION, SOLUTION INTRAVENOUS at 13:20

## 2024-12-19 RX ADMIN — LIDOCAINE HYDROCHLORIDE 40 MG: 20 INJECTION, SOLUTION INFILTRATION; PERINEURAL at 13:24

## 2024-12-19 RX ADMIN — EPHEDRINE SULFATE 5 MG: 5 INJECTION INTRAVENOUS at 14:20

## 2024-12-19 RX ADMIN — PROPOFOL 150 MCG/KG/MIN: 10 INJECTION, EMULSION INTRAVENOUS at 13:24

## 2024-12-19 ASSESSMENT — ACTIVITIES OF DAILY LIVING (ADL)
ADLS_ACUITY_SCORE: 41
ADLS_ACUITY_SCORE: 42
ADLS_ACUITY_SCORE: 41

## 2024-12-19 NOTE — DISCHARGE INSTRUCTIONS
Contacting your Doctor -   To contact a doctor, call Dr Hogan at the  GI clinic at [phone]      or:  [phone] and ask for the resident on call for Gastroenterology (answered 24 hours a day)   Emergency Department:  [facility]: [phone]  [facility]: [phone] 911 if you are in need of immediate or emergent help

## 2024-12-19 NOTE — ANESTHESIA POSTPROCEDURE EVALUATION
Patient: Alma Johns    Procedure: Procedure(s):  ESOPHAGOGASTRODUODENOSCOPY, WITH FINE NEEDLE ASPIRATION BIOPSY, WITH ENDOSCOPIC ULTRASOUND GUIDANCE  Colonoscopy with biopsies and polypectomy, endoscopic marker  Enteroscopy small bowel       Anesthesia Type:  MAC    Note:  Disposition: Outpatient   Postop Pain Control: Uneventful            Sign Out: Well controlled pain   PONV: No   Neuro/Psych: Uneventful            Sign Out: Acceptable/Baseline neuro status   Airway/Respiratory: Uneventful            Sign Out: Acceptable/Baseline resp. status   CV/Hemodynamics: Uneventful            Sign Out: Acceptable CV status; No obvious hypovolemia; No obvious fluid overload   Other NRE: NONE   DID A NON-ROUTINE EVENT OCCUR? No           Last vitals:  Vitals Value Taken Time   /85 12/19/24 1630   Temp 36.4  C (97.5  F) 12/19/24 1545   Pulse 71 12/19/24 1524   Resp     SpO2 96 % 12/19/24 1552   Vitals shown include unfiled device data.    Electronically Signed By: Christopher Pierson MD  December 19, 2024  5:22 PM

## 2024-12-19 NOTE — ANESTHESIA PREPROCEDURE EVALUATION
Anesthesia Pre-Procedure Evaluation    Patient: Alma Johns   MRN: [id number] : 1935        Procedure : Procedure(s):  ENDOSCOPIC ULTRASOUND, ESOPHAGOSCOPY / UPPER GASTROINTESTINAL TRACT (GI)  COLONOSCOPY          Past Medical History:   Diagnosis Date    Dyslipidemia     dyslipoproteinemia    Glaucoma     Hypercalcemia     Hyperparathyroidism (H)     Hypertension     Hypothyroidism     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    OA (osteoarthritis)     Osteopenia     Postmenopausal     Retinal vein occlusion (H)     Vitamin D deficiency       Past Surgical History:   Procedure Laterality Date    APPENDECTOMY      Incidental    BIOPSY BREAST       SECTION      CHOLECYSTECTOMY      DILATION AND CURETTAGE      HYSTERECTOMY TOTAL ABDOMINAL, BILATERAL SALPINGO-OOPHORECTOMY, COMBINED      KIDNEY STONE SURGERY      extraction    LUMBAR LAMINECTOMY      TOTAL HIP ARTHROPLASTY Left 2009    TOTAL KNEE ARTHROPLASTY Right 2009    TUBAL LIGATION        Allergies   Allergen Reactions    Narcotic Antagonist [External Allergen Needs Reconciliation - See Comment] Other (See Comments)     Dysphoria, hallucinations    Other Environmental Allergy Unknown     congestion, DUST    Pravastatin Unknown     myalgia    Triamterene Other (See Comments)     stones    Vasotec [Enalapril] Other (See Comments)     cough    Effexor [Venlafaxine] Other (See Comments)     Adverse reaction -bowel      Social History     Tobacco Use    Smoking status: Never     Passive exposure: Past    Smokeless tobacco: Never   Substance Use Topics    Alcohol use: No      Wt Readings from Last 1 Encounters:   24 58.5 kg (129 lb)        Anesthesia Evaluation   Pt has had prior anesthetic. Type: General and MAC.        ROS/MED HX  ENT/Pulmonary:       Neurologic:       Cardiovascular:     (+) Dyslipidemia hypertension- -   -  - -                                      METS/Exercise Tolerance:     Hematologic:       Musculoskeletal:   (+)  arthritis,    "          GI/Hepatic:     (+)         appendicitis, cholecystitis/cholelithiasis,          Renal/Genitourinary:     (+) renal disease, type: CRI,     Nephrolithiasis ,       Endo:     (+)          thyroid problem, hypothyroidism Mild hyperparathyroidism (H),           Psychiatric/Substance Use:     (+) psychiatric history depression       Infectious Disease:       Malignancy:       Other:               OUTSIDE LABS:  CBC:   Lab Results   Component Value Date    WBC 8.8 11/21/2024    WBC 7.5 08/12/2024    HGB 11.6 (L) 11/21/2024    HGB 14.6 08/12/2024    HCT 35.5 11/21/2024    HCT 43.9 08/12/2024     11/21/2024     08/12/2024     BMP:   Lab Results   Component Value Date     11/19/2024     08/12/2024    POTASSIUM 4.9 11/19/2024    POTASSIUM 3.4 08/12/2024    CHLORIDE 107 11/19/2024    CHLORIDE 107 08/12/2024    CO2 21 (L) 11/19/2024    CO2 25 08/12/2024    BUN 21.1 11/19/2024    BUN 11.2 08/12/2024    CR 1.62 (H) 11/19/2024    CR 1.00 (H) 08/12/2024     (H) 11/19/2024     (H) 08/12/2024     COAGS: No results found for: \"PTT\", \"INR\", \"FIBR\"  POC: No results found for: \"BGM\", \"HCG\", \"HCGS\"  HEPATIC:   Lab Results   Component Value Date    ALBUMIN 3.7 11/19/2024    PROTTOTAL 7.5 11/19/2024    ALT 9 11/19/2024    AST 23 11/19/2024    ALKPHOS 90 11/19/2024    BILITOTAL 0.6 11/19/2024     OTHER:   Lab Results   Component Value Date    JIMMY 10.7 (H) 11/19/2024    PHOS 2.9 02/05/2021    LIPASE 36 11/19/2024    AMYLASE 108 (H) 11/19/2024    TSH 0.13 (L) 11/19/2024    SED 13 05/16/2018       Anesthesia Plan    ASA Status:  3       Anesthesia Type: MAC.     - Reason for MAC: immobility needed, straight local not clinically adequate   Induction: Intravenous, Propofol.   Maintenance: TIVA.        Consents    Anesthesia Plan(s) and associated risks, benefits, and realistic alternatives discussed. Questions answered and patient/representative(s) expressed understanding.     - Discussed: Risks, " [EKG obtained to assist in diagnosis and management of assessed problem(s)] : EKG obtained to assist in diagnosis and management of assessed problem(s) "Benefits and Alternatives for BOTH SEDATION and the PROCEDURE were discussed     - Discussed with:  Patient            Postoperative Care       PONV prophylaxis: Ondansetron (or other 5HT-3), Background Propofol Infusion     Comments:               Christopher Pierson MD    I have reviewed the pertinent notes and labs in the chart from the past 30 days and (re)examined the patient.  Any updates or changes from those notes are reflected in this note.               # Hypertension: Noted on problem list           # Overweight: Estimated body mass index is 26.96 kg/m  as calculated from the following:    Height as of 11/19/24: 1.473 m (4' 10\").    Weight as of 11/19/24: 58.5 kg (129 lb).             " [FreeTextEntry1] : IMPRESSIONS:  1. AFL/AFIB/GIB: S/P CVA:  now s/p  Afib ablation and CTI ablation, s/p Watchman procedure due to contraindication to oral anticoagulant therapy for AFL on 8/14/23. EKG performed today to assess for conduction disease reveals NSR with 1st degree AVB He is doing well post procedure. APRIL post Watchman showed mild leaks. He will continue metoprolol and Eliquis due to CVA.   CHADSVASC score =3.  Will get APRIL in August 2024 and if leaks have stopped, will discontinue Eliquis.   2. HTN: resume oral antihypertensives as prescribed. Encouraged heart healthy diet, sodium restriction, and weight loss. Continue regular f/u with Cardiologist for further HTN management.  3. HLD: resume statin therapy as prescribed and regular f/u with Cardiologist for routine lipid monitoring and management.  Resume routine f/u with Cardiologist and RTO as needed or for any new/worsening symptoms.

## 2024-12-19 NOTE — BRIEF OP NOTE
[facility]    Brief Operative Note    Pre-operative diagnosis: Weight loss [R63.4]  Nausea [R11.0]  Abnormal finding on GI tract imaging [R93.3]  Post-operative diagnosis Periampullary diverticulum, multiple malignant-appearing upper abdominal lymph nodes, simple pancreatic cysts, hepatic flexure colon mass.    Procedure: ESOPHAGOGASTRODUODENOSCOPY, WITH FINE NEEDLE ASPIRATION BIOPSY, WITH ENDOSCOPIC ULTRASOUND GUIDANCE, N/A - Esophagus  Colonoscopy with biopsies, N/A - Rectum  Enteroscopy small bowel, N/A - Mouth    Surgeon: Surgeons and Role:     * Ron Hogan MD - Primary  Anesthesia: MAC   Estimated Blood Loss: Minimal    Drains: None  Specimens:   ID Type Source Tests Collected by Time Destination   1 : GIANCARLO-PANCREATIC LYMPH NODE Fine Needle Aspiration Lymph Node(s) FINE NEEDLE ASPIRATE Rno Hogan MD 12/19/2024  2:02 PM    2 : Colon hepatic flexure mass biopsies Biopsy Large Intestine, Colon, Hepatic Flexure SURGICAL PATHOLOGY EXAM Ron Hogan MD 12/19/2024  2:46 PM    3 : 7mm sigmoid polyp Polyp Large Intestine, Colon, Sigmoid SURGICAL PATHOLOGY EXAM Ron Hogan MD 12/19/2024  2:59 PM    4 : 4mm sigmoid polyp Polyp Large Intestine, Colon, Sigmoid SURGICAL PATHOLOGY EXAM Ron Hogan MD 12/19/2024  3:03 PM    5 : 4mm rectal polyp Polyp Rectum SURGICAL PATHOLOGY EXAM Ron Hogan MD 12/19/2024  3:06 PM      Findings:     Enteroscopy to approximately 10 cm distal to the ligament of Treitz was normal.  Sideviewing endoscopy showed a large periampullary diverticulum. The major papilla could not be endoscopically visualized and is likely within the diverticulum.  Multiple simple-appearing pancreatic cysts were seen.  Multiple malignant-appearing lymph nodes were seen in the upper abdomen. Needle biopsy of a peripancreatic node was performed. Preliminary cytology showed lesional material.    No hepatic or left  adrenal masses were seen.    Colonoscopy showed a 4 cm annular/apple-core mass near the hepatic flexure.   Multiple biopsies were obtained.   Three tattoos were placed approximately 5 cm distal/downstream from the mass.    5-6 six small 3-6 mm sessile polyps were seen in the cecum and ascending colon which were left in place.    A 7 mm sigmoid polyp, a 4 mm sigmoid polyp and a 4 mm rectal polyp were resected with cold snare and retrieved.  Complications: None.  Implants: * No implants in log *    GERMÁN Hogan MD  Professor of Medicine  Division of Gastroenterology, Hepatology and Nutrition  [facility]

## 2024-12-19 NOTE — ANESTHESIA CARE TRANSFER NOTE
Patient: Alma Johns    Procedure: Procedure(s):  ESOPHAGOGASTRODUODENOSCOPY, WITH FINE NEEDLE ASPIRATION BIOPSY, WITH ENDOSCOPIC ULTRASOUND GUIDANCE  Colonoscopy with biopsies and polypectomy, endoscopic marker  Enteroscopy small bowel       Diagnosis: Weight loss [R63.4]  Nausea [R11.0]  Abnormal finding on GI tract imaging [R93.3]  Diagnosis Additional Information: No value filed.    Anesthesia Type:   MAC     Note:    Oropharynx: oropharynx clear of all foreign objects  Level of Consciousness: awake  Oxygen Supplementation: room air    Independent Airway: airway patency satisfactory and stable  Dentition: dentition unchanged  Vital Signs Stable: post-procedure vital signs reviewed and stable  Report to RN Given: handoff report given  Patient transferred to: Phase II    Handoff Report: Identifed the Patient, Identified the Reponsible Provider, Reviewed the pertinent medical history, Discussed the surgical course, Reviewed Intra-OP anesthesia mangement and issues during anesthesia, Set expectations for post-procedure period and Allowed opportunity for questions and acknowledgement of understanding  Vitals:  Vitals Value Taken Time   BP     Temp     Pulse 71 12/19/24 1524   Resp     SpO2 96 % 12/19/24 1533   Vitals shown include unfiled device data.    Electronically Signed By: VICKY Byrne CRNA  December 19, 2024  3:34 PM

## 2024-12-20 LAB
COLONOSCOPY: NORMAL
UPPER EUS: NORMAL

## 2024-12-23 ENCOUNTER — TELEPHONE (OUTPATIENT)
Dept: SURGERY | Facility: CLINIC | Age: 89
End: 2024-12-23
Payer: MEDICARE

## 2024-12-23 DIAGNOSIS — C18.9 COLON ADENOCARCINOMA (H): Primary | ICD-10-CM

## 2024-12-23 DIAGNOSIS — K63.89 MASS OF HEPATIC FLEXURE OF COLON: Primary | ICD-10-CM

## 2024-12-23 LAB
PATH REPORT.COMMENTS IMP SPEC: ABNORMAL
PATH REPORT.COMMENTS IMP SPEC: ABNORMAL
PATH REPORT.COMMENTS IMP SPEC: YES
PATH REPORT.FINAL DX SPEC: ABNORMAL
PATH REPORT.GROSS SPEC: ABNORMAL
PATH REPORT.MICROSCOPIC SPEC OTHER STN: ABNORMAL
PATH REPORT.RELEVANT HX SPEC: ABNORMAL
PATHOLOGY SYNOPTIC REPORT: ABNORMAL
PHOTO IMAGE: ABNORMAL

## 2024-12-23 NOTE — TELEPHONE ENCOUNTER
Diagnosis, Referred by & from: Colon Cancer   Appt date: 12/30/2024   NOTES STATUS DETAILS   OFFICE NOTE from referring provider N/A    OFFICE NOTE from other specialist Internal [place] - [place]:  ([facility]) 12/26/24 - ONC OV with Dr. Friedell [place] - [place]:  11/19/24 - PCC OV with Dr. velásquez   DISCHARGE SUMMARY from hospital N/A    DISCHARGE REPORT from the ER N/A    OPERATIVE REPORT N/A    MEDICATION LIST Internal    LABS     BIOPSIES/PATHOLOGY RELATED TO DIAGNOSIS Internal [facility]:  12/19/24 - Colon Biopsy (Case: [id number])   DIAGNOSTIC PROCEDURES     COLONOSCOPY (most recent all time after 5 years) [facility]:  12/19/24 - Colonoscopy   IMAGING (DISC & REPORT)      CT [facility]:  12/10/24 - CT Abd/Pelvis   MRI [facility]:  12/13/24 - MRI Abdomen

## 2024-12-23 NOTE — CONFIDENTIAL NOTE
NEW CANCER APPOINTMENT DETAILS:    With: Dr. Kim London    Referring Provider: Dr Hogan    For / Appt Notes: cancer of hepatic flexure    Appt Type: [facility] New Cancer    Has the patient been given a timeframe or date/time of appt?: YES     Is this appointment held on the schedule (Hold will be removed once appt is scheduled)?: YES     Additional Appointments: MRI Abdomen and CT Chest Abdomen Pelvis  - completed  Imaging Questions: N/A    RECORDS NEEDED: none        Thank you!

## 2024-12-23 NOTE — TELEPHONE ENCOUNTER
[facility] Call Center    Phone Message    May a detailed message be left on voicemail: yes     Reason for Call: Appointment Intake    Referring Provider Name: Ron Hogan MD  Diagnosis and/or Symptoms: Mass of hepatic flexure of colon [K63.89]    Patient is being referred for above dx. Please review and follow up with patient. Thanks!    Action Taken: Message routed to:  [facility]    Travel Screening: Not Applicable     Date of Service:

## 2024-12-24 LAB
PATH REPORT.COMMENTS IMP SPEC: ABNORMAL
PATH REPORT.COMMENTS IMP SPEC: YES
PATH REPORT.FINAL DX SPEC: ABNORMAL
PATH REPORT.GROSS SPEC: ABNORMAL
PATH REPORT.MICROSCOPIC SPEC OTHER STN: ABNORMAL
PATH REPORT.RELEVANT HX SPEC: ABNORMAL

## 2024-12-26 ENCOUNTER — ONCOLOGY VISIT (OUTPATIENT)
Dept: ONCOLOGY | Facility: HOSPITAL | Age: 89
End: 2024-12-26
Attending: INTERNAL MEDICINE
Payer: MEDICARE

## 2024-12-26 VITALS
OXYGEN SATURATION: 95 % | WEIGHT: 127.4 LBS | BODY MASS INDEX: 26.74 KG/M2 | RESPIRATION RATE: 16 BRPM | TEMPERATURE: 98.3 F | HEIGHT: 58 IN | HEART RATE: 68 BPM | DIASTOLIC BLOOD PRESSURE: 65 MMHG | SYSTOLIC BLOOD PRESSURE: 142 MMHG

## 2024-12-26 DIAGNOSIS — C18.3 MALIGNANT NEOPLASM OF HEPATIC FLEXURE (H): Primary | ICD-10-CM

## 2024-12-26 DIAGNOSIS — C76.2 CANCER OF ABDOMEN (H): ICD-10-CM

## 2024-12-26 DIAGNOSIS — N18.32 STAGE 3B CHRONIC KIDNEY DISEASE (H): ICD-10-CM

## 2024-12-26 PROCEDURE — G0463 HOSPITAL OUTPT CLINIC VISIT: HCPCS | Performed by: INTERNAL MEDICINE

## 2024-12-26 ASSESSMENT — PAIN SCALES - GENERAL: PAINLEVEL_OUTOF10: NO PAIN (0)

## 2024-12-26 NOTE — PROGRESS NOTES
[facility] Hematology and Oncology Consult Note    Patient: Alma Johns  MRN: [id number]  Date of Service: Dec 26, 2024       Reason for Visit    I was consulted by   Lucy Ruano MD     For evaluation and management of newly diagnosed colon cancer.      Encounter Diagnoses Assessment and Plan:    Problem List Items Addressed This Visit       Chronic kidney disease, stage 3 (H)     Other Visit Diagnoses       Malignant neoplasm of hepatic flexure (H)    -  Primary    Relevant Orders    PET Oncology (Eyes to Thighs)    Cancer of abdomen (H)            Patient with newly diagnosed apple core adenocarcinoma of the hepatic flexure.  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  Patient does not wish to have chemotherapy.  She has a surgical consultation planned for 12/30/2024.  I advised palliative surgery to avoid obstruction of the colon.  This could be followed by palliative chemotherapy or observation depending on the patient's wishes.  I also advised PET CT scan to complete staging.  Patient will return to oncology after PET imaging is completed.        ______________________________________________________________________________    Staging History  Cancer Staging   No matching staging information was found for the patient.    ECOG Performance  1 - Can't do physically strenuous work, but fully ambulatory and can do light sedentary work.    History of Present Illness      Alma Johns is an 89 year old woman with a history of hypercalcemia with elevated parathyroid hormone.  Over the last year she has had unexplained weight loss of about 20 pounds.  On 12/10/2024 she underwent CT scan of the abdomen and pelvis which showed probable lesion at the hepatic flexure of the colon and suspicious retroperitoneal adenopathy.  On 12/19/2024 she underwent colonoscopy which confirmed an adenocarcinoma of the colon.  Upper GI endoscopic ultrasound confirmed metastases in a upper abdominal lymph node adjacent  "to the pancreas near the mesenteric root.  Multiple pancreatic cysts appeared benign.    Presently she feels fairly well.  Her appetite is still decreased.  She does not have pain.  She does not have cough chest pain or shortness of breath.  She had a brief episode of numbness in her left arm and hand lasting for less than 24 hours a few weeks ago.  She does not smoke cigarettes or use alcohol.  There is no family history of cancer.    Review of systems.  Pertinent Findings are included in the History of Present Illness    Physical Exam    BP (!) 142/65 (BP Location: Left arm, Patient Position: Sitting, Cuff Size: Adult Regular)   Pulse 68   Temp 98.3  F (36.8  C) (Oral)   Resp 16   Ht 1.467 m (4' 9.75\")   Wt 57.8 kg (127 lb 6.4 oz)   LMP  (LMP Unknown)   SpO2 95%   BMI 26.86 kg/m       GENERAL APPEARANCE: 89-year-old woman in no apparent distress.  HEAD: Atraumatic; normocephalic; without lesions.  EYES: Conjunctiva, corneas and eyelids normal; pupils equal, round, reactive to light; No Icterus.  MOUTH/OROPHARYNX: Oral mucosa intact  NECK: Supple with no nodes.  LUNGS:  Clear to auscultation and percussion with no extra sounds.  HEART: Regular rhythm and rate; S1 and S2 normal; no murmurs noted.  ABDOMEN: Soft; no masses or tenderness, no hepatosplenomegaly.  NEUROLOGIC: Alert and oriented.  No obvious focal findings.  EXTREMITIES: No cyanosis, or edema.  SKIN: No abnormal bruising or bleeding. No suspicious lesions noted on exposed skin.  PSYCHIATRIC: Mental status normal; no apparent psychiatric issues    Medications:    Current Outpatient Medications   Medication Sig Dispense Refill    alendronate (FOSAMAX) 70 MG tablet Take 1 tablet (70 mg) by mouth every 7 days 12 tablet 0    amLODIPine (NORVASC) 10 MG tablet Take 1 tablet (10 mg) by mouth daily 90 tablet 3    celecoxib (CELEBREX) 200 MG capsule TAKE 1 CAPSULE BY MOUTH DAILY 90 capsule 3    doxazosin (CARDURA) 4 MG tablet TAKE ONE TABLET BY MOUTH ONE " TIME DAILY. 90 tablet 3    escitalopram (LEXAPRO) 5 MG tablet Take 1 tablet (5 mg) by mouth daily. 90 tablet 3    latanoprost (XALATAN) 0.005 % ophthalmic solution [LATANOPROST (XALATAN) 0.005 % OPHTHALMIC SOLUTION] Administer 1 drop to both eyes bedtime.      LORazepam (ATIVAN) 0.5 MG tablet Take 1 tablet (0.5 mg) by mouth every 6 hours as needed for anxiety. 5 tablet 0    ondansetron (ZOFRAN) 4 MG tablet Take 1 tablet (4 mg) by mouth every 8 hours as needed for nausea. 90 tablet 3    therapeutic multivitamin (THERAGRAN) tablet [THERAPEUTIC MULTIVITAMIN (THERAGRAN) TABLET] Take 1 tablet by mouth daily.      timolol maleate (TIMOPTIC) 0.5 % ophthalmic solution [TIMOLOL MALEATE (TIMOPTIC) 0.5 % OPHTHALMIC SOLUTION] INSTILL 1 DROP INTO BOTH EYES Q EVENING  3     No current facility-administered medications for this visit.           Past History    Past Medical History:   Diagnosis Date    Dyslipidemia     dyslipoproteinemia    Glaucoma     Hypercalcemia     Hyperparathyroidism (H)     Hypertension     Hypothyroidism     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    OA (osteoarthritis)     Osteopenia     Postmenopausal     Retinal vein occlusion (H)     Vitamin D deficiency      Past Surgical History:   Procedure Laterality Date    APPENDECTOMY      Incidental    BIOPSY BREAST       SECTION      CHOLECYSTECTOMY      COLONOSCOPY N/A 2024    Procedure: Colonoscopy with biopsies and polypectomy, endoscopic marker;  Surgeon: Ron Hogan MD;  Location: [facility] OR    DILATION AND CURETTAGE      ENTEROSCOPY SMALL BOWEL N/A 2024    Procedure: Enteroscopy small bowel;  Surgeon: Ron Hogan MD;  Location: [facility] OR    ESOPHAGOSCOPY, GASTROSCOPY, DUODENOSCOPY (EGD), COMBINED N/A 2024    Procedure: ESOPHAGOGASTRODUODENOSCOPY, WITH FINE NEEDLE ASPIRATION BIOPSY, WITH ENDOSCOPIC ULTRASOUND GUIDANCE;  Surgeon: Ron Hogan MD;  Location: [facility] OR    HYSTERECTOMY TOTAL ABDOMINAL, BILATERAL  SALPINGO-OOPHORECTOMY, COMBINED      KIDNEY STONE SURGERY      extraction    LUMBAR LAMINECTOMY      TOTAL HIP ARTHROPLASTY Left 2009    TOTAL KNEE ARTHROPLASTY Right 2009    TUBAL LIGATION       Allergies   Allergen Reactions    Narcotic Antagonist [External Allergen Needs Reconciliation - See Comment] Other (See Comments)     Dysphoria, hallucinations    Other Environmental Allergy Unknown     congestion, DUST    Pravastatin Unknown     myalgia    Triamterene Other (See Comments)     stones    Vasotec [Enalapril] Other (See Comments)     cough    Effexor [Venlafaxine] Other (See Comments)     Adverse reaction -bowel     No family history on file.  Social History     Socioeconomic History    Marital status: Single     Spouse name: None    Number of children: None    Years of education: None    Highest education level: None   Tobacco Use    Smoking status: Never     Passive exposure: Past    Smokeless tobacco: Never   Vaping Use    Vaping status: Never Used   Substance and Sexual Activity    Alcohol use: No    Drug use: No   Social History Narrative    Lives alone. 2 hip replacements and 1 knee replacement. Used to work in housekeeping at [employer]. Saw Dr Crabtree. Has a daughter and son, and grandchildren and great grandchildren.     Social Drivers of Health     Financial Resource Strain: Low Risk  (8/12/2024)    Financial Resource Strain     Within the past 12 months, have you or your family members you live with been unable to get utilities (heat, electricity) when it was really needed?: No   Food Insecurity: Low Risk  (8/12/2024)    Food Insecurity     Within the past 12 months, did you worry that your food would run out before you got money to buy more?: No     Within the past 12 months, did the food you bought just not last and you didn t have money to get more?: No   Transportation Needs: Low Risk  (8/12/2024)    Transportation Needs     Within the past 12 months, has lack of transportation kept you from  medical appointments, getting your medicines, non-medical meetings or appointments, work, or from getting things that you need?: No   Physical Activity: Inactive (8/12/2024)    Exercise Vital Sign     Days of Exercise per Week: 0 days     Minutes of Exercise per Session: 0 min   Stress: No Stress Concern Present (8/12/2024)    [employer] - Occupational Stress Questionnaire     Feeling of Stress : Only a little   Social Connections: Unknown (8/12/2024)    Social Connection and Isolation Panel [NHANES]     Frequency of Social Gatherings with Friends and Family: Once a week   Interpersonal Safety: Low Risk  (12/19/2024)    Interpersonal Safety     Do you feel physically and emotionally safe where you currently live?: Yes     Within the past 12 months, have you been hit, slapped, kicked or otherwise physically hurt by someone?: No     Within the past 12 months, have you been humiliated or emotionally abused in other ways by your partner or ex-partner?: No   Housing Stability: Low Risk  (8/12/2024)    Housing Stability     Do you have housing? : Yes     Are you worried about losing your housing?: No           Lab Results    Recent Results (from the past 240 hours)   UPPER EUS    Collection Time: 12/19/24 12:35 PM   Result Value Ref Range    Upper EUS       [address] [address] [phone]     Endoscopy Department  _______________________________________________________________________________  Patient Name: Alma Johns              Procedure Date: 12/19/2024 12:35 PM  MRN: [id number]                       Account Number: [id number]  YOB: 1935              Admit Type: Outpatient  Age: 89                               Room: [place] [address]  Gender: Female                        Note Status: Finalized  Attending MD: DWAINE BENZ MD,  Pause for the Cause: completed  Total Sedation Time:                     _______________________________________________________________________________     Procedure:             Upper EUS  Indications:           Lymphadenopathy on CT scan and MRI with suspected mass                          in duodenum/periampullary region and pancreatic cysts.                          Also suspicious appearance of the colon.  Providers:              DWAINE BENZ MD, DAVID NEIL RN  Referring MD:          PETER E. FRIEDELL, NILAY CASEY MD  Medicines:             Monitored Anesthesia Care  Complications:         No immediate complications.  _______________________________________________________________________________  Procedure:             Pre-Anesthesia Assessment:                         - Prior to the procedure, a History and Physical was                          performed, and patient medications, allergies and                          sensitivities were reviewed. The patient's tolerance                          of previous anesthesia was reviewed.                         - The risks and benefits of the procedure and the                          sedation options and risks were discussed with the                          patient. All questions were answered and informed                          consent was obtained.                         After obtaining informed consent, the endoscope was                           passed under direct vision. Throughout the procedure,                          the patient's blood pressure, pulse, and oxygen                          saturations were monitored continuously. The                          Colonoscope was introduced through the mouth, and                          advanced to the third part of duodenum. The                          Endosonoscope was introduced through the mouth, and                          advanced to the third part of duodenum. After                          obtaining informed consent, the endoscope was passed                           under direct vision. Throughout the procedure, the                          patient's blood pressure, pulse, and oxygen                          saturations were monitored continuously.The upper EUS                          was accomplished without difficulty. The patient                          tolerated the procedure well.                                                                                    Findings:       ENDOSONOGRAPHIC FINDING: :       A small sliding type hiatal hernia was present (Hill grade 4)       Enteroscopy to approximately 10 cm distal to the ligament of Treitz was        otherwise normal.       Sideviewing endoscopy showed a large periampullary diverticulum. The        major papilla could not be endoscopically visualized and is likely        within the diverticulum. This appeared bilobed but EUS (or could be two        separate diverticulae, however a second opening was not seen).       Multiple simple-appearing pancreatic cysts were seen. The largest in the        head measured 45 x 41 mm in diameter. None had thick walls or mural        nodules. The pancreas was otherwise normal without features of chronic        pancreatis. The main pancreatic duct was non-dilated measuring 1.4 mm in        maximal diameter.       Multiple malignant-appearing lymph nodes were seen in the upper abdomen.        Needle biopsy of a representative 17 mm diameter peripancreati c node was        performed. Preliminary cytology showed lesional material.       No suspicious hepatic or left adrenal masses were seen. A central        hyperechoic liver lesion was seen measuring 36 x 25 mm. MRI and CT        showed this to be fat density consistent with a lipoma. A simple 16 x 11        left lobe cyst was seen.       The CBD measured 8 mm in diameter. There were no stones.       Incidental note was made of a large > 3 cm left renal pelvis stone.                                                                                    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                         - Multiple simple-appearing pancreatic cysts without                          high-risk features. No solid pancreatic lesions seen.                         - Multiple upper abdominal lymph nodes seen                          corresponding to the CT and MRI. A representative node                          by the  pancreatic body/root of the mesentery was                          biopsies and prelimirily showed lesional material.                         - No suspicious hepatic or left adrenal lesions seen.                         - No potential primary tumor was seen in the upper                          abdomen. See colonoscopy report which showed a mass.                         - Incidental large 36 x 25 mm left renal pelvis stone.  Recommendation:        - Discharge patient to home.                         - Await cytology results.                         - Follow-uo with Oncology (Dr. Friedell) as scheduled.                          See colonoscopy report. Will discuss Colorectal                          Surgery consultation with referring providers.                         The findings and concern for malignacy were discussed                          with the patient and her daughter.                                                                                     Electronically signed  by Dr. Logan Benz  _______________________  DWAINE BENZ MD  12/20/2024 1:20:06 PM  I was physically present for the entire viewing portion of the exam.  __________________________  Signature of teaching physician  NATEc/Zaria BENZ MD  Number of Addenda: 0    Note Initiated On: 12/19/2024 12:35 PM  Scope In:  Scope Out:     COLONOSCOPY    Collection Time: 12/19/24 12:38 PM   Result Value Ref Range    COLONOSCOPY       [facility]  [address] [place] [address] [phone]     Endoscopy Department  _______________________________________________________________________________  Patient Name: Alma Johns              Procedure Date: 12/19/2024 12:38 PM  MRN: [id number]                       Account Number: [id number]  YOB: 1935              Admit Type: Outpatient  Age: 89                               Room: [place] [address]  Gender: Female                        Note Status: Finalized  Attending MD: DWAINE BENZ MD,  Pause for the Cause: completed  Total Sedation Time:                    _______________________________________________________________________________     Procedure:             Colonoscopy  Indications:           Abnormal CT of the GI tract, Abnormal MRI of the GI                          tract with suspicious appearance of the proximal                          transverse colon. Upper abdominal adenopathy. Weight                           loss.  Providers:             DWAINE BENZ MD, DAVID NEIL, RN  Referring MD:          NILAY CASEY MD, PETER E. FRIEDELL  Medicines:             Propofol per Anesthesia  Complications:         No immediate complications.  _______________________________________________________________________________  Procedure:             Pre-Anesthesia Assessment:                         - Prior to the procedure, a History and Physical was                          performed, and patient medications, allergies and                          sensitivities were reviewed. The patient's tolerance                          of previous anesthesia was reviewed.                         - The risks and benefits of the procedure and the                          sedation options and risks were discussed with the                          patient. All questions were answered and informed                          consent was obtained.                         After obtaining  informed consent, the colo noscope was                          passed under direct vision. Throughout the procedure,                          the patient's blood pressure, pulse, and oxygen                          saturations were monitored continuously. The Olympus                          Adult Colonoscope was introduced through the anus and                          advanced to the cecum, identified by appendiceal                          orifice and ileocecal valve. The colonoscopy was                          performed without difficulty. The patient tolerated                          the procedure well.                                                                                   Findings:       Colonoscopy showed a 4 cm annular/apple-core mass in the hepatic        flexure/proximal transverse colon.       Multiple biopsies were obtained.       Three tattoos were placed approximately 5 cm distal/downstream from the        mass.       5-6 six small 3-6 mm sessile polyps were seen in the cecum and ascen ding        colon upstream from the mass which were left in place.       A 7 mm sigmoid polyp, a 4 mm sigmoid polyp and a 4 mm rectal polyp were        resected with cold snare and retrieved.       Retroflex exam showed non-bleeding internal hemorrhoids.                                                                                   Impression:            - Non-obstructing 4 cm annular/apple-core mass in the                          hepatic flexure/proximal transverse colon                          corresponding to a finding on recent CT and MRI.                          Biopsied. The appearance is consistent with malignancy.                         - Tattoo performed x 3 approximately 5 cm                          distally/downstream from the mass, presuming if                          resection performed this would be a right                          hemicolectomy.                         - A few small  polyps are present in the cecum and                          ascending colon whi ch were not removed presuming these                          will be addressed with surgical resection.                         - Three small sigmoid and rectal polyps resected. No                          polyps remain distal to the mass.                         - Incidental sigmoid diverticulosis and internnal                          hemorrhoids.  Recommendation:        - Discharge patient to home.                         - Await pathology results.                         - Proceed with medical oncology consultation as                          scheduled. Will need colorectal surgery consultation                          as well. Messaged referring providers re this.                         - See separate EUS report re adenopathy with needle                          biopsy.                         The likelihood of malignancy was discussed with the pt                          and her daughter.                                                                                     Electro nically signed by Dr. Logan Benz  _______________________  DWAINE BENZ MD  12/20/2024 1:00:30 PM  I was physically present for the entire viewing portion of the exam.  __________________________  Signature of teaching physician  B4c/Zaria BENZ MD  Number of Addenda: 0    Note Initiated On: 12/19/2024 12:38 PM  Scope In:  Scope Out:     Fine Needle Aspirate Lymph Node(s)    Collection Time: 12/19/24  2:02 PM   Result Value Ref Range    Final Diagnosis       LYMPH NODE(S), GIANCARLO-PANCREATIC LYMPH NODE, ENDOSCOPIC ULTRASOUND-GUIDED FINE-NEEDLE ASPIRATION:  Interpretation:  - Metastatic adenocarcinoma, consistent with colorectal primary (see comment)     Adequacy: Satisfactory for evaluation          Comment       Immunohistochemical stains are performed with appropriate controls and demonstrate the tumor cells to be positive for CK20 and  CDX2, while negative for CK7.  The morphologic and immunohistochemical profile is consistent with metastatic adenocarcinoma from patient's known colorectal primary.      Clinical Information       89-year-old patient with recently diagnosed adenocarcinoma of hepatic flexure & pericolonic lymphadenopathy      Rapid Onsite Evaluation       FNA Performance:   Fine needle aspiration was not performed by [facility] staff.    Aspirate immediate study/adequacy:  I, RAJI GALLEGOS MD, attest that I immediately examined smears while the procedure was underway and determined or confirmed the adequacy of the specimens via telepathology.    It is of note that the final assessment and report may be performed and signed by a different pathologist.    Onsite adequacy/interpretation:  A: Adequate      Gross Description       A(1). Lymph Node(s), GIANCARLO-PANCREATIC LYMPH NODE:A. Lymph Node(s), GIANCARLO-PANCREATIC LYMPH NODE, Fine Needle Aspirate:  Received are 1 fixed slides, processed for Pap stain, 1 air dried slides, processed for Diff Quik stain, and material in formalin, processed for one hematoxylin stained cell block.      Microscopic Description         Case was reviewed by the following:  Pathology Fellow: Ghazala Cruz DO  Pathology Fellow: Kacy Magaña MD  Resident Pathologist: Bambi Damian MD  A resident or fellow in a training program was involved in the initial review, preparation, and/or interpretation of this case.  I, as the senior physician, attest that I have personally reviewed all specimens and or slides, including the listed special stains, and used them with my medical judgement to determine the final diagnosis.              Abnormal Result? Yes (A) No    Performing Labs       The technical component of this testing was completed at [facility].     Stain controls for all stains resulted within this report  have been reviewed and show appropriate reactivity.      Surgical Pathology Exam    Collection Time: 12/19/24  2:46 PM   Result Value Ref Range    Case Report       Surgical Pathology Report                         Case: [id number]                                  Authorizing Provider:  Ron Hogan MD   Collected:           12/19/2024 02:46 PM          Ordering Location:      MAIN OR                 Received:            12/19/2024 03:26 PM          Pathologist:           Bernard Bailey DO                                                            Specimens:   A) - Large Intestine, Colon, Hepatic Flexure, Colon hepatic flexure mass biopsies                   B) - Large Intestine, Colon, Sigmoid, 7mm sigmoid polyp                                             C) - Large Intestine, Colon, Sigmoid, 4mm sigmoid polyp                                             D) - Rectum, 4mm rectal polyp                                                              Final Diagnosis       A. COLON, HEPATIC FLEXURE MASS, BIOPSY:  - Fragments of adenocarcinoma, moderately-differentiated  - Immunohistochemical testing for mismatch repair proteins will be performed; results will be reported in an addendum    B. COLON, SIGMOID, POLYP:  - Tubular adenoma  - No high-grade dysplasia or malignancy identified    C. COLON, SIGMOID, POLYP:  - Hyperplastic polyp    D. RECTUM, POLYP:  - Hyperplastic polyp      Synoptic Checklist       Colon and Rectum Biomarker Reporting Template   (Added in Addendum) COLON AND RECTUM: BIOMARKER REPORTING TEMPLATE - A   Protocol posted: 6/30/2021      RESULTS      Mismatch Repair:            Immunohistochemistry (IHC) Testing for Mismatch Repair (MMR) Proteins:              MLH1 Result:    Intact nuclear expression         Immunohistochemistry (IHC) Testing for Mismatch Repair (MMR) Proteins:              MSH2 Result:    Intact nuclear expression         Immunohistochemistry (IHC) Testing for Mismatch Repair  "(MMR) Proteins:              MSH6 Result:    Intact nuclear expression         Immunohistochemistry (IHC) Testing for Mismatch Repair (MMR) Proteins:              PMS2 Result:    Intact nuclear expression           IHC Interpretation:    No loss of nuclear expression of MMR proteins: low probability of MSI-H       Clinical Information       Weight loss.      Gross Description       A(2). Large Intestine, Colon, Hepatic Flexure, Colon hepatic flexure mass biopsies:  The specimen is received in formalin with proper patient identification, labeled \"colon hepatic flexure mass biopsies\".  The specimen consists of multiple pieces of tan-white to pink-tan soft tissue fragments.  Submitted in A1.   B(3). Large Intestine, Colon, Sigmoid, 7mm sigmoid polyp:  The specimen is received in formalin with proper patient identification, labeled \"7 mm sigmoid polyp\".  The specimen consists of 2 pieces of tan-red soft tissue measuring 0.2 and 0.8 cm in greatest dimension.  Submitted in B1.   C(4). Large Intestine, Colon, Sigmoid, 4mm sigmoid polyp:  The specimen is received in formalin with proper patient identification, labeled \"4 mm sigmoid polyp\".  The specimen consists of a 0.4 cm pink-tan soft tissue fragment.  Submitted in C1.   D(5). Rectum, 4mm rectal polyp:  The specimen is received in formalin with proper patient identification, labeled \"4 mm rectal polyp\".  The specimen consists of 2 pieces of tan-pink soft tissue measuring 0.3 and 1.2 cm in greatest dimension.  Submitted in D1.       Microscopic Description       Microscopic examination is performed.      Case was reviewed by the following:  Resident Pathologist: Cheri Bae MD  A resident or fellow in a training program was involved in the initial review, preparation, and/or interpretation of this case.  I, as the senior physician, attest that I have personally reviewed all specimens and or slides, including the listed special stains, and used them with my medical " judgement to determine the final diagnosis.              MCRS Yes (A) N/A    Performing Labs       The technical component of this testing was completed at [facility].    Stain controls for all stains resulted within this report have been reviewed and show appropriate reactivity.       Case Images         Imaging Results    MR Abdomen w/o & w Contrast    Result Date: 12/13/2024  EXAM: MR ABDOMEN W/O and W CONTRAST LOCATION: [facility] DATE: 12/13/2024 INDICATION: pancreatic cysts , colonic thickening , highly suspicious CTfor metastatic colon or pancreatic cancer COMPARISON: Noncontrast CT AP 12/10/2024 TECHNIQUE: Routine MR liver/pancreas protocol including axial and coronal MRCP sequences. 2D and 3D reconstruction performed by MR technologist including MIP reconstruction and slab cholangiograms. If performed with contrast, additional dynamic T1 post IV contrast images. CONTRAST: Gadavist 6 mL FINDINGS: BILIARY: Cholecystectomy. Mild intrahepatic and extra hepatic bile duct dilatation to the level of the ampulla where there is a small rounded filling defect (axial series 7 image 26, series 8 coronal image 17) that may relate to the abnormal soft tissue in the second duodenal segment described below.. LIVER: Mild diffuse hepatic steatosis. Benign 2.4 cm lipoma in hepatic segment IV and benign 1.5 cm simple cyst hepatic segment II require no follow-up. PANCREAS AND PANCREATIC DUCT: Multiple thin-walled, nonenhancing cysts with no solid component throughout the pancreas with the largest measuring 4.5 cm in the pancreatic head. Normal caliber main pancreatic duct with classic ductal anatomy. BOWEL: Ill-defined enhancing tissue with diffusion restriction within the second duodenal segment adjacent to the ampulla could represent a periampullary malignancy. Focal mural thickening proximal transverse colon observed at recent CT is not  well visualized on the current MRI study but remains indeterminate. LYMPH NODES: Bilateral para-aortic retroperitoneal lymphadenopathy extending from the level of the renal vasculature inferiorly to the level of the bifurcation with the largest lymph node in the aortocaval lymph node measuring 2.8 x 2.3 cm and a 2 cm left para-aortic lymph node just inferior to the left renal vein that would likely be amenable to CT-guided percutaneous biopsy if necessary. KIDNEYS/ADRENAL GLANDS: The 1.0 x 0.8 x 0.6 cm proximal right ureteral stone and the 3.2 x 3.0 x 2.5 cm left staghorn calculus are unchanged. Multiple small benign cysts in each kidney require no follow-up. Kidneys and adrenal glands otherwise normal. SPLEEN: Spleen size normal. VASCULAR: Normal caliber abdominal aorta     IMPRESSION: 1.  Ill-defined enhancing tissue with diffusion restriction within the second duodenal segment adjacent to the ampulla could represent a periampullary malignancy and would likely be amenable to endoscopic evaluation. Recommend GI consult. 2.  Focal mural thickening proximal transverse colon observed at recent CT is not well visualized on the current MRI study but remains indeterminate. 3.  Mild intrahepatic and extra hepatic bile duct dilatation to the level of the ampulla where there is a small rounded filling defect that may relate to the abnormal soft tissue in the second duodenal segment . 4.  Bilateral para-aortic retroperitoneal lymphadenopathy. A 2 cm left para-aortic lymph node just inferior to the left renal vein with likely be amenable to CT-guided percutaneous biopsy if necessary. 5.  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 6.  The 1.0 x 0.8 x 0.6 cm proximal right ureteral stone and the 3.2 x 3.0 x 2.5 cm left staghorn calculus are  unchanged.  Recommend urology consult.     CT Abdomen Pelvis w/o Contrast    Result Date: 12/11/2024  [Access Center: ] This report will be copied to the [facility] to ensure a provider acknowledges the finding. [facility] is available Monday through Friday 8am-3:30 pm. EXAM: CT ABDOMEN PELVIS W/O CONTRAST LOCATION: [facility] DATE: 12/10/2024 INDICATION: Weight loss, Nausea. COMPARISON: CT 2/28/2009. TECHNIQUE: CT scan of the abdomen and pelvis was performed without IV contrast. Multiplanar reformats were obtained. Dose reduction techniques were used. CONTRAST: None. FINDINGS: LOWER CHEST: Normal. HEPATOBILIARY: 2.4 cm lipoma centrally within the liver is unchanged. There is a new fluid density low-attenuation lesion in the left hepatic lobe measuring 14 mm most likely representing a cyst or cystic lesion. Cholecystectomy. No biliary dilatation. PANCREAS: There are 2 cystic masses identified within the pancreas. The largest involves the pancreatic head region measuring approximately 4.5 x 3.8 cm. The second lesion involves the pancreatic body measuring 1.8 x 1.6 cm. Pancreatic duct does not appear to be dilated. No surrounding inflammation. Detail is limited without IV contrast therefore further workup with a contrast-enhanced MRI or CT is recommended to exclude cystic neoplasm. SPLEEN: Normal. ADRENAL GLANDS: Normal. KIDNEYS/BLADDER: 13 x 8 mm stone in the proximal right ureter. Despite the size of the stone there is only minimal prominence of the right renal pelvis without johny hydronephrosis. The stone has an average Hounsfield unit of 1252. There is a large staghorn like calculus in the left kidney measuring 37 x 31 cm. The stone has an average Hounsfield unit of 1593. There is dilatation of the upper caliceal system compatible with mild hydronephrosis from the stone. Cortical cyst lower pole of the left kidney requires no additional follow-up. Urinary bladder is  completely obscured by streak artifact from the patient's bilateral hip replacements. BOWEL: There is a short segment of apparent colonic wall thickening involving the right aspect of the transverse colon. This extends for approximately 3.5 cm. While this could reflect a area of contraction, a colonic mass cannot be excluded on these noncontrast images. There are few suspicious lymph nodes surrounding this thickened segment therefore colonic neoplasm is suspected. Moderate formed stool in the colon. Scattered colonic diverticula without diverticulitis. LYMPH NODES: Lymphadenopathy noted within the retroperitoneum as well as in the central mesentery. Largest node lies in the aortocaval region measuring 16 mm in short axis diameter. This is concerning for metastatic disease. VASCULATURE: Atherosclerotic changes of the aorta without aneurysm. PELVIC ORGANS: Detail is completely obscured by streak artifact related to the patient's bilateral hip replacements. MUSCULOSKELETAL: Bilateral hip replacements. Degenerative changes lumbar spine.     IMPRESSION: 1.  There is a short segment of the right transverse colon which shows wall thickening. Although this could reflect a colonic contraction, colonic neoplasm cannot be excluded. There also couple suspicious pericolonic lymph nodes noted in this region as well as lymphadenopathy in the retroperitoneum and central mesentery which may be metastatic in nature. Direct visualization is recommended to exclude colonic neoplasm. 2.  There are 2 low-attenuation cystic masses involving the pancreas. A contrast enhanced MRI or CT is recommended for further characterization and to exclude cystic pancreatic neoplasm. 3.  There is an large stone in the proximal right ureter measuring 13 x 8 mm. Despite the size of this does not result in significant hydronephrosis. There is also a large staghorn calculus left kidney. Urology consultation is recommended.        I spent 60 minutes on the  patient's visit today.  This included preparation for the visit, face-to-face time with the patient and documentation following the visit.  It did not include teaching or procedure time.    Signed by: Peter E. Friedell, MD

## 2024-12-26 NOTE — LETTER
"12/26/2024      Alma Johns  [address] [place] [address]      Dear Colleague,    Thank you for referring your patient, Alma Johns, to the [facility]. Please see a copy of my visit note below.    Oncology Rooming Note    December 26, 2024 9:27 AM   Alma Johns is a 89 year old adult who presents for:    Chief Complaint   Patient presents with     Oncology Clinic Visit     New Oncology-Colon adenocarcinoma and pancreatic masses, stage 3b chronic kidney disease.     Initial Vitals: BP (!) 142/65 (BP Location: Left arm, Patient Position: Sitting, Cuff Size: Adult Regular)   Pulse 68   Temp 98.3  F (36.8  C) (Oral)   Resp 16   Ht 1.467 m (4' 9.75\")   Wt 57.8 kg (127 lb 6.4 oz)   LMP  (LMP Unknown)   SpO2 95%   BMI 26.86 kg/m   Estimated body mass index is 26.86 kg/m  as calculated from the following:    Height as of this encounter: 1.467 m (4' 9.75\").    Weight as of this encounter: 57.8 kg (127 lb 6.4 oz). Body surface area is 1.53 meters squared.  No Pain (0) Comment: Data Unavailable   No LMP recorded (lmp unknown). Patient has had a hysterectomy.  Allergies reviewed: Yes  Medications reviewed: Yes    Medications: Medication refills not needed today.  Pharmacy name entered into [employer]: [id number] - [place], [place] - [address]    Frailty Screening:   Is the patient here for a new oncology consult visit in cancer care? 1. Yes. Over the past month, have you experienced difficulty or required a caregiver to assist with:   1. Balance, walking or general mobility (including any falls)? NO  2. Completion of self-care tasks such as bathing, dressing, toileting, grooming/hygiene?  NO  3. Concentration or memory that affects your daily life?  NO       Clinical concerns: none       Pamela Roy, [facility] Hematology [facility] Oncology Consult Note    Patient: Alma Johns  MRN: [id number]  Date of Service: Dec 26, 2024       Reason " for Visit    I was consulted by   Lucy Ruano MD     For evaluation and management of newly diagnosed colon cancer.      Encounter Diagnoses Assessment and Plan:    Problem List Items Addressed This Visit       Chronic kidney disease, stage 3 (H)     Other Visit Diagnoses       Malignant neoplasm of hepatic flexure (H)    -  Primary    Relevant Orders    PET Oncology (Eyes to Thighs)    Cancer of abdomen (H)            Patient with newly diagnosed apple core adenocarcinoma of the hepatic flexure.  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  Patient does not wish to have chemotherapy.  She has a surgical consultation planned for 12/30/2024.  I advised palliative surgery to avoid obstruction of the colon.  This could be followed by palliative chemotherapy or observation depending on the patient's wishes.  I also advised PET CT scan to complete staging.  Patient will return to oncology after PET imaging is completed.        ______________________________________________________________________________    Staging History  Cancer Staging   No matching staging information was found for the patient.    ECOG Performance  1 - Can't do physically strenuous work, but fully ambulatory and can do light sedentary work.    History of Present Illness      Alma Johns is an 89 year old woman with a history of hypercalcemia with elevated parathyroid hormone.  Over the last year she has had unexplained weight loss of about 20 pounds.  On 12/10/2024 she underwent CT scan of the abdomen and pelvis which showed probable lesion at the hepatic flexure of the colon and suspicious retroperitoneal adenopathy.  On 12/19/2024 she underwent colonoscopy which confirmed an adenocarcinoma of the colon.  Upper GI endoscopic ultrasound confirmed metastases in a upper abdominal lymph node adjacent to the pancreas near the mesenteric root.  Multiple pancreatic cysts appeared benign.    Presently she feels fairly well.  Her appetite is  "still decreased.  She does not have pain.  She does not have cough chest pain or shortness of breath.  She had a brief episode of numbness in her left arm and hand lasting for less than 24 hours a few weeks ago.  She does not smoke cigarettes or use alcohol.  There is no family history of cancer.    Review of systems.  Pertinent Findings are included in the History of Present Illness    Physical Exam    BP (!) 142/65 (BP Location: Left arm, Patient Position: Sitting, Cuff Size: Adult Regular)   Pulse 68   Temp 98.3  F (36.8  C) (Oral)   Resp 16   Ht 1.467 m (4' 9.75\")   Wt 57.8 kg (127 lb 6.4 oz)   LMP  (LMP Unknown)   SpO2 95%   BMI 26.86 kg/m       GENERAL APPEARANCE: 89-year-old woman in no apparent distress.  HEAD: Atraumatic; normocephalic; without lesions.  EYES: Conjunctiva, corneas and eyelids normal; pupils equal, round, reactive to light; No Icterus.  MOUTH/OROPHARYNX: Oral mucosa intact  NECK: Supple with no nodes.  LUNGS:  Clear to auscultation and percussion with no extra sounds.  HEART: Regular rhythm and rate; S1 and S2 normal; no murmurs noted.  ABDOMEN: Soft; no masses or tenderness, no hepatosplenomegaly.  NEUROLOGIC: Alert and oriented.  No obvious focal findings.  EXTREMITIES: No cyanosis, or edema.  SKIN: No abnormal bruising or bleeding. No suspicious lesions noted on exposed skin.  PSYCHIATRIC: Mental status normal; no apparent psychiatric issues    Medications:    Current Outpatient Medications   Medication Sig Dispense Refill     alendronate (FOSAMAX) 70 MG tablet Take 1 tablet (70 mg) by mouth every 7 days 12 tablet 0     amLODIPine (NORVASC) 10 MG tablet Take 1 tablet (10 mg) by mouth daily 90 tablet 3     celecoxib (CELEBREX) 200 MG capsule TAKE 1 CAPSULE BY MOUTH DAILY 90 capsule 3     doxazosin (CARDURA) 4 MG tablet TAKE ONE TABLET BY MOUTH ONE TIME DAILY. 90 tablet 3     escitalopram (LEXAPRO) 5 MG tablet Take 1 tablet (5 mg) by mouth daily. 90 tablet 3     latanoprost " (XALATAN) 0.005 % ophthalmic solution [LATANOPROST (XALATAN) 0.005 % OPHTHALMIC SOLUTION] Administer 1 drop to both eyes bedtime.       LORazepam (ATIVAN) 0.5 MG tablet Take 1 tablet (0.5 mg) by mouth every 6 hours as needed for anxiety. 5 tablet 0     ondansetron (ZOFRAN) 4 MG tablet Take 1 tablet (4 mg) by mouth every 8 hours as needed for nausea. 90 tablet 3     therapeutic multivitamin (THERAGRAN) tablet [THERAPEUTIC MULTIVITAMIN (THERAGRAN) TABLET] Take 1 tablet by mouth daily.       timolol maleate (TIMOPTIC) 0.5 % ophthalmic solution [TIMOLOL MALEATE (TIMOPTIC) 0.5 % OPHTHALMIC SOLUTION] INSTILL 1 DROP INTO BOTH EYES Q EVENING  3     No current facility-administered medications for this visit.           Past History    Past Medical History:   Diagnosis Date     Dyslipidemia     dyslipoproteinemia     Glaucoma      Hypercalcemia      Hyperparathyroidism (H)      Hypertension      Hypothyroidism      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     OA (osteoarthritis)      Osteopenia      Postmenopausal      Retinal vein occlusion (H)      Vitamin D deficiency      Past Surgical History:   Procedure Laterality Date     APPENDECTOMY      Incidental     BIOPSY BREAST        SECTION       CHOLECYSTECTOMY       COLONOSCOPY N/A 2024    Procedure: Colonoscopy with biopsies and polypectomy, endoscopic marker;  Surgeon: Ron Hogan MD;  Location: [facility] OR     DILATION AND CURETTAGE       ENTEROSCOPY SMALL BOWEL N/A 2024    Procedure: Enteroscopy small bowel;  Surgeon: Ron Hogan MD;  Location: [facility] OR     ESOPHAGOSCOPY, GASTROSCOPY, DUODENOSCOPY (EGD), COMBINED N/A 2024    Procedure: ESOPHAGOGASTRODUODENOSCOPY, WITH FINE NEEDLE ASPIRATION BIOPSY, WITH ENDOSCOPIC ULTRASOUND GUIDANCE;  Surgeon: Ron Hogan MD;  Location: [facility] OR     HYSTERECTOMY TOTAL ABDOMINAL, BILATERAL SALPINGO-OOPHORECTOMY, COMBINED       KIDNEY STONE SURGERY      extraction     LUMBAR LAMINECTOMY       TOTAL  HIP ARTHROPLASTY Left 2009     TOTAL KNEE ARTHROPLASTY Right 2009     TUBAL LIGATION       Allergies   Allergen Reactions     Narcotic Antagonist [External Allergen Needs Reconciliation - See Comment] Other (See Comments)     Dysphoria, hallucinations     Other Environmental Allergy Unknown     congestion, DUST     Pravastatin Unknown     myalgia     Triamterene Other (See Comments)     stones     Vasotec [Enalapril] Other (See Comments)     cough     Effexor [Venlafaxine] Other (See Comments)     Adverse reaction -bowel     No family history on file.  Social History     Socioeconomic History     Marital status: Single     Spouse name: None     Number of children: None     Years of education: None     Highest education level: None   Tobacco Use     Smoking status: Never     Passive exposure: Past     Smokeless tobacco: Never   Vaping Use     Vaping status: Never Used   Substance and Sexual Activity     Alcohol use: No     Drug use: No   Social History Narrative    Lives alone. 2 hip replacements and 1 knee replacement. Used to work in housekeeping at [employer]. Saw Dr Crabtree. Has a daughter and son, and grandchildren and great grandchildren.     Social Drivers of Health     Financial Resource Strain: Low Risk  (8/12/2024)    Financial Resource Strain      Within the past 12 months, have you or your family members you live with been unable to get utilities (heat, electricity) when it was really needed?: No   Food Insecurity: Low Risk  (8/12/2024)    Food Insecurity      Within the past 12 months, did you worry that your food would run out before you got money to buy more?: No      Within the past 12 months, did the food you bought just not last and you didn t have money to get more?: No   Transportation Needs: Low Risk  (8/12/2024)    Transportation Needs      Within the past 12 months, has lack of transportation kept you from medical appointments, getting your medicines, non-medical meetings or appointments,  work, or from getting things that you need?: No   Physical Activity: Inactive (8/12/2024)    Exercise Vital Sign      Days of Exercise per Week: 0 days      Minutes of Exercise per Session: 0 min   Stress: No Stress Concern Present (8/12/2024)    [employer] - Occupational Stress Questionnaire      Feeling of Stress : Only a little   Social Connections: Unknown (8/12/2024)    Social Connection and Isolation Panel [NHANES]      Frequency of Social Gatherings with Friends and Family: Once a week   Interpersonal Safety: Low Risk  (12/19/2024)    Interpersonal Safety      Do you feel physically and emotionally safe where you currently live?: Yes      Within the past 12 months, have you been hit, slapped, kicked or otherwise physically hurt by someone?: No      Within the past 12 months, have you been humiliated or emotionally abused in other ways by your partner or ex-partner?: No   Housing Stability: Low Risk  (8/12/2024)    Housing Stability      Do you have housing? : Yes      Are you worried about losing your housing?: No           Lab Results    Recent Results (from the past 240 hours)   UPPER EUS    Collection Time: 12/19/24 12:35 PM   Result Value Ref Range    Upper EUS       M [address] [address] [phone]     Endoscopy Department  _______________________________________________________________________________  Patient Name: Alma Johns              Procedure Date: 12/19/2024 12:35 PM  MRN: [id number]                       Account Number: [id number]  YOB: 1935              Admit Type: Outpatient  Age: 89                               Room:  OR   Gender: Female                        Note Status: Finalized  Attending MD: DWAINE BENZ MD,  Pause for the Cause: completed  Total Sedation Time:                    _______________________________________________________________________________      Procedure:             Upper EUS  Indications:           Lymphadenopathy on CT scan and MRI with suspected mass                          in duodenum/periampullary region and pancreatic cysts.                          Also suspicious appearance of the colon.  Providers:              DWAINE BENZ MD, DAVID NEIL RN  Referring MD:          PETER E. FRIEDELL, NILAY CASEY MD  Medicines:             Monitored Anesthesia Care  Complications:         No immediate complications.  _______________________________________________________________________________  Procedure:             Pre-Anesthesia Assessment:                         - Prior to the procedure, a History and Physical was                          performed, and patient medications, allergies and                          sensitivities were reviewed. The patient's tolerance                          of previous anesthesia was reviewed.                         - The risks and benefits of the procedure and the                          sedation options and risks were discussed with the                          patient. All questions were answered and informed                          consent was obtained.                         After obtaining informed consent, the endoscope was                           passed under direct vision. Throughout the procedure,                          the patient's blood pressure, pulse, and oxygen                          saturations were monitored continuously. The                          Colonoscope was introduced through the mouth, and                          advanced to the third part of duodenum. The                          Endosonoscope was introduced through the mouth, and                          advanced to the third part of duodenum. After                          obtaining informed consent, the endoscope was passed                          under direct vision. Throughout the procedure, the                           patient's blood pressure, pulse, and oxygen                          saturations were monitored continuously.The upper EUS                          was accomplished without difficulty. The patient                          tolerated the procedure well.                                                                                    Findings:       ENDOSONOGRAPHIC FINDING: :       A small sliding type hiatal hernia was present (Hill grade 4)       Enteroscopy to approximately 10 cm distal to the ligament of Treitz was        otherwise normal.       Sideviewing endoscopy showed a large periampullary diverticulum. The        major papilla could not be endoscopically visualized and is likely        within the diverticulum. This appeared bilobed but EUS (or could be two        separate diverticulae, however a second opening was not seen).       Multiple simple-appearing pancreatic cysts were seen. The largest in the        head measured 45 x 41 mm in diameter. None had thick walls or mural        nodules. The pancreas was otherwise normal without features of chronic        pancreatis. The main pancreatic duct was non-dilated measuring 1.4 mm in        maximal diameter.       Multiple malignant-appearing lymph nodes were seen in the upper abdomen.        Needle biopsy of a representative 17 mm diameter peripancreati c node was        performed. Preliminary cytology showed lesional material.       No suspicious hepatic or left adrenal masses were seen. A central        hyperechoic liver lesion was seen measuring 36 x 25 mm. MRI and CT        showed this to be fat density consistent with a lipoma. A simple 16 x 11        left lobe cyst was seen.       The CBD measured 8 mm in diameter. There were no stones.       Incidental note was made of a large > 3 cm left renal pelvis stone.                                                                                   Impression:            - Periampullary diverticulum  without evidence of                          duodenal mass.                         - Multiple simple-appearing pancreatic cysts without                          high-risk features. No solid pancreatic lesions seen.                         - Multiple upper abdominal lymph nodes seen                          corresponding to the CT and MRI. A representative node                          by the  pancreatic body/root of the mesentery was                          biopsies and prelimirily showed lesional material.                         - No suspicious hepatic or left adrenal lesions seen.                         - No potential primary tumor was seen in the upper                          abdomen. See colonoscopy report which showed a mass.                         - Incidental large 36 x 25 mm left renal pelvis stone.  Recommendation:        - Discharge patient to home.                         - Await cytology results.                         - Follow-uo with Oncology (Dr. Friedell) as scheduled.                          See colonoscopy report. Will discuss Colorectal                          Surgery consultation with referring providers.                         The findings and concern for malignacy were discussed                          with the patient and her daughter.                                                                                     Electronically signed  by Dr. Logan Benz  _______________________  DWAINE BENZ MD  12/20/2024 1:20:06 PM  I was physically present for the entire viewing portion of the exam.  __________________________  Signature of teaching physician  NATEc/Zaria BENZ MD  Number of Addenda: 0    Note Initiated On: 12/19/2024 12:35 PM  Scope In:  Scope Out:     COLONOSCOPY    Collection Time: 12/19/24 12:38 PM   Result Value Ref Range    COLONOSCOPY       [facility]  [address] [place] [address] [phone]     Endoscopy  Department  _______________________________________________________________________________  Patient Name: Alma Johns              Procedure Date: 12/19/2024 12:38 PM  MRN: [id number]                       Account Number: [id number]  YOB: 1935              Admit Type: Outpatient  Age: 89                               Room: [place] [address]  Gender: Female                        Note Status: Finalized  Attending MD: DWAINE BENZ MD,  Pause for the Cause: completed  Total Sedation Time:                    _______________________________________________________________________________     Procedure:             Colonoscopy  Indications:           Abnormal CT of the GI tract, Abnormal MRI of the GI                          tract with suspicious appearance of the proximal                          transverse colon. Upper abdominal adenopathy. Weight                           loss.  Providers:             DWAINE BENZ MD, DAVID NEIL RN  Referring MD:          NILAY CASEY MD, PETER E. FRIEDELL  Medicines:             Propofol per Anesthesia  Complications:         No immediate complications.  _______________________________________________________________________________  Procedure:             Pre-Anesthesia Assessment:                         - Prior to the procedure, a History and Physical was                          performed, and patient medications, allergies and                          sensitivities were reviewed. The patient's tolerance                          of previous anesthesia was reviewed.                         - The risks and benefits of the procedure and the                          sedation options and risks were discussed with the                          patient. All questions were answered and informed                          consent was obtained.                         After obtaining informed consent, the colo noscope was                          passed under direct  vision. Throughout the procedure,                          the patient's blood pressure, pulse, and oxygen                          saturations were monitored continuously. The Olympus                          Adult Colonoscope was introduced through the anus and                          advanced to the cecum, identified by appendiceal                          orifice and ileocecal valve. The colonoscopy was                          performed without difficulty. The patient tolerated                          the procedure well.                                                                                   Findings:       Colonoscopy showed a 4 cm annular/apple-core mass in the hepatic        flexure/proximal transverse colon.       Multiple biopsies were obtained.       Three tattoos were placed approximately 5 cm distal/downstream from the        mass.       5-6 six small 3-6 mm sessile polyps were seen in the cecum and ascen ding        colon upstream from the mass which were left in place.       A 7 mm sigmoid polyp, a 4 mm sigmoid polyp and a 4 mm rectal polyp were        resected with cold snare and retrieved.       Retroflex exam showed non-bleeding internal hemorrhoids.                                                                                   Impression:            - Non-obstructing 4 cm annular/apple-core mass in the                          hepatic flexure/proximal transverse colon                          corresponding to a finding on recent CT and MRI.                          Biopsied. The appearance is consistent with malignancy.                         - Tattoo performed x 3 approximately 5 cm                          distally/downstream from the mass, presuming if                          resection performed this would be a right                          hemicolectomy.                         - A few small polyps are present in the cecum and                          ascending colon whi ch  were not removed presuming these                          will be addressed with surgical resection.                         - Three small sigmoid and rectal polyps resected. No                          polyps remain distal to the mass.                         - Incidental sigmoid diverticulosis and internnal                          hemorrhoids.  Recommendation:        - Discharge patient to home.                         - Await pathology results.                         - Proceed with medical oncology consultation as                          scheduled. Will need colorectal surgery consultation                          as well. Messaged referring providers re this.                         - See separate EUS report re adenopathy with needle                          biopsy.                         The likelihood of malignancy was discussed with the pt                          and her daughter.                                                                                     Electro nically signed by Dr. Logan Benz  _______________________  DWAINE BENZ MD  12/20/2024 1:00:30 PM  I was physically present for the entire viewing portion of the exam.  __________________________  Signature of teaching physician  Mariah/Zaria BENZ MD  Number of Addenda: 0    Note Initiated On: 12/19/2024 12:38 PM  Scope In:  Scope Out:     Fine Needle Aspirate Lymph Node(s)    Collection Time: 12/19/24  2:02 PM   Result Value Ref Range    Final Diagnosis       LYMPH NODE(S), GIANCARLO-PANCREATIC LYMPH NODE, ENDOSCOPIC ULTRASOUND-GUIDED FINE-NEEDLE ASPIRATION:  Interpretation:  - Metastatic adenocarcinoma, consistent with colorectal primary (see comment)     Adequacy: Satisfactory for evaluation          Comment       Immunohistochemical stains are performed with appropriate controls and demonstrate the tumor cells to be positive for CK20 and CDX2, while negative for CK7.  The morphologic and immunohistochemical profile is  consistent with metastatic adenocarcinoma from patient's known colorectal primary.      Clinical Information       89-year-old patient with recently diagnosed adenocarcinoma of hepatic flexure & pericolonic lymphadenopathy      Rapid Onsite Evaluation       FNA Performance:   Fine needle aspiration was not performed by [facility] staff.    Aspirate immediate study/adequacy:  I, RAJI GALLEGOS MD, attest that I immediately examined smears while the procedure was underway and determined or confirmed the adequacy of the specimens via telepathology.    It is of note that the final assessment and report may be performed and signed by a different pathologist.    Onsite adequacy/interpretation:  A: Adequate      Gross Description       A(1). Lymph Node(s), GIANCARLO-PANCREATIC LYMPH NODE:A. Lymph Node(s), GIANCARLO-PANCREATIC LYMPH NODE, Fine Needle Aspirate:  Received are 1 fixed slides, processed for Pap stain, 1 air dried slides, processed for Diff Quik stain, and material in formalin, processed for one hematoxylin stained cell block.      Microscopic Description         Case was reviewed by the following:  Pathology Fellow: Ghazala Cruz DO  Pathology Fellow: Kacy Magaña MD  Resident Pathologist: Bambi Damian MD  A resident or fellow in a training program was involved in the initial review, preparation, and/or interpretation of this case.  I, as the senior physician, attest that I have personally reviewed all specimens and or slides, including the listed special stains, and used them with my medical judgement to determine the final diagnosis.              Abnormal Result? Yes (A) No    Performing Labs       The technical component of this testing was completed at  [facility].     Stain controls for all stains resulted within this report have been reviewed and show appropriate reactivity.      Surgical Pathology Exam     Collection Time: 12/19/24  2:46 PM   Result Value Ref Range    Case Report       Surgical Pathology Report                         Case: [id number]                                  Authorizing Provider:  Ron Hogan MD   Collected:           12/19/2024 02:46 PM          Ordering Location:      MAIN OR                 Received:            12/19/2024 03:26 PM          Pathologist:           Bernard Bailey DO                                                            Specimens:   A) - Large Intestine, Colon, Hepatic Flexure, Colon hepatic flexure mass biopsies                   B) - Large Intestine, Colon, Sigmoid, 7mm sigmoid polyp                                             C) - Large Intestine, Colon, Sigmoid, 4mm sigmoid polyp                                             D) - Rectum, 4mm rectal polyp                                                              Final Diagnosis       A. COLON, HEPATIC FLEXURE MASS, BIOPSY:  - Fragments of adenocarcinoma, moderately-differentiated  - Immunohistochemical testing for mismatch repair proteins will be performed; results will be reported in an addendum    B. COLON, SIGMOID, POLYP:  - Tubular adenoma  - No high-grade dysplasia or malignancy identified    C. COLON, SIGMOID, POLYP:  - Hyperplastic polyp    D. RECTUM, POLYP:  - Hyperplastic polyp      Synoptic Checklist       Colon and Rectum Biomarker Reporting Template   (Added in Addendum) COLON AND RECTUM: BIOMARKER REPORTING TEMPLATE - A   Protocol posted: 6/30/2021      RESULTS      Mismatch Repair:            Immunohistochemistry (IHC) Testing for Mismatch Repair (MMR) Proteins:              MLH1 Result:    Intact nuclear expression         Immunohistochemistry (IHC) Testing for Mismatch Repair (MMR) Proteins:              MSH2 Result:    Intact nuclear expression         Immunohistochemistry (IHC) Testing for Mismatch Repair (MMR) Proteins:              MSH6 Result:    Intact nuclear expression          "Immunohistochemistry (IHC) Testing for Mismatch Repair (MMR) Proteins:              PMS2 Result:    Intact nuclear expression           IHC Interpretation:    No loss of nuclear expression of MMR proteins: low probability of MSI-H       Clinical Information       Weight loss.      Gross Description       A(2). Large Intestine, Colon, Hepatic Flexure, Colon hepatic flexure mass biopsies:  The specimen is received in formalin with proper patient identification, labeled \"colon hepatic flexure mass biopsies\".  The specimen consists of multiple pieces of tan-white to pink-tan soft tissue fragments.  Submitted in A1.   B(3). Large Intestine, Colon, Sigmoid, 7mm sigmoid polyp:  The specimen is received in formalin with proper patient identification, labeled \"7 mm sigmoid polyp\".  The specimen consists of 2 pieces of tan-red soft tissue measuring 0.2 and 0.8 cm in greatest dimension.  Submitted in B1.   C(4). Large Intestine, Colon, Sigmoid, 4mm sigmoid polyp:  The specimen is received in formalin with proper patient identification, labeled \"4 mm sigmoid polyp\".  The specimen consists of a 0.4 cm pink-tan soft tissue fragment.  Submitted in C1.   D(5). Rectum, 4mm rectal polyp:  The specimen is received in formalin with proper patient identification, labeled \"4 mm rectal polyp\".  The specimen consists of 2 pieces of tan-pink soft tissue measuring 0.3 and 1.2 cm in greatest dimension.  Submitted in D1.       Microscopic Description       Microscopic examination is performed.      Case was reviewed by the following:  Resident Pathologist: Cheri Bae MD  A resident or fellow in a training program was involved in the initial review, preparation, and/or interpretation of this case.  I, as the senior physician, attest that I have personally reviewed all specimens and or slides, including the listed special stains, and used them with my medical judgement to determine the final diagnosis.              MCRS Yes (A) N/A    " Performing Labs       The technical component of this testing was completed at [facility].    Stain controls for all stains resulted within this report have been reviewed and show appropriate reactivity.       Case Images         Imaging Results    MR Abdomen w/o & w Contrast    Result Date: 12/13/2024  EXAM: MR ABDOMEN W/O and W CONTRAST LOCATION: [facility] DATE: 12/13/2024 INDICATION: pancreatic cysts , colonic thickening , highly suspicious CTfor metastatic colon or pancreatic cancer COMPARISON: Noncontrast CT AP 12/10/2024 TECHNIQUE: Routine MR liver/pancreas protocol including axial and coronal MRCP sequences. 2D and 3D reconstruction performed by MR technologist including MIP reconstruction and slab cholangiograms. If performed with contrast, additional dynamic T1 post IV contrast images. CONTRAST: Gadavist 6 mL FINDINGS: BILIARY: Cholecystectomy. Mild intrahepatic and extra hepatic bile duct dilatation to the level of the ampulla where there is a small rounded filling defect (axial series 7 image 26, series 8 coronal image 17) that may relate to the abnormal soft tissue in the second duodenal segment described below.. LIVER: Mild diffuse hepatic steatosis. Benign 2.4 cm lipoma in hepatic segment IV and benign 1.5 cm simple cyst hepatic segment II require no follow-up. PANCREAS AND PANCREATIC DUCT: Multiple thin-walled, nonenhancing cysts with no solid component throughout the pancreas with the largest measuring 4.5 cm in the pancreatic head. Normal caliber main pancreatic duct with classic ductal anatomy. BOWEL: Ill-defined enhancing tissue with diffusion restriction within the second duodenal segment adjacent to the ampulla could represent a periampullary malignancy. Focal mural thickening proximal transverse colon observed at recent CT is not well visualized on the current MRI study but remains indeterminate. LYMPH  NODES: Bilateral para-aortic retroperitoneal lymphadenopathy extending from the level of the renal vasculature inferiorly to the level of the bifurcation with the largest lymph node in the aortocaval lymph node measuring 2.8 x 2.3 cm and a 2 cm left para-aortic lymph node just inferior to the left renal vein that would likely be amenable to CT-guided percutaneous biopsy if necessary. KIDNEYS/ADRENAL GLANDS: The 1.0 x 0.8 x 0.6 cm proximal right ureteral stone and the 3.2 x 3.0 x 2.5 cm left staghorn calculus are unchanged. Multiple small benign cysts in each kidney require no follow-up. Kidneys and adrenal glands otherwise normal. SPLEEN: Spleen size normal. VASCULAR: Normal caliber abdominal aorta     IMPRESSION: 1.  Ill-defined enhancing tissue with diffusion restriction within the second duodenal segment adjacent to the ampulla could represent a periampullary malignancy and would likely be amenable to endoscopic evaluation. Recommend GI consult. 2.  Focal mural thickening proximal transverse colon observed at recent CT is not well visualized on the current MRI study but remains indeterminate. 3.  Mild intrahepatic and extra hepatic bile duct dilatation to the level of the ampulla where there is a small rounded filling defect that may relate to the abnormal soft tissue in the second duodenal segment . 4.  Bilateral para-aortic retroperitoneal lymphadenopathy. A 2 cm left para-aortic lymph node just inferior to the left renal vein with likely be amenable to CT-guided percutaneous biopsy if necessary. 5.  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 6.  The 1.0 x 0.8 x 0.6 cm proximal right ureteral stone and the 3.2 x 3.0 x 2.5 cm left staghorn calculus are unchanged.  Recommend urology consult.     CT Abdomen Pelvis w/o  Contrast    Result Date: 12/11/2024  [Access Center: ] This report will be copied to the [facility] to ensure a provider acknowledges the finding. Access Center is available Monday through Friday 8am-3:30 pm. EXAM: CT ABDOMEN PELVIS W/O CONTRAST LOCATION: [facility] DATE: 12/10/2024 INDICATION: Weight loss, Nausea. COMPARISON: CT 2/28/2009. TECHNIQUE: CT scan of the abdomen and pelvis was performed without IV contrast. Multiplanar reformats were obtained. Dose reduction techniques were used. CONTRAST: None. FINDINGS: LOWER CHEST: Normal. HEPATOBILIARY: 2.4 cm lipoma centrally within the liver is unchanged. There is a new fluid density low-attenuation lesion in the left hepatic lobe measuring 14 mm most likely representing a cyst or cystic lesion. Cholecystectomy. No biliary dilatation. PANCREAS: There are 2 cystic masses identified within the pancreas. The largest involves the pancreatic head region measuring approximately 4.5 x 3.8 cm. The second lesion involves the pancreatic body measuring 1.8 x 1.6 cm. Pancreatic duct does not appear to be dilated. No surrounding inflammation. Detail is limited without IV contrast therefore further workup with a contrast-enhanced MRI or CT is recommended to exclude cystic neoplasm. SPLEEN: Normal. ADRENAL GLANDS: Normal. KIDNEYS/BLADDER: 13 x 8 mm stone in the proximal right ureter. Despite the size of the stone there is only minimal prominence of the right renal pelvis without johny hydronephrosis. The stone has an average Hounsfield unit of 1252. There is a large staghorn like calculus in the left kidney measuring 37 x 31 cm. The stone has an average Hounsfield unit of 1593. There is dilatation of the upper caliceal system compatible with mild hydronephrosis from the stone. Cortical cyst lower pole of the left kidney requires no additional follow-up. Urinary bladder is completely obscured by streak artifact from the patient's bilateral hip  replacements. BOWEL: There is a short segment of apparent colonic wall thickening involving the right aspect of the transverse colon. This extends for approximately 3.5 cm. While this could reflect a area of contraction, a colonic mass cannot be excluded on these noncontrast images. There are few suspicious lymph nodes surrounding this thickened segment therefore colonic neoplasm is suspected. Moderate formed stool in the colon. Scattered colonic diverticula without diverticulitis. LYMPH NODES: Lymphadenopathy noted within the retroperitoneum as well as in the central mesentery. Largest node lies in the aortocaval region measuring 16 mm in short axis diameter. This is concerning for metastatic disease. VASCULATURE: Atherosclerotic changes of the aorta without aneurysm. PELVIC ORGANS: Detail is completely obscured by streak artifact related to the patient's bilateral hip replacements. MUSCULOSKELETAL: Bilateral hip replacements. Degenerative changes lumbar spine.     IMPRESSION: 1.  There is a short segment of the right transverse colon which shows wall thickening. Although this could reflect a colonic contraction, colonic neoplasm cannot be excluded. There also couple suspicious pericolonic lymph nodes noted in this region as well as lymphadenopathy in the retroperitoneum and central mesentery which may be metastatic in nature. Direct visualization is recommended to exclude colonic neoplasm. 2.  There are 2 low-attenuation cystic masses involving the pancreas. A contrast enhanced MRI or CT is recommended for further characterization and to exclude cystic pancreatic neoplasm. 3.  There is an large stone in the proximal right ureter measuring 13 x 8 mm. Despite the size of this does not result in significant hydronephrosis. There is also a large staghorn calculus left kidney. Urology consultation is recommended.        I spent 60 minutes on the patient's visit today.  This included preparation for the visit,  face-to-face time with the patient and documentation following the visit.  It did not include teaching or procedure time.    Signed by: Peter E. Friedell, MD          Again, thank you for allowing me to participate in the care of your patient.        Sincerely,        Peter E. Friedell, MD    Electronically signed

## 2024-12-26 NOTE — PROGRESS NOTES
"Oncology Rooming Note    December 26, 2024 9:27 AM   Alma Johns is a 89 year old adult who presents for:    Chief Complaint   Patient presents with    Oncology Clinic Visit     New Oncology-Colon adenocarcinoma and pancreatic masses, stage 3b chronic kidney disease.     Initial Vitals: BP (!) 142/65 (BP Location: Left arm, Patient Position: Sitting, Cuff Size: Adult Regular)   Pulse 68   Temp 98.3  F (36.8  C) (Oral)   Resp 16   Ht 1.467 m (4' 9.75\")   Wt 57.8 kg (127 lb 6.4 oz)   LMP  (LMP Unknown)   SpO2 95%   BMI 26.86 kg/m   Estimated body mass index is 26.86 kg/m  as calculated from the following:    Height as of this encounter: 1.467 m (4' 9.75\").    Weight as of this encounter: 57.8 kg (127 lb 6.4 oz). Body surface area is 1.53 meters squared.  No Pain (0) Comment: Data Unavailable   No LMP recorded (lmp unknown). Patient has had a hysterectomy.  Allergies reviewed: Yes  Medications reviewed: Yes    Medications: Medication refills not needed today.  Pharmacy name entered into [employer]: [id number] - [place], [place] - [address]    Frailty Screening:   Is the patient here for a new oncology consult visit in cancer care? 1. Yes. Over the past month, have you experienced difficulty or required a caregiver to assist with:   1. Balance, walking or general mobility (including any falls)? NO  2. Completion of self-care tasks such as bathing, dressing, toileting, grooming/hygiene?  NO  3. Concentration or memory that affects your daily life?  NO       Clinical concerns: none       Pamela Roy CMA            "

## 2024-12-28 NOTE — PROGRESS NOTES
Colon and Rectal Surgery Clinic Note    RE: Alma Johns.  : 1935.  YURI: 2024.    Reason for visit: colon cancer.    HPI: Alma is an 89 year old presenting with newly diagnosed adenocarcinoma of the hepatic flexure. Over the past year she had some unexplained weight loss (about 20 pounds). She had a CT on 12/10 showing right transverse colon wall thickening, possibly representing colonic neoplasm. There were also a couple suspicious pericolonic lymph nodes noted in this region as well as lymphadenopathy in the retroperitoneum and central mesentery.     Based on these findings, she then had an upper endoscopic ultrasound and colonoscopy. EUS with multiple upper abdominal lymph nodes seen corresponding to the CT and MRI. Biopsy of a representative roseann-pancreatic lymph node near the mesenteric root taken with pathology resulting as metastatic adenocarcinoma. Multiple pancreatic cysts appeared to be benign. No suspicious hepatic lesions seen. Colonoscopy showed a 4 cm annular/apple-core mass in the hepatic flexure/proximal transverse colon. Three tattoos were placed approximately 5 cm distal/downstream from the mass. Pathology confirming moderately-differentiated adenocarcinoma of the hepatic flexure. MMR intact. CEA 3.9.     She met with Dr. Friedell with oncology on  to discuss further options. Alma does not wish to pursue chemotherapy. She is here today based on recommendation for surgical consultation for potential palliative surgery to prevent any colonic obstruction. This could then be followed by palliative chemotherapy or observation. Plan for PET/CT  to complete staging.     Of note patient also met with urology due to large right proximal ureteral stone with left staghorn stone with plans to proceed with a stone removal procedure.     Baseline bowel habits: ***  Last colonoscopy: 24    CT AP (12/10)  IMPRESSION:   1.  There is a short segment of the right transverse colon which  shows wall thickening. Although this could reflect a colonic contraction, colonic neoplasm cannot be excluded. There also couple suspicious pericolonic lymph nodes noted in this region as well as lymphadenopathy in the retroperitoneum and central mesentery which may be metastatic in nature. Direct visualization is recommended to exclude colonic neoplasm.  2.  There are 2 low-attenuation cystic masses involving the pancreas. A contrast enhanced MRI or CT is recommended for further characterization and to exclude cystic pancreatic neoplasm.  3.  There is an large stone in the proximal right ureter measuring 13 x 8 mm. Despite the size of this does not result in significant hydronephrosis. There is also a large staghorn calculus left kidney. Urology consultation is recommended.    MR Abdomen (12/13)  IMPRESSION:  1.  Ill-defined enhancing tissue with diffusion restriction within the second duodenal segment adjacent to the ampulla could represent a periampullary malignancy and would likely be amenable to endoscopic evaluation. Recommend GI consult.  2.  Focal mural thickening proximal transverse colon observed at recent CT is not well visualized on the current MRI study but remains indeterminate.  3.  Mild intrahepatic and extra hepatic bile duct dilatation to the level of the ampulla where there is a small rounded filling defect that may relate to the abnormal soft tissue in the second duodenal segment .  4.  Bilateral para-aortic retroperitoneal lymphadenopathy. A 2 cm left para-aortic lymph node just inferior to the left renal vein with likely be amenable to CT-guided percutaneous biopsy if necessary.  5.  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   6.  The 1.0 x 0.8 x 0.6 cm proximal right ureteral stone and the 3.2 x 3.0 x 2.5 cm  left staghorn calculus are unchanged.  Recommend urology consult.     Upper EUS (12/19)  Impression:    - Periampullary diverticulum without evidence of                          duodenal mass.                          - Multiple simple-appearing pancreatic cysts without                          high-risk features. No solid pancreatic lesions seen.                          - Multiple upper abdominal lymph nodes seen                          corresponding to the CT and MRI. A representative node                          by the pancreatic body/root of the mesentery was                          biopsies and prelimirily showed lesional material.                          - No suspicious hepatic or left adrenal lesions seen.                          - No potential primary tumor was seen in the upper                          abdomen. See colonoscopy report which showed a mass.                          - Incidental large 36 x 25 mm left renal pelvis stone.   Final Diagnosis   LYMPH NODE(S), GIANCARLO-PANCREATIC LYMPH NODE, ENDOSCOPIC ULTRASOUND-GUIDED FINE-NEEDLE ASPIRATION:  Interpretation:  - Metastatic adenocarcinoma, consistent with colorectal primary (see comment)       Colonoscopy (12/19)      Findings:       Colonoscopy showed a 4 cm annular/apple-core mass in the hepatic        flexure/proximal transverse colon.        Multiple biopsies were obtained.        Three tattoos were placed approximately 5 cm distal/downstream from the        mass.        5-6 six small 3-6 mm sessile polyps were seen in the cecum and ascending        colon upstream from the mass which were left in place.        A 7 mm sigmoid polyp, a 4 mm sigmoid polyp and a 4 mm rectal polyp were        resected with cold snare and retrieved.        Retroflex exam showed non-bleeding internal hemorrhoids.                                                                                    Impression:    - Non-obstructing 4 cm annular/apple-core mass in the                           hepatic flexure/proximal transverse colon                          corresponding to a finding on recent CT and MRI.                          Biopsied. The appearance is consistent with malignancy.                          - Tattoo performed x 3 approximately 5 cm                          distally/downstream from the mass, presuming if                          resection performed this would be a right                          hemicolectomy.                          - A few small polyps are present in the cecum and                          ascending colon which were not removed presuming these                          will be addressed with surgical resection.                          - Three small sigmoid and rectal polyps resected. No                          polyps remain distal to the mass.                          - Incidental sigmoid diverticulosis and internnal                          hemorrhoids.   Final Diagnosis   A. COLON, HEPATIC FLEXURE MASS, BIOPSY:  - Fragments of adenocarcinoma, moderately-differentiated  - Immunohistochemical testing for mismatch repair proteins will be performed; results will be reported in an addendum     B. COLON, SIGMOID, POLYP:  - Tubular adenoma  - No high-grade dysplasia or malignancy identified     C. COLON, SIGMOID, POLYP:  - Hyperplastic polyp     D. RECTUM, POLYP:  - Hyperplastic polyp     IHC Interpretation  No loss of nuclear expression of MMR proteins: low probability of MSI-H     CEA ()  CEA  ng/mL 3.9       Medical history:  CKD 3   Glaucoma  Hyperparathyroidism  Hypertension  Hypothyroidism  Osteoarthritis   Osteopenia       Surgical history:  Appendectomy   section  Cholecystectomy   THBSO  Total hip replacement (left)  Total knee replacement (right)      Family history:  No family history on file.  ***No Hx of IBD or GI malignancy    Medications:  Current Outpatient Medications   Medication Sig Dispense Refill    alendronate  (FOSAMAX) 70 MG tablet Take 1 tablet (70 mg) by mouth every 7 days 12 tablet 0    amLODIPine (NORVASC) 10 MG tablet Take 1 tablet (10 mg) by mouth daily 90 tablet 3    celecoxib (CELEBREX) 200 MG capsule TAKE 1 CAPSULE BY MOUTH DAILY 90 capsule 3    doxazosin (CARDURA) 4 MG tablet TAKE ONE TABLET BY MOUTH ONE TIME DAILY. 90 tablet 3    escitalopram (LEXAPRO) 5 MG tablet Take 1 tablet (5 mg) by mouth daily. 90 tablet 3    latanoprost (XALATAN) 0.005 % ophthalmic solution [LATANOPROST (XALATAN) 0.005 % OPHTHALMIC SOLUTION] Administer 1 drop to both eyes bedtime.      LORazepam (ATIVAN) 0.5 MG tablet Take 1 tablet (0.5 mg) by mouth every 6 hours as needed for anxiety. 5 tablet 0    ondansetron (ZOFRAN) 4 MG tablet Take 1 tablet (4 mg) by mouth every 8 hours as needed for nausea. 90 tablet 3    therapeutic multivitamin (THERAGRAN) tablet [THERAPEUTIC MULTIVITAMIN (THERAGRAN) TABLET] Take 1 tablet by mouth daily.      timolol maleate (TIMOPTIC) 0.5 % ophthalmic solution [TIMOLOL MALEATE (TIMOPTIC) 0.5 % OPHTHALMIC SOLUTION] INSTILL 1 DROP INTO BOTH EYES Q EVENING  3       Allergies:  Allergies   Allergen Reactions    Narcotic Antagonist [External Allergen Needs Reconciliation - See Comment] Other (See Comments)     Dysphoria, hallucinations    Other Environmental Allergy Unknown     congestion, DUST    Pravastatin Unknown     myalgia    Triamterene Other (See Comments)     stones    Vasotec [Enalapril] Other (See Comments)     cough    Effexor [Venlafaxine] Other (See Comments)     Adverse reaction -bowel       Social history:   Social History     Tobacco Use    Smoking status: Never     Passive exposure: Past    Smokeless tobacco: Never   Substance Use Topics    Alcohol use: No     Marital status: single.    ROS:  A complete review of systems was performed with the patient and all systems negative except as per HPI.    Physical Examination:  Exam was chaperoned by ***  ***  General: Well hydrated. No acute  "distress.  Abdomen: Soft, NT, ***. No inguinal adenopathy palpated.  Perianal external examination:  Perianal skin: {INTACT / ABNORMAL:512975::\"Intact with no excoriation or lichenification\"}.  Lesions: {YES/NO  Comments:641111::\"No evidence of an external lesion, nodularity, or induration in the perianal region\"}.  Eversion of buttocks: There {WAS/WAS NOT (default):441876::\"was not\"} evidence of an anal fissure. Details: {Not Applicable or free text:897696::\"N/A\"}.  Skin tags or external hemorrhoids: {YES/NO  Comments:868553::\"None\"}.  Digital rectal examination: {Was performed/was not performed/etc:865606}.    Anoscopy: {Was performed/was not performed/etc:595049471}    Procedures:  ***.    Laboratory values reviewed:  Recent Labs   Lab Test 11/21/24  0913 11/19/24  0940   WBC 8.8  --    HGB 11.6*  --      --    CR  --  1.62*   ALBUMIN  --  3.7   BILITOTAL  --  0.6   ALKPHOS  --  90   ALT  --  9   AST  --  23       Imaging personally reviewed by me:  ***    ASSESSMENT  1. ***  2. ***    The Division of Colon and Rectal Surgery at [facility] is committed to advancing discovery and innovation in human health through research. Alma Johns is willing to be contacted by our research team if they qualify for any future research studies:  {Yes No NA:681925}    PLAN  1. ***    Risks, benefits, and alternatives of operative treatment were thoroughly discussed with the patient, ***he/she understands these well and agrees to proceed.    Time spent: *** minutes, an additional *** minutes were spent doing *** procedure.  >50% spent in discussing, counseling and coordinating care.    Kim London M.D    Division of Colon and Rectal Surgery  [facility]    Referring Provider:  Lucy Ruano MD  [address] [place] [address]     Primary Care Provider:  Lucy Ruano  " M.D    Division of Colon and Rectal Surgery  [facility]    Referring Provider:  Lucy Ruano MD  [facility] [place] [address]     Primary Care Provider:  Lucy Ruano

## 2024-12-30 ENCOUNTER — PRE VISIT (OUTPATIENT)
Dept: SURGERY | Facility: CLINIC | Age: 89
End: 2024-12-30

## 2024-12-30 ENCOUNTER — PATIENT OUTREACH (OUTPATIENT)
Dept: ONCOLOGY | Facility: HOSPITAL | Age: 89
End: 2024-12-30

## 2024-12-30 ENCOUNTER — OFFICE VISIT (OUTPATIENT)
Dept: SURGERY | Facility: CLINIC | Age: 89
End: 2024-12-30
Payer: MEDICARE

## 2024-12-30 ENCOUNTER — TELEPHONE (OUTPATIENT)
Dept: UROLOGY | Facility: CLINIC | Age: 89
End: 2024-12-30

## 2024-12-30 ENCOUNTER — TELEPHONE (OUTPATIENT)
Dept: INTERNAL MEDICINE | Facility: CLINIC | Age: 89
End: 2024-12-30

## 2024-12-30 ENCOUNTER — LAB (OUTPATIENT)
Dept: LAB | Facility: CLINIC | Age: 89
End: 2024-12-30
Payer: MEDICARE

## 2024-12-30 VITALS
HEART RATE: 53 BPM | BODY MASS INDEX: 26.87 KG/M2 | DIASTOLIC BLOOD PRESSURE: 54 MMHG | OXYGEN SATURATION: 97 % | HEIGHT: 58 IN | WEIGHT: 128 LBS | SYSTOLIC BLOOD PRESSURE: 130 MMHG

## 2024-12-30 DIAGNOSIS — C18.9 COLON ADENOCARCINOMA (H): Primary | ICD-10-CM

## 2024-12-30 DIAGNOSIS — C18.9 COLON ADENOCARCINOMA (H): ICD-10-CM

## 2024-12-30 DIAGNOSIS — N20.0 NEPHROLITHIASIS: Primary | ICD-10-CM

## 2024-12-30 LAB
ALBUMIN SERPL BCG-MCNC: 3.6 G/DL (ref 3.5–5.2)
ALP SERPL-CCNC: 78 U/L (ref 40–150)
ALT SERPL W P-5'-P-CCNC: 7 U/L (ref 0–70)
ANION GAP SERPL CALCULATED.3IONS-SCNC: 12 MMOL/L (ref 7–15)
AST SERPL W P-5'-P-CCNC: 18 U/L (ref 0–45)
BASOPHILS # BLD AUTO: 0 10E3/UL (ref 0–0.2)
BASOPHILS NFR BLD AUTO: 1 %
BILIRUB SERPL-MCNC: 0.6 MG/DL
BUN SERPL-MCNC: 13.4 MG/DL (ref 8–23)
CALCIUM SERPL-MCNC: 10 MG/DL (ref 8.8–10.4)
CEA SERPL-MCNC: 3.9 NG/ML
CHLORIDE SERPL-SCNC: 108 MMOL/L (ref 98–107)
CREAT SERPL-MCNC: 1.22 MG/DL (ref 0.51–1.17)
EGFRCR SERPLBLD CKD-EPI 2021: 42 ML/MIN/1.73M2
EOSINOPHIL # BLD AUTO: 0.3 10E3/UL (ref 0–0.7)
EOSINOPHIL NFR BLD AUTO: 4 %
ERYTHROCYTE [DISTWIDTH] IN BLOOD BY AUTOMATED COUNT: 13.8 % (ref 10–15)
GLUCOSE SERPL-MCNC: 114 MG/DL (ref 70–99)
HCO3 SERPL-SCNC: 24 MMOL/L (ref 22–29)
HCT VFR BLD AUTO: 36 % (ref 35–53)
HGB BLD-MCNC: 12.1 G/DL (ref 11.7–17.7)
IMM GRANULOCYTES # BLD: 0 10E3/UL
IMM GRANULOCYTES NFR BLD: 1 %
LYMPHOCYTES # BLD AUTO: 1.6 10E3/UL (ref 0.8–5.3)
LYMPHOCYTES NFR BLD AUTO: 25 %
MCH RBC QN AUTO: 29 PG (ref 26.5–33)
MCHC RBC AUTO-ENTMCNC: 33.6 G/DL (ref 31.5–36.5)
MCV RBC AUTO: 86 FL (ref 78–100)
MONOCYTES # BLD AUTO: 0.5 10E3/UL (ref 0–1.3)
MONOCYTES NFR BLD AUTO: 7 %
NEUTROPHILS # BLD AUTO: 4.2 10E3/UL (ref 1.6–8.3)
NEUTROPHILS NFR BLD AUTO: 63 %
NRBC # BLD AUTO: 0 10E3/UL
NRBC BLD AUTO-RTO: 0 /100
PLATELET # BLD AUTO: 254 10E3/UL (ref 150–450)
POTASSIUM SERPL-SCNC: 3.6 MMOL/L (ref 3.4–5.3)
PREALB SERPL-MCNC: 16.7 MG/DL (ref 20–40)
PROT SERPL-MCNC: 6.7 G/DL (ref 6.4–8.3)
RBC # BLD AUTO: 4.17 10E6/UL (ref 3.8–5.9)
SODIUM SERPL-SCNC: 144 MMOL/L (ref 135–145)
WBC # BLD AUTO: 6.6 10E3/UL (ref 4–11)

## 2024-12-30 PROCEDURE — 99000 SPECIMEN HANDLING OFFICE-LAB: CPT | Performed by: PATHOLOGY

## 2024-12-30 PROCEDURE — 36415 COLL VENOUS BLD VENIPUNCTURE: CPT | Performed by: PATHOLOGY

## 2024-12-30 PROCEDURE — 82378 CARCINOEMBRYONIC ANTIGEN: CPT | Performed by: STUDENT IN AN ORGANIZED HEALTH CARE EDUCATION/TRAINING PROGRAM

## 2024-12-30 PROCEDURE — 80053 COMPREHEN METABOLIC PANEL: CPT | Performed by: PATHOLOGY

## 2024-12-30 PROCEDURE — 99203 OFFICE O/P NEW LOW 30 MIN: CPT | Performed by: STUDENT IN AN ORGANIZED HEALTH CARE EDUCATION/TRAINING PROGRAM

## 2024-12-30 PROCEDURE — 84134 ASSAY OF PREALBUMIN: CPT | Performed by: STUDENT IN AN ORGANIZED HEALTH CARE EDUCATION/TRAINING PROGRAM

## 2024-12-30 PROCEDURE — 85025 COMPLETE CBC W/AUTO DIFF WBC: CPT | Performed by: PATHOLOGY

## 2024-12-30 ASSESSMENT — PAIN SCALES - GENERAL: PAINLEVEL_OUTOF10: NO PAIN (0)

## 2024-12-30 NOTE — TELEPHONE ENCOUNTER
[facility] Call Center    Phone Message    May a detailed message be left on voicemail: yes     Reason for Call: Other: Pt's daughter Va is calling to get her kidney stone taken care of. Pt's colon cancer doctor is hoping to have kidney stone taken care of first as soon as possible before they are able to do anything further. Please call Va to discuss further.Thanks      Action Taken: Other: uro    Travel Screening: Not Applicable     Date of Service:

## 2024-12-30 NOTE — PATIENT INSTRUCTIONS
Continue with PET/CT on 1/6  Referral placed for oncologist at this location    Call if you need anything!    Margarita RN: [phone]    Colorectal Surgery Clinic: [phone]

## 2024-12-30 NOTE — TELEPHONE ENCOUNTER
Reason for Call:  Appointment Request    Patient requesting this type of appt: Pre-op    Requested provider: Lucy Ruano or with any providers    Reason patient unable to be scheduled: Not within requested timeframe    When does patient want to be seen/preferred time: 3-7 days    Comments:     Preop  What procedure: Kidney Stone Removal  Date of procedure: 01/10/2025  Surgeon's name: Napoleon Madrid MD   Location it will take place: [facility]    [facility] we [facility] this [facility] you [facility] or would you prefer to receive a phone call?:   Patient would prefer a phone call   Okay to leave a detailed message?: Yes at Cell number on file:    Telephone Information:   Mobile [phone]       Call taken on 12/30/2024 at 3:41 PM by Snehal Huerta

## 2024-12-30 NOTE — PROGRESS NOTES
[facility]: Cancer Care                                                                                          Situation: Chart reviewed by RN Care Coordinator.    Background: Patient was in clinic last week for consultation regarding pancreatic mass.    Assessment: Patient does not wish to have chemo.  She has a surgical consultation planned on Monday, 12/30.    Plan/Recommendations: Patient is to have a PET scan to complete staging.  She is to return to clinic once PET scan results finalized.      Signature:  Shannon Marino RN Care Coordinator  [facility]  Cancer [facility]

## 2024-12-30 NOTE — LETTER
2024       RE: Alma Johns  [address]  [place] [address]     Dear Colleague,    Thank you for referring your patient, Alma Johns, to the [facility] COLON AND RE[facility]ERY [facility]IN[facility] [place] at [facility]. Please see a copy of my visit note below.    Colon and Rectal Surgery Clinic Note    RE: Alma Johns.  : 1935.  YURI: 2024.    Reason for visit: colon cancer.    HPI: Alma is an 89 year old presenting with newly diagnosed adenocarcinoma of the hepatic flexure. Over the past year she had some unexplained weight loss (~ 20 pounds). She had a CT on 12/10 showing right transverse colon wall thickening, possibly representing colonic neoplasm. There were also a couple suspicious pericolonic lymph nodes noted in this region as well as lymphadenopathy in the retroperitoneum and central mesentery.     Based on these findings, she then had an upper endoscopic ultrasound and colonoscopy. EUS with multiple upper abdominal lymph nodes seen corresponding to the CT and MRI. Biopsy of a representative roseann-pancreatic lymph node near the mesenteric root was performed and pathology resulted as metastatic adenocarcinoma. Multiple pancreatic cysts appeared to be benign. No suspicious hepatic lesions seen. Colonoscopy showed a 4 cm annular/apple-core mass in the hepatic flexure/proximal transverse colon. Tattoo was placed distal to the mass. Pathology confirming moderately-differentiated adenocarcinoma of the hepatic flexure. MMR intact. CEA 3.9.     She met with Dr. Friedell with oncology on  to discuss further options. Alma was initially not not sure if she would be interested in pursuing chemotherapy. Plan for PET/CT  to complete staging. She is here today based on recommendation for surgical consultation for potential palliative surgery to prevent any colonic obstruction.     She reports that she has been tolerating solid foods and having  normal daily bowel function. Her appetite has been low and she had some weight loss. No nausea or emesis. No blood per rectum.     Prior abdominal surgeries include appendectomy, cholecystectomy, , and EDUARDO/BSO. She is somewhat active and lives alone, has a few steps at home. Does not smoke or drink etoh. No family history of CRC.     Of note patient also met with urology due to large right proximal ureteral stone with left staghorn stone with plans to proceed with a stone removal procedure.     CT AP (12/10)  IMPRESSION:   1.  There is a short segment of the right transverse colon which shows wall thickening. Although this could reflect a colonic contraction, colonic neoplasm cannot be excluded. There also couple suspicious pericolonic lymph nodes noted in this region as well as lymphadenopathy in the retroperitoneum and central mesentery which may be metastatic in nature. Direct visualization is recommended to exclude colonic neoplasm.  2.  There are 2 low-attenuation cystic masses involving the pancreas. A contrast enhanced MRI or CT is recommended for further characterization and to exclude cystic pancreatic neoplasm.  3.  There is an large stone in the proximal right ureter measuring 13 x 8 mm. Despite the size of this does not result in significant hydronephrosis. There is also a large staghorn calculus left kidney. Urology consultation is recommended.    MR Abdomen ()  IMPRESSION:  1.  Ill-defined enhancing tissue with diffusion restriction within the second duodenal segment adjacent to the ampulla could represent a periampullary malignancy and would likely be amenable to endoscopic evaluation. Recommend GI consult.  2.  Focal mural thickening proximal transverse colon observed at recent CT is not well visualized on the current MRI study but remains indeterminate.  3.  Mild intrahepatic and extra hepatic bile duct dilatation to the level of the ampulla where there is a small rounded filling  defect that may relate to the abnormal soft tissue in the second duodenal segment .  4.  Bilateral para-aortic retroperitoneal lymphadenopathy. A 2 cm left para-aortic lymph node just inferior to the left renal vein with likely be amenable to CT-guided percutaneous biopsy if necessary.  5.  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   6.  The 1.0 x 0.8 x 0.6 cm proximal right ureteral stone and the 3.2 x 3.0 x 2.5 cm left staghorn calculus are unchanged.  Recommend urology consult.     Upper EUS (12/19)  Impression:    - Periampullary diverticulum without evidence of                          duodenal mass.                          - Multiple simple-appearing pancreatic cysts without                          high-risk features. No solid pancreatic lesions seen.                          - Multiple upper abdominal lymph nodes seen                          corresponding to the CT and MRI. A representative node                          by the pancreatic body/root of the mesentery was                          biopsies and prelimirily showed lesional material.                          - No suspicious hepatic or left adrenal lesions seen.                          - No potential primary tumor was seen in the upper                          abdomen. See colonoscopy report which showed a mass.                          - Incidental large 36 x 25 mm left renal pelvis stone.   Final Diagnosis   LYMPH NODE(S), GIANCARLO-PANCREATIC LYMPH NODE, ENDOSCOPIC ULTRASOUND-GUIDED FINE-NEEDLE ASPIRATION:  Interpretation:  - Metastatic adenocarcinoma, consistent with colorectal primary (see comment)       Colonoscopy (12/19)      Findings:       Colonoscopy showed a 4 cm annular/apple-core mass in the hepatic        flexure/proximal transverse colon.        Multiple  biopsies were obtained.        Three tattoos were placed approximately 5 cm distal/downstream from the        mass.        5-6 six small 3-6 mm sessile polyps were seen in the cecum and ascending        colon upstream from the mass which were left in place.        A 7 mm sigmoid polyp, a 4 mm sigmoid polyp and a 4 mm rectal polyp were        resected with cold snare and retrieved.        Retroflex exam showed non-bleeding internal hemorrhoids.                                                                                    Impression:    - Non-obstructing 4 cm annular/apple-core mass in the                          hepatic flexure/proximal transverse colon                          corresponding to a finding on recent CT and MRI.                          Biopsied. The appearance is consistent with malignancy.                          - Tattoo performed x 3 approximately 5 cm                          distally/downstream from the mass, presuming if                          resection performed this would be a right                          hemicolectomy.                          - A few small polyps are present in the cecum and                          ascending colon which were not removed presuming these                          will be addressed with surgical resection.                          - Three small sigmoid and rectal polyps resected. No                          polyps remain distal to the mass.                          - Incidental sigmoid diverticulosis and internnal                          hemorrhoids.   Final Diagnosis   A. COLON, HEPATIC FLEXURE MASS, BIOPSY:  - Fragments of adenocarcinoma, moderately-differentiated  - Immunohistochemical testing for mismatch repair proteins will be performed; results will be reported in an addendum     B. COLON, SIGMOID, POLYP:  - Tubular adenoma  - No high-grade dysplasia or malignancy identified     C. COLON, SIGMOID, POLYP:  - Hyperplastic polyp     D. RECTUM,  POLYP:  - Hyperplastic polyp     IHC Interpretation  No loss of nuclear expression of MMR proteins: low probability of MSI-H     CEA ()  CEA  ng/mL 3.9     Medical history:  CKD 3   Glaucoma  Hyperparathyroidism  Hypertension  Hypothyroidism  Osteoarthritis   Osteopenia     Surgical history:  Appendectomy   section  Cholecystectomy   THBSO  Total hip replacement (left)  Total knee replacement (right)    Family history:  No Hx of IBD or GI malignancy    Medications:  Current Outpatient Medications   Medication Sig Dispense Refill     alendronate (FOSAMAX) 70 MG tablet Take 1 tablet (70 mg) by mouth every 7 days 12 tablet 0     amLODIPine (NORVASC) 10 MG tablet Take 1 tablet (10 mg) by mouth daily 90 tablet 3     celecoxib (CELEBREX) 200 MG capsule TAKE 1 CAPSULE BY MOUTH DAILY 90 capsule 3     doxazosin (CARDURA) 4 MG tablet TAKE ONE TABLET BY MOUTH ONE TIME DAILY. 90 tablet 3     escitalopram (LEXAPRO) 5 MG tablet Take 1 tablet (5 mg) by mouth daily. 90 tablet 3     latanoprost (XALATAN) 0.005 % ophthalmic solution [LATANOPROST (XALATAN) 0.005 % OPHTHALMIC SOLUTION] Administer 1 drop to both eyes bedtime.       LORazepam (ATIVAN) 0.5 MG tablet Take 1 tablet (0.5 mg) by mouth every 6 hours as needed for anxiety. 5 tablet 0     ondansetron (ZOFRAN) 4 MG tablet Take 1 tablet (4 mg) by mouth every 8 hours as needed for nausea. 90 tablet 3     therapeutic multivitamin (THERAGRAN) tablet [THERAPEUTIC MULTIVITAMIN (THERAGRAN) TABLET] Take 1 tablet by mouth daily.       timolol maleate (TIMOPTIC) 0.5 % ophthalmic solution [TIMOLOL MALEATE (TIMOPTIC) 0.5 % OPHTHALMIC SOLUTION] INSTILL 1 DROP INTO BOTH EYES Q EVENING  3       Allergies:  Allergies   Allergen Reactions     Narcotic Antagonist [External Allergen Needs Reconciliation - See Comment] Other (See Comments)     Dysphoria, hallucinations     Other Environmental Allergy Unknown     congestion, DUST     Pravastatin Unknown     myalgia     Triamterene Other  "(See Comments)     stones     Vasotec [Enalapril] Other (See Comments)     cough     Effexor [Venlafaxine] Other (See Comments)     Adverse reaction -bowel       Social history:   Social History     Tobacco Use     Smoking status: Never     Passive exposure: Past     Smokeless tobacco: Never   Substance Use Topics     Alcohol use: No     Marital status: single.    ROS:  A complete review of systems was performed with the patient and all systems negative except as per HPI.    Physical Examination:  /54 (BP Location: Left arm, Patient Position: Sitting, Cuff Size: Adult Regular)   Pulse 53   Ht 1.467 m (4' 9.75\")   Wt 58.1 kg (128 lb)   LMP  (LMP Unknown)   SpO2 97%   BMI 26.98 kg/m      Exam was chaperoned by Gwen Melton, LOLA    General: Elderly. Well hydrated. No acute distress.  Resp: non-labored respirations on room air  Abdomen: Soft, NT, ND. No inguinal adenopathy palpated.  Neuro: ambulatory    Laboratory values reviewed:  Recent Labs   Lab Test 11/21/24  0913 11/19/24  0940   WBC 8.8  --    HGB 11.6*  --      --    CR  --  1.62*   ALBUMIN  --  3.7   BILITOTAL  --  0.6   ALKPHOS  --  90   ALT  --  9   AST  --  23     Imaging personally reviewed by me:  CT AP, MR abdomen, upper EUS, colonoscopy    ASSESSMENT  Alma is an 89 year old woman with pMMR distal transverse colon cancer with malignant lymphadenopathy in the central mesentery. We discussed that she is not currently a candidate for surgical resection with curative intent given lymphatic spread to root of mesentery and roseann-pancreatic nodes. I would recommend neoadjuvant chemotherapy. If there is adequate treatment response, she may be a surgical candidate in the future. We discussed the details about colectomy, as well as her elevated risks due to frailty which she understood. She may also need surgery if the colonic mass grows and becomes obstructive. Currently, she is having daily bowel function without significant obstructive " symptoms.    PLAN  1. No surgery at this time.   2. Await PET scan.   3. Recommend chemotherapy.   4. She would like to establish care with oncology here which is closer to her home. Referral placed.     Time spent: 30 minutes.  >50% spent in discussing, counseling and coordinating care.    Kim London M.D    Division of Colon and Rectal Surgery  [facility]    Referring Provider:  Lucy Ruano MD  [facility] [place] [address]     Primary Care Provider:  Lucy Ruano      Again, thank you for allowing me to participate in the care of your patient.      Sincerely,    Kim London MD

## 2024-12-30 NOTE — TELEPHONE ENCOUNTER
Spoke with: Patient       Date of surgery: Friday January 10 2025 with Dr Madrid      Location: [facility]      Informed patient they will need a adult : YES- Patients daughter       Pre op with provider: Patients daughter Va will schedule at  [place] she is informed that patient needs a UC done at as soon as possible     Please place order      H&P Scheduled in PAC- NA         Pre procedure covid :Not req      Additional imaging: NA        Surgery Packet : Sent via [employer]      Additional comments: Please call for surgery teaching

## 2024-12-30 NOTE — TELEPHONE ENCOUNTER
Spoke with patient's daughter and informed her we have orders and are working to get her on the schedule. Her mom was appreciative of the call back. Surgery coordinator is aware.     KETAN Medel  Care Coordinator- Urology   [phone]

## 2024-12-30 NOTE — NURSING NOTE
"Chief Complaint   Patient presents with    Consult     New cancer, rectal cancer       Vitals:    12/30/24 1010   BP: 130/54   BP Location: Left arm   Patient Position: Sitting   Cuff Size: Adult Regular   Pulse: 53   SpO2: 97%   Weight: 128 lb   Height: 4' 9.75\"       Body mass index is 26.98 kg/m .    Gwen Melton, EMT  "

## 2025-01-03 ENCOUNTER — OFFICE VISIT (OUTPATIENT)
Dept: FAMILY MEDICINE | Facility: CLINIC | Age: OVER 89
End: 2025-01-03
Payer: MEDICARE

## 2025-01-03 VITALS
BODY MASS INDEX: 27.61 KG/M2 | HEART RATE: 63 BPM | TEMPERATURE: 98.3 F | SYSTOLIC BLOOD PRESSURE: 133 MMHG | DIASTOLIC BLOOD PRESSURE: 72 MMHG | WEIGHT: 128 LBS | OXYGEN SATURATION: 95 % | HEIGHT: 57 IN | RESPIRATION RATE: 18 BRPM

## 2025-01-03 DIAGNOSIS — N20.0 KIDNEY STONE ON LEFT SIDE: ICD-10-CM

## 2025-01-03 DIAGNOSIS — N20.1 RIGHT URETERAL STONE: ICD-10-CM

## 2025-01-03 DIAGNOSIS — Z01.818 PREOP GENERAL PHYSICAL EXAM: Primary | ICD-10-CM

## 2025-01-03 PROBLEM — R41.89: Status: RESOLVED | Noted: 2019-04-03 | Resolved: 2025-01-03

## 2025-01-03 PROBLEM — C18.9 COLON ADENOCARCINOMA (H): Status: ACTIVE | Noted: 2025-01-03

## 2025-01-03 PROCEDURE — 99214 OFFICE O/P EST MOD 30 MIN: CPT | Performed by: FAMILY MEDICINE

## 2025-01-03 ASSESSMENT — PAIN SCALES - GENERAL: PAINLEVEL_OUTOF10: NO PAIN (0)

## 2025-01-03 NOTE — PROGRESS NOTES
"Preoperative Evaluation  [facility]  [address] [place] [address]  Phone: [phone]  Fax: [phone]  Primary Provider: Lucy Ruano MD  Pre-op Performing Provider: Kisha Pimentel MD  Hector 3, 2025             1/1/2025   Surgical Information   What procedure is being done? Cystoscopy, Right Ureteroscopy, Right Holmium Laser Lithotripsy, Right Retrograde Pyelogram, Possible Right Ureteral Stent Placement    Facility or Hospital where procedure/surgery will be performed: [facility]   Who is doing the procedure / surgery? Napoleon Madrid   Date of surgery / procedure: Hector 10   Time of surgery / procedure: They will call day before with a time   Where do you plan to recover after surgery? at home with family     Fax number for surgical facility: Note does not need to be faxed, will be available electronically in Epic.    Assessment & Plan     The proposed surgical procedure is considered INTERMEDIATE risk.    Preop general physical exam      Right ureteral stone  Kidney stone on left side  Reasons for surgery    Medication instructions    OK to stay on the aspirin  (this is for CV prevention and surgery is low risk)  Please stop Celebrex 3 days before surgery  Do not take lorazepam (\"Ativan\") on the day of surgery  Other than above, you can continue your usual daily medications    Recommendation  Approval given to proceed with proposed procedure, without further diagnostic evaluation.    Chau Marquez is a 89 year old, presenting for the following:  Pre-Op Exam (DOS 1/10/25 at [facility] with Dr. Madrid for Cystoscopy, Right Ureteroscopy, Right Holmium Laser Lithotripsy, Right Retrograde Pyelogram, Possible Right Ureteral Stent Placement)          1/3/2025     2:50 PM   Additional Questions   Roomed by angela TIMMONS related to upcoming procedure:     Note from Oncologist Dr. 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  Patient does not wish to have chemotherapy.  She has a surgical consultation planned for 12/30/2024.  I advised palliative surgery to avoid obstruction of the colon.  This could be followed by palliative chemotherapy or observation depending on the patient's wishes.  I also advised PET CT scan to complete staging.  Patient will return to oncology after PET imaging is completed.    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 she has a previous history of a kidney stone    She also adds that her oncologist was worried about the possibility of a mini stroke when she told her about a day when her left arm felt numb and this resolved.  She thought she slept the wrong way.  However he did start her on aspirin for this    Preventive: Though I strongly encouraged her to reconsider, she declines Covid vaccine        1/1/2025   Pre-Op Questionnaire   Have you ever had a heart attack or stroke? No   Have you ever had surgery on your heart or blood vessels, such as a stent placement, a coronary artery bypass, or surgery on an artery in your head, neck, heart, or legs? No   Do you have chest pain with activity? No   Do you have a history of heart failure? No   Do you currently have a cold, bronchitis or symptoms of other infection? No   Do you have a cough, shortness of breath, or wheezing? No   Do you or anyone in your family have previous history of blood clots? No   Do you or does anyone in your family have a serious bleeding problem such as prolonged bleeding following surgeries or cuts? No   Have you ever had problems with anemia or been told to take iron pills? No   Have you had any abnormal blood loss such as black, tarry or bloody stools, or abnormal vaginal bleeding? No   Have you ever had a blood transfusion? No   Are you willing to have  a blood transfusion if it is medically needed before, during, or after your surgery? Yes   Have you or any of your relatives ever had problems with anesthesia? No   Do you have sleep apnea, excessive snoring or daytime drowsiness? No   Do you have any artifical heart valves or other implanted medical devices like a pacemaker, defibrillator, or continuous glucose monitor? No   Do you have artificial joints? (!) YES tow hips and left knee   Are you allergic to latex? No     Health Care Directive  Patient has a Health Care Directive on file      Preoperative Review of    reviewed - no record of controlled substances prescribed.      Status of Chronic Conditions:      Mild hyperparathyroidism: Previously elevated calcium, not with most recent testing.  She is not on medications.  Last Comprehensive Metabolic Panel:  Lab Results   Component Value Date     12/30/2024    POTASSIUM 3.6 12/30/2024    CHLORIDE 108 (H) 12/30/2024    CO2 24 12/30/2024    ANIONGAP 12 12/30/2024     (H) 12/30/2024    BUN 13.4 12/30/2024    CR 1.22 (H) 12/30/2024    GFRESTIMATED 42 (L) 12/30/2024    JIMMY 10.0 12/30/2024     Hypertension: Well-controlled on medications amlodipine and doxazosin  BP Readings from Last 6 Encounters:   01/03/25 133/72   12/30/24 130/54   12/26/24 (!) 142/65   12/19/24 122/85   11/19/24 110/50   10/16/24 104/60     Osteoarthritis of several sites just had injection in left knee.  Takes Celebrex for pain    Situational depression: Takes escitalopram daily and lorazepam as needed      Patient Active Problem List    Diagnosis Date Noted    Calculus of right ureter 12/27/2024     Priority: Medium    Chronic kidney disease, stage 3 (H) 02/05/2021     Priority: Medium    Osteopenia      Priority: Medium    Bilateral impacted cerumen 04/03/2019     Priority: Medium    Orthostasis 04/03/2019     Priority: Medium    Complaint related to dreams 04/03/2019     Priority: Medium    Mild hyperparathyroidism (H)  2016     Priority: Medium    Osteoarthritis of lumbar spine 2015     Priority: Medium    Essential hypertension 2015     Priority: Medium    Hypercalcemia 2015     Priority: Medium    Situational depression 2015     Priority: Medium    Osteoarthritis of right hip 2014     Priority: Medium      Past Medical History:   Diagnosis Date    Dyslipidemia     dyslipoproteinemia    Glaucoma     Hypercalcemia     Hyperparathyroidism (H)     Hypertension     Hypothyroidism     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    OA (osteoarthritis)     Osteopenia     Postmenopausal     Retinal vein occlusion (H)     Vitamin D deficiency      Past Surgical History:   Procedure Laterality Date    APPENDECTOMY      Incidental    BIOPSY BREAST       SECTION      CHOLECYSTECTOMY      COLONOSCOPY N/A 2024    Procedure: Colonoscopy with biopsies and polypectomy, endoscopic marker;  Surgeon: Ron Hogan MD;  Location: [facility] OR    DILATION AND CURETTAGE      ENTEROSCOPY SMALL BOWEL N/A 2024    Procedure: Enteroscopy small bowel;  Surgeon: Ron Hogan MD;  Location: [facility] OR    ESOPHAGOSCOPY, GASTROSCOPY, DUODENOSCOPY (EGD), COMBINED N/A 2024    Procedure: ESOPHAGOGASTRODUODENOSCOPY, WITH FINE NEEDLE ASPIRATION BIOPSY, WITH ENDOSCOPIC ULTRASOUND GUIDANCE;  Surgeon: Ron Hogan MD;  Location: [facility] OR    HYSTERECTOMY TOTAL ABDOMINAL, BILATERAL SALPINGO-OOPHORECTOMY, COMBINED      KIDNEY STONE SURGERY      extraction    LUMBAR LAMINECTOMY      TOTAL HIP ARTHROPLASTY Left 2009    TOTAL KNEE ARTHROPLASTY Right 2009    TUBAL LIGATION       Current Outpatient Medications   Medication Sig Dispense Refill    alendronate (FOSAMAX) 70 MG tablet Take 1 tablet (70 mg) by mouth every 7 days 12 tablet 0    amLODIPine (NORVASC) 10 MG tablet Take 1 tablet (10 mg) by mouth daily 90 tablet 3    celecoxib (CELEBREX) 200 MG capsule TAKE 1 CAPSULE BY MOUTH DAILY 90 capsule 3    doxazosin  "(CARDURA) 4 MG tablet TAKE ONE TABLET BY MOUTH ONE TIME DAILY. 90 tablet 3    escitalopram (LEXAPRO) 5 MG tablet Take 1 tablet (5 mg) by mouth daily. 90 tablet 3    latanoprost (XALATAN) 0.005 % ophthalmic solution [LATANOPROST (XALATAN) 0.005 % OPHTHALMIC SOLUTION] Administer 1 drop to both eyes bedtime.      LORazepam (ATIVAN) 0.5 MG tablet Take 1 tablet (0.5 mg) by mouth every 6 hours as needed for anxiety. 5 tablet 0    ondansetron (ZOFRAN) 4 MG tablet Take 1 tablet (4 mg) by mouth every 8 hours as needed for nausea. 90 tablet 3    therapeutic multivitamin (THERAGRAN) tablet [THERAPEUTIC MULTIVITAMIN (THERAGRAN) TABLET] Take 1 tablet by mouth daily.      timolol maleate (TIMOPTIC) 0.5 % ophthalmic solution [TIMOLOL MALEATE (TIMOPTIC) 0.5 % OPHTHALMIC SOLUTION] INSTILL 1 DROP INTO BOTH EYES Q EVENING  3       Allergies   Allergen Reactions    Narcotic Antagonist [External Allergen Needs Reconciliation - See Comment] Other (See Comments)     Dysphoria, hallucinations    Other Environmental Allergy Unknown     congestion, DUST    Pravastatin Unknown     myalgia    Triamterene Other (See Comments)     stones    Vasotec [Enalapril] Other (See Comments)     cough    Effexor [Venlafaxine] Other (See Comments)     Adverse reaction -bowel        Social History     Tobacco Use    Smoking status: Never     Passive exposure: Past    Smokeless tobacco: Never   Substance Use Topics    Alcohol use: No       History   Drug Use No             Review of Systems  Constitutional, HEENT, cardiovascular, pulmonary, gi  are negative, except as otherwise noted.    Objective    /72 (BP Location: Left arm, Patient Position: Sitting, Cuff Size: Adult Large)   Pulse 63   Temp 98.3  F (36.8  C)   Resp 18   Ht 1.448 m (4' 9\")   Wt 58.1 kg (128 lb)   LMP  (LMP Unknown)   SpO2 95%   BMI 27.70 kg/m     Estimated body mass index is 27.7 kg/m  as calculated from the following:    Height as of this encounter: 1.448 m (4' 9\").    " Weight as of this encounter: 58.1 kg (128 lb).  Physical Exam  General appearance - alert, well appearing, and in no distress  Mental status - normal mood, behavior, speech, dress, motor activity, and thought processes  Eyes - pupils equal and reactive, extraocular eye movements intact, funduscopic exam normal, discs flat and sharp  Ears - bilateral TM's and external ear canals normal  Mouth - mucous membranes moist, pharynx normal without lesions  Neck - supple, no significant adenopathy, carotids upstroke normal bilaterally, no bruits, thyroid exam: thyroid is normal in size without nodules or tenderness  Chest - clear to auscultation, no wheezes, rales or rhonchi, symmetric air entry  Heart - normal rate, regular rhythm, normal S1, S2, no murmurs, rubs, clicks or gallops  Abdomen - soft, nontender, nondistended, no masses or organomegaly  Neurological - alert, oriented, normal speech, no focal findings or movement disorder noted, DTR's normal and symmetric  Extremities - peripheral pulses normal, no pedal edema, no clubbing or cyanosis  Skin - no rashes or worrisome lesions      Recent Labs   Lab Test 12/30/24  0940 11/21/24  0913 11/19/24  0940   HGB 12.1 11.6*  --     315  --      --  140   POTASSIUM 3.6  --  4.9   CR 1.22*  --  1.62*        Diagnostics  No labs were ordered during this visit.   No EKG required, no history of coronary heart disease, significant arrhythmia, peripheral arterial disease or other structural heart disease.    Revised Cardiac Risk Index (RCRI)  The patient has the following serious cardiovascular risks for perioperative complications:   - No serious cardiac risks = 0 points     RCRI Interpretation: 0 points: Class I (very low risk - 0.4% complication rate)         Signed Electronically by: Kisha Pimentel MD  A copy of this evaluation report is provided to the requesting physician.

## 2025-01-03 NOTE — PATIENT INSTRUCTIONS
"OK to stay on the aspirin   Please stop Celebrex 3 days before surgery  Do not take lorazepam (\"Ativan\") on the day of surgery  Other than above, you can continue your usual daily medications      Patient Education   Preparing for Your Surgery  For Adults  Getting started  In most cases, a nurse will call to review your health history and instructions. They will give you an arrival time based on your scheduled surgery time. Please be ready to share:  Your doctor's clinic name and phone number  Your medical, surgical, and anesthesia history  A list of allergies and sensitivities  A list of medicines, including herbal treatments and over-the-counter drugs  Whether the patient has a legal guardian (ask how to send us the papers in advance)  Note: You may not receive a call if you were seen at our PAC (Preoperative Assessment Center).  Please tell us if you're pregnant--or if there's any chance you might be pregnant. Some surgeries may injure a fetus (unborn baby), so they require a pregnancy test. Surgeries that are safe for a fetus don't always need a test, and you can choose whether to have one.   Preparing for surgery  Within 10 to 30 days of surgery: Have a pre-op exam (sometimes called an H&P, or History and Physical). This can be done at a clinic or pre-operative center.  If you're having a , you may not need this exam. Talk to your care team.  At your pre-op exam, talk to your care team about all medicines you take. (This includes CBD oil and any drugs, such as THC, marijuana, and other forms of cannabis.) If you need to stop any medicine before surgery, ask when to start taking it again.  This is for your safety. Many medicines and drugs can make you bleed too much during surgery. Some change how well surgery (anesthesia) drugs work.  Call your insurance company to let them know you're having surgery. (If you don't have insurance, call [phone].)  Call your clinic if there's any change in your " health. This includes a scrape or scratch near the surgery site, or any signs of a cold (sore throat, runny nose, cough, rash, fever).  Eating and drinking guidelines  For your safety: Unless your surgeon tells you otherwise, follow the guidelines below.  Eat and drink as normal until 8 hours before you arrive for surgery. After that, no food or milk. You can spit out gum when you arrive.  Drink clear liquids until 2 hours before you arrive. These are liquids you can see through, like water, Gatorade, and Propel Water. They also include plain black coffee and tea (no cream or milk).  No alcohol for 24 hours before you arrive. The night before surgery, stop any drinks that contain THC.  If your care team tells you to take medicine on the morning of surgery, it's okay to take it with a sip of water. No other medicines or drugs are allowed (including CBD oil)--follow your care team's instructions.  If you have questions the day of surgery, call your hospital or surgery center.   Preventing infection  Shower or bathe the night before and the morning of surgery. Follow the instructions your clinic gave you. (If no instructions, use regular soap.)  Don't shave or clip hair near your surgery site. We'll remove the hair if needed.  Don't smoke or vape the morning of surgery. No chewing tobacco for 6 hours before you arrive. A nicotine patch is okay. You may spit out nicotine gum when you arrive.  For some surgeries, the surgeon will tell you to fully quit smoking and nicotine.  We will make every effort to keep you safe from infection. We will:  Clean our hands often with soap and water (or an alcohol-based hand rub).  Clean the skin at your surgery site with a special soap that kills germs.  Give you a special gown to keep you warm. (Cold raises the risk of infection.)  Wear hair covers, masks, gowns, and gloves during surgery.  Give antibiotic medicine, if prescribed. Not all surgeries need this medicine.  What to bring  on the day of surgery  Photo ID and insurance card  Copy of your health care directive, if you have one  Glasses and hearing aids (bring cases)  You can't wear contacts during surgery  Inhaler and eye drops, if you use them (tell us about these when you arrive)  CPAP machine or breathing device, if you use them  A few personal items, if spending the night  If you have . . .  A pacemaker, ICD (cardiac defibrillator), or other implant: Bring the ID card.  An implanted stimulator: Bring the remote control.  A legal guardian: Bring a copy of the certified (court-stamped) guardianship papers.  Please remove any jewelry, including body piercings. Leave jewelry and other valuables at home.  If you're going home the day of surgery  You must have a responsible adult drive you home. They should stay with you overnight as well.  If you don't have someone to stay with you, and you aren't safe to go home alone, we may keep you overnight. Insurance often won't pay for this.  After surgery  If it's hard to control your pain or you need more pain medicine, please call your surgeon's office.  Questions?   If you have any questions for your care team, list them here:   ____________________________________________________________________________________________________________________________________________________________________________________________________________________________________________________________  For informational purposes only. Not to replace the advice of your health care provider. Copyright   2003, 2019 [employer] [facility]. All rights reserved. Clinically reviewed by Juan Diego West MD. [employer] [id number] - REV 08/24.

## 2025-01-06 ENCOUNTER — TELEPHONE (OUTPATIENT)
Dept: ONCOLOGY | Facility: HOSPITAL | Age: OVER 89
End: 2025-01-06

## 2025-01-06 ENCOUNTER — TELEPHONE (OUTPATIENT)
Dept: UROLOGY | Facility: CLINIC | Age: OVER 89
End: 2025-01-06
Payer: MEDICARE

## 2025-01-06 ENCOUNTER — HOSPITAL ENCOUNTER (OUTPATIENT)
Dept: PET IMAGING | Facility: HOSPITAL | Age: OVER 89
Discharge: HOME | End: 2025-01-06
Attending: INTERNAL MEDICINE
Payer: MEDICARE

## 2025-01-06 ENCOUNTER — LAB (OUTPATIENT)
Dept: LAB | Facility: HOSPITAL | Age: OVER 89
End: 2025-01-06
Attending: INTERNAL MEDICINE
Payer: MEDICARE

## 2025-01-06 DIAGNOSIS — N20.0 NEPHROLITHIASIS: Primary | ICD-10-CM

## 2025-01-06 DIAGNOSIS — N20.1 CALCULUS OF RIGHT URETER: ICD-10-CM

## 2025-01-06 DIAGNOSIS — N20.0 NEPHROLITHIASIS: ICD-10-CM

## 2025-01-06 DIAGNOSIS — C18.3 MALIGNANT NEOPLASM OF HEPATIC FLEXURE (H): ICD-10-CM

## 2025-01-06 LAB
ALBUMIN UR-MCNC: NEGATIVE MG/DL
APPEARANCE UR: ABNORMAL
BACTERIA #/AREA URNS HPF: ABNORMAL /HPF
BILIRUB UR QL STRIP: NEGATIVE
COLOR UR AUTO: YELLOW
GLUCOSE BLDC GLUCOMTR-MCNC: 109 MG/DL (ref 70–99)
GLUCOSE UR STRIP-MCNC: NEGATIVE MG/DL
HGB UR QL STRIP: NEGATIVE
KETONES UR STRIP-MCNC: NEGATIVE MG/DL
LEUKOCYTE ESTERASE UR QL STRIP: ABNORMAL
NITRATE UR QL: NEGATIVE
PH UR STRIP: 5.5 [PH] (ref 5–7)
RBC URINE: <1 /HPF
SP GR UR STRIP: 1.01 (ref 1–1.03)
SQUAMOUS EPITHELIAL: 14 /HPF
UROBILINOGEN UR STRIP-MCNC: 2 MG/DL
WBC URINE: 46 /HPF

## 2025-01-06 PROCEDURE — A9552 F18 FDG: HCPCS | Performed by: INTERNAL MEDICINE

## 2025-01-06 PROCEDURE — 78816 PET IMAGE W/CT FULL BODY: CPT | Mod: PI

## 2025-01-06 PROCEDURE — 87086 URINE CULTURE/COLONY COUNT: CPT

## 2025-01-06 PROCEDURE — 81001 URINALYSIS AUTO W/SCOPE: CPT

## 2025-01-06 PROCEDURE — 343N000001 HC RX 343 MED OP 636: Performed by: INTERNAL MEDICINE

## 2025-01-06 PROCEDURE — 82962 GLUCOSE BLOOD TEST: CPT

## 2025-01-06 RX ORDER — FLUDEOXYGLUCOSE F 18 200 MCI/ML
7-17 INJECTION, SOLUTION INTRAVENOUS ONCE
Status: COMPLETED | OUTPATIENT
Start: 2025-01-06 | End: 2025-01-06

## 2025-01-06 RX ADMIN — FLUDEOXYGLUCOSE F 18 10.13 MILLICURIE: 200 INJECTION, SOLUTION INTRAVENOUS at 12:15

## 2025-01-06 NOTE — TELEPHONE ENCOUNTER
"[facility]: Cancer Care                                                                                          Patient and daughter had requested for second opinion and transfer of care to the [facility].  Due to patient no longer being \"New Patient\" status, patient would not be seen until end of January.  Patient is eager to start chemotherapy right away.  Request no longer wanted.  Patient will stay at our [place] location and continue to see Dr. Friedell.    I spoke with Va, patient's daughter. She had no other questions or concerns at this time.      Signature:  Shannon Marino RN  "

## 2025-01-06 NOTE — TELEPHONE ENCOUNTER
Spoke with patient's daughter Va- urine labs were missed at the pre-op. They will do them today at [facility]. Orders in.     KETAN Medel  Care Coordinator- Urology   [phone]

## 2025-01-09 ENCOUNTER — ONCOLOGY VISIT (OUTPATIENT)
Dept: ONCOLOGY | Facility: HOSPITAL | Age: OVER 89
End: 2025-01-09
Payer: MEDICARE

## 2025-01-09 VITALS
SYSTOLIC BLOOD PRESSURE: 156 MMHG | DIASTOLIC BLOOD PRESSURE: 66 MMHG | RESPIRATION RATE: 16 BRPM | TEMPERATURE: 98.3 F | HEART RATE: 59 BPM | BODY MASS INDEX: 27.29 KG/M2 | OXYGEN SATURATION: 97 % | WEIGHT: 126.1 LBS

## 2025-01-09 DIAGNOSIS — C18.3 MALIGNANT NEOPLASM OF HEPATIC FLEXURE (H): Primary | ICD-10-CM

## 2025-01-09 DIAGNOSIS — C76.2 CANCER OF ABDOMEN (H): Primary | ICD-10-CM

## 2025-01-09 DIAGNOSIS — C18.3 MALIGNANT NEOPLASM OF HEPATIC FLEXURE (H): ICD-10-CM

## 2025-01-09 LAB
BACTERIA UR CULT: NORMAL
BACTERIA UR CULT: NORMAL

## 2025-01-09 PROCEDURE — G0463 HOSPITAL OUTPT CLINIC VISIT: HCPCS | Performed by: INTERNAL MEDICINE

## 2025-01-09 RX ORDER — CIPROFLOXACIN 500 MG/1
500 TABLET, FILM COATED ORAL ONCE
Qty: 1 TABLET | Refills: 0 | Status: SHIPPED | OUTPATIENT
Start: 2025-01-09 | End: 2025-01-09

## 2025-01-09 ASSESSMENT — PAIN SCALES - GENERAL: PAINLEVEL_OUTOF10: NO PAIN (0)

## 2025-01-09 NOTE — PROGRESS NOTES
[facility] Hematology and Oncology Progress Note    Patient: Alma Johns  MRN: [id number]  Date of Service: Jan 9, 2025       Reason for Visit    I was consulted by   Lucy Ruano MD     For evaluation and management of newly diagnosed colon cancer.      Encounter Diagnoses Assessment and Plan:    Problem List Items Addressed This Visit       Malignant neoplasm of hepatic flexure (H) - Primary    Relevant Orders    CBC with Platelets & Differential    Comprehensive metabolic panel   Patient with newly diagnosed apple core adenocarcinoma of the hepatic flexure.  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  Patient does not wish to have chemotherapy.  PET scan shows extensive lymphadenopathy above and below the diaphragm.  Genomic studies are ordered on the colon cancer.  This mismatch repair proteins are intact by immunohistochemical analysis.  Follow-up is planned after genomic testing is completed.      ______________________________________________________________________________    Staging History   Cancer Staging   Colon adenocarcinoma (H)  Staging form: Colon and Rectum, AJCC 8th Edition  - Clinical stage from 1/9/2025: Stage IVB (cTX, cNX, cM1b) - Signed by Friedell, Peter E, MD on 1/9/2025    ECOG Performance  1 - Can't do physically strenuous work, but fully ambulatory and can do light sedentary work.    History of Present Illness      Alma Johns is an 89 year old woman with a history of hypercalcemia with elevated parathyroid hormone.  Over the last year she has had unexplained weight loss of about 20 pounds.  On 12/10/2024 she underwent CT scan of the abdomen and pelvis which showed probable lesion at the hepatic flexure of the colon and suspicious retroperitoneal adenopathy.  On 12/19/2024 she underwent colonoscopy which confirmed an adenocarcinoma of the colon.  Upper GI endoscopic ultrasound confirmed metastases in a upper abdominal lymph node adjacent to the pancreas near the  mesenteric root.  Multiple pancreatic cysts appeared benign.    Presently she feels fairly well.  Her appetite is still decreased.  She does not have pain.  She does not have cough chest pain or shortness of breath.  She had a brief episode of numbness in her left arm and hand lasting for less than 24 hours a few weeks ago.  She does not smoke cigarettes or use alcohol.  There is no family history of cancer.    Review of systems.  Pertinent Findings are included in the History of Present Illness    Physical Exam    /66 (BP Location: Right arm, Patient Position: Sitting, Cuff Size: Adult Regular)   Pulse 59   Temp 98.3  F (36.8  C) (Oral)   Resp 16   Wt 57.2 kg (126 lb 1.6 oz)   LMP  (LMP Unknown)   SpO2 97%   BMI 27.29 kg/m       Not examined this visit    Medications:    Current Outpatient Medications   Medication Sig Dispense Refill    alendronate (FOSAMAX) 70 MG tablet Take 1 tablet (70 mg) by mouth every 7 days 12 tablet 0    amLODIPine (NORVASC) 10 MG tablet Take 1 tablet (10 mg) by mouth daily 90 tablet 3    celecoxib (CELEBREX) 200 MG capsule TAKE 1 CAPSULE BY MOUTH DAILY 90 capsule 3    ciprofloxacin (CIPRO) 500 MG tablet Take 1 tablet (500 mg) by mouth once for 1 dose. 1 tablet 0    doxazosin (CARDURA) 4 MG tablet TAKE ONE TABLET BY MOUTH ONE TIME DAILY. 90 tablet 3    escitalopram (LEXAPRO) 5 MG tablet Take 1 tablet (5 mg) by mouth daily. 90 tablet 3    latanoprost (XALATAN) 0.005 % ophthalmic solution [LATANOPROST (XALATAN) 0.005 % OPHTHALMIC SOLUTION] Administer 1 drop to both eyes bedtime.      LORazepam (ATIVAN) 0.5 MG tablet Take 1 tablet (0.5 mg) by mouth every 6 hours as needed for anxiety. 5 tablet 0    therapeutic multivitamin (THERAGRAN) tablet [THERAPEUTIC MULTIVITAMIN (THERAGRAN) TABLET] Take 1 tablet by mouth daily.      timolol maleate (TIMOPTIC) 0.5 % ophthalmic solution [TIMOLOL MALEATE (TIMOPTIC) 0.5 % OPHTHALMIC SOLUTION] INSTILL 1 DROP INTO BOTH EYES Q EVENING  3     ondansetron (ZOFRAN) 4 MG tablet Take 1 tablet (4 mg) by mouth every 8 hours as needed for nausea. (Patient not taking: Reported on 2025) 90 tablet 3     No current facility-administered medications for this visit.           Past History    Past Medical History:   Diagnosis Date    Complaint related to dreams 2019    Dyslipidemia     dyslipoproteinemia    Glaucoma     Hypercalcemia     Hyperparathyroidism     Hypertension     Hypothyroidism     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    OA (osteoarthritis)     Osteopenia     PONV (postoperative nausea and vomiting)     Postmenopausal     Retinal vein occlusion (H)     Vitamin D deficiency      Past Surgical History:   Procedure Laterality Date    APPENDECTOMY      Incidental    BIOPSY BREAST       SECTION      and     CHOLECYSTECTOMY      COLONOSCOPY N/A 2024    Procedure: Colonoscopy with biopsies and polypectomy, endoscopic marker;  Surgeon: Ron Hogan MD;  Location: [facility] OR    ENTEROSCOPY SMALL BOWEL N/A 2024    Procedure: Enteroscopy small bowel;  Surgeon: Ron Hogan MD;  Location: [facility] OR    ESOPHAGOSCOPY, GASTROSCOPY, DUODENOSCOPY (EGD), COMBINED N/A 2024    Procedure: ESOPHAGOGASTRODUODENOSCOPY, WITH FINE NEEDLE ASPIRATION BIOPSY, WITH ENDOSCOPIC ULTRASOUND GUIDANCE;  Surgeon: Ron Hogan MD;  Location: [facility] OR    HYSTERECTOMY TOTAL ABDOMINAL, BILATERAL SALPINGO-OOPHORECTOMY, COMBINED      KIDNEY STONE SURGERY      extraction    LUMBAR LAMINECTOMY      TOTAL HIP ARTHROPLASTY Left 2009    TOTAL KNEE ARTHROPLASTY Right 2009    TUBAL LIGATION       Allergies   Allergen Reactions    Narcotic Antagonist [External Allergen Needs Reconciliation - See Comment] Other (See Comments)     Dysphoria, hallucinations    Other Environmental Allergy Unknown     congestion, DUST    Pravastatin Unknown     myalgia    Triamterene Other (See Comments)     stones    Vasotec [Enalapril]  Other (See Comments)     cough    Effexor [Venlafaxine] Other (See Comments)     Adverse reaction -bowel     No family history on file.  Social History     Socioeconomic History    Marital status: Single     Spouse name: None    Number of children: None    Years of education: None    Highest education level: None   Tobacco Use    Smoking status: Never     Passive exposure: Past    Smokeless tobacco: Never   Vaping Use    Vaping status: Never Used   Substance and Sexual Activity    Alcohol use: No    Drug use: No   Social History Narrative    Lives alone. 2 hip replacements and 1 knee replacement. Used to work in housekeeping at [employer]. Saw Dr Crabtree. Has a daughter and son, and grandchildren and great grandchildren.     Social Drivers of Health     Financial Resource Strain: Low Risk  (8/12/2024)    Financial Resource Strain     Within the past 12 months, have you or your family members you live with been unable to get utilities (heat, electricity) when it was really needed?: No   Food Insecurity: Low Risk  (8/12/2024)    Food Insecurity     Within the past 12 months, did you worry that your food would run out before you got money to buy more?: No     Within the past 12 months, did the food you bought just not last and you didn t have money to get more?: No   Transportation Needs: Low Risk  (8/12/2024)    Transportation Needs     Within the past 12 months, has lack of transportation kept you from medical appointments, getting your medicines, non-medical meetings or appointments, work, or from getting things that you need?: No   Physical Activity: Inactive (8/12/2024)    Exercise Vital Sign     Days of Exercise per Week: 0 days     Minutes of Exercise per Session: 0 min   Stress: No Stress Concern Present (8/12/2024)    [employer] [employer] - Occupational Stress Questionnaire     Feeling of Stress : Only a little   Social Connections: Unknown (8/12/2024)    Social Connection and Isolation  Panel [NHANES]     Frequency of Social Gatherings with Friends and Family: Once a week   Interpersonal Safety: Low Risk  (12/19/2024)    Interpersonal Safety     Do you feel physically and emotionally safe where you currently live?: Yes     Within the past 12 months, have you been hit, slapped, kicked or otherwise physically hurt by someone?: No     Within the past 12 months, have you been humiliated or emotionally abused in other ways by your partner or ex-partner?: No   Housing Stability: Low Risk  (8/12/2024)    Housing Stability     Do you have housing? : Yes     Are you worried about losing your housing?: No           Lab Results    Recent Results (from the past 240 hours)   UA with Microscopic    Collection Time: 01/06/25 11:58 AM   Result Value Ref Range    Color Urine Yellow Colorless, Straw, Light Yellow, Yellow    Appearance Urine Hazy (A) Clear    Glucose Urine Negative Negative mg/dL    Bilirubin Urine Negative Negative    Ketones Urine Negative Negative mg/dL    Specific Gravity Urine 1.013 1.001 - 1.030    Blood Urine Negative Negative    pH Urine 5.5 5.0 - 7.0    Protein Albumin Urine Negative Negative mg/dL    Urobilinogen Urine 2.0 (A) <2.0 mg/dL    Nitrite Urine Negative Negative    Leukocyte Esterase Urine 500 Betsy/uL (A) Negative    Bacteria Urine Few (A) None Seen /HPF    RBC Urine <1 <=2 /HPF    WBC Urine 46 (H) <=5 /HPF    Squamous Epithelials Urine 14 (H) <=1 /HPF   Urine Culture Aerobic Bacterial    Collection Time: 01/06/25 11:59 AM    Specimen: Urine, Midstream   Result Value Ref Range    Culture 10,000-50,000 CFU/mL Urogenital shaun     Culture 10,000-50,000 CFU/mL Urogenital shaun    Glucose by meter    Collection Time: 01/06/25 12:12 PM   Result Value Ref Range    GLUCOSE BY METER POCT 109 (H) 70 - 99 mg/dL       Imaging Results    PET Oncology Whole Body    Result Date: 1/7/2025  EXAM: PET ONCOLOGY WHOLE BODY LOCATION: [facility] DATE: 1/6/2025 INDICATION:  Initial treatment evaluation for malignant neoplasm of the hepatic flexure. COMPARISON: 12/10/2024. CONTRAST: None. TECHNIQUE: Serum glucose level 109 mg/dL. One hour post intravenous administration of 10.13 mCi F-18 FDG, PET imaging was performed from the skull vertex to feet, utilizing attenuation correction with concurrent axial CT and PET/CT image fusion. Dose reduction techniques were used. PET/CT FINDINGS: FDG avid masslike thickening at the hepatic flexure of the colon measuring approximately 3.4 x 2.7 x 2.9 cm (SUV max 10.9). FDG lymphadenopathy in the neck, chest, and abdomen, with examples including left lower cervical (SUV max 11.3), left axillary station 3 (SUV max 5.5), left axillary station 1 (SUV max 10.7), retrocrural (SUV max 6.0), peripancreatic (SUV max 10.1), mesenteric (SUV max 13.9), para-aortic (SUV max 17.5), and right common iliac (SUV max 4.1). CT FINDINGS: Mild intracranial senescent changes. Mild carotid calcifications. Partial opacification of the left sphenoid sinus. Enlarged, multinodular thyroid. Mild to moderate coronary artery calcium. Hepatic lipoma. Left hepatic lobe cyst. Cholecystectomy. Multiple cystic lesions in the pancreas, the largest located in the pancreatic head measuring up to 4.6 cm. Simple renal cysts, which are benign and do not require follow-up. Left renal staghorn calculus. Approximately 11 mm calculus in the right proximal ureter with minimal upstream hydroureteronephrosis. Colonic diverticulosis. Mild to moderate aortobiiliac calcifications. Hysterectomy. Multilevel degenerative disease in the spine. Bilateral metallic hip arthroplasties. Metallic right  knee arthroplasty.     IMPRESSION: FDG avid masslike thickening in the hepatic flexure of the colon, corresponding to the known primary malignancy, with extensive naeem metastatic disease in the neck, chest, and abdomen.    MR Abdomen w/o & w Contrast    Result Date: 12/13/2024  EXAM: MR ABDOMEN W/O and W  CONTRAST LOCATION: [facility] DATE: 12/13/2024 INDICATION: pancreatic cysts , colonic thickening , highly suspicious CTfor metastatic colon or pancreatic cancer COMPARISON: Noncontrast CT AP 12/10/2024 TECHNIQUE: Routine MR liver/pancreas protocol including axial and coronal MRCP sequences. 2D and 3D reconstruction performed by MR technologist including MIP reconstruction and slab cholangiograms. If performed with contrast, additional dynamic T1 post IV contrast images. CONTRAST: Gadavist 6 mL FINDINGS: BILIARY: Cholecystectomy. Mild intrahepatic and extra hepatic bile duct dilatation to the level of the ampulla where there is a small rounded filling defect (axial series 7 image 26, series 8 coronal image 17) that may relate to the abnormal soft tissue in the second duodenal segment described below.. LIVER: Mild diffuse hepatic steatosis. Benign 2.4 cm lipoma in hepatic segment IV and benign 1.5 cm simple cyst hepatic segment II require no follow-up. PANCREAS AND PANCREATIC DUCT: Multiple thin-walled, nonenhancing cysts with no solid component throughout the pancreas with the largest measuring 4.5 cm in the pancreatic head. Normal caliber main pancreatic duct with classic ductal anatomy. BOWEL: Ill-defined enhancing tissue with diffusion restriction within the second duodenal segment adjacent to the ampulla could represent a periampullary malignancy. Focal mural thickening proximal transverse colon observed at recent CT is not well visualized on the current MRI study but remains indeterminate. LYMPH NODES: Bilateral para-aortic retroperitoneal lymphadenopathy extending from the level of the renal vasculature inferiorly to the level of the bifurcation with the largest lymph node in the aortocaval lymph node measuring 2.8 x 2.3 cm and a 2 cm left para-aortic lymph node just inferior to the left renal vein that would likely be amenable to CT-guided percutaneous biopsy if necessary.  KIDNEYS/ADRENAL GLANDS: The 1.0 x 0.8 x 0.6 cm proximal right ureteral stone and the 3.2 x 3.0 x 2.5 cm left staghorn calculus are unchanged. Multiple small benign cysts in each kidney require no follow-up. Kidneys and adrenal glands otherwise normal. SPLEEN: Spleen size normal. VASCULAR: Normal caliber abdominal aorta     IMPRESSION: 1.  Ill-defined enhancing tissue with diffusion restriction within the second duodenal segment adjacent to the ampulla could represent a periampullary malignancy and would likely be amenable to endoscopic evaluation. Recommend GI consult. 2.  Focal mural thickening proximal transverse colon observed at recent CT is not well visualized on the current MRI study but remains indeterminate. 3.  Mild intrahepatic and extra hepatic bile duct dilatation to the level of the ampulla where there is a small rounded filling defect that may relate to the abnormal soft tissue in the second duodenal segment . 4.  Bilateral para-aortic retroperitoneal lymphadenopathy. A 2 cm left para-aortic lymph node just inferior to the left renal vein with likely be amenable to CT-guided percutaneous biopsy if necessary. 5.  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 6.  The 1.0 x 0.8 x 0.6 cm proximal right ureteral stone and the 3.2 x 3.0 x 2.5 cm left staghorn calculus are unchanged.  Recommend urology consult.        I spent 40 minutes on the patient's visit today.  This included preparation for the visit, face-to-face time with the patient and documentation following the visit.  It did not include teaching or procedure time.    Signed by: Peter E. Friedell, MD

## 2025-01-09 NOTE — LETTER
"1/9/2025      Alma Johns  [address] [place] [address]      Dear Colleague,    Thank you for referring your patient, Alma Johns, to the [facility] CANCER [facility]. Please see a copy of my visit note below.    Oncology Rooming Note    January 9, 2025 1:34 PM   Alma Johns is a 89 year old female who presents for:    Chief Complaint   Patient presents with     Oncology Clinic Visit     Return visit 2 weeks. PET 01/06/25. Malignant neoplasm of hepatic flexure.     Initial Vitals: /66 (BP Location: Right arm, Patient Position: Sitting, Cuff Size: Adult Regular)   Pulse 59   Temp 98.3  F (36.8  C) (Oral)   Resp 16   Wt 57.2 kg (126 lb 1.6 oz)   LMP  (LMP Unknown)   SpO2 97%   BMI 27.29 kg/m   Estimated body mass index is 27.29 kg/m  as calculated from the following:    Height as of 1/3/25: 1.448 m (4' 9\").    Weight as of this encounter: 57.2 kg (126 lb 1.6 oz). Body surface area is 1.52 meters squared.  No Pain (0) Comment: Data Unavailable   No LMP recorded (lmp unknown). Patient has had a hysterectomy.  Allergies reviewed: Yes  Medications reviewed: Yes    Medications: Medication refills not needed today.  Pharmacy name entered into [employer]: [id number] - [place], [place] - [address] [facility]    Frailty Screening:   Is the patient here for a new oncology consult visit in cancer care? 2. No      Clinical concerns: none      Pamela Roy, [facility] Hematology and Oncology Progress Note    Patient: Alma Johns  MRN: [id number]  Date of Service: Jan 9, 2025       Reason for Visit    I was consulted by   Lucy Ruano MD     For evaluation and management of newly diagnosed colon cancer.      Encounter Diagnoses Assessment and Plan:    Problem List Items Addressed This Visit       Malignant neoplasm of hepatic flexure (H) - Primary    Relevant Orders    CBC with Platelets & Differential    Comprehensive metabolic panel   Patient with newly " diagnosed apple core adenocarcinoma of the hepatic flexure.  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  Patient does not wish to have chemotherapy.  PET scan shows extensive lymphadenopathy above and below the diaphragm.  Genomic studies are ordered on the colon cancer.  This mismatch repair proteins are intact by immunohistochemical analysis.  Follow-up is planned after genomic testing is completed.      ______________________________________________________________________________    Staging History   Cancer Staging   Colon adenocarcinoma (H)  Staging form: Colon and Rectum, AJCC 8th Edition  - Clinical stage from 1/9/2025: Stage IVB (cTX, cNX, cM1b) - Signed by Friedell, Peter E, MD on 1/9/2025    ECOG Performance  1 - Can't do physically strenuous work, but fully ambulatory and can do light sedentary work.    History of Present Illness      Alma Johns is an 89 year old woman with a history of hypercalcemia with elevated parathyroid hormone.  Over the last year she has had unexplained weight loss of about 20 pounds.  On 12/10/2024 she underwent CT scan of the abdomen and pelvis which showed probable lesion at the hepatic flexure of the colon and suspicious retroperitoneal adenopathy.  On 12/19/2024 she underwent colonoscopy which confirmed an adenocarcinoma of the colon.  Upper GI endoscopic ultrasound confirmed metastases in a upper abdominal lymph node adjacent to the pancreas near the mesenteric root.  Multiple pancreatic cysts appeared benign.    Presently she feels fairly well.  Her appetite is still decreased.  She does not have pain.  She does not have cough chest pain or shortness of breath.  She had a brief episode of numbness in her left arm and hand lasting for less than 24 hours a few weeks ago.  She does not smoke cigarettes or use alcohol.  There is no family history of cancer.    Review of systems.  Pertinent Findings are included in the History of Present Illness    Physical  Exam    /66 (BP Location: Right arm, Patient Position: Sitting, Cuff Size: Adult Regular)   Pulse 59   Temp 98.3  F (36.8  C) (Oral)   Resp 16   Wt 57.2 kg (126 lb 1.6 oz)   LMP  (LMP Unknown)   SpO2 97%   BMI 27.29 kg/m       Not examined this visit    Medications:    Current Outpatient Medications   Medication Sig Dispense Refill     alendronate (FOSAMAX) 70 MG tablet Take 1 tablet (70 mg) by mouth every 7 days 12 tablet 0     amLODIPine (NORVASC) 10 MG tablet Take 1 tablet (10 mg) by mouth daily 90 tablet 3     celecoxib (CELEBREX) 200 MG capsule TAKE 1 CAPSULE BY MOUTH DAILY 90 capsule 3     ciprofloxacin (CIPRO) 500 MG tablet Take 1 tablet (500 mg) by mouth once for 1 dose. 1 tablet 0     doxazosin (CARDURA) 4 MG tablet TAKE ONE TABLET BY MOUTH ONE TIME DAILY. 90 tablet 3     escitalopram (LEXAPRO) 5 MG tablet Take 1 tablet (5 mg) by mouth daily. 90 tablet 3     latanoprost (XALATAN) 0.005 % ophthalmic solution [LATANOPROST (XALATAN) 0.005 % OPHTHALMIC SOLUTION] Administer 1 drop to both eyes bedtime.       LORazepam (ATIVAN) 0.5 MG tablet Take 1 tablet (0.5 mg) by mouth every 6 hours as needed for anxiety. 5 tablet 0     therapeutic multivitamin (THERAGRAN) tablet [THERAPEUTIC MULTIVITAMIN (THERAGRAN) TABLET] Take 1 tablet by mouth daily.       timolol maleate (TIMOPTIC) 0.5 % ophthalmic solution [TIMOLOL MALEATE (TIMOPTIC) 0.5 % OPHTHALMIC SOLUTION] INSTILL 1 DROP INTO BOTH EYES Q EVENING  3     ondansetron (ZOFRAN) 4 MG tablet Take 1 tablet (4 mg) by mouth every 8 hours as needed for nausea. (Patient not taking: Reported on 1/9/2025) 90 tablet 3     No current facility-administered medications for this visit.           Past History    Past Medical History:   Diagnosis Date     Complaint related to dreams 04/03/2019     Dyslipidemia     dyslipoproteinemia     Glaucoma      Hypercalcemia      Hyperparathyroidism      Hypertension      Hypothyroidism      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     OA  (osteoarthritis)      Osteopenia      PONV (postoperative nausea and vomiting)      Postmenopausal      Retinal vein occlusion (H)      Vitamin D deficiency      Past Surgical History:   Procedure Laterality Date     APPENDECTOMY  1985    Incidental     BIOPSY BREAST        SECTION      and 195     CHOLECYSTECTOMY       COLONOSCOPY N/A 2024    Procedure: Colonoscopy with biopsies and polypectomy, endoscopic marker;  Surgeon: Ron Hogan MD;  Location: [facility] OR     ENTEROSCOPY SMALL BOWEL N/A 2024    Procedure: Enteroscopy small bowel;  Surgeon: Ron Hogan MD;  Location: [facility] OR     ESOPHAGOSCOPY, GASTROSCOPY, DUODENOSCOPY (EGD), COMBINED N/A 2024    Procedure: ESOPHAGOGASTRODUODENOSCOPY, WITH FINE NEEDLE ASPIRATION BIOPSY, WITH ENDOSCOPIC ULTRASOUND GUIDANCE;  Surgeon: Ron Hogan MD;  Location: [facility] OR     HYSTERECTOMY TOTAL ABDOMINAL, BILATERAL SALPINGO-OOPHORECTOMY, COMBINED       KIDNEY STONE SURGERY      extraction     LUMBAR LAMINECTOMY       TOTAL HIP ARTHROPLASTY Left 2009     TOTAL KNEE ARTHROPLASTY Right 2009     TUBAL LIGATION       Allergies   Allergen Reactions     Narcotic Antagonist [External Allergen Needs Reconciliation - See Comment] Other (See Comments)     Dysphoria, hallucinations     Other Environmental Allergy Unknown     congestion, DUST     Pravastatin Unknown     myalgia     Triamterene Other (See Comments)     stones     Vasotec [Enalapril] Other (See Comments)     cough     Effexor [Venlafaxine] Other (See Comments)     Adverse reaction -bowel     No family history on file.  Social History     Socioeconomic History     Marital status: Single     Spouse name: None     Number of children: None     Years of education: None     Highest education level: None   Tobacco Use     Smoking status: Never     Passive exposure: Past     Smokeless tobacco: Never   Vaping Use     Vaping status: Never Used   Substance and  Sexual Activity     Alcohol use: No     Drug use: No   Social History Narrative    Lives alone. 2 hip replacements and 1 knee replacement. Used to work in housekeeping at [employer]. Saw Dr Crabtree. Has a daughter and son, and grandchildren and great grandchildren.     Social Drivers of Health     Financial Resource Strain: Low Risk  (8/12/2024)    Financial Resource Strain      Within the past 12 months, have you or your family members you live with been unable to get utilities (heat, electricity) when it was really needed?: No   Food Insecurity: Low Risk  (8/12/2024)    Food Insecurity      Within the past 12 months, did you worry that your food would run out before you got money to buy more?: No      Within the past 12 months, did the food you bought just not last and you didn t have money to get more?: No   Transportation Needs: Low Risk  (8/12/2024)    Transportation Needs      Within the past 12 months, has lack of transportation kept you from medical appointments, getting your medicines, non-medical meetings or appointments, work, or from getting things that you need?: No   Physical Activity: Inactive (8/12/2024)    Exercise Vital Sign      Days of Exercise per Week: 0 days      Minutes of Exercise per Session: 0 min   Stress: No Stress Concern Present (8/12/2024)    [employer] [place] [employer] - Occupational Stress Questionnaire      Feeling of Stress : Only a little   Social Connections: Unknown (8/12/2024)    Social Connection and Isolation Panel [NHANES]      Frequency of Social Gatherings with Friends and Family: Once a week   Interpersonal Safety: Low Risk  (12/19/2024)    Interpersonal Safety      Do you feel physically and emotionally safe where you currently live?: Yes      Within the past 12 months, have you been hit, slapped, kicked or otherwise physically hurt by someone?: No      Within the past 12 months, have you been humiliated or emotionally abused in other ways by your  partner or ex-partner?: No   Housing Stability: Low Risk  (8/12/2024)    Housing Stability      Do you have housing? : Yes      Are you worried about losing your housing?: No           Lab Results    Recent Results (from the past 240 hours)   UA with Microscopic    Collection Time: 01/06/25 11:58 AM   Result Value Ref Range    Color Urine Yellow Colorless, Straw, Light Yellow, Yellow    Appearance Urine Hazy (A) Clear    Glucose Urine Negative Negative mg/dL    Bilirubin Urine Negative Negative    Ketones Urine Negative Negative mg/dL    Specific Gravity Urine 1.013 1.001 - 1.030    Blood Urine Negative Negative    pH Urine 5.5 5.0 - 7.0    Protein Albumin Urine Negative Negative mg/dL    Urobilinogen Urine 2.0 (A) <2.0 mg/dL    Nitrite Urine Negative Negative    Leukocyte Esterase Urine 500 Betsy/uL (A) Negative    Bacteria Urine Few (A) None Seen /HPF    RBC Urine <1 <=2 /HPF    WBC Urine 46 (H) <=5 /HPF    Squamous Epithelials Urine 14 (H) <=1 /HPF   Urine Culture Aerobic Bacterial    Collection Time: 01/06/25 11:59 AM    Specimen: Urine, Midstream   Result Value Ref Range    Culture 10,000-50,000 CFU/mL Urogenital shaun     Culture 10,000-50,000 CFU/mL Urogenital shaun    Glucose by meter    Collection Time: 01/06/25 12:12 PM   Result Value Ref Range    GLUCOSE BY METER POCT 109 (H) 70 - 99 mg/dL       Imaging Results    PET Oncology Whole Body    Result Date: 1/7/2025  EXAM: PET ONCOLOGY WHOLE BODY LOCATION: [facility] DATE: 1/6/2025 INDICATION: Initial treatment evaluation for malignant neoplasm of the hepatic flexure. COMPARISON: 12/10/2024. CONTRAST: None. TECHNIQUE: Serum glucose level 109 mg/dL. One hour post intravenous administration of 10.13 mCi F-18 FDG, PET imaging was performed from the skull vertex to feet, utilizing attenuation correction with concurrent axial CT and PET/CT image fusion. Dose reduction techniques were used. PET/CT FINDINGS: FDG avid masslike thickening at the  hepatic flexure of the colon measuring approximately 3.4 x 2.7 x 2.9 cm (SUV max 10.9). FDG lymphadenopathy in the neck, chest, and abdomen, with examples including left lower cervical (SUV max 11.3), left axillary station 3 (SUV max 5.5), left axillary station 1 (SUV max 10.7), retrocrural (SUV max 6.0), peripancreatic (SUV max 10.1), mesenteric (SUV max 13.9), para-aortic (SUV max 17.5), and right common iliac (SUV max 4.1). CT FINDINGS: Mild intracranial senescent changes. Mild carotid calcifications. Partial opacification of the left sphenoid sinus. Enlarged, multinodular thyroid. Mild to moderate coronary artery calcium. Hepatic lipoma. Left hepatic lobe cyst. Cholecystectomy. Multiple cystic lesions in the pancreas, the largest located in the pancreatic head measuring up to 4.6 cm. Simple renal cysts, which are benign and do not require follow-up. Left renal staghorn calculus. Approximately 11 mm calculus in the right proximal ureter with minimal upstream hydroureteronephrosis. Colonic diverticulosis. Mild to moderate aortobiiliac calcifications. Hysterectomy. Multilevel degenerative disease in the spine. Bilateral metallic hip arthroplasties. Metallic right  knee arthroplasty.     IMPRESSION: FDG avid masslike thickening in the hepatic flexure of the colon, corresponding to the known primary malignancy, with extensive naeem metastatic disease in the neck, chest, and abdomen.    MR Abdomen w/o & w Contrast    Result Date: 12/13/2024  EXAM: MR ABDOMEN W/O and W CONTRAST LOCATION: [facility] DATE: 12/13/2024 INDICATION: pancreatic cysts , colonic thickening , highly suspicious CTfor metastatic colon or pancreatic cancer COMPARISON: Noncontrast CT AP 12/10/2024 TECHNIQUE: Routine MR liver/pancreas protocol including axial and coronal MRCP sequences. 2D and 3D reconstruction performed by MR technologist including MIP reconstruction and slab cholangiograms. If performed with contrast,  additional dynamic T1 post IV contrast images. CONTRAST: Gadavist 6 mL FINDINGS: BILIARY: Cholecystectomy. Mild intrahepatic and extra hepatic bile duct dilatation to the level of the ampulla where there is a small rounded filling defect (axial series 7 image 26, series 8 coronal image 17) that may relate to the abnormal soft tissue in the second duodenal segment described below.. LIVER: Mild diffuse hepatic steatosis. Benign 2.4 cm lipoma in hepatic segment IV and benign 1.5 cm simple cyst hepatic segment II require no follow-up. PANCREAS AND PANCREATIC DUCT: Multiple thin-walled, nonenhancing cysts with no solid component throughout the pancreas with the largest measuring 4.5 cm in the pancreatic head. Normal caliber main pancreatic duct with classic ductal anatomy. BOWEL: Ill-defined enhancing tissue with diffusion restriction within the second duodenal segment adjacent to the ampulla could represent a periampullary malignancy. Focal mural thickening proximal transverse colon observed at recent CT is not well visualized on the current MRI study but remains indeterminate. LYMPH NODES: Bilateral para-aortic retroperitoneal lymphadenopathy extending from the level of the renal vasculature inferiorly to the level of the bifurcation with the largest lymph node in the aortocaval lymph node measuring 2.8 x 2.3 cm and a 2 cm left para-aortic lymph node just inferior to the left renal vein that would likely be amenable to CT-guided percutaneous biopsy if necessary. KIDNEYS/ADRENAL GLANDS: The 1.0 x 0.8 x 0.6 cm proximal right ureteral stone and the 3.2 x 3.0 x 2.5 cm left staghorn calculus are unchanged. Multiple small benign cysts in each kidney require no follow-up. Kidneys and adrenal glands otherwise normal. SPLEEN: Spleen size normal. VASCULAR: Normal caliber abdominal aorta     IMPRESSION: 1.  Ill-defined enhancing tissue with diffusion restriction within the second duodenal segment adjacent to the ampulla could  represent a periampullary malignancy and would likely be amenable to endoscopic evaluation. Recommend GI consult. 2.  Focal mural thickening proximal transverse colon observed at recent CT is not well visualized on the current MRI study but remains indeterminate. 3.  Mild intrahepatic and extra hepatic bile duct dilatation to the level of the ampulla where there is a small rounded filling defect that may relate to the abnormal soft tissue in the second duodenal segment . 4.  Bilateral para-aortic retroperitoneal lymphadenopathy. A 2 cm left para-aortic lymph node just inferior to the left renal vein with likely be amenable to CT-guided percutaneous biopsy if necessary. 5.  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 6.  The 1.0 x 0.8 x 0.6 cm proximal right ureteral stone and the 3.2 x 3.0 x 2.5 cm left staghorn calculus are unchanged.  Recommend urology consult.        I spent 40 minutes on the patient's visit today.  This included preparation for the visit, face-to-face time with the patient and documentation following the visit.  It did not include teaching or procedure time.    Signed by: Peter E. Friedell, MD          Again, thank you for allowing me to participate in the care of your patient.        Sincerely,        Peter E. Friedell, MD    Electronically signed

## 2025-01-09 NOTE — TELEPHONE ENCOUNTER
Spoke with Va regarding patient's UA/UC results. Culture is still pending. Dr. Madrid would like one dose of Cipro 500mg tonight prior to OR tomorrow.     Va will  this afternoon. No additional questions.     Lizy Valenzuela RN

## 2025-01-09 NOTE — PROGRESS NOTES
"Oncology Rooming Note    January 9, 2025 1:34 PM   Alma Johns is a 89 year old female who presents for:    Chief Complaint   Patient presents with    Oncology Clinic Visit     Return visit 2 weeks. PET 01/06/25. Malignant neoplasm of hepatic flexure.     Initial Vitals: /66 (BP Location: Right arm, Patient Position: Sitting, Cuff Size: Adult Regular)   Pulse 59   Temp 98.3  F (36.8  C) (Oral)   Resp 16   Wt 57.2 kg (126 lb 1.6 oz)   LMP  (LMP Unknown)   SpO2 97%   BMI 27.29 kg/m   Estimated body mass index is 27.29 kg/m  as calculated from the following:    Height as of 1/3/25: 1.448 m (4' 9\").    Weight as of this encounter: 57.2 kg (126 lb 1.6 oz). Body surface area is 1.52 meters squared.  No Pain (0) Comment: Data Unavailable   No LMP recorded (lmp unknown). Patient has had a hysterectomy.  Allergies reviewed: Yes  Medications reviewed: Yes    Medications: Medication refills not needed today.  Pharmacy name entered into [facility]: [id number] - [place], [place] - [address]    Frailty Screening:   Is the patient here for a new oncology consult visit in cancer care? 2. No      Clinical concerns: none      Pamela Roy CMA            "

## 2025-01-10 ENCOUNTER — HOSPITAL ENCOUNTER (OUTPATIENT)
Facility: AMBULATORY SURGERY CENTER | Age: OVER 89
Discharge: HOME | End: 2025-01-10
Attending: UROLOGY
Payer: MEDICARE

## 2025-01-10 VITALS
WEIGHT: 126 LBS | BODY MASS INDEX: 25.4 KG/M2 | OXYGEN SATURATION: 95 % | SYSTOLIC BLOOD PRESSURE: 134 MMHG | RESPIRATION RATE: 16 BRPM | HEIGHT: 59 IN | HEART RATE: 53 BPM | DIASTOLIC BLOOD PRESSURE: 62 MMHG | TEMPERATURE: 97.5 F

## 2025-01-10 DIAGNOSIS — N20.1 CALCULUS OF RIGHT URETER: ICD-10-CM

## 2025-01-10 DIAGNOSIS — N20.0 NEPHROLITHIASIS: ICD-10-CM

## 2025-01-10 PROCEDURE — G0452 MOLECULAR PATHOLOGY INTERPR: HCPCS | Mod: 26 | Performed by: PATHOLOGY

## 2025-01-10 RX ORDER — DEXAMETHASONE SODIUM PHOSPHATE 4 MG/ML
4 INJECTION, SOLUTION INTRA-ARTICULAR; INTRALESIONAL; INTRAMUSCULAR; INTRAVENOUS; SOFT TISSUE
Status: DISCONTINUED | OUTPATIENT
Start: 2025-01-10 | End: 2025-01-11 | Stop reason: HOSPADM

## 2025-01-10 RX ORDER — LIDOCAINE 40 MG/G
CREAM TOPICAL
Status: DISCONTINUED | OUTPATIENT
Start: 2025-01-10 | End: 2025-01-11 | Stop reason: HOSPADM

## 2025-01-10 RX ORDER — SODIUM CHLORIDE, SODIUM LACTATE, POTASSIUM CHLORIDE, CALCIUM CHLORIDE 600; 310; 30; 20 MG/100ML; MG/100ML; MG/100ML; MG/100ML
INJECTION, SOLUTION INTRAVENOUS CONTINUOUS
Status: DISCONTINUED | OUTPATIENT
Start: 2025-01-10 | End: 2025-01-11 | Stop reason: HOSPADM

## 2025-01-10 RX ORDER — ACETAMINOPHEN 325 MG/1
975 TABLET ORAL ONCE
Status: COMPLETED | OUTPATIENT
Start: 2025-01-10 | End: 2025-01-10

## 2025-01-10 RX ORDER — CIPROFLOXACIN 500 MG/1
500 TABLET, FILM COATED ORAL DAILY
Qty: 14 TABLET | Refills: 0 | Status: SHIPPED | OUTPATIENT
Start: 2025-01-10

## 2025-01-10 RX ORDER — OXYCODONE HYDROCHLORIDE 5 MG/1
5 TABLET ORAL
Status: DISCONTINUED | OUTPATIENT
Start: 2025-01-10 | End: 2025-01-11 | Stop reason: HOSPADM

## 2025-01-10 RX ORDER — NALOXONE HYDROCHLORIDE 0.4 MG/ML
0.1 INJECTION, SOLUTION INTRAMUSCULAR; INTRAVENOUS; SUBCUTANEOUS
Status: DISCONTINUED | OUTPATIENT
Start: 2025-01-10 | End: 2025-01-11 | Stop reason: HOSPADM

## 2025-01-10 RX ORDER — ONDANSETRON 4 MG/1
4 TABLET, ORALLY DISINTEGRATING ORAL EVERY 30 MIN PRN
Status: DISCONTINUED | OUTPATIENT
Start: 2025-01-10 | End: 2025-01-11 | Stop reason: HOSPADM

## 2025-01-10 RX ORDER — CIPROFLOXACIN 2 MG/ML
400 INJECTION, SOLUTION INTRAVENOUS
Status: COMPLETED | OUTPATIENT
Start: 2025-01-10 | End: 2025-01-10

## 2025-01-10 RX ORDER — ONDANSETRON 2 MG/ML
4 INJECTION INTRAMUSCULAR; INTRAVENOUS EVERY 30 MIN PRN
Status: DISCONTINUED | OUTPATIENT
Start: 2025-01-10 | End: 2025-01-11 | Stop reason: HOSPADM

## 2025-01-10 RX ORDER — OXYCODONE HYDROCHLORIDE 10 MG/1
10 TABLET ORAL
Status: DISCONTINUED | OUTPATIENT
Start: 2025-01-10 | End: 2025-01-11 | Stop reason: HOSPADM

## 2025-01-10 RX ADMIN — ACETAMINOPHEN 975 MG: 325 TABLET ORAL at 12:07

## 2025-01-10 RX ADMIN — SODIUM CHLORIDE, SODIUM LACTATE, POTASSIUM CHLORIDE, CALCIUM CHLORIDE: 600; 310; 30; 20 INJECTION, SOLUTION INTRAVENOUS at 12:22

## 2025-01-10 RX ADMIN — CIPROFLOXACIN 400 MG: 2 INJECTION, SOLUTION INTRAVENOUS at 12:23

## 2025-01-10 NOTE — DISCHARGE INSTRUCTIONS
If you have any questions or concerns regarding your procedure, please contact Dr. Madrid, his office number is [phone].     You have received 975 mg of Acetaminophen (Tylenol) at 12 PM. Please do not take an additional dose of Tylenol until after 6 PM     Do not exceed 4,000 mg of acetaminophen during a 24 hour period and keep in mind that acetaminophen can also be found in many over-the-counter cold medications as well as narcotics that may be given for pain.     Same-Day Surgery   Adult Discharge Orders & Instructions     For 24 hours after surgery    Get plenty of rest.  A responsible adult must stay with you for at least 24 hours after you leave the hospital.   Do not drive or use heavy equipment.  If you have weakness or tingling, don't drive or use heavy equipment until this feeling goes away.  Do not drink alcohol.  Avoid strenuous or risky activities.  Ask for help when climbing stairs.   You may feel lightheaded.  IF so, sit for a few minutes before standing.  Have someone help you get up.   If you have nausea (feel sick to your stomach): Drink only clear liquids such as apple juice, ginger ale, broth or 7-Up.  Rest may also help.  Be sure to drink enough fluids.  Move to a regular diet as you feel able.  You may have a slight fever. Call the doctor if your fever is over 100 F (37.7 C) (taken under the tongue) or lasts longer than 24 hours.  You may have a dry mouth, a sore throat, muscle aches or trouble sleeping.  These should go away after 24 hours.  Do not make important or legal decisions.   Call your doctor for any of the followin.  Signs of infection (fever, growing tenderness at the surgery site, a large amount of drainage or bleeding, severe pain, foul-smelling drainage, redness, swelling).    2. It has been over 8 to 10 hours since surgery and you are still not able to urinate (pass water).    3.  Headache for over 24 hours.

## 2025-01-13 ENCOUNTER — MYC MEDICAL ADVICE (OUTPATIENT)
Dept: UROLOGY | Facility: CLINIC | Age: OVER 89
End: 2025-01-13

## 2025-01-13 ENCOUNTER — PATIENT OUTREACH (OUTPATIENT)
Dept: ONCOLOGY | Facility: HOSPITAL | Age: OVER 89
End: 2025-01-13

## 2025-01-13 DIAGNOSIS — N20.0 KIDNEY STONE: Primary | ICD-10-CM

## 2025-01-13 LAB
BACTERIA UR CULT: ABNORMAL
BKR LAB AP ADD'L TEST STATUS: NORMAL
BKR PATH ADDL TEST FINAL COMMENTS: NORMAL

## 2025-01-13 PROCEDURE — 81455 SO/HL 51/>GSAP DNA/DNA&RNA: CPT | Performed by: INTERNAL MEDICINE

## 2025-01-13 RX ORDER — FLUCONAZOLE 200 MG/1
200 TABLET ORAL DAILY
Qty: 14 TABLET | Refills: 0 | Status: SHIPPED | OUTPATIENT
Start: 2025-01-13

## 2025-01-13 NOTE — PROGRESS NOTES
[facility]: Cancer Care                                                                                          Situation: Chart reviewed by RN Care Coordinator.    Background: Patient was seen last week for follow-up regarding colon cancer.     Assessment: Newly diagnosed cancer. She does not want any chemotherapy.  PET scan shows extensive lymphadenopathy above and below the diaphragm.      Plan/Recommendations: Genomic studies are ordered on the colon cancer. Follow-up is planned after genomic testing is completed.       Signature:  Shannon Marino  RN Care Coordinator  [facility]  Cancer [facility]

## 2025-01-14 ENCOUNTER — TELEPHONE (OUTPATIENT)
Dept: UROLOGY | Facility: CLINIC | Age: OVER 89
End: 2025-01-14
Payer: MEDICARE

## 2025-01-14 PROBLEM — N20.1 CALCULUS OF RIGHT URETER: Status: ACTIVE | Noted: 2024-12-27

## 2025-01-14 NOTE — PROGRESS NOTES
Spoke with patient's daughter. They would like to proceed with definitive stone management.     Va will assist patient in picking up and starting the Diflucan. She notes she is tolerating the stent well and has no concerns today. Surgery coordinator will reach out to patient and confirm 01/31 procedure date.     Lizy Valenzuela RN

## 2025-01-15 ENCOUNTER — TELEPHONE (OUTPATIENT)
Dept: UROLOGY | Facility: CLINIC | Age: OVER 89
End: 2025-01-15
Payer: MEDICARE

## 2025-01-15 NOTE — TELEPHONE ENCOUNTER
Spoke with: Va davis daughter      Date of surgery: Friday J an 31 2025 with Dr Madrid      Location: [facility]      Informed patient they will need a adult : YES      Pre op with provider: Recent OR  pre op 1/3       H&P Scheduled in PAC-NA        Pre procedure covid : Not req      Additional imaging: NA        Surgery Packet : Sent via [employer]      Additional comments: Please call for surgery teaching

## 2025-01-29 ENCOUNTER — TELEPHONE (OUTPATIENT)
Dept: ONCOLOGY | Facility: HOSPITAL | Age: OVER 89
End: 2025-01-29
Payer: MEDICARE

## 2025-01-29 NOTE — TELEPHONE ENCOUNTER
I received a phone call today from Alma's daughter, Va.  She is calling because her mom is coming in to see Dr. Friedell tomorrow to learn more about her cancer and prognosis.  She states her mom has a difficult time thinking about death but more recently had mentioned that a year still gives her time to experience life and see people.  Va is calling today to see how much time Dr. Friedell feels her mom has left.  I reviewed this with Dr. Friedell and he states she has less than 6 months but there may be some nonchemo treatments that could help extend this.  I called Va back with this information.  She is hoping Dr. Friedell would consider telling her mom but she has around a year left because she is concerned about how she will handle this information emotionally.  I let her know I would get this information over to Dr. Friedell today for him to consider prior to their discussion.  She appreciated the help and has no other questions at this time.    Soledad Espinal RN on 1/29/2025 at 11:01 AM

## 2025-01-30 ENCOUNTER — PATIENT OUTREACH (OUTPATIENT)
Dept: ONCOLOGY | Facility: HOSPITAL | Age: OVER 89
End: 2025-01-30

## 2025-01-30 ENCOUNTER — ONCOLOGY VISIT (OUTPATIENT)
Dept: ONCOLOGY | Facility: HOSPITAL | Age: OVER 89
End: 2025-01-30
Attending: INTERNAL MEDICINE
Payer: MEDICARE

## 2025-01-30 ENCOUNTER — LAB (OUTPATIENT)
Dept: INFUSION THERAPY | Facility: HOSPITAL | Age: OVER 89
End: 2025-01-30
Attending: INTERNAL MEDICINE
Payer: MEDICARE

## 2025-01-30 VITALS
RESPIRATION RATE: 16 BRPM | HEART RATE: 57 BPM | OXYGEN SATURATION: 96 % | HEIGHT: 58 IN | TEMPERATURE: 98 F | DIASTOLIC BLOOD PRESSURE: 66 MMHG | WEIGHT: 126.8 LBS | SYSTOLIC BLOOD PRESSURE: 134 MMHG | BODY MASS INDEX: 26.62 KG/M2

## 2025-01-30 DIAGNOSIS — C18.3 MALIGNANT NEOPLASM OF HEPATIC FLEXURE (H): ICD-10-CM

## 2025-01-30 DIAGNOSIS — C18.9 COLON ADENOCARCINOMA (H): Primary | ICD-10-CM

## 2025-01-30 LAB
ALBUMIN SERPL BCG-MCNC: 3.6 G/DL (ref 3.5–5.2)
ALP SERPL-CCNC: 94 U/L (ref 40–150)
ALT SERPL W P-5'-P-CCNC: 8 U/L (ref 0–50)
ANION GAP SERPL CALCULATED.3IONS-SCNC: 12 MMOL/L (ref 7–15)
AST SERPL W P-5'-P-CCNC: 22 U/L (ref 0–45)
BASOPHILS # BLD AUTO: 0.1 10E3/UL (ref 0–0.2)
BASOPHILS NFR BLD AUTO: 1 %
BILIRUB SERPL-MCNC: 0.8 MG/DL
BUN SERPL-MCNC: 25.8 MG/DL (ref 8–23)
CALCIUM SERPL-MCNC: 10.1 MG/DL (ref 8.8–10.4)
CHLORIDE SERPL-SCNC: 107 MMOL/L (ref 98–107)
CREAT SERPL-MCNC: 1.51 MG/DL (ref 0.51–0.95)
EGFRCR SERPLBLD CKD-EPI 2021: 33 ML/MIN/1.73M2
EOSINOPHIL # BLD AUTO: 0.3 10E3/UL (ref 0–0.7)
EOSINOPHIL NFR BLD AUTO: 4 %
ERYTHROCYTE [DISTWIDTH] IN BLOOD BY AUTOMATED COUNT: 13 % (ref 10–15)
GLUCOSE SERPL-MCNC: 127 MG/DL (ref 70–99)
HCO3 SERPL-SCNC: 23 MMOL/L (ref 22–29)
HCT VFR BLD AUTO: 37 % (ref 35–47)
HGB BLD-MCNC: 11.9 G/DL (ref 11.7–15.7)
IMM GRANULOCYTES # BLD: 0 10E3/UL
IMM GRANULOCYTES NFR BLD: 0 %
LYMPHOCYTES # BLD AUTO: 1.6 10E3/UL (ref 0.8–5.3)
LYMPHOCYTES NFR BLD AUTO: 23 %
MCH RBC QN AUTO: 28.2 PG (ref 26.5–33)
MCHC RBC AUTO-ENTMCNC: 32.2 G/DL (ref 31.5–36.5)
MCV RBC AUTO: 88 FL (ref 78–100)
MONOCYTES # BLD AUTO: 0.7 10E3/UL (ref 0–1.3)
MONOCYTES NFR BLD AUTO: 9 %
NEUTROPHILS # BLD AUTO: 4.6 10E3/UL (ref 1.6–8.3)
NEUTROPHILS NFR BLD AUTO: 63 %
NRBC # BLD AUTO: 0 10E3/UL
NRBC BLD AUTO-RTO: 0 /100
PLATELET # BLD AUTO: 228 10E3/UL (ref 150–450)
POTASSIUM SERPL-SCNC: 4.4 MMOL/L (ref 3.4–5.3)
PROT SERPL-MCNC: 6.4 G/DL (ref 6.4–8.3)
RBC # BLD AUTO: 4.22 10E6/UL (ref 3.8–5.2)
SODIUM SERPL-SCNC: 142 MMOL/L (ref 135–145)
WBC # BLD AUTO: 7.2 10E3/UL (ref 4–11)

## 2025-01-30 PROCEDURE — 84132 ASSAY OF SERUM POTASSIUM: CPT

## 2025-01-30 PROCEDURE — 36415 COLL VENOUS BLD VENIPUNCTURE: CPT

## 2025-01-30 PROCEDURE — 85048 AUTOMATED LEUKOCYTE COUNT: CPT

## 2025-01-30 PROCEDURE — G0463 HOSPITAL OUTPT CLINIC VISIT: HCPCS | Performed by: INTERNAL MEDICINE

## 2025-01-30 PROCEDURE — 85004 AUTOMATED DIFF WBC COUNT: CPT

## 2025-01-30 PROCEDURE — 82040 ASSAY OF SERUM ALBUMIN: CPT

## 2025-01-30 ASSESSMENT — PAIN SCALES - GENERAL: PAINLEVEL_OUTOF10: NO PAIN (0)

## 2025-01-30 NOTE — PROGRESS NOTES
"Oncology Rooming Note    January 30, 2025 1:55 PM   Alma Johns is a 89 year old female who presents for:    Chief Complaint   Patient presents with    Oncology Clinic Visit     Malignant neoplasm of hepatic flexure (H)        Initial Vitals: /66   Pulse 57   Temp 98  F (36.7  C)   Resp 16   Ht 1.467 m (4' 9.75\")   Wt 57.5 kg (126 lb 12.8 oz)   LMP  (LMP Unknown)   SpO2 96%   BMI 26.73 kg/m   Estimated body mass index is 26.73 kg/m  as calculated from the following:    Height as of this encounter: 1.467 m (4' 9.75\").    Weight as of this encounter: 57.5 kg (126 lb 12.8 oz). Body surface area is 1.53 meters squared.  No Pain (0) Comment: Data Unavailable   No LMP recorded (lmp unknown). Patient has had a hysterectomy.  Allergies reviewed: Yes  Medications reviewed: Yes    Medications: Medication refills not needed today.  Pharmacy name entered into [employer]:    [id number] - [place], [place] - [address] [facility] [place], [place] - [address] [place] [address]    Frailty Screening:   Is the patient here for a new oncology consult visit in cancer care? 2. No      Clinical concerns: None      Jovita Huang LPN             "

## 2025-01-30 NOTE — PROGRESS NOTES
[facility]: Cancer Care                                                                                          Patient was in clinic today for follow-up regarding colon cancer.    She is to commence capecitabine.  Oral pharmacy team has been made aware and will be processing this accordingly.  I provided Alma with some educational material.    She is aware that follow-up will be 1 to 2 weeks after she commences medication.  She has no questions or concerns at this time.      Signature:  Shannon Marino RN Care Coordinator  [facility]  Cancer [facility]

## 2025-01-30 NOTE — LETTER
"1/30/2025      Alma Johns  [address] [place] [address]      Dear Colleague,    Thank you for referring your patient, Alma Johns, to the [facility]. Please see a copy of my visit note below.    Oncology Rooming Note    January 30, 2025 1:55 PM   Alma Johns is a 89 year old female who presents for:    Chief Complaint   Patient presents with     Oncology Clinic Visit     Malignant neoplasm of hepatic flexure (H)        Initial Vitals: /66   Pulse 57   Temp 98  F (36.7  C)   Resp 16   Ht 1.467 m (4' 9.75\")   Wt 57.5 kg (126 lb 12.8 oz)   LMP  (LMP Unknown)   SpO2 96%   BMI 26.73 kg/m   Estimated body mass index is 26.73 kg/m  as calculated from the following:    Height as of this encounter: 1.467 m (4' 9.75\").    Weight as of this encounter: 57.5 kg (126 lb 12.8 oz). Body surface area is 1.53 meters squared.  No Pain (0) Comment: Data Unavailable   No LMP recorded (lmp unknown). Patient has had a hysterectomy.  Allergies reviewed: Yes  Medications reviewed: Yes    Medications: Medication refills not needed today.  Pharmacy name entered into [email or web address]:    [id number] - [place], [place] - [address] [facility] [place], [place] - [address] [place] [address]    Frailty Screening:   Is the patient here for a new oncology consult visit in cancer care? 2. No      Clinical concerns: None      Jovita Huang LPN               [facility] Hematology and Oncology Consult Note    Patient: Alma Johns  MRN: [id number]  Date of Service: Jan 30, 2025       Reason for Visit    I was consulted by   Lucy Ruano MD     For evaluation and management of newly diagnosed colon cancer.      Encounter Diagnoses Assessment and Plan:    Problem List Items Addressed This Visit       Colon adenocarcinoma (H) - Primary    Relevant Orders    CBC with platelets differential    Comprehensive metabolic panel    CBC with Platelets & Differential    " Comprehensive metabolic panel   Patient with newly diagnosed apple core adenocarcinoma of the hepatic flexure.  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  Patient does not wish to have chemotherapy.  She had a surgical consultation on 12/30/2024.  Palliative surgery was deferred until the patient developed obstructive symptoms.  We again discussed palliative chemotherapy.  Patient has agreed to a trial of capecitabine.  Treatment plan has been entered.  Follow-up visit is planned 1 to 2 weeks after starting capecitabine.        ______________________________________________________________________________    Staging History   Cancer Staging   Colon adenocarcinoma (H)  Staging form: Colon and Rectum, AJCC 8th Edition  - Clinical stage from 1/9/2025: Stage IVB (cTX, cNX, cM1b) - Signed by Friedell, Peter E, MD on 1/9/2025    ECOG Performance  1 - Can't do physically strenuous work, but fully ambulatory and can do light sedentary work.    History of Present Illness      Alma Johns is an 89 year old woman with a history of hypercalcemia with elevated parathyroid hormone.  Over the last year she has had unexplained weight loss of about 20 pounds.  On 12/10/2024 she underwent CT scan of the abdomen and pelvis which showed probable lesion at the hepatic flexure of the colon and suspicious retroperitoneal adenopathy.  On 12/19/2024 she underwent colonoscopy which confirmed an adenocarcinoma of the colon.  Upper GI endoscopic ultrasound confirmed metastases in a upper abdominal lymph node adjacent to the pancreas near the mesenteric root.  Multiple pancreatic cysts appeared benign.    Presently she feels fairly well.  Her appetite is still decreased.  She does not have pain.  She does not have cough chest pain or shortness of breath.  She had a brief episode of numbness in her left arm and hand lasting for less than 24 hours a few weeks ago.  She does not smoke cigarettes or use alcohol.  There is no family  "history of cancer.    Review of systems.  Pertinent Findings are included in the History of Present Illness    Physical Exam    /66   Pulse 57   Temp 98  F (36.7  C)   Resp 16   Ht 1.467 m (4' 9.75\")   Wt 57.5 kg (126 lb 12.8 oz)   LMP  (LMP Unknown)   SpO2 96%   BMI 26.73 kg/m       GENERAL APPEARANCE: 89-year-old woman in no apparent distress.  HEAD: Atraumatic; normocephalic; without lesions.  EYES: Conjunctiva, corneas and eyelids normal; pupils equal, round, reactive to light; No Icterus.  MOUTH/OROPHARYNX: Oral mucosa intact  NECK: Supple with no nodes.  LUNGS:  Clear to auscultation and percussion with no extra sounds.  HEART: Regular rhythm and rate; S1 and S2 normal; no murmurs noted.  ABDOMEN: Soft; no masses or tenderness, no hepatosplenomegaly.  NEUROLOGIC: Alert and oriented.  No obvious focal findings.  EXTREMITIES: No cyanosis, or edema.  SKIN: No abnormal bruising or bleeding. No suspicious lesions noted on exposed skin.  PSYCHIATRIC: Mental status normal; no apparent psychiatric issues    Medications:    Current Outpatient Medications   Medication Sig Dispense Refill     alendronate (FOSAMAX) 70 MG tablet Take 1 tablet (70 mg) by mouth every 7 days 12 tablet 0     amLODIPine (NORVASC) 10 MG tablet Take 1 tablet (10 mg) by mouth daily 90 tablet 3     celecoxib (CELEBREX) 200 MG capsule TAKE 1 CAPSULE BY MOUTH DAILY 90 capsule 3     ciprofloxacin (CIPRO) 500 MG tablet Take 1 tablet (500 mg) by mouth daily. 14 tablet 0     doxazosin (CARDURA) 4 MG tablet TAKE ONE TABLET BY MOUTH ONE TIME DAILY. 90 tablet 3     escitalopram (LEXAPRO) 5 MG tablet Take 1 tablet (5 mg) by mouth daily. 90 tablet 3     fluconazole (DIFLUCAN) 200 MG tablet Take 1 tablet (200 mg) by mouth daily. 14 tablet 0     latanoprost (XALATAN) 0.005 % ophthalmic solution [LATANOPROST (XALATAN) 0.005 % OPHTHALMIC SOLUTION] Administer 1 drop to both eyes bedtime.       LORazepam (ATIVAN) 0.5 MG tablet Take 1 tablet (0.5 mg) " by mouth every 6 hours as needed for anxiety. 5 tablet 0     therapeutic multivitamin (THERAGRAN) tablet [THERAPEUTIC MULTIVITAMIN (THERAGRAN) TABLET] Take 1 tablet by mouth daily.       timolol maleate (TIMOPTIC) 0.5 % ophthalmic solution [TIMOLOL MALEATE (TIMOPTIC) 0.5 % OPHTHALMIC SOLUTION] INSTILL 1 DROP INTO BOTH EYES Q EVENING  3     ondansetron (ZOFRAN) 4 MG tablet Take 1 tablet (4 mg) by mouth every 8 hours as needed for nausea. (Patient not taking: Reported on 2025) 90 tablet 3     No current facility-administered medications for this visit.           Past History    Past Medical History:   Diagnosis Date     Colon cancer (H)     Stage IV with presence in lymph nodes and other distant body parts     Complaint related to dreams 2019     Dyslipidemia     dyslipoproteinemia     Glaucoma      Hypercalcemia      Hyperparathyroidism      Hypertension      Hypothyroidism      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     OA (osteoarthritis)      Osteopenia      PONV (postoperative nausea and vomiting)      Postmenopausal      Retinal vein occlusion (H)      Vitamin D deficiency      Past Surgical History:   Procedure Laterality Date     APPENDECTOMY      Incidental     BIOPSY BREAST        SECTION      and      CHOLECYSTECTOMY       COLONOSCOPY N/A 2024    Procedure: Colonoscopy with biopsies and polypectomy, endoscopic marker;  Surgeon: Ron Hogan MD;  Location: [facility] OR     ENTEROSCOPY SMALL BOWEL N/A 2024    Procedure: Enteroscopy small bowel;  Surgeon: Ron Hogan MD;  Location: [facility] OR     ESOPHAGOSCOPY, GASTROSCOPY, DUODENOSCOPY (EGD), COMBINED N/A 2024    Procedure: ESOPHAGOGASTRODUODENOSCOPY, WITH FINE NEEDLE ASPIRATION BIOPSY, WITH ENDOSCOPIC ULTRASOUND GUIDANCE;  Surgeon: Ron Hogan MD;  Location: [facility] OR     HYSTERECTOMY TOTAL ABDOMINAL, BILATERAL SALPINGO-OOPHORECTOMY, COMBINED       KIDNEY STONE SURGERY      extraction      LUMBAR LAMINECTOMY  2014     TOTAL HIP ARTHROPLASTY Left 01/01/2009     TOTAL KNEE ARTHROPLASTY Right 01/01/2009     TUBAL LIGATION       Allergies   Allergen Reactions     Narcotic Antagonist [External Allergen Needs Reconciliation - See Comment] Other (See Comments)     Dysphoria, hallucinations     Other Environmental Allergy Unknown     congestion, DUST     Pravastatin Unknown     myalgia     Triamterene Other (See Comments)     stones     Vasotec [Enalapril] Other (See Comments)     cough     Effexor [Venlafaxine] Other (See Comments)     Adverse reaction -bowel     No family history on file.  Social History     Socioeconomic History     Marital status: Single     Spouse name: None     Number of children: None     Years of education: None     Highest education level: None   Tobacco Use     Smoking status: Never     Passive exposure: Past     Smokeless tobacco: Never   Vaping Use     Vaping status: Never Used   Substance and Sexual Activity     Alcohol use: No     Drug use: No   Social History Narrative    Lives alone. 2 hip replacements and 1 knee replacement. Used to work in housekeeping at [employer]. Saw Dr Crabtree. Has a daughter and son, and grandchildren and great grandchildren.     Social Drivers of Health     Financial Resource Strain: Low Risk  (8/12/2024)    Financial Resource Strain      Within the past 12 months, have you or your family members you live with been unable to get utilities (heat, electricity) when it was really needed?: No   Food Insecurity: Low Risk  (8/12/2024)    Food Insecurity      Within the past 12 months, did you worry that your food would run out before you got money to buy more?: No      Within the past 12 months, did the food you bought just not last and you didn t have money to get more?: No   Transportation Needs: Low Risk  (8/12/2024)    Transportation Needs      Within the past 12 months, has lack of transportation kept you from medical appointments, getting your  medicines, non-medical meetings or appointments, work, or from getting things that you need?: No   Physical Activity: Inactive (8/12/2024)    Exercise Vital Sign      Days of Exercise per Week: 0 days      Minutes of Exercise per Session: 0 min   Stress: No Stress Concern Present (8/12/2024)    [employer] [place] [employer] - Occupational Stress Questionnaire      Feeling of Stress : Only a little   Social Connections: Unknown (8/12/2024)    Social Connection and Isolation Panel [NHANES]      Frequency of Social Gatherings with Friends and Family: Once a week   Interpersonal Safety: Low Risk  (12/19/2024)    Interpersonal Safety      Do you feel physically and emotionally safe where you currently live?: Yes      Within the past 12 months, have you been hit, slapped, kicked or otherwise physically hurt by someone?: No      Within the past 12 months, have you been humiliated or emotionally abused in other ways by your partner or ex-partner?: No   Housing Stability: Low Risk  (8/12/2024)    Housing Stability      Do you have housing? : Yes      Are you worried about losing your housing?: No           Lab Results    Recent Results (from the past 240 hours)   Comprehensive metabolic panel    Collection Time: 01/30/25 12:58 PM   Result Value Ref Range    Sodium 142 135 - 145 mmol/L    Potassium 4.4 3.4 - 5.3 mmol/L    Carbon Dioxide (CO2) 23 22 - 29 mmol/L    Anion Gap 12 7 - 15 mmol/L    Urea Nitrogen 25.8 (H) 8.0 - 23.0 mg/dL    Creatinine 1.51 (H) 0.51 - 0.95 mg/dL    GFR Estimate 33 (L) >60 mL/min/1.73m2    Calcium 10.1 8.8 - 10.4 mg/dL    Chloride 107 98 - 107 mmol/L    Glucose 127 (H) 70 - 99 mg/dL    Alkaline Phosphatase 94 40 - 150 U/L    AST 22 0 - 45 U/L    ALT 8 0 - 50 U/L    Protein Total 6.4 6.4 - 8.3 g/dL    Albumin 3.6 3.5 - 5.2 g/dL    Bilirubin Total 0.8 <=1.2 mg/dL   CBC with platelets and differential    Collection Time: 01/30/25 12:58 PM   Result Value Ref Range    WBC Count 7.2 4.0 - 11.0  10e3/uL    RBC Count 4.22 3.80 - 5.20 10e6/uL    Hemoglobin 11.9 11.7 - 15.7 g/dL    Hematocrit 37.0 35.0 - 47.0 %    MCV 88 78 - 100 fL    MCH 28.2 26.5 - 33.0 pg    MCHC 32.2 31.5 - 36.5 g/dL    RDW 13.0 10.0 - 15.0 %    Platelet Count 228 150 - 450 10e3/uL    % Neutrophils 63 %    % Lymphocytes 23 %    % Monocytes 9 %    % Eosinophils 4 %    % Basophils 1 %    % Immature Granulocytes 0 %    NRBCs per 100 WBC 0 <1 /100    Absolute Neutrophils 4.6 1.6 - 8.3 10e3/uL    Absolute Lymphocytes 1.6 0.8 - 5.3 10e3/uL    Absolute Monocytes 0.7 0.0 - 1.3 10e3/uL    Absolute Eosinophils 0.3 0.0 - 0.7 10e3/uL    Absolute Basophils 0.1 0.0 - 0.2 10e3/uL    Absolute Immature Granulocytes 0.0 <=0.4 10e3/uL    Absolute NRBCs 0.0 10e3/uL       Imaging Results    PET Oncology Whole Body    Result Date: 1/7/2025  EXAM: PET ONCOLOGY WHOLE BODY LOCATION: [facility] DATE: 1/6/2025 INDICATION: Initial treatment evaluation for malignant neoplasm of the hepatic flexure. COMPARISON: 12/10/2024. CONTRAST: None. TECHNIQUE: Serum glucose level 109 mg/dL. One hour post intravenous administration of 10.13 mCi F-18 FDG, PET imaging was performed from the skull vertex to feet, utilizing attenuation correction with concurrent axial CT and PET/CT image fusion. Dose reduction techniques were used. PET/CT FINDINGS: FDG avid masslike thickening at the hepatic flexure of the colon measuring approximately 3.4 x 2.7 x 2.9 cm (SUV max 10.9). FDG lymphadenopathy in the neck, chest, and abdomen, with examples including left lower cervical (SUV max 11.3), left axillary station 3 (SUV max 5.5), left axillary station 1 (SUV max 10.7), retrocrural (SUV max 6.0), peripancreatic (SUV max 10.1), mesenteric (SUV max 13.9), para-aortic (SUV max 17.5), and right common iliac (SUV max 4.1). CT FINDINGS: Mild intracranial senescent changes. Mild carotid calcifications. Partial opacification of the left sphenoid sinus. Enlarged, multinodular  thyroid. Mild to moderate coronary artery calcium. Hepatic lipoma. Left hepatic lobe cyst. Cholecystectomy. Multiple cystic lesions in the pancreas, the largest located in the pancreatic head measuring up to 4.6 cm. Simple renal cysts, which are benign and do not require follow-up. Left renal staghorn calculus. Approximately 11 mm calculus in the right proximal ureter with minimal upstream hydroureteronephrosis. Colonic diverticulosis. Mild to moderate aortobiiliac calcifications. Hysterectomy. Multilevel degenerative disease in the spine. Bilateral metallic hip arthroplasties. Metallic right  knee arthroplasty.     IMPRESSION: FDG avid masslike thickening in the hepatic flexure of the colon, corresponding to the known primary malignancy, with extensive naeem metastatic disease in the neck, chest, and abdomen.     The informed consent process has occurred, as I have provided the patient with an explanation of their colon cancer diagnosis as well as the prognosis of their disease. I discussed the risks, benefits and alternatives to treatment. Risks which are common with this treatment may include, but are not limited to anemia, infections and bleeding. Less common risks with this treatment may include, but are not limited to dryness and redness of the hands and feet. The patient was given the opportunity to ask questions, and their questions were answered. The patient has consented to capecitabine chemotherapy has palliation for adenocarcinoma of the colon..        I spent 44 minutes on the patient's visit today.  This included preparation for the visit, face-to-face time with the patient and documentation following the visit.  It did not include teaching or procedure time.    Signed by: Peter E. Friedell, MD          Again, thank you for allowing me to participate in the care of your patient.        Sincerely,        Peter E. Friedell, MD    Electronically signed

## 2025-01-30 NOTE — PROGRESS NOTES
[facility] Hematology and Oncology Consult Note    Patient: Alma Johns  MRN: [id number]  Date of Service: Jan 30, 2025       Reason for Visit    I was consulted by   Lucy Ruano MD     For evaluation and management of newly diagnosed colon cancer.      Encounter Diagnoses Assessment and Plan:    Problem List Items Addressed This Visit       Colon adenocarcinoma (H) - Primary    Relevant Orders    CBC with platelets differential    Comprehensive metabolic panel    CBC with Platelets & Differential    Comprehensive metabolic panel   Patient with newly diagnosed apple core adenocarcinoma of the hepatic flexure.  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  Patient does not wish to have chemotherapy.  She had a surgical consultation on 12/30/2024.  Palliative surgery was deferred until the patient developed obstructive symptoms.  We again discussed palliative chemotherapy.  Patient has agreed to a trial of capecitabine.  Treatment plan has been entered.  Follow-up visit is planned 1 to 2 weeks after starting capecitabine.        ______________________________________________________________________________    Staging History   Cancer Staging   Colon adenocarcinoma (H)  Staging form: Colon and Rectum, AJCC 8th Edition  - Clinical stage from 1/9/2025: Stage IVB (cTX, cNX, cM1b) - Signed by Friedell, Peter E, MD on 1/9/2025    ECOG Performance  1 - Can't do physically strenuous work, but fully ambulatory and can do light sedentary work.    History of Present Illness      Alma Johns is an 89 year old woman with a history of hypercalcemia with elevated parathyroid hormone.  Over the last year she has had unexplained weight loss of about 20 pounds.  On 12/10/2024 she underwent CT scan of the abdomen and pelvis which showed probable lesion at the hepatic flexure of the colon and suspicious retroperitoneal adenopathy.  On 12/19/2024 she underwent colonoscopy which confirmed an adenocarcinoma of the  "colon.  Upper GI endoscopic ultrasound confirmed metastases in a upper abdominal lymph node adjacent to the pancreas near the mesenteric root.  Multiple pancreatic cysts appeared benign.    Presently she feels fairly well.  Her appetite is still decreased.  She does not have pain.  She does not have cough chest pain or shortness of breath.  She had a brief episode of numbness in her left arm and hand lasting for less than 24 hours a few weeks ago.  She does not smoke cigarettes or use alcohol.  There is no family history of cancer.    Review of systems.  Pertinent Findings are included in the History of Present Illness    Physical Exam    /66   Pulse 57   Temp 98  F (36.7  C)   Resp 16   Ht 1.467 m (4' 9.75\")   Wt 57.5 kg (126 lb 12.8 oz)   LMP  (LMP Unknown)   SpO2 96%   BMI 26.73 kg/m       GENERAL APPEARANCE: 89-year-old woman in no apparent distress.  HEAD: Atraumatic; normocephalic; without lesions.  EYES: Conjunctiva, corneas and eyelids normal; pupils equal, round, reactive to light; No Icterus.  MOUTH/OROPHARYNX: Oral mucosa intact  NECK: Supple with no nodes.  LUNGS:  Clear to auscultation and percussion with no extra sounds.  HEART: Regular rhythm and rate; S1 and S2 normal; no murmurs noted.  ABDOMEN: Soft; no masses or tenderness, no hepatosplenomegaly.  NEUROLOGIC: Alert and oriented.  No obvious focal findings.  EXTREMITIES: No cyanosis, or edema.  SKIN: No abnormal bruising or bleeding. No suspicious lesions noted on exposed skin.  PSYCHIATRIC: Mental status normal; no apparent psychiatric issues    Medications:    Current Outpatient Medications   Medication Sig Dispense Refill    alendronate (FOSAMAX) 70 MG tablet Take 1 tablet (70 mg) by mouth every 7 days 12 tablet 0    amLODIPine (NORVASC) 10 MG tablet Take 1 tablet (10 mg) by mouth daily 90 tablet 3    celecoxib (CELEBREX) 200 MG capsule TAKE 1 CAPSULE BY MOUTH DAILY 90 capsule 3    ciprofloxacin (CIPRO) 500 MG tablet Take 1 tablet " (500 mg) by mouth daily. 14 tablet 0    doxazosin (CARDURA) 4 MG tablet TAKE ONE TABLET BY MOUTH ONE TIME DAILY. 90 tablet 3    escitalopram (LEXAPRO) 5 MG tablet Take 1 tablet (5 mg) by mouth daily. 90 tablet 3    fluconazole (DIFLUCAN) 200 MG tablet Take 1 tablet (200 mg) by mouth daily. 14 tablet 0    latanoprost (XALATAN) 0.005 % ophthalmic solution [LATANOPROST (XALATAN) 0.005 % OPHTHALMIC SOLUTION] Administer 1 drop to both eyes bedtime.      LORazepam (ATIVAN) 0.5 MG tablet Take 1 tablet (0.5 mg) by mouth every 6 hours as needed for anxiety. 5 tablet 0    therapeutic multivitamin (THERAGRAN) tablet [THERAPEUTIC MULTIVITAMIN (THERAGRAN) TABLET] Take 1 tablet by mouth daily.      timolol maleate (TIMOPTIC) 0.5 % ophthalmic solution [TIMOLOL MALEATE (TIMOPTIC) 0.5 % OPHTHALMIC SOLUTION] INSTILL 1 DROP INTO BOTH EYES Q EVENING  3    ondansetron (ZOFRAN) 4 MG tablet Take 1 tablet (4 mg) by mouth every 8 hours as needed for nausea. (Patient not taking: Reported on 2025) 90 tablet 3     No current facility-administered medications for this visit.           Past History    Past Medical History:   Diagnosis Date    Colon cancer (H)     Stage IV with presence in lymph nodes and other distant body parts    Complaint related to dreams 2019    Dyslipidemia     dyslipoproteinemia    Glaucoma     Hypercalcemia     Hyperparathyroidism     Hypertension     Hypothyroidism     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    OA (osteoarthritis)     Osteopenia     PONV (postoperative nausea and vomiting)     Postmenopausal     Retinal vein occlusion (H)     Vitamin D deficiency      Past Surgical History:   Procedure Laterality Date    APPENDECTOMY  1985    Incidental    BIOPSY BREAST       SECTION      and     CHOLECYSTECTOMY      COLONOSCOPY N/A 2024    Procedure: Colonoscopy with biopsies and polypectomy, endoscopic marker;  Surgeon: Ron Hogan MD;  Location: [facility] OR    ENTEROSCOPY SMALL  BOWEL N/A 12/19/2024    Procedure: Enteroscopy small bowel;  Surgeon: Ron Hogan MD;  Location: [facility] OR    ESOPHAGOSCOPY, GASTROSCOPY, DUODENOSCOPY (EGD), COMBINED N/A 12/19/2024    Procedure: ESOPHAGOGASTRODUODENOSCOPY, WITH FINE NEEDLE ASPIRATION BIOPSY, WITH ENDOSCOPIC ULTRASOUND GUIDANCE;  Surgeon: Ron Hogan MD;  Location: [facility] OR    HYSTERECTOMY TOTAL ABDOMINAL, BILATERAL SALPINGO-OOPHORECTOMY, COMBINED  1985    KIDNEY STONE SURGERY      extraction    LUMBAR LAMINECTOMY  2014    TOTAL HIP ARTHROPLASTY Left 01/01/2009    TOTAL KNEE ARTHROPLASTY Right 01/01/2009    TUBAL LIGATION       Allergies   Allergen Reactions    Narcotic Antagonist [External Allergen Needs Reconciliation - See Comment] Other (See Comments)     Dysphoria, hallucinations    Other Environmental Allergy Unknown     congestion, DUST    Pravastatin Unknown     myalgia    Triamterene Other (See Comments)     stones    Vasotec [Enalapril] Other (See Comments)     cough    Effexor [Venlafaxine] Other (See Comments)     Adverse reaction -bowel     No family history on file.  Social History     Socioeconomic History    Marital status: Single     Spouse name: None    Number of children: None    Years of education: None    Highest education level: None   Tobacco Use    Smoking status: Never     Passive exposure: Past    Smokeless tobacco: Never   Vaping Use    Vaping status: Never Used   Substance and Sexual Activity    Alcohol use: No    Drug use: No   Social History Narrative    Lives alone. 2 hip replacements and 1 knee replacement. Used to work in housekeeping at [employer]. Saw Dr Crabtree. Has a daughter and son, and grandchildren and great grandchildren.     Social Drivers of Health     Financial Resource Strain: Low Risk  (8/12/2024)    Financial Resource Strain     Within the past 12 months, have you or your family members you live with been unable to get utilities (heat, electricity) when it was really needed?: No   Food  Insecurity: Low Risk  (8/12/2024)    Food Insecurity     Within the past 12 months, did you worry that your food would run out before you got money to buy more?: No     Within the past 12 months, did the food you bought just not last and you didn t have money to get more?: No   Transportation Needs: Low Risk  (8/12/2024)    Transportation Needs     Within the past 12 months, has lack of transportation kept you from medical appointments, getting your medicines, non-medical meetings or appointments, work, or from getting things that you need?: No   Physical Activity: Inactive (8/12/2024)    Exercise Vital Sign     Days of Exercise per Week: 0 days     Minutes of Exercise per Session: 0 min   Stress: No Stress Concern Present (8/12/2024)    [employer] [place] [employer] - Occupational Stress Questionnaire     Feeling of Stress : Only a little   Social Connections: Unknown (8/12/2024)    Social Connection and Isolation Panel [NHANES]     Frequency of Social Gatherings with Friends and Family: Once a week   Interpersonal Safety: Low Risk  (12/19/2024)    Interpersonal Safety     Do you feel physically and emotionally safe where you currently live?: Yes     Within the past 12 months, have you been hit, slapped, kicked or otherwise physically hurt by someone?: No     Within the past 12 months, have you been humiliated or emotionally abused in other ways by your partner or ex-partner?: No   Housing Stability: Low Risk  (8/12/2024)    Housing Stability     Do you have housing? : Yes     Are you worried about losing your housing?: No           Lab Results    Recent Results (from the past 240 hours)   Comprehensive metabolic panel    Collection Time: 01/30/25 12:58 PM   Result Value Ref Range    Sodium 142 135 - 145 mmol/L    Potassium 4.4 3.4 - 5.3 mmol/L    Carbon Dioxide (CO2) 23 22 - 29 mmol/L    Anion Gap 12 7 - 15 mmol/L    Urea Nitrogen 25.8 (H) 8.0 - 23.0 mg/dL    Creatinine 1.51 (H) 0.51 - 0.95 mg/dL    GFR  Estimate 33 (L) >60 mL/min/1.73m2    Calcium 10.1 8.8 - 10.4 mg/dL    Chloride 107 98 - 107 mmol/L    Glucose 127 (H) 70 - 99 mg/dL    Alkaline Phosphatase 94 40 - 150 U/L    AST 22 0 - 45 U/L    ALT 8 0 - 50 U/L    Protein Total 6.4 6.4 - 8.3 g/dL    Albumin 3.6 3.5 - 5.2 g/dL    Bilirubin Total 0.8 <=1.2 mg/dL   CBC with platelets and differential    Collection Time: 01/30/25 12:58 PM   Result Value Ref Range    WBC Count 7.2 4.0 - 11.0 10e3/uL    RBC Count 4.22 3.80 - 5.20 10e6/uL    Hemoglobin 11.9 11.7 - 15.7 g/dL    Hematocrit 37.0 35.0 - 47.0 %    MCV 88 78 - 100 fL    MCH 28.2 26.5 - 33.0 pg    MCHC 32.2 31.5 - 36.5 g/dL    RDW 13.0 10.0 - 15.0 %    Platelet Count 228 150 - 450 10e3/uL    % Neutrophils 63 %    % Lymphocytes 23 %    % Monocytes 9 %    % Eosinophils 4 %    % Basophils 1 %    % Immature Granulocytes 0 %    NRBCs per 100 WBC 0 <1 /100    Absolute Neutrophils 4.6 1.6 - 8.3 10e3/uL    Absolute Lymphocytes 1.6 0.8 - 5.3 10e3/uL    Absolute Monocytes 0.7 0.0 - 1.3 10e3/uL    Absolute Eosinophils 0.3 0.0 - 0.7 10e3/uL    Absolute Basophils 0.1 0.0 - 0.2 10e3/uL    Absolute Immature Granulocytes 0.0 <=0.4 10e3/uL    Absolute NRBCs 0.0 10e3/uL       Imaging Results    PET Oncology Whole Body    Result Date: 1/7/2025  EXAM: PET ONCOLOGY WHOLE BODY LOCATION: [facility] DATE: 1/6/2025 INDICATION: Initial treatment evaluation for malignant neoplasm of the hepatic flexure. COMPARISON: 12/10/2024. CONTRAST: None. TECHNIQUE: Serum glucose level 109 mg/dL. One hour post intravenous administration of 10.13 mCi F-18 FDG, PET imaging was performed from the skull vertex to feet, utilizing attenuation correction with concurrent axial CT and PET/CT image fusion. Dose reduction techniques were used. PET/CT FINDINGS: FDG avid masslike thickening at the hepatic flexure of the colon measuring approximately 3.4 x 2.7 x 2.9 cm (SUV max 10.9). FDG lymphadenopathy in the neck, chest, and abdomen,  with examples including left lower cervical (SUV max 11.3), left axillary station 3 (SUV max 5.5), left axillary station 1 (SUV max 10.7), retrocrural (SUV max 6.0), peripancreatic (SUV max 10.1), mesenteric (SUV max 13.9), para-aortic (SUV max 17.5), and right common iliac (SUV max 4.1). CT FINDINGS: Mild intracranial senescent changes. Mild carotid calcifications. Partial opacification of the left sphenoid sinus. Enlarged, multinodular thyroid. Mild to moderate coronary artery calcium. Hepatic lipoma. Left hepatic lobe cyst. Cholecystectomy. Multiple cystic lesions in the pancreas, the largest located in the pancreatic head measuring up to 4.6 cm. Simple renal cysts, which are benign and do not require follow-up. Left renal staghorn calculus. Approximately 11 mm calculus in the right proximal ureter with minimal upstream hydroureteronephrosis. Colonic diverticulosis. Mild to moderate aortobiiliac calcifications. Hysterectomy. Multilevel degenerative disease in the spine. Bilateral metallic hip arthroplasties. Metallic right  knee arthroplasty.     IMPRESSION: FDG avid masslike thickening in the hepatic flexure of the colon, corresponding to the known primary malignancy, with extensive naeem metastatic disease in the neck, chest, and abdomen.     The informed consent process has occurred, as I have provided the patient with an explanation of their colon cancer diagnosis as well as the prognosis of their disease. I discussed the risks, benefits and alternatives to treatment. Risks which are common with this treatment may include, but are not limited to anemia, infections and bleeding. Less common risks with this treatment may include, but are not limited to dryness and redness of the hands and feet. The patient was given the opportunity to ask questions, and their questions were answered. The patient has consented to capecitabine chemotherapy has palliation for adenocarcinoma of the colon..        I spent 44 minutes  on the patient's visit today.  This included preparation for the visit, face-to-face time with the patient and documentation following the visit.  It did not include teaching or procedure time.    Signed by: Peter E. Friedell, MD

## 2025-01-31 ENCOUNTER — HOSPITAL ENCOUNTER (OUTPATIENT)
Facility: AMBULATORY SURGERY CENTER | Age: OVER 89
Discharge: HOME | End: 2025-01-31
Attending: UROLOGY
Payer: MEDICARE

## 2025-01-31 VITALS
BODY MASS INDEX: 27.61 KG/M2 | TEMPERATURE: 96.9 F | DIASTOLIC BLOOD PRESSURE: 57 MMHG | SYSTOLIC BLOOD PRESSURE: 134 MMHG | HEART RATE: 45 BPM | OXYGEN SATURATION: 95 % | WEIGHT: 128 LBS | RESPIRATION RATE: 15 BRPM | HEIGHT: 57 IN

## 2025-01-31 DIAGNOSIS — N20.0 KIDNEY STONE: ICD-10-CM

## 2025-01-31 DIAGNOSIS — N20.1 CALCULUS OF RIGHT URETER: ICD-10-CM

## 2025-01-31 DIAGNOSIS — N20.0 NEPHROLITHIASIS: ICD-10-CM

## 2025-01-31 PROCEDURE — 82365 CALCULUS SPECTROSCOPY: CPT | Mod: 90 | Performed by: UROLOGY

## 2025-01-31 PROCEDURE — 52356 CYSTO/URETERO W/LITHOTRIPSY: CPT | Mod: RT | Performed by: UROLOGY

## 2025-01-31 PROCEDURE — 99000 SPECIMEN HANDLING OFFICE-LAB: CPT | Performed by: UROLOGY

## 2025-01-31 PROCEDURE — 74420 UROGRAPHY RTRGR +-KUB: CPT | Mod: 26 | Performed by: UROLOGY

## 2025-01-31 RX ORDER — FENTANYL CITRATE 0.05 MG/ML
25 INJECTION, SOLUTION INTRAMUSCULAR; INTRAVENOUS EVERY 5 MIN PRN
Status: DISCONTINUED | OUTPATIENT
Start: 2025-01-31 | End: 2025-02-01 | Stop reason: HOSPADM

## 2025-01-31 RX ORDER — SODIUM CHLORIDE, SODIUM LACTATE, POTASSIUM CHLORIDE, CALCIUM CHLORIDE 600; 310; 30; 20 MG/100ML; MG/100ML; MG/100ML; MG/100ML
INJECTION, SOLUTION INTRAVENOUS CONTINUOUS
Status: DISCONTINUED | OUTPATIENT
Start: 2025-01-31 | End: 2025-02-01 | Stop reason: HOSPADM

## 2025-01-31 RX ORDER — LABETALOL 20 MG/4 ML (5 MG/ML) INTRAVENOUS SYRINGE
10
Status: DISCONTINUED | OUTPATIENT
Start: 2025-01-31 | End: 2025-02-01 | Stop reason: HOSPADM

## 2025-01-31 RX ORDER — OXYCODONE HYDROCHLORIDE 5 MG/1
5 TABLET ORAL
Status: DISCONTINUED | OUTPATIENT
Start: 2025-01-31 | End: 2025-02-01 | Stop reason: HOSPADM

## 2025-01-31 RX ORDER — ACETAMINOPHEN 650 MG/1
650 SUPPOSITORY RECTAL ONCE
Status: DISCONTINUED | OUTPATIENT
Start: 2025-01-31 | End: 2025-02-01 | Stop reason: HOSPADM

## 2025-01-31 RX ORDER — ONDANSETRON 2 MG/ML
4 INJECTION INTRAMUSCULAR; INTRAVENOUS EVERY 30 MIN PRN
Status: DISCONTINUED | OUTPATIENT
Start: 2025-01-31 | End: 2025-02-01 | Stop reason: HOSPADM

## 2025-01-31 RX ORDER — ONDANSETRON 4 MG/1
4 TABLET, ORALLY DISINTEGRATING ORAL EVERY 30 MIN PRN
Status: DISCONTINUED | OUTPATIENT
Start: 2025-01-31 | End: 2025-02-01 | Stop reason: HOSPADM

## 2025-01-31 RX ORDER — CEFTRIAXONE 1 G/1
1 INJECTION, POWDER, FOR SOLUTION INTRAMUSCULAR; INTRAVENOUS EVERY 24 HOURS
Status: DISCONTINUED | OUTPATIENT
Start: 2025-01-31 | End: 2025-02-01 | Stop reason: HOSPADM

## 2025-01-31 RX ORDER — NALOXONE HYDROCHLORIDE 0.4 MG/ML
0.1 INJECTION, SOLUTION INTRAMUSCULAR; INTRAVENOUS; SUBCUTANEOUS
Status: DISCONTINUED | OUTPATIENT
Start: 2025-01-31 | End: 2025-02-01 | Stop reason: HOSPADM

## 2025-01-31 RX ORDER — HYDRALAZINE HYDROCHLORIDE 20 MG/ML
2.5-5 INJECTION INTRAMUSCULAR; INTRAVENOUS EVERY 10 MIN PRN
Status: DISCONTINUED | OUTPATIENT
Start: 2025-01-31 | End: 2025-02-01 | Stop reason: HOSPADM

## 2025-01-31 RX ORDER — CIPROFLOXACIN 500 MG/1
500 TABLET, FILM COATED ORAL DAILY
Qty: 10 TABLET | Refills: 0 | Status: SHIPPED | OUTPATIENT
Start: 2025-01-31

## 2025-01-31 RX ORDER — ACETAMINOPHEN 325 MG/1
975 TABLET ORAL ONCE
Status: COMPLETED | OUTPATIENT
Start: 2025-01-31 | End: 2025-01-31

## 2025-01-31 RX ORDER — FLUCONAZOLE 200 MG/1
200 TABLET ORAL DAILY
Qty: 5 TABLET | Refills: 0 | Status: SHIPPED | OUTPATIENT
Start: 2025-01-31

## 2025-01-31 RX ORDER — LIDOCAINE 40 MG/G
CREAM TOPICAL
Status: DISCONTINUED | OUTPATIENT
Start: 2025-01-31 | End: 2025-02-01 | Stop reason: HOSPADM

## 2025-01-31 RX ORDER — DEXAMETHASONE SODIUM PHOSPHATE 4 MG/ML
4 INJECTION, SOLUTION INTRA-ARTICULAR; INTRALESIONAL; INTRAMUSCULAR; INTRAVENOUS; SOFT TISSUE
Status: DISCONTINUED | OUTPATIENT
Start: 2025-01-31 | End: 2025-02-01 | Stop reason: HOSPADM

## 2025-01-31 RX ORDER — HYDROMORPHONE HCL IN WATER/PF 6 MG/30 ML
0.2 PATIENT CONTROLLED ANALGESIA SYRINGE INTRAVENOUS EVERY 5 MIN PRN
Status: DISCONTINUED | OUTPATIENT
Start: 2025-01-31 | End: 2025-02-01 | Stop reason: HOSPADM

## 2025-01-31 RX ORDER — ACETAMINOPHEN 325 MG/1
975 TABLET ORAL ONCE
Status: DISCONTINUED | OUTPATIENT
Start: 2025-01-31 | End: 2025-02-01 | Stop reason: HOSPADM

## 2025-01-31 RX ADMIN — SODIUM CHLORIDE, SODIUM LACTATE, POTASSIUM CHLORIDE, CALCIUM CHLORIDE: 600; 310; 30; 20 INJECTION, SOLUTION INTRAVENOUS at 08:50

## 2025-01-31 RX ADMIN — ACETAMINOPHEN 975 MG: 325 TABLET ORAL at 08:31

## 2025-01-31 NOTE — DISCHARGE INSTRUCTIONS
If you have any questions or concerns regarding your procedure please contact Dr. Madrid, his office number is [phone]    You have received 975 mg of Acetaminophen (Tylenol) at 8:30am. Please do not take an additional dose of Tylenol until after 2:30pm.     Do not exceed 4,000 mg of acetaminophen during a 24 hour period and keep in mind that acetaminophen can also be found in many over-the-counter cold medications as well as narcotics that may be given for pain.     Apply ice to the incision site as needed for pain. Twenty minutes with the ice on and then twenty minutes with the ice off.                   [facility]   Adult Discharge Orders & Instructions     For 24 hours after surgery    Get plenty of rest.  A responsible adult must stay with you for at least 24 hours after you leave the hospital.   Do not drive or use heavy equipment.  If you have weakness or tingling, don't drive or use heavy equipment until this feeling goes away.  Do not drink alcohol.  Avoid strenuous or risky activities.  Ask for help when climbing stairs.   You may feel lightheaded.  IF so, sit for a few minutes before standing.  Have someone help you get up.   If you have nausea (feel sick to your stomach): Drink only clear liquids such as apple juice, ginger ale, broth or 7-Up.  Rest may also help.  Be sure to drink enough fluids.  Move to a regular diet as you feel able.  You may have a slight fever. Call the doctor if your fever is over 100 F (37.7 C) (taken under the tongue) or lasts longer than 24 hours.  You may have a dry mouth, a sore throat, muscle aches or trouble sleeping.  These should go away after 24 hours.  Do not make important or legal decisions.   Call your doctor for any of the followin.  Signs of infection (fever, growing tenderness at the surgery site, a large amount of drainage or bleeding, severe pain, foul-smelling drainage, redness, swelling).    2. It has been over 8 to 10 hours since  surgery and you are still not able to urinate (pass water).    3.  Headache for over 24 hours.

## 2025-01-31 NOTE — PROGRESS NOTES
I personally met with Alma Johns and discussed their current medical situation.     We reviewed the nature of planned surgery, the risks benefits and complications and treatment alternatives.      Shared decision made to proceed with right ureteroscopic stone treatment

## 2025-01-31 NOTE — OP NOTE
PREOPERATIVE DIAGNOSIS:  Right ureteral stones  POSTOPERATIVE DIAGNOSIS: Same  PROCEDURES PERFORMED:    Cystoscopy  Right ureteroscopy with holmium laser lithotripsy and stone basket extraction  Right retrograde pyelogram with interpretation of imaging  Right ureteral stent exchange    STAFF SURGEON: Napoleon Madrid MD  RESIDENT(S) none present    ANESTHESIA: General  ESTIMATED BLOOD LOSS: 1 ml  COMPLICATIONS: None  SPECIMEN: Right ureteral stone for analysis  URETERAL STENT(S): 6 [place] by 22 cm right double-J silicone stent    CASE CONSIDERATIONS: Modifier 22.  Patient with 2 extremely large hard dense stones 1 in the proximal ureter and a large approximately 1.5 cm stone in the distal ureter as well that was missed on her preoperative CT scan owing to overlying artifact from a hip prosthesis.  This required substantial additional time and effort greater than 3 times typical in order to fully clear stones from the kidney and ureter.    SIGNIFICANT FINDINGS: Large right ureteral stones in the proximal and distal ureter    BRIEF OPERATIVE INDICATIONS: Patient presented with right ureteral obstruction in the setting of large ureteral stones.  Previously underwent stent placement.  After a discussion of all risks, benefits, and alternatives, the patient elected to proceed with definitive stone management. The patient understands the potential need for more than one procedure to eliminate all stone burden.      DESCRIPTION OF PROCEDURE:  After informed consent was obtained, the patient was transported to the operating room & placed supine on the table. After adequate anesthesia was induced, the patient was placed in lithotomy and prepped and draped in the usual sterile fashion. A timeout was taken to confirm correct patient, procedure and laterality. Pre-operative IV antibiotics were administered.     We started the procedure by performing cystoscopy.  The bladder was unremarkable and the urine was clear.  The  right distal ureteral curl of the stent was identified grasped and removed intact.  We cannulated the right ureter with the flexible ureteroscope and identified a very large nearly 2 cm distal stone.  It was clearly calcium oxalate and very hard.  We introduced a 200  m holmium laser and proceeded to perform laser lithotripsy at a setting of 1 J and 10 Hz.  We cracked the stone into sufficiently small pieces such that we were able to basket extract each piece.  Estimate about 25-30 passes in the basket were required to clear the distal stone.  We then passed the flexible ureteroscope proximally into the kidney.  We identified the large proximal ureteral stone which we nudged backwards into the kidney and relocated into a dependent upper pole calyx.  We broke the stone down again with a 200  m holmium laser fiber at a setting of 1 J and 10 Hz.  We broke it into approximately 10 pieces which we subsequently basket extracted with separate passes of the basket.  One of the procedure the ureter was widely patent though the right kidney was clearly chronically dilated.  A retrograde pyelogram showed moderate hydronephrosis without extravasation.  Pullback ureteroscopy was unremarkable.  We reintroduced a 6 [place] by 22 cm double-J stent which we ensured had a full curl in the kidney and the bladder.  We left a string on stent to facilitate removal in 1 week.    The bladder was drained and the patient was awoken from anesthesia and transported to the postanesthesia care unit in stable condition.     POSTOP PLAN:  -Stent removal in 1 week, follow-up 6 weeks with imaging

## 2025-02-03 ENCOUNTER — TELEPHONE (OUTPATIENT)
Dept: ONCOLOGY | Facility: HOSPITAL | Age: OVER 89
End: 2025-02-03
Payer: MEDICARE

## 2025-02-03 ENCOUNTER — TELEPHONE (OUTPATIENT)
Dept: UROLOGY | Facility: CLINIC | Age: OVER 89
End: 2025-02-03
Payer: MEDICARE

## 2025-02-03 DIAGNOSIS — C18.9 COLON ADENOCARCINOMA (H): Primary | ICD-10-CM

## 2025-02-03 RX ORDER — PROCHLORPERAZINE MALEATE 5 MG/1
5 TABLET ORAL EVERY 6 HOURS PRN
Qty: 30 TABLET | Refills: 2 | Status: SHIPPED | OUTPATIENT
Start: 2025-02-12

## 2025-02-03 RX ORDER — CAPECITABINE 500 MG/1
625 TABLET, FILM COATED ORAL 2 TIMES DAILY
Qty: 56 TABLET | Refills: 0 | Status: SHIPPED | OUTPATIENT
Start: 2025-02-13 | End: 2025-02-27

## 2025-02-03 NOTE — ORAL ONC MGMT
"  Oral Chemotherapy Monitoring Program    Lab Monitoring Plan    Subjective/Objective:  Alma Johns is a 89 year old female contacted by phone for an initial visit for oral chemotherapy education.          2/3/2025    11:00 AM   ORAL CHEMOTHERAPY   Assessment Type Initial Work up;New Teach   Diagnosis Code Colon Cancer   Providers Dr. Friedell   Clinic Name/Location [place]   Drug Name Xeloda (capecitabine)   Dose 1,000 mg   Current Schedule BID   Cycle Details 2 weeks on, 1 week off   Any new drug interactions? Yes   Pharmacist Intervention? Yes   Intervention(s) Patient Education   Is the dose as ordered appropriate for the patient? Yes       Last PHQ-2 Score on record:       12/27/2024     7:47 AM 8/12/2024     8:48 AM   PHQ-2 ( 1999 [employer])   Q1: Little interest or pleasure in doing things 3 2    Q2: Feeling down, depressed or hopeless 1 3    PHQ-2 Score 4 5   Q1: Little interest or pleasure in doing things  More than half the days   Q2: Feeling down, depressed or hopeless  Nearly every day   PHQ-2 Score  5       Proxy-reported       Vitals:  BP:   BP Readings from Last 1 Encounters:   01/31/25 134/57     Wt Readings from Last 1 Encounters:   01/30/25 58.1 kg (128 lb)     Estimated body surface area is 1.53 meters squared as calculated from the following:    Height as of 1/30/25: 1.467 m (4' 9.75\").    Weight as of 1/30/25: 57.5 kg (126 lb 12.8 oz).    Labs:  _  Result Component Current Result Ref Range   Sodium 142 (1/30/2025) 135 - 145 mmol/L     _  Result Component Current Result Ref Range   Potassium 4.4 (1/30/2025) 3.4 - 5.3 mmol/L     _  Result Component Current Result Ref Range   Calcium 10.1 (1/30/2025) 8.8 - 10.4 mg/dL     No results found for Mag within last 30 days.     No results found for Phos within last 30 days.     _  Result Component Current Result Ref Range   Albumin 3.6 (1/30/2025) 3.5 - 5.2 g/dL     _  Result Component Current Result Ref Range   Urea Nitrogen 25.8 (H) (1/30/2025) 8.0 - " 23.0 mg/dL     _  Result Component Current Result Ref Range   Creatinine 1.51 (H) (1/30/2025) 0.51 - 0.95 mg/dL     _  Result Component Current Result Ref Range   AST 22 (1/30/2025) 0 - 45 U/L     _  Result Component Current Result Ref Range   ALT 8 (1/30/2025) 0 - 50 U/L     _  Result Component Current Result Ref Range   Bilirubin Total 0.8 (1/30/2025) <=1.2 mg/dL     _  Result Component Current Result Ref Range   WBC Count 7.2 (1/30/2025) 4.0 - 11.0 10e3/uL     _  Result Component Current Result Ref Range   Hemoglobin 11.9 (1/30/2025) 11.7 - 15.7 g/dL     _  Result Component Current Result Ref Range   Platelet Count 228 (1/30/2025) 150 - 450 10e3/uL     No results found for ANC within last 30 days.     _  Result Component Current Result Ref Range   Absolute Neutrophils 4.6 (1/30/2025) 1.6 - 8.3 10e3/uL        Assessment:  Patient is appropriate to start therapy.    Plan:  Basic chemotherapy teaching was reviewed with the patient's daughter, Va including indication, start date of therapy, dose, administration, adverse effects, missed doses, food and drug interactions, monitoring, side effect management, office contact information, and safe handling. Written materials were provided and all questions answered.    Follow-Up:  We will call Va back 7-10 days after Alma starts.      Jovita Maciel PharmD  Oral Chemotherapy Pharmacist  [facility] Phone: [phone]  In Basket Pools:   ALEXANDRA ORAL CHEMOTHERAPY PHARMACIST   [facility] ORAL CHEMOTHERAPY PHARMACIST

## 2025-02-03 NOTE — TELEPHONE ENCOUNTER
[facility] Call Center    Phone Message    May a detailed message be left on voicemail: yes     Reason for Call: Appointment Intake    Referring Provider Name: Dr. Madrid  Diagnosis and/or Symptoms: Stent removal for this Friday.    Please call Va for scheduling.    Action Taken: Other: [facility] - Urology    Travel Screening: Not Applicable     Date of Service:                                                                      Patient refused Tdap and flu vaccines.

## 2025-02-04 LAB
APPEARANCE STONE: NORMAL
COMPN STONE: NORMAL
SPECIMEN WT: 146 MG

## 2025-02-13 ENCOUNTER — PATIENT OUTREACH (OUTPATIENT)
Dept: ONCOLOGY | Facility: HOSPITAL | Age: OVER 89
End: 2025-02-13
Payer: MEDICARE

## 2025-02-18 ENCOUNTER — TELEPHONE (OUTPATIENT)
Dept: ONCOLOGY | Facility: HOSPITAL | Age: OVER 89
End: 2025-02-18
Payer: MEDICARE

## 2025-02-18 NOTE — ORAL ONC MGMT
"Oral Chemotherapy Monitoring Program    Subjective/Objective:  Alma Johns is a 89 year old female contacted by phone for a follow-up visit for oral chemotherapy.  Spoke with Va who confirms Alma is doing well so far on capecitabine. She takes two pills in the morning and two pills in the evening which she started last Thursday. So far denies nausea, diarrhea, and may have some fatigue since starting.         2/3/2025    11:00 AM 2/18/2025    11:00 AM   ORAL CHEMOTHERAPY   Assessment Type Initial Work up;New Teach Initial Follow up   Diagnosis Code Colon Cancer Colon Cancer   Providers Dr. Friedell Dr. Friedell   Clinic Name/Location [facility]   Drug Name Xeloda (capecitabine) Xeloda (capecitabine)   Dose 1,000 mg 1,000 mg   Current Schedule BID BID   Cycle Details 2 weeks on, 1 week off 2 weeks on, 1 week off   Start Date of Last Cycle  2/13/2025   Planned next cycle start date  3/6/2025   Doses missed in last 2 weeks  0   Adherence Assessment  Adherent   Any new drug interactions? Yes    Pharmacist Intervention? Yes    Intervention(s) Patient Education    Is the dose as ordered appropriate for the patient? Yes        Last PHQ-2 Score on record:       12/27/2024     7:47 AM 8/12/2024     8:48 AM   PHQ-2 ( 1999 [employer])   Q1: Little interest or pleasure in doing things 3 2    Q2: Feeling down, depressed or hopeless 1 3    PHQ-2 Score 4 5   Q1: Little interest or pleasure in doing things  More than half the days   Q2: Feeling down, depressed or hopeless  Nearly every day   PHQ-2 Score  5       Proxy-reported       Vitals:  BP:   BP Readings from Last 1 Encounters:   01/31/25 134/57     Wt Readings from Last 1 Encounters:   01/30/25 58.1 kg (128 lb)     Estimated body surface area is 1.53 meters squared as calculated from the following:    Height as of 1/30/25: 1.467 m (4' 9.75\").    Weight as of 1/30/25: 57.5 kg (126 lb 12.8 oz).    Labs:  _  Result Component Current Result Ref Range   Sodium 142 " "(1/30/2025) 135 - 145 mmol/L     _  Result Component Current Result Ref Range   Potassium 4.4 (1/30/2025) 3.4 - 5.3 mmol/L     _  Result Component Current Result Ref Range   Calcium 10.1 (1/30/2025) 8.8 - 10.4 mg/dL     No results found for Mag within last 30 days.     No results found for Phos within last 30 days.     _  Result Component Current Result Ref Range   Albumin 3.6 (1/30/2025) 3.5 - 5.2 g/dL     _  Result Component Current Result Ref Range   Urea Nitrogen 25.8 (H) (1/30/2025) 8.0 - 23.0 mg/dL     _  Result Component Current Result Ref Range   Creatinine 1.51 (H) (1/30/2025) 0.51 - 0.95 mg/dL     _  Result Component Current Result Ref Range   AST 22 (1/30/2025) 0 - 45 U/L     _  Result Component Current Result Ref Range   ALT 8 (1/30/2025) 0 - 50 U/L     _  Result Component Current Result Ref Range   Bilirubin Total 0.8 (1/30/2025) <=1.2 mg/dL     _  Result Component Current Result Ref Range   WBC Count 7.2 (1/30/2025) 4.0 - 11.0 10e3/uL     _  Result Component Current Result Ref Range   Hemoglobin 11.9 (1/30/2025) 11.7 - 15.7 g/dL     _  Result Component Current Result Ref Range   Platelet Count 228 (1/30/2025) 150 - 450 10e3/uL     No results found for ANC within last 30 days.     _  Result Component Current Result Ref Range   Absolute Neutrophils 4.6 (1/30/2025) 1.6 - 8.3 10e3/uL      Assessment/Plan:  Alma is doing well so far on capecitabine and will continue as prescribed. Discussed reasonable exercise can help combat fatigue.    Follow-Up:  Will review 2/20 appt and then call during next cycle as well.    Refill Due:  2/27    Apurva Mojica, PharmD, BCOP  Oral Chemotherapy Pharmacist  [facility] [place] Phone: [phone]  In Basket Pools: \"[facility] ORAL CHEMOTHERAPY PHARMACIST\" or \"[facility] ORAL CHEMOTHERAPY PHARMACIST\"  February 18, 2025      "

## 2025-02-20 ENCOUNTER — PATIENT OUTREACH (OUTPATIENT)
Dept: ONCOLOGY | Facility: HOSPITAL | Age: OVER 89
End: 2025-02-20

## 2025-02-20 ENCOUNTER — ONCOLOGY VISIT (OUTPATIENT)
Dept: ONCOLOGY | Facility: HOSPITAL | Age: OVER 89
End: 2025-02-20
Payer: MEDICARE

## 2025-02-20 ENCOUNTER — OFFICE VISIT (OUTPATIENT)
Dept: INTERNAL MEDICINE | Facility: CLINIC | Age: OVER 89
End: 2025-02-20
Payer: MEDICARE

## 2025-02-20 ENCOUNTER — LAB (OUTPATIENT)
Dept: INFUSION THERAPY | Facility: HOSPITAL | Age: OVER 89
End: 2025-02-20
Payer: MEDICARE

## 2025-02-20 VITALS
HEART RATE: 69 BPM | HEIGHT: 58 IN | DIASTOLIC BLOOD PRESSURE: 75 MMHG | TEMPERATURE: 97.8 F | OXYGEN SATURATION: 97 % | RESPIRATION RATE: 16 BRPM | SYSTOLIC BLOOD PRESSURE: 162 MMHG | BODY MASS INDEX: 25.92 KG/M2 | WEIGHT: 123.5 LBS

## 2025-02-20 VITALS
SYSTOLIC BLOOD PRESSURE: 134 MMHG | WEIGHT: 123 LBS | HEIGHT: 59 IN | OXYGEN SATURATION: 98 % | DIASTOLIC BLOOD PRESSURE: 68 MMHG | BODY MASS INDEX: 24.8 KG/M2 | HEART RATE: 66 BPM | TEMPERATURE: 96.9 F

## 2025-02-20 DIAGNOSIS — C18.9 COLON ADENOCARCINOMA (H): Primary | ICD-10-CM

## 2025-02-20 DIAGNOSIS — C18.9 COLON ADENOCARCINOMA (H): ICD-10-CM

## 2025-02-20 DIAGNOSIS — Z23 NEED FOR TDAP VACCINATION: ICD-10-CM

## 2025-02-20 DIAGNOSIS — Z29.11 NEED FOR VACCINATION AGAINST RESPIRATORY SYNCYTIAL VIRUS: ICD-10-CM

## 2025-02-20 DIAGNOSIS — N18.31 STAGE 3A CHRONIC KIDNEY DISEASE (H): Primary | ICD-10-CM

## 2025-02-20 DIAGNOSIS — N20.0 CALCULUS OF KIDNEY: ICD-10-CM

## 2025-02-20 DIAGNOSIS — H61.23 BILATERAL IMPACTED CERUMEN: ICD-10-CM

## 2025-02-20 DIAGNOSIS — C77.2 SECONDARY AND UNSPECIFIED MALIGNANT NEOPLASM OF INTRA-ABDOMINAL LYMPH NODES (H): ICD-10-CM

## 2025-02-20 DIAGNOSIS — Z23 NEED FOR SHINGLES VACCINE: ICD-10-CM

## 2025-02-20 DIAGNOSIS — N18.32 STAGE 3B CHRONIC KIDNEY DISEASE (H): ICD-10-CM

## 2025-02-20 LAB
ALBUMIN SERPL BCG-MCNC: 4 G/DL (ref 3.5–5.2)
ALP SERPL-CCNC: 139 U/L (ref 40–150)
ALT SERPL W P-5'-P-CCNC: 40 U/L (ref 0–50)
ANION GAP SERPL CALCULATED.3IONS-SCNC: 14 MMOL/L (ref 7–15)
AST SERPL W P-5'-P-CCNC: 25 U/L (ref 0–45)
BASOPHILS # BLD AUTO: 0 10E3/UL (ref 0–0.2)
BASOPHILS NFR BLD AUTO: 1 %
BILIRUB SERPL-MCNC: 1 MG/DL
BUN SERPL-MCNC: 25.3 MG/DL (ref 8–23)
CALCIUM SERPL-MCNC: 10.7 MG/DL (ref 8.8–10.4)
CHLORIDE SERPL-SCNC: 102 MMOL/L (ref 98–107)
CREAT SERPL-MCNC: 1.61 MG/DL (ref 0.51–0.95)
EGFRCR SERPLBLD CKD-EPI 2021: 30 ML/MIN/1.73M2
EOSINOPHIL # BLD AUTO: 0.2 10E3/UL (ref 0–0.7)
EOSINOPHIL NFR BLD AUTO: 4 %
ERYTHROCYTE [DISTWIDTH] IN BLOOD BY AUTOMATED COUNT: 12.8 % (ref 10–15)
GLUCOSE SERPL-MCNC: 111 MG/DL (ref 70–99)
HCO3 SERPL-SCNC: 24 MMOL/L (ref 22–29)
HCT VFR BLD AUTO: 39 % (ref 35–47)
HGB BLD-MCNC: 12.8 G/DL (ref 11.7–15.7)
IMM GRANULOCYTES # BLD: 0 10E3/UL
IMM GRANULOCYTES NFR BLD: 0 %
LYMPHOCYTES # BLD AUTO: 1.5 10E3/UL (ref 0.8–5.3)
LYMPHOCYTES NFR BLD AUTO: 23 %
MCH RBC QN AUTO: 27.9 PG (ref 26.5–33)
MCHC RBC AUTO-ENTMCNC: 32.8 G/DL (ref 31.5–36.5)
MCV RBC AUTO: 85 FL (ref 78–100)
MONOCYTES # BLD AUTO: 0.5 10E3/UL (ref 0–1.3)
MONOCYTES NFR BLD AUTO: 8 %
NEUTROPHILS # BLD AUTO: 4.1 10E3/UL (ref 1.6–8.3)
NEUTROPHILS NFR BLD AUTO: 65 %
NRBC # BLD AUTO: 0 10E3/UL
NRBC BLD AUTO-RTO: 0 /100
PLATELET # BLD AUTO: 275 10E3/UL (ref 150–450)
POTASSIUM SERPL-SCNC: 3.9 MMOL/L (ref 3.4–5.3)
PROT SERPL-MCNC: 7.1 G/DL (ref 6.4–8.3)
RBC # BLD AUTO: 4.58 10E6/UL (ref 3.8–5.2)
SODIUM SERPL-SCNC: 140 MMOL/L (ref 135–145)
WBC # BLD AUTO: 6.3 10E3/UL (ref 4–11)

## 2025-02-20 PROCEDURE — 99214 OFFICE O/P EST MOD 30 MIN: CPT | Mod: 25 | Performed by: INTERNAL MEDICINE

## 2025-02-20 PROCEDURE — 85025 COMPLETE CBC W/AUTO DIFF WBC: CPT

## 2025-02-20 PROCEDURE — G0463 HOSPITAL OUTPT CLINIC VISIT: HCPCS | Performed by: INTERNAL MEDICINE

## 2025-02-20 PROCEDURE — 36415 COLL VENOUS BLD VENIPUNCTURE: CPT

## 2025-02-20 PROCEDURE — 82565 ASSAY OF CREATININE: CPT

## 2025-02-20 PROCEDURE — 69209 REMOVE IMPACTED EAR WAX UNI: CPT | Mod: 50 | Performed by: INTERNAL MEDICINE

## 2025-02-20 RX ORDER — DEXAMETHASONE 1 MG
1 TABLET ORAL
Qty: 30 TABLET | Refills: 1 | Status: SHIPPED | OUTPATIENT
Start: 2025-02-20

## 2025-02-20 ASSESSMENT — PAIN SCALES - GENERAL
PAINLEVEL_OUTOF10: NO PAIN (0)
PAINLEVEL_OUTOF10: NO PAIN (0)

## 2025-02-20 ASSESSMENT — PATIENT HEALTH QUESTIONNAIRE - PHQ9
SUM OF ALL RESPONSES TO PHQ QUESTIONS 1-9: 13
10. IF YOU CHECKED OFF ANY PROBLEMS, HOW DIFFICULT HAVE THESE PROBLEMS MADE IT FOR YOU TO DO YOUR WORK, TAKE CARE OF THINGS AT HOME, OR GET ALONG WITH OTHER PEOPLE: SOMEWHAT DIFFICULT
SUM OF ALL RESPONSES TO PHQ QUESTIONS 1-9: 13

## 2025-02-20 NOTE — PROGRESS NOTES
[facility] Hematology and Oncology Progress Note    Patient: Alma Johns  MRN: [id number]  Date of Service: Feb 20, 2025       Reason for Visit    I was consulted by   Lucy Ruano MD     For evaluation and management of newly diagnosed colon cancer.      Encounter Diagnoses Assessment and Plan:    Problem List Items Addressed This Visit       Colon adenocarcinoma (H) - Primary    Relevant Medications    dexAMETHasone (DECADRON) 1 MG tablet    Other Relevant Orders    CBC with platelets differential    Comprehensive metabolic panel   Patient with newly diagnosed apple core adenocarcinoma of the hepatic flexure.  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  Patient does not wish to have chemotherapy.  She had a surgical consultation on 12/30/2024.  Palliative surgery was deferred until the patient developed obstructive symptoms.  We again discussed palliative chemotherapy.  Patient has agreed to a trial of capecitabine.  Treatment plan has been entered.     Update 2/20/2025 patient returns for follow-up she has been on capecitabine for 1 week.  She is tolerating it without difficulty.  Her EGFR is at 30.  Calcium is slightly increased at 10.7.  She did have laser lithotripsy on 1/31/2025.  She will continue with capecitabine 1000 mg twice daily for 1 week.  Follow-up is planned for 2 weeks at the start of her next cycle.      ______________________________________________________________________________    Staging History   Cancer Staging   Colon adenocarcinoma (H)  Staging form: Colon and Rectum, AJCC 8th Edition  - Clinical stage from 1/9/2025: Stage IVB (cTX, cNX, cM1b) - Signed by Friedell, Peter E, MD on 1/9/2025    ECOG Performance  1 - Can't do physically strenuous work, but fully ambulatory and can do light sedentary work.    History of Present Illness      Alma Johns is an 89 year old woman with a history of hypercalcemia with elevated parathyroid hormone.  Over the last year she has  "had unexplained weight loss of about 20 pounds.  On 12/10/2024 she underwent CT scan of the abdomen and pelvis which showed probable lesion at the hepatic flexure of the colon and suspicious retroperitoneal adenopathy.  On 12/19/2024 she underwent colonoscopy which confirmed an adenocarcinoma of the colon.  Upper GI endoscopic ultrasound confirmed metastases in a upper abdominal lymph node adjacent to the pancreas near the mesenteric root.  Multiple pancreatic cysts appeared benign.    Presently she feels fairly well.  Her appetite is still decreased.  She does not have pain.  She does not have cough chest pain or shortness of breath.  She had a brief episode of numbness in her left arm and hand lasting for less than 24 hours a few weeks ago.  She does not smoke cigarettes or use alcohol.  There is no family history of cancer.    Review of systems.  Pertinent Findings are included in the History of Present Illness    Physical Exam    BP (!) 162/75   Pulse 69   Temp 97.8  F (36.6  C)   Resp 16   Ht 1.467 m (4' 9.75\")   Wt 56 kg (123 lb 8 oz)   LMP  (LMP Unknown)   SpO2 97%   BMI 26.04 kg/m       GENERAL APPEARANCE: 89-year-old woman in no apparent distress.  HEAD: Atraumatic; normocephalic; without lesions.  EYES: Conjunctiva, corneas and eyelids normal; pupils equal, round, reactive to light; No Icterus.  MOUTH/OROPHARYNX: Oral mucosa intact  NECK: Supple with no nodes.  LUNGS:  Clear to auscultation and percussion with no extra sounds.  HEART: Regular rhythm and rate; S1 and S2 normal; no murmurs noted.  ABDOMEN: Soft; no masses or tenderness, no hepatosplenomegaly.  NEUROLOGIC: Alert and oriented.  No obvious focal findings.  EXTREMITIES: No cyanosis, or edema.  SKIN: No abnormal bruising or bleeding. No suspicious lesions noted on exposed skin.  PSYCHIATRIC: Mental status normal; no apparent psychiatric issues    Medications:    Current Outpatient Medications   Medication Sig Dispense Refill    " alendronate (FOSAMAX) 70 MG tablet Take 1 tablet (70 mg) by mouth every 7 days 12 tablet 0    amLODIPine (NORVASC) 10 MG tablet Take 1 tablet (10 mg) by mouth daily 90 tablet 3    capecitabine (XELODA) 500 MG tablet Take 2 tablets (1,000 mg) by mouth 2 times daily for 14 days. Days 1 through 14, then off for 7 days.Take with water within 30 minutes after a meal. 56 tablet 0    celecoxib (CELEBREX) 200 MG capsule TAKE 1 CAPSULE BY MOUTH DAILY 90 capsule 3    ciprofloxacin (CIPRO) 500 MG tablet Take 1 tablet (500 mg) by mouth daily. 10 tablet 0    dexAMETHasone (DECADRON) 1 MG tablet Take 1 tablet (1 mg) by mouth daily (with breakfast). 30 tablet 1    doxazosin (CARDURA) 4 MG tablet TAKE ONE TABLET BY MOUTH ONE TIME DAILY. 90 tablet 3    escitalopram (LEXAPRO) 5 MG tablet Take 1 tablet (5 mg) by mouth daily. 90 tablet 3    fluconazole (DIFLUCAN) 200 MG tablet Take 1 tablet (200 mg) by mouth daily. 5 tablet 0    latanoprost (XALATAN) 0.005 % ophthalmic solution [LATANOPROST (XALATAN) 0.005 % OPHTHALMIC SOLUTION] Administer 1 drop to both eyes bedtime.      LORazepam (ATIVAN) 0.5 MG tablet Take 1 tablet (0.5 mg) by mouth every 6 hours as needed for anxiety. 5 tablet 0    ondansetron (ZOFRAN) 4 MG tablet Take 1 tablet (4 mg) by mouth every 8 hours as needed for nausea. 90 tablet 3    prochlorperazine (COMPAZINE) 5 MG tablet Take 1 tablet (5 mg) by mouth every 6 hours as needed for nausea or vomiting. 30 tablet 2    therapeutic multivitamin (THERAGRAN) tablet [THERAPEUTIC MULTIVITAMIN (THERAGRAN) TABLET] Take 1 tablet by mouth daily.      timolol maleate (TIMOPTIC) 0.5 % ophthalmic solution [TIMOLOL MALEATE (TIMOPTIC) 0.5 % OPHTHALMIC SOLUTION] INSTILL 1 DROP INTO BOTH EYES Q EVENING  3     No current facility-administered medications for this visit.           Past History    Past Medical History:   Diagnosis Date    Colon cancer (H)     Stage IV with presence in lymph nodes and other distant body parts    Complaint  related to dreams 2019    Dyslipidemia     dyslipoproteinemia    Glaucoma     Hypercalcemia     Hyperparathyroidism     Hypertension     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    OA (osteoarthritis)     Osteopenia     PONV (postoperative nausea and vomiting)     Postmenopausal     Retinal vein occlusion (H)     Vitamin D deficiency      Past Surgical History:   Procedure Laterality Date    APPENDECTOMY  1985    Incidental    BIOPSY BREAST       SECTION      and     CHOLECYSTECTOMY      COLONOSCOPY N/A 2024    Procedure: Colonoscopy with biopsies and polypectomy, endoscopic marker;  Surgeon: Ron Hogan MD;  Location: [facility] OR    ENTEROSCOPY SMALL BOWEL N/A 2024    Procedure: Enteroscopy small bowel;  Surgeon: Ron Hogan MD;  Location: [facility] OR    ESOPHAGOSCOPY, GASTROSCOPY, DUODENOSCOPY (EGD), COMBINED N/A 2024    Procedure: ESOPHAGOGASTRODUODENOSCOPY, WITH FINE NEEDLE ASPIRATION BIOPSY, WITH ENDOSCOPIC ULTRASOUND GUIDANCE;  Surgeon: Ron Hogan MD;  Location: [facility] OR    HYSTERECTOMY TOTAL ABDOMINAL, BILATERAL SALPINGO-OOPHORECTOMY, COMBINED      KIDNEY STONE SURGERY      extraction    LUMBAR LAMINECTOMY      TOTAL HIP ARTHROPLASTY Left 2009    TOTAL KNEE ARTHROPLASTY Right 2009    TUBAL LIGATION       Allergies   Allergen Reactions    Narcotic Antagonist [External Allergen Needs Reconciliation - See Comment] Other (See Comments)     Dysphoria, hallucinations    Other Environmental Allergy Unknown     congestion, DUST    Pravastatin Unknown     myalgia    Triamterene Other (See Comments)     stones    Vasotec [Enalapril] Other (See Comments)     cough    Effexor [Venlafaxine] Other (See Comments)     Adverse reaction -bowel     No family history on file.  Social History     Socioeconomic History    Marital status: Single     Spouse name: None    Number of children: None    Years of education: None    Highest education level: None    Tobacco Use    Smoking status: Never     Passive exposure: Past    Smokeless tobacco: Never   Vaping Use    Vaping status: Never Used   Substance and Sexual Activity    Alcohol use: No    Drug use: No   Social History Narrative    Lives alone. 2 hip replacements and 1 knee replacement. Used to work in housekeeping at [employer]. Saw Dr Crabtree. Has a daughter and son, and grandchildren and great grandchildren.     Social Drivers of Health     Financial Resource Strain: Low Risk  (8/12/2024)    Financial Resource Strain     Within the past 12 months, have you or your family members you live with been unable to get utilities (heat, electricity) when it was really needed?: No   Food Insecurity: Low Risk  (8/12/2024)    Food Insecurity     Within the past 12 months, did you worry that your food would run out before you got money to buy more?: No     Within the past 12 months, did the food you bought just not last and you didn t have money to get more?: No   Transportation Needs: Low Risk  (8/12/2024)    Transportation Needs     Within the past 12 months, has lack of transportation kept you from medical appointments, getting your medicines, non-medical meetings or appointments, work, or from getting things that you need?: No   Physical Activity: Inactive (8/12/2024)    Exercise Vital Sign     Days of Exercise per Week: 0 days     Minutes of Exercise per Session: 0 min   Stress: No Stress Concern Present (8/12/2024)    [employer] - Occupational Stress Questionnaire     Feeling of Stress : Only a little   Social Connections: Unknown (8/12/2024)    Social Connection and Isolation Panel [NHANES]     Frequency of Social Gatherings with Friends and Family: Once a week   Interpersonal Safety: Low Risk  (12/19/2024)    Interpersonal Safety     Do you feel physically and emotionally safe where you currently live?: Yes     Within the past 12 months, have you been hit, slapped, kicked or otherwise  physically hurt by someone?: No     Within the past 12 months, have you been humiliated or emotionally abused in other ways by your partner or ex-partner?: No   Housing Stability: Low Risk  (8/12/2024)    Housing Stability     Do you have housing? : Yes     Are you worried about losing your housing?: No           Lab Results    Recent Results (from the past 240 hours)   Comprehensive metabolic panel    Collection Time: 02/20/25 10:49 AM   Result Value Ref Range    Sodium 140 135 - 145 mmol/L    Potassium 3.9 3.4 - 5.3 mmol/L    Carbon Dioxide (CO2) 24 22 - 29 mmol/L    Anion Gap 14 7 - 15 mmol/L    Urea Nitrogen 25.3 (H) 8.0 - 23.0 mg/dL    Creatinine 1.61 (H) 0.51 - 0.95 mg/dL    GFR Estimate 30 (L) >60 mL/min/1.73m2    Calcium 10.7 (H) 8.8 - 10.4 mg/dL    Chloride 102 98 - 107 mmol/L    Glucose 111 (H) 70 - 99 mg/dL    Alkaline Phosphatase 139 40 - 150 U/L    AST 25 0 - 45 U/L    ALT 40 0 - 50 U/L    Protein Total 7.1 6.4 - 8.3 g/dL    Albumin 4.0 3.5 - 5.2 g/dL    Bilirubin Total 1.0 <=1.2 mg/dL   CBC with platelets and differential    Collection Time: 02/20/25 10:49 AM   Result Value Ref Range    WBC Count 6.3 4.0 - 11.0 10e3/uL    RBC Count 4.58 3.80 - 5.20 10e6/uL    Hemoglobin 12.8 11.7 - 15.7 g/dL    Hematocrit 39.0 35.0 - 47.0 %    MCV 85 78 - 100 fL    MCH 27.9 26.5 - 33.0 pg    MCHC 32.8 31.5 - 36.5 g/dL    RDW 12.8 10.0 - 15.0 %    Platelet Count 275 150 - 450 10e3/uL    % Neutrophils 65 %    % Lymphocytes 23 %    % Monocytes 8 %    % Eosinophils 4 %    % Basophils 1 %    % Immature Granulocytes 0 %    NRBCs per 100 WBC 0 <1 /100    Absolute Neutrophils 4.1 1.6 - 8.3 10e3/uL    Absolute Lymphocytes 1.5 0.8 - 5.3 10e3/uL    Absolute Monocytes 0.5 0.0 - 1.3 10e3/uL    Absolute Eosinophils 0.2 0.0 - 0.7 10e3/uL    Absolute Basophils 0.0 0.0 - 0.2 10e3/uL    Absolute Immature Granulocytes 0.0 <=0.4 10e3/uL    Absolute NRBCs 0.0 10e3/uL       Imaging Results    No results found.    The informed consent  process has occurred, as I have provided the patient with an explanation of their colon cancer diagnosis as well as the prognosis of their disease. I discussed the risks, benefits and alternatives to treatment. Risks which are common with this treatment may include, but are not limited to anemia, infections and bleeding. Less common risks with this treatment may include, but are not limited to dryness and redness of the hands and feet. The patient was given the opportunity to ask questions, and their questions were answered. The patient has consented to capecitabine chemotherapy has palliation for adenocarcinoma of the colon..        I spent 45 minutes on the patient's visit today.  This included preparation for the visit, face-to-face time with the patient and documentation following the visit.  It did not include teaching or procedure time.    Signed by: Peter E. Friedell, MD

## 2025-02-20 NOTE — PROGRESS NOTES
[facility]: Cancer Care                                                                                          Situation: Chart reviewed by RN Care Coordinator.    Background: Patient was seen in clinic today for follow-up regarding colon cancer.    Assessment: She has started capecitabine.  Tolerating it without difficulty.    Plan/Recommendations: Patient is to continue on capecitabine at 1000 mg twice daily for 1 week..  Patient is to follow-up in 2 weeks with labs.      Signature:  Shannon Marino  RN Care Coordinator  [facility]  Cancer [facility]

## 2025-02-20 NOTE — NURSING NOTE
Ear Wash  Patient identified using two patient identifiers.    Ear exam showing wax occlusion completed by RN and MD prior to ear irrigation.    Solution: warm water was placed in the left ear and right ear via irrigation tool: rhinoceros ear.      RN ear assessment completed prior to ear irrigation.   Otoscopic exam reveals cerumen present and irrigation advised by MD.   Post Ear Irrigation exam completed and cerumen plugs removed.    Pain assessment completed. No discomfort or pain reported. No trauma to external ear canal    Patient tolerated procedure:  yes    Pt was accompanied by daughter Va.     Sharon Wheeler RN on 2/20/2025 at 2:59 PM

## 2025-02-20 NOTE — LETTER
"2/20/2025      Alma Johns  [address] [place] [address]      Dear Colleague,    Thank you for referring your patient, Alma Johns, to the [facility]. Please see a copy of my visit note below.    Oncology Rooming Note    February 20, 2025 11:24 AM   Alma Johns is a 89 year old female who presents for:    Chief Complaint   Patient presents with     Oncology Clinic Visit     Malignant neoplasm of hepatic flexure (H)  Colon adenocarcinoma (H)       Initial Vitals: BP (!) 162/75   Pulse 69   Temp 97.8  F (36.6  C)   Resp 16   Ht 1.467 m (4' 9.75\")   Wt 56 kg (123 lb 8 oz)   LMP  (LMP Unknown)   SpO2 97%   BMI 26.04 kg/m   Estimated body mass index is 26.04 kg/m  as calculated from the following:    Height as of this encounter: 1.467 m (4' 9.75\").    Weight as of this encounter: 56 kg (123 lb 8 oz). Body surface area is 1.51 meters squared.  No Pain (0) Comment: Data Unavailable   No LMP recorded (lmp unknown). Patient has had a hysterectomy.  Allergies reviewed: Yes  Medications reviewed: Yes    Medications: Medication refills not needed today.  Pharmacy name entered into [employer]:    [facility][id number] - [place], [place] - [address] [facility] [place], [place] - [address] [place] [address]    Frailty Screening:   Is the patient here for a new oncology consult visit in cancer care? 2. No    PHQ9:  Did this patient require a PHQ9?: No      Clinical concerns: None    Jovita Huang LPN               [facility] H[facility]ology and Oncology Progress Note    Patient: Alma Johns  MRN: [id number]  Date of Service: Feb 20, 2025       Reason for Visit    I was consulted by   Lucy Ruano MD     For evaluation and management of newly diagnosed colon cancer.      Encounter Diagnoses Assessment and Plan:    Problem List Items Addressed This Visit       Colon adenocarcinoma (H) - Primary    Relevant Medications    dexAMETHasone (DECADRON) 1 MG tablet    " Other Relevant Orders    CBC with platelets differential    Comprehensive metabolic panel   Patient with newly diagnosed apple core adenocarcinoma of the hepatic flexure.  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  Patient does not wish to have chemotherapy.  She had a surgical consultation on 12/30/2024.  Palliative surgery was deferred until the patient developed obstructive symptoms.  We again discussed palliative chemotherapy.  Patient has agreed to a trial of capecitabine.  Treatment plan has been entered.     Update 2/20/2025 patient returns for follow-up she has been on capecitabine for 1 week.  She is tolerating it without difficulty.  Her EGFR is at 30.  Calcium is slightly increased at 10.7.  She did have laser lithotripsy on 1/31/2025.  She will continue with capecitabine 1000 mg twice daily for 1 week.  Follow-up is planned for 2 weeks at the start of her next cycle.      ______________________________________________________________________________    Staging History   Cancer Staging   Colon adenocarcinoma (H)  Staging form: Colon and Rectum, AJCC 8th Edition  - Clinical stage from 1/9/2025: Stage IVB (cTX, cNX, cM1b) - Signed by Friedell, Peter E, MD on 1/9/2025    ECOG Performance  1 - Can't do physically strenuous work, but fully ambulatory and can do light sedentary work.    History of Present Illness      Alma Johns is an 89 year old woman with a history of hypercalcemia with elevated parathyroid hormone.  Over the last year she has had unexplained weight loss of about 20 pounds.  On 12/10/2024 she underwent CT scan of the abdomen and pelvis which showed probable lesion at the hepatic flexure of the colon and suspicious retroperitoneal adenopathy.  On 12/19/2024 she underwent colonoscopy which confirmed an adenocarcinoma of the colon.  Upper GI endoscopic ultrasound confirmed metastases in a upper abdominal lymph node adjacent to the pancreas near the mesenteric root.  Multiple  "pancreatic cysts appeared benign.    Presently she feels fairly well.  Her appetite is still decreased.  She does not have pain.  She does not have cough chest pain or shortness of breath.  She had a brief episode of numbness in her left arm and hand lasting for less than 24 hours a few weeks ago.  She does not smoke cigarettes or use alcohol.  There is no family history of cancer.    Review of systems.  Pertinent Findings are included in the History of Present Illness    Physical Exam    BP (!) 162/75   Pulse 69   Temp 97.8  F (36.6  C)   Resp 16   Ht 1.467 m (4' 9.75\")   Wt 56 kg (123 lb 8 oz)   LMP  (LMP Unknown)   SpO2 97%   BMI 26.04 kg/m       GENERAL APPEARANCE: 89-year-old woman in no apparent distress.  HEAD: Atraumatic; normocephalic; without lesions.  EYES: Conjunctiva, corneas and eyelids normal; pupils equal, round, reactive to light; No Icterus.  MOUTH/OROPHARYNX: Oral mucosa intact  NECK: Supple with no nodes.  LUNGS:  Clear to auscultation and percussion with no extra sounds.  HEART: Regular rhythm and rate; S1 and S2 normal; no murmurs noted.  ABDOMEN: Soft; no masses or tenderness, no hepatosplenomegaly.  NEUROLOGIC: Alert and oriented.  No obvious focal findings.  EXTREMITIES: No cyanosis, or edema.  SKIN: No abnormal bruising or bleeding. No suspicious lesions noted on exposed skin.  PSYCHIATRIC: Mental status normal; no apparent psychiatric issues    Medications:    Current Outpatient Medications   Medication Sig Dispense Refill     alendronate (FOSAMAX) 70 MG tablet Take 1 tablet (70 mg) by mouth every 7 days 12 tablet 0     amLODIPine (NORVASC) 10 MG tablet Take 1 tablet (10 mg) by mouth daily 90 tablet 3     capecitabine (XELODA) 500 MG tablet Take 2 tablets (1,000 mg) by mouth 2 times daily for 14 days. Days 1 through 14, then off for 7 days.Take with water within 30 minutes after a meal. 56 tablet 0     celecoxib (CELEBREX) 200 MG capsule TAKE 1 CAPSULE BY MOUTH DAILY 90 capsule 3 "     ciprofloxacin (CIPRO) 500 MG tablet Take 1 tablet (500 mg) by mouth daily. 10 tablet 0     dexAMETHasone (DECADRON) 1 MG tablet Take 1 tablet (1 mg) by mouth daily (with breakfast). 30 tablet 1     doxazosin (CARDURA) 4 MG tablet TAKE ONE TABLET BY MOUTH ONE TIME DAILY. 90 tablet 3     escitalopram (LEXAPRO) 5 MG tablet Take 1 tablet (5 mg) by mouth daily. 90 tablet 3     fluconazole (DIFLUCAN) 200 MG tablet Take 1 tablet (200 mg) by mouth daily. 5 tablet 0     latanoprost (XALATAN) 0.005 % ophthalmic solution [LATANOPROST (XALATAN) 0.005 % OPHTHALMIC SOLUTION] Administer 1 drop to both eyes bedtime.       LORazepam (ATIVAN) 0.5 MG tablet Take 1 tablet (0.5 mg) by mouth every 6 hours as needed for anxiety. 5 tablet 0     ondansetron (ZOFRAN) 4 MG tablet Take 1 tablet (4 mg) by mouth every 8 hours as needed for nausea. 90 tablet 3     prochlorperazine (COMPAZINE) 5 MG tablet Take 1 tablet (5 mg) by mouth every 6 hours as needed for nausea or vomiting. 30 tablet 2     therapeutic multivitamin (THERAGRAN) tablet [THERAPEUTIC MULTIVITAMIN (THERAGRAN) TABLET] Take 1 tablet by mouth daily.       timolol maleate (TIMOPTIC) 0.5 % ophthalmic solution [TIMOLOL MALEATE (TIMOPTIC) 0.5 % OPHTHALMIC SOLUTION] INSTILL 1 DROP INTO BOTH EYES Q EVENING  3     No current facility-administered medications for this visit.           Past History    Past Medical History:   Diagnosis Date     Colon cancer (H)     Stage IV with presence in lymph nodes and other distant body parts     Complaint related to dreams 04/03/2019     Dyslipidemia     dyslipoproteinemia     Glaucoma      Hypercalcemia      Hyperparathyroidism      Hypertension      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     OA (osteoarthritis)      Osteopenia      PONV (postoperative nausea and vomiting)      Postmenopausal      Retinal vein occlusion (H)      Vitamin D deficiency      Past Surgical History:   Procedure Laterality Date     APPENDECTOMY  1985    Incidental      BIOPSY BREAST        SECTION      and      CHOLECYSTECTOMY       COLONOSCOPY N/A 2024    Procedure: Colonoscopy with biopsies and polypectomy, endoscopic marker;  Surgeon: oRn Hogan MD;  Location: [facility] OR     ENTEROSCOPY SMALL BOWEL N/A 2024    Procedure: Enteroscopy small bowel;  Surgeon: Ron Hogan MD;  Location: [facility] OR     ESOPHAGOSCOPY, GASTROSCOPY, DUODENOSCOPY (EGD), COMBINED N/A 2024    Procedure: ESOPHAGOGASTRODUODENOSCOPY, WITH FINE NEEDLE ASPIRATION BIOPSY, WITH ENDOSCOPIC ULTRASOUND GUIDANCE;  Surgeon: Ron Hogan MD;  Location: [facility] OR     HYSTERECTOMY TOTAL ABDOMINAL, BILATERAL SALPINGO-OOPHORECTOMY, COMBINED       KIDNEY STONE SURGERY      extraction     LUMBAR LAMINECTOMY       TOTAL HIP ARTHROPLASTY Left 2009     TOTAL KNEE ARTHROPLASTY Right 2009     TUBAL LIGATION       Allergies   Allergen Reactions     Narcotic Antagonist [External Allergen Needs Reconciliation - See Comment] Other (See Comments)     Dysphoria, hallucinations     Other Environmental Allergy Unknown     congestion, DUST     Pravastatin Unknown     myalgia     Triamterene Other (See Comments)     stones     Vasotec [Enalapril] Other (See Comments)     cough     Effexor [Venlafaxine] Other (See Comments)     Adverse reaction -bowel     No family history on file.  Social History     Socioeconomic History     Marital status: Single     Spouse name: None     Number of children: None     Years of education: None     Highest education level: None   Tobacco Use     Smoking status: Never     Passive exposure: Past     Smokeless tobacco: Never   Vaping Use     Vaping status: Never Used   Substance and Sexual Activity     Alcohol use: No     Drug use: No   Social History Narrative    Lives alone. 2 hip replacements and 1 knee replacement. Used to work in housekeeping at [employer]. Saw Dr Crabtree. Has a daughter and son, and grandchildren and great  grandchildren.     Social Drivers of Health     Financial Resource Strain: Low Risk  (8/12/2024)    Financial Resource Strain      Within the past 12 months, have you or your family members you live with been unable to get utilities (heat, electricity) when it was really needed?: No   Food Insecurity: Low Risk  (8/12/2024)    Food Insecurity      Within the past 12 months, did you worry that your food would run out before you got money to buy more?: No      Within the past 12 months, did the food you bought just not last and you didn t have money to get more?: No   Transportation Needs: Low Risk  (8/12/2024)    Transportation Needs      Within the past 12 months, has lack of transportation kept you from medical appointments, getting your medicines, non-medical meetings or appointments, work, or from getting things that you need?: No   Physical Activity: Inactive (8/12/2024)    Exercise Vital Sign      Days of Exercise per Week: 0 days      Minutes of Exercise per Session: 0 min   Stress: No Stress Concern Present (8/12/2024)    [place] citizen [place] [place] Occupational [employer] - Occupational Stress Questionnaire      Feeling of Stress : Only a little   Social Connections: Unknown (8/12/2024)    Social Connection and Isolation Panel [NHANES]      Frequency of Social Gatherings with Friends and Family: Once a week   Interpersonal Safety: Low Risk  (12/19/2024)    Interpersonal Safety      Do you feel physically and emotionally safe where you currently live?: Yes      Within the past 12 months, have you been hit, slapped, kicked or otherwise physically hurt by someone?: No      Within the past 12 months, have you been humiliated or emotionally abused in other ways by your partner or ex-partner?: No   Housing Stability: Low Risk  (8/12/2024)    Housing Stability      Do you have housing? : Yes      Are you worried about losing your housing?: No           Lab Results    Recent Results (from the past 240 hours)   Comprehensive  metabolic panel    Collection Time: 02/20/25 10:49 AM   Result Value Ref Range    Sodium 140 135 - 145 mmol/L    Potassium 3.9 3.4 - 5.3 mmol/L    Carbon Dioxide (CO2) 24 22 - 29 mmol/L    Anion Gap 14 7 - 15 mmol/L    Urea Nitrogen 25.3 (H) 8.0 - 23.0 mg/dL    Creatinine 1.61 (H) 0.51 - 0.95 mg/dL    GFR Estimate 30 (L) >60 mL/min/1.73m2    Calcium 10.7 (H) 8.8 - 10.4 mg/dL    Chloride 102 98 - 107 mmol/L    Glucose 111 (H) 70 - 99 mg/dL    Alkaline Phosphatase 139 40 - 150 U/L    AST 25 0 - 45 U/L    ALT 40 0 - 50 U/L    Protein Total 7.1 6.4 - 8.3 g/dL    Albumin 4.0 3.5 - 5.2 g/dL    Bilirubin Total 1.0 <=1.2 mg/dL   CBC with platelets and differential    Collection Time: 02/20/25 10:49 AM   Result Value Ref Range    WBC Count 6.3 4.0 - 11.0 10e3/uL    RBC Count 4.58 3.80 - 5.20 10e6/uL    Hemoglobin 12.8 11.7 - 15.7 g/dL    Hematocrit 39.0 35.0 - 47.0 %    MCV 85 78 - 100 fL    MCH 27.9 26.5 - 33.0 pg    MCHC 32.8 31.5 - 36.5 g/dL    RDW 12.8 10.0 - 15.0 %    Platelet Count 275 150 - 450 10e3/uL    % Neutrophils 65 %    % Lymphocytes 23 %    % Monocytes 8 %    % Eosinophils 4 %    % Basophils 1 %    % Immature Granulocytes 0 %    NRBCs per 100 WBC 0 <1 /100    Absolute Neutrophils 4.1 1.6 - 8.3 10e3/uL    Absolute Lymphocytes 1.5 0.8 - 5.3 10e3/uL    Absolute Monocytes 0.5 0.0 - 1.3 10e3/uL    Absolute Eosinophils 0.2 0.0 - 0.7 10e3/uL    Absolute Basophils 0.0 0.0 - 0.2 10e3/uL    Absolute Immature Granulocytes 0.0 <=0.4 10e3/uL    Absolute NRBCs 0.0 10e3/uL       Imaging Results    No results found.    The informed consent process has occurred, as I have provided the patient with an explanation of their colon cancer diagnosis as well as the prognosis of their disease. I discussed the risks, benefits and alternatives to treatment. Risks which are common with this treatment may include, but are not limited to anemia, infections and bleeding. Less common risks with this treatment may include, but are not limited  to dryness and redness of the hands and feet. The patient was given the opportunity to ask questions, and their questions were answered. The patient has consented to capecitabine chemotherapy has palliation for adenocarcinoma of the colon..        I spent 45 minutes on the patient's visit today.  This included preparation for the visit, face-to-face time with the patient and documentation following the visit.  It did not include teaching or procedure time.    Signed by: Peter E. Friedell, MD          Again, thank you for allowing me to participate in the care of your patient.        Sincerely,        Peter E. Friedell, MD    Electronically signed

## 2025-02-20 NOTE — PROGRESS NOTES
"Oncology Rooming Note    February 20, 2025 11:24 AM   Alma Johns is a 89 year old female who presents for:    Chief Complaint   Patient presents with    Oncology Clinic Visit     Malignant neoplasm of hepatic flexure (H)  Colon adenocarcinoma (H)       Initial Vitals: BP (!) 162/75   Pulse 69   Temp 97.8  F (36.6  C)   Resp 16   Ht 1.467 m (4' 9.75\")   Wt 56 kg (123 lb 8 oz)   LMP  (LMP Unknown)   SpO2 97%   BMI 26.04 kg/m   Estimated body mass index is 26.04 kg/m  as calculated from the following:    Height as of this encounter: 1.467 m (4' 9.75\").    Weight as of this encounter: 56 kg (123 lb 8 oz). Body surface area is 1.51 meters squared.  No Pain (0) Comment: Data Unavailable   No LMP recorded (lmp unknown). Patient has had a hysterectomy.  Allergies reviewed: Yes  Medications reviewed: Yes    Medications: Medication refills not needed today.  Pharmacy name entered into [employer]:    [facility] #[id number] - [place], [place] - [address] [facility] [place], [place] - [address] [place] [address]    Frailty Screening:   Is the patient here for a new oncology consult visit in cancer care? 2. No    PHQ9:  Did this patient require a PHQ9?: No      Clinical concerns: None    Jovita Huang LPN             "

## 2025-02-20 NOTE — PROGRESS NOTES
{PROVIDER CHARTING PREFERENCE:479558}    Chau Marquez is a 89 year old, presenting for the following health issues:  office visit (Patient reports they are here to get her ears cleaned. There are a few allergies listed from 2014 that they would like to get removed from her list. )      Current Outpatient Medications   Medication Sig Dispense Refill    alendronate (FOSAMAX) 70 MG tablet Take 1 tablet (70 mg) by mouth every 7 days 12 tablet 0    amLODIPine (NORVASC) 10 MG tablet Take 1 tablet (10 mg) by mouth daily 90 tablet 3    capecitabine (XELODA) 500 MG tablet Take 2 tablets (1,000 mg) by mouth 2 times daily for 14 days. Days 1 through 14, then off for 7 days.Take with water within 30 minutes after a meal. 56 tablet 0    celecoxib (CELEBREX) 200 MG capsule TAKE 1 CAPSULE BY MOUTH DAILY 90 capsule 3    ciprofloxacin (CIPRO) 500 MG tablet Take 1 tablet (500 mg) by mouth daily. 10 tablet 0    dexAMETHasone (DECADRON) 1 MG tablet Take 1 tablet (1 mg) by mouth daily (with breakfast). 30 tablet 1    doxazosin (CARDURA) 4 MG tablet TAKE ONE TABLET BY MOUTH ONE TIME DAILY. 90 tablet 3    escitalopram (LEXAPRO) 5 MG tablet Take 1 tablet (5 mg) by mouth daily. 90 tablet 3    fluconazole (DIFLUCAN) 200 MG tablet Take 1 tablet (200 mg) by mouth daily. 5 tablet 0    latanoprost (XALATAN) 0.005 % ophthalmic solution [LATANOPROST (XALATAN) 0.005 % OPHTHALMIC SOLUTION] Administer 1 drop to both eyes bedtime.      LORazepam (ATIVAN) 0.5 MG tablet Take 1 tablet (0.5 mg) by mouth every 6 hours as needed for anxiety. 5 tablet 0    ondansetron (ZOFRAN) 4 MG tablet Take 1 tablet (4 mg) by mouth every 8 hours as needed for nausea. 90 tablet 3    prochlorperazine (COMPAZINE) 5 MG tablet Take 1 tablet (5 mg) by mouth every 6 hours as needed for nausea or vomiting. 30 tablet 2    therapeutic multivitamin (THERAGRAN) tablet [THERAPEUTIC MULTIVITAMIN (THERAGRAN) TABLET] Take 1 tablet by mouth daily.      timolol maleate (TIMOPTIC)  "0.5 % ophthalmic solution [TIMOLOL MALEATE (TIMOPTIC) 0.5 % OPHTHALMIC SOLUTION] INSTILL 1 DROP INTO BOTH EYES Q EVENING  3     No current facility-administered medications for this visit.           2/20/2025     1:43 PM   Additional Questions   Roomed by John   Accompanied by daughter Va         2/20/2025     1:43 PM   Patient Reported Additional Medications   Patient reports taking the following new medications none     History of Present Illness       Reason for visit:  Ear wash   She is taking medications regularly.       {MA/LPN/RN Pre-Provider Visit Orders- hCG/UA/Strep (Optional):822161}  {SUPERLIST (Optional):045856}  {additonal problems for provider to add (Optional):638044}    {ROS Picklists (Optional):628848}      Objective    /68 (BP Location: Left arm, Patient Position: Sitting, Cuff Size: Adult Regular)   Pulse 66   Temp 96.9  F (36.1  C) (Temporal)   Ht 1.499 m (4' 11\")   Wt 55.8 kg (123 lb)   LMP  (LMP Unknown)   SpO2 98%   BMI 24.84 kg/m    Body mass index is 24.84 kg/m .  Physical Exam   {Exam List (Optional):814838}    {Diagnostic Test Results (Optional):775598}        Signed Electronically by: Lucy Ruano MD  {Email feedback regarding this note to [email or web address]   :850340}  " "0.5 % OPHTHALMIC SOLUTION] INSTILL 1 DROP INTO BOTH EYES Q EVENING  3     No current facility-administered medications for this visit.           2/20/2025     1:43 PM   Additional Questions   Roomed by John   Accompanied by daughter Va         2/20/2025     1:43 PM   Patient Reported Additional Medications   Patient reports taking the following new medications none     History of Present Illness       Reason for visit:  Ear wash   She is taking medications regularly.                     Objective    /68 (BP Location: Left arm, Patient Position: Sitting, Cuff Size: Adult Regular)   Pulse 66   Temp 96.9  F (36.1  C) (Temporal)   Ht 1.499 m (4' 11\")   Wt 55.8 kg (123 lb)   LMP  (LMP Unknown)   SpO2 98%   BMI 24.84 kg/m    Body mass index is 24.84 kg/m .  Physical Exam   GENERAL: alert and no distress  NECK: no adenopathy, no asymmetry, masses, or scars  RESP: lungs clear to auscultation - no rales, rhonchi or wheezes  CV: regular rate and rhythm, normal S1 S2, no S3 or S4, no murmur, click or rub, no peripheral edema  MS: no gross musculoskeletal defects noted, no edema            Signed Electronically by: Lucy Ruano MD    "

## 2025-02-24 ENCOUNTER — PATIENT OUTREACH (OUTPATIENT)
Dept: CARE COORDINATION | Facility: CLINIC | Age: OVER 89
End: 2025-02-24
Payer: MEDICARE

## 2025-02-24 NOTE — PROGRESS NOTES
Clinic Care Coordination Contact  Community Health Worker Initial Outreach    CHW Initial Information Gathering:  Referral Source: PCP  Preferred Urgent Care: [facility] [place], [phone]  Current living arrangement:: I live in a private home  Type of residence:: Private home - stairs  Informal Support system:: Family, Children  No PCP office visit in Past Year: No  Transportation means:: Family, Regular car  CHW Additional Questions  If ED/Hospital discharge, follow-up appointment scheduled as recommended?: N/A  Medication changes made following ED/Hospital discharge?: N/A  MyChart active?: Yes  Patient sent Social Drivers of Health questionnaire?: Yes    Patient accepts CC: Yes. Patient scheduled for assessment with CC RN on Tuesday 2/25/25 at 10:00 am. Patient noted desire to discuss recent CCC referral re: in home support assistance and resources.     Call daughter Va for phone assmt (C2C on file)    Order Questions  Meal delivery services, other in home supports  Question Answer   Reason for Referral: Advance Care Planning    Patient/Caregiver Support    Resources for Transportation   ACP: Serious illness or Goals of Care   Patient/Caregiver Suport: Resources for Support   Clinical Staff have discussed the Care Coordination Referral with the patient and/or caregiver: Yes     Yvrose MCKINNEY  Community Health Worker  [facility] Care Coordination  [email or web address]  [email or web address]  Office: [phone]

## 2025-02-25 ENCOUNTER — PATIENT OUTREACH (OUTPATIENT)
Dept: NURSING | Facility: CLINIC | Age: OVER 89
End: 2025-02-25
Payer: MEDICARE

## 2025-02-25 NOTE — LETTER
[facility] CARE COORDINATION  [facility]    March 5, 2025    Alma Johns  [address]  [place] [address]      Dear Rubi,    I am a clinic care coordinator who works with Lucy Ruano MD with the [facility]. I wanted to thank you for spending the time to talk with me.  Below is a description of clinic care coordination and how I can further assist you.       The clinic care coordination team is made up of a registered nurse, , financial resource worker and community health worker who understand the health care system. The goal of clinic care coordination is to help you manage your health and improve access to the health care system. Our team works alongside your provider to assist you in determining your health and social needs. We can help you obtain health care and community resources, providing you with necessary information and education. We can work with you through any barriers and develop a care plan that helps coordinate and strengthen the communication between you and your care team.  Our services are voluntary and are offered without charge to you personally.    Please feel free to contact me with any questions or concerns regarding care coordination and what we can offer.      We are focused on providing you with the highest-quality healthcare experience possible.    Sincerely,     David Myhre, RN   [facility] RN  [phone]    Enclosed: Resources for private pay home care agencies    [facility] [phone]    [facility] [phone]    [facility] [phone]    [facility] [phone]    [facility] [phone]

## 2025-02-27 DIAGNOSIS — C18.9 COLON ADENOCARCINOMA (H): Primary | ICD-10-CM

## 2025-02-27 RX ORDER — CAPECITABINE 500 MG/1
625 TABLET, FILM COATED ORAL 2 TIMES DAILY
Qty: 56 TABLET | Refills: 0 | Status: SHIPPED | OUTPATIENT
Start: 2025-03-06 | End: 2025-03-20

## 2025-03-06 ENCOUNTER — LAB (OUTPATIENT)
Dept: INFUSION THERAPY | Facility: HOSPITAL | Age: OVER 89
End: 2025-03-06
Attending: INTERNAL MEDICINE
Payer: MEDICARE

## 2025-03-06 ENCOUNTER — PATIENT OUTREACH (OUTPATIENT)
Dept: ONCOLOGY | Facility: HOSPITAL | Age: OVER 89
End: 2025-03-06

## 2025-03-06 ENCOUNTER — ONCOLOGY VISIT (OUTPATIENT)
Dept: ONCOLOGY | Facility: HOSPITAL | Age: OVER 89
End: 2025-03-06
Attending: INTERNAL MEDICINE
Payer: MEDICARE

## 2025-03-06 VITALS
HEIGHT: 59 IN | OXYGEN SATURATION: 96 % | HEART RATE: 44 BPM | TEMPERATURE: 98.4 F | DIASTOLIC BLOOD PRESSURE: 67 MMHG | SYSTOLIC BLOOD PRESSURE: 145 MMHG | BODY MASS INDEX: 24.27 KG/M2 | RESPIRATION RATE: 16 BRPM | WEIGHT: 120.4 LBS

## 2025-03-06 DIAGNOSIS — C18.9 COLON ADENOCARCINOMA (H): ICD-10-CM

## 2025-03-06 DIAGNOSIS — C18.9 COLON ADENOCARCINOMA (H): Primary | ICD-10-CM

## 2025-03-06 LAB
ALBUMIN SERPL BCG-MCNC: 3.7 G/DL (ref 3.5–5.2)
ALP SERPL-CCNC: 85 U/L (ref 40–150)
ALT SERPL W P-5'-P-CCNC: 11 U/L (ref 0–50)
ANION GAP SERPL CALCULATED.3IONS-SCNC: 9 MMOL/L (ref 7–15)
AST SERPL W P-5'-P-CCNC: 14 U/L (ref 0–45)
BASOPHILS # BLD AUTO: 0 10E3/UL (ref 0–0.2)
BASOPHILS NFR BLD AUTO: 0 %
BILIRUB SERPL-MCNC: 0.9 MG/DL
BUN SERPL-MCNC: 23.7 MG/DL (ref 8–23)
CALCIUM SERPL-MCNC: 10.7 MG/DL (ref 8.8–10.4)
CHLORIDE SERPL-SCNC: 107 MMOL/L (ref 98–107)
CREAT SERPL-MCNC: 1.25 MG/DL (ref 0.51–0.95)
EGFRCR SERPLBLD CKD-EPI 2021: 41 ML/MIN/1.73M2
EOSINOPHIL # BLD AUTO: 0.1 10E3/UL (ref 0–0.7)
EOSINOPHIL NFR BLD AUTO: 1 %
ERYTHROCYTE [DISTWIDTH] IN BLOOD BY AUTOMATED COUNT: 15.9 % (ref 10–15)
GLUCOSE SERPL-MCNC: 96 MG/DL (ref 70–99)
HCO3 SERPL-SCNC: 25 MMOL/L (ref 22–29)
HCT VFR BLD AUTO: 37.2 % (ref 35–47)
HGB BLD-MCNC: 12.2 G/DL (ref 11.7–15.7)
IMM GRANULOCYTES # BLD: 0.1 10E3/UL
IMM GRANULOCYTES NFR BLD: 1 %
LYMPHOCYTES # BLD AUTO: 3 10E3/UL (ref 0.8–5.3)
LYMPHOCYTES NFR BLD AUTO: 25 %
MCH RBC QN AUTO: 28.6 PG (ref 26.5–33)
MCHC RBC AUTO-ENTMCNC: 32.8 G/DL (ref 31.5–36.5)
MCV RBC AUTO: 87 FL (ref 78–100)
MONOCYTES # BLD AUTO: 1.1 10E3/UL (ref 0–1.3)
MONOCYTES NFR BLD AUTO: 9 %
NEUTROPHILS # BLD AUTO: 7.6 10E3/UL (ref 1.6–8.3)
NEUTROPHILS NFR BLD AUTO: 63 %
NRBC # BLD AUTO: 0 10E3/UL
NRBC BLD AUTO-RTO: 0 /100
PLATELET # BLD AUTO: 259 10E3/UL (ref 150–450)
POTASSIUM SERPL-SCNC: 3.6 MMOL/L (ref 3.4–5.3)
PROT SERPL-MCNC: 6.3 G/DL (ref 6.4–8.3)
RBC # BLD AUTO: 4.27 10E6/UL (ref 3.8–5.2)
SODIUM SERPL-SCNC: 141 MMOL/L (ref 135–145)
WBC # BLD AUTO: 12.1 10E3/UL (ref 4–11)

## 2025-03-06 PROCEDURE — 82565 ASSAY OF CREATININE: CPT

## 2025-03-06 PROCEDURE — 36415 COLL VENOUS BLD VENIPUNCTURE: CPT

## 2025-03-06 PROCEDURE — 85004 AUTOMATED DIFF WBC COUNT: CPT

## 2025-03-06 PROCEDURE — G0463 HOSPITAL OUTPT CLINIC VISIT: HCPCS | Performed by: INTERNAL MEDICINE

## 2025-03-06 PROCEDURE — 84155 ASSAY OF PROTEIN SERUM: CPT

## 2025-03-06 ASSESSMENT — PAIN SCALES - GENERAL: PAINLEVEL_OUTOF10: NO PAIN (0)

## 2025-03-06 NOTE — PROGRESS NOTES
[facility]ology [facility] Oncology Progress Note    Patient: Alma Johns  MRN: [id number]  Date of Service: Mar 6, 2025       Reason for Visit    I was consulted by   Lucy Ruano MD     For evaluation and management of newly diagnosed colon cancer.      Encounter Diagnoses Assessment and Plan:    Problem List Items Addressed This Visit       Colon adenocarcinoma (H) - Primary    Relevant Orders    CBC with platelets differential    Comprehensive metabolic panel   Patient with newly diagnosed apple core adenocarcinoma of the hepatic flexure.  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  Patient does not wish to have chemotherapy.  She had a surgical consultation on 12/30/2024.  Palliative surgery was deferred until the patient developed obstructive symptoms.  We again discussed palliative chemotherapy.  Patient has agreed to a trial of capecitabine.  Treatment plan has been entered.     Update 3/6/2025.  Patient returns for day 1 cycle 2 of capecitabine.  She is tolerating it without difficulty.  Her EGFR has improved to 41.  Calcium is slightly increased at 10.7.  She did have laser lithotripsy on 1/31/2025.  She will continue with capecitabine 1000 mg twice daily for 2 weeks on and 1 week off.  Follow-up is planned for 3 weeks at the start of her next cycle.      ______________________________________________________________________________    Staging History   Cancer Staging   Colon adenocarcinoma (H)  Staging form: Colon and Rectum, AJCC 8th Edition  - Clinical stage from 1/9/2025: Stage IVB (cTX, cNX, cM1b) - Signed by Friedell, Peter E, MD on 1/9/2025    ECOG Performance  1 - Can't do physically strenuous work, but fully ambulatory and can do light sedentary work.    History of Present Illness      Alma Johns is an 89 year old woman with a history of hypercalcemia with elevated parathyroid hormone.  Over the last year she has had unexplained weight loss of about 20 pounds.  On 12/10/2024  "she underwent CT scan of the abdomen and pelvis which showed probable lesion at the hepatic flexure of the colon and suspicious retroperitoneal adenopathy.  On 12/19/2024 she underwent colonoscopy which confirmed an adenocarcinoma of the colon.  Upper GI endoscopic ultrasound confirmed metastases in a upper abdominal lymph node adjacent to the pancreas near the mesenteric root.  Multiple pancreatic cysts appeared benign.    Presently she feels fairly well.  Her appetite is dexamethasone.  She does not have pain.  She does not have cough chest pain or shortness of breath.  She does not smoke cigarettes or use alcohol.  There is no family history of cancer.    Review of systems.  Pertinent Findings are included in the History of Present Illness    Physical Exam    BP (!) 145/67 (BP Location: Right arm, Patient Position: Sitting, Cuff Size: Adult Regular)   Pulse (!) 44   Temp 98.4  F (36.9  C) (Oral)   Resp 16   Ht 1.499 m (4' 11\")   Wt 54.6 kg (120 lb 6.4 oz)   LMP  (LMP Unknown)   SpO2 96%   BMI 24.32 kg/m       GENERAL APPEARANCE: 89-year-old woman in no apparent distress.  HEAD: Atraumatic; normocephalic; without lesions.  EYES: Conjunctiva, corneas and eyelids normal; pupils equal, round, reactive to light; No Icterus.  MOUTH/OROPHARYNX: Oral mucosa intact  NECK: Supple with no nodes.  LUNGS:  Clear to auscultation and percussion with no extra sounds.  HEART: Regular rhythm and rate; S1 and S2 normal; no murmurs noted.  ABDOMEN: Soft; no masses or tenderness, no hepatosplenomegaly.  NEUROLOGIC: Alert and oriented.  No obvious focal findings.  EXTREMITIES: No cyanosis, or edema.  SKIN: No abnormal bruising or bleeding. No suspicious lesions noted on exposed skin.  PSYCHIATRIC: Mental status normal; no apparent psychiatric issues    Medications:    Current Outpatient Medications   Medication Sig Dispense Refill    alendronate (FOSAMAX) 70 MG tablet Take 1 tablet (70 mg) by mouth every 7 days 12 tablet 0    " amLODIPine (NORVASC) 10 MG tablet Take 1 tablet (10 mg) by mouth daily 90 tablet 3    capecitabine (XELODA) 500 MG tablet Take 2 tablets (1,000 mg) by mouth 2 times daily for 14 days. Days 1 through 14, then off for 7 days.Take with water within 30 minutes after a meal. 56 tablet 0    celecoxib (CELEBREX) 200 MG capsule TAKE 1 CAPSULE BY MOUTH DAILY 90 capsule 3    ciprofloxacin (CIPRO) 500 MG tablet Take 1 tablet (500 mg) by mouth daily. 10 tablet 0    dexAMETHasone (DECADRON) 1 MG tablet Take 1 tablet (1 mg) by mouth daily (with breakfast). 30 tablet 1    doxazosin (CARDURA) 4 MG tablet TAKE ONE TABLET BY MOUTH ONE TIME DAILY. 90 tablet 3    latanoprost (XALATAN) 0.005 % ophthalmic solution [LATANOPROST (XALATAN) 0.005 % OPHTHALMIC SOLUTION] Administer 1 drop to both eyes bedtime.      LORazepam (ATIVAN) 0.5 MG tablet Take 1 tablet (0.5 mg) by mouth every 6 hours as needed for anxiety. 5 tablet 0    therapeutic multivitamin (THERAGRAN) tablet [THERAPEUTIC MULTIVITAMIN (THERAGRAN) TABLET] Take 1 tablet by mouth daily.      timolol maleate (TIMOPTIC) 0.5 % ophthalmic solution [TIMOLOL MALEATE (TIMOPTIC) 0.5 % OPHTHALMIC SOLUTION] INSTILL 1 DROP INTO BOTH EYES Q EVENING  3    escitalopram (LEXAPRO) 5 MG tablet Take 1 tablet (5 mg) by mouth daily. 90 tablet 3    fluconazole (DIFLUCAN) 200 MG tablet Take 1 tablet (200 mg) by mouth daily. 5 tablet 0    ondansetron (ZOFRAN) 4 MG tablet Take 1 tablet (4 mg) by mouth every 8 hours as needed for nausea. (Patient not taking: Reported on 3/6/2025) 90 tablet 3    prochlorperazine (COMPAZINE) 5 MG tablet Take 1 tablet (5 mg) by mouth every 6 hours as needed for nausea or vomiting. (Patient not taking: Reported on 3/6/2025) 30 tablet 2     No current facility-administered medications for this visit.           Past History    Past Medical History:   Diagnosis Date    Colon cancer (H)     Stage IV with presence in lymph nodes and other distant body parts    Complaint related to  dreams 2019    Dyslipidemia     dyslipoproteinemia    Glaucoma     Hypercalcemia     Hyperparathyroidism     Hypertension     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    OA (osteoarthritis)     Osteopenia     PONV (postoperative nausea and vomiting)     Postmenopausal     Retinal vein occlusion (H)     Vitamin D deficiency      Past Surgical History:   Procedure Laterality Date    APPENDECTOMY  1985    Incidental    BIOPSY BREAST       SECTION      and     CHOLECYSTECTOMY      COLONOSCOPY N/A 2024    Procedure: Colonoscopy with biopsies and polypectomy, endoscopic marker;  Surgeon: Ron Hogan MD;  Location: [facility] OR    ENTEROSCOPY SMALL BOWEL N/A 2024    Procedure: Enteroscopy small bowel;  Surgeon: Ron Hogan MD;  Location: [facility] OR    ESOPHAGOSCOPY, GASTROSCOPY, DUODENOSCOPY (EGD), COMBINED N/A 2024    Procedure: ESOPHAGOGASTRODUODENOSCOPY, WITH FINE NEEDLE ASPIRATION BIOPSY, WITH ENDOSCOPIC ULTRASOUND GUIDANCE;  Surgeon: Ron Hogan MD;  Location: [facility] OR    HYSTERECTOMY TOTAL ABDOMINAL, BILATERAL SALPINGO-OOPHORECTOMY, COMBINED      KIDNEY STONE SURGERY      extraction    LUMBAR LAMINECTOMY      TOTAL HIP ARTHROPLASTY Left 2009    TOTAL KNEE ARTHROPLASTY Right 2009    TUBAL LIGATION       No Known Allergies    No family history on file.  Social History     Socioeconomic History    Marital status: Single     Spouse name: None    Number of children: None    Years of education: None    Highest education level: None   Tobacco Use    Smoking status: Never     Passive exposure: Past    Smokeless tobacco: Never   Vaping Use    Vaping status: Never Used   Substance and Sexual Activity    Alcohol use: No    Drug use: No   Social History Narrative    Lives alone. 2 hip replacements and 1 knee replacement. Used to work in housekeeping at [employer]. Saw Dr Crabtree. Has a daughter and son, and grandchildren and great grandchildren.      Social Drivers of Health     Financial Resource Strain: Low Risk  (8/12/2024)    Financial Resource Strain     Within the past 12 months, have you or your family members you live with been unable to get utilities (heat, electricity) when it was really needed?: No   Food Insecurity: Low Risk  (8/12/2024)    Food Insecurity     Within the past 12 months, did you worry that your food would run out before you got money to buy more?: No     Within the past 12 months, did the food you bought just not last and you didn t have money to get more?: No   Transportation Needs: Low Risk  (8/12/2024)    Transportation Needs     Within the past 12 months, has lack of transportation kept you from medical appointments, getting your medicines, non-medical meetings or appointments, work, or from getting things that you need?: No   Physical Activity: Inactive (8/12/2024)    Exercise Vital Sign     Days of Exercise per Week: 0 days     Minutes of Exercise per Session: 0 min   Stress: No Stress Concern Present (8/12/2024)    [employer] [place] [employer] - Occupational Stress Questionnaire     Feeling of Stress : Only a little   Social Connections: Unknown (8/12/2024)    Social Connection and Isolation Panel [NHANES]     Frequency of Social Gatherings with Friends and Family: Once a week   Interpersonal Safety: Low Risk  (12/19/2024)    Interpersonal Safety     Do you feel physically and emotionally safe where you currently live?: Yes     Within the past 12 months, have you been hit, slapped, kicked or otherwise physically hurt by someone?: No     Within the past 12 months, have you been humiliated or emotionally abused in other ways by your partner or ex-partner?: No   Housing Stability: Low Risk  (8/12/2024)    Housing Stability     Do you have housing? : Yes     Are you worried about losing your housing?: No           Lab Results    Recent Results (from the past 240 hours)   Comprehensive metabolic panel    Collection  Time: 03/06/25 11:05 AM   Result Value Ref Range    Sodium 141 135 - 145 mmol/L    Potassium 3.6 3.4 - 5.3 mmol/L    Carbon Dioxide (CO2) 25 22 - 29 mmol/L    Anion Gap 9 7 - 15 mmol/L    Urea Nitrogen 23.7 (H) 8.0 - 23.0 mg/dL    Creatinine 1.25 (H) 0.51 - 0.95 mg/dL    GFR Estimate 41 (L) >60 mL/min/1.73m2    Calcium 10.7 (H) 8.8 - 10.4 mg/dL    Chloride 107 98 - 107 mmol/L    Glucose 96 70 - 99 mg/dL    Alkaline Phosphatase 85 40 - 150 U/L    AST 14 0 - 45 U/L    ALT 11 0 - 50 U/L    Protein Total 6.3 (L) 6.4 - 8.3 g/dL    Albumin 3.7 3.5 - 5.2 g/dL    Bilirubin Total 0.9 <=1.2 mg/dL   CBC with platelets and differential    Collection Time: 03/06/25 11:05 AM   Result Value Ref Range    WBC Count 12.1 (H) 4.0 - 11.0 10e3/uL    RBC Count 4.27 3.80 - 5.20 10e6/uL    Hemoglobin 12.2 11.7 - 15.7 g/dL    Hematocrit 37.2 35.0 - 47.0 %    MCV 87 78 - 100 fL    MCH 28.6 26.5 - 33.0 pg    MCHC 32.8 31.5 - 36.5 g/dL    RDW 15.9 (H) 10.0 - 15.0 %    Platelet Count 259 150 - 450 10e3/uL    % Neutrophils 63 %    % Lymphocytes 25 %    % Monocytes 9 %    % Eosinophils 1 %    % Basophils 0 %    % Immature Granulocytes 1 %    NRBCs per 100 WBC 0 <1 /100    Absolute Neutrophils 7.6 1.6 - 8.3 10e3/uL    Absolute Lymphocytes 3.0 0.8 - 5.3 10e3/uL    Absolute Monocytes 1.1 0.0 - 1.3 10e3/uL    Absolute Eosinophils 0.1 0.0 - 0.7 10e3/uL    Absolute Basophils 0.0 0.0 - 0.2 10e3/uL    Absolute Immature Granulocytes 0.1 <=0.4 10e3/uL    Absolute NRBCs 0.0 10e3/uL       Imaging Results    No results found.    The informed consent process has occurred, as I have provided the patient with an explanation of their colon cancer diagnosis as well as the prognosis of their disease. I discussed the risks, benefits and alternatives to treatment. Risks which are common with this treatment may include, but are not limited to anemia, infections and bleeding. Less common risks with this treatment may include, but are not limited to dryness and redness  of the hands and feet. The patient was given the opportunity to ask questions, and their questions were answered. The patient has consented to capecitabine chemotherapy has palliation for adenocarcinoma of the colon..        I spent 34 minutes on the patient's visit today.  This included preparation for the visit, face-to-face time with the patient and documentation following the visit.  It did not include teaching or procedure time.    Signed by: Peter E. Friedell, MD

## 2025-03-06 NOTE — PROGRESS NOTES
[facility]: Cancer Care                                                                                          Situation: Chart reviewed by RN Care Coordinator.    Background: Patient was seen in clinic today for follow-up regarding colon cancer.    Assessment: She has started on capecitabine.  She is tolerating it without difficulty.    Plan/Recommendations: Continue on medication. She is to return in 3 weeks with labs.      Signature:  Shannon Marino RN Care Coordinator  [facility]

## 2025-03-06 NOTE — PROGRESS NOTES
"Oncology Rooming Note    March 6, 2025 11:33 AM   Alma Johns is a 89 year old female who presents for:    Chief Complaint   Patient presents with    Oncology Clinic Visit     Follow up labs     Initial Vitals: BP (!) 145/67 (BP Location: Right arm, Patient Position: Sitting, Cuff Size: Adult Regular)   Pulse (!) 44   Temp 98.4  F (36.9  C) (Oral)   Resp 16   Ht 1.499 m (4' 11\")   Wt 54.6 kg (120 lb 6.4 oz)   LMP  (LMP Unknown)   SpO2 96%   BMI 24.32 kg/m   Estimated body mass index is 24.32 kg/m  as calculated from the following:    Height as of this encounter: 1.499 m (4' 11\").    Weight as of this encounter: 54.6 kg (120 lb 6.4 oz). Body surface area is 1.51 meters squared.  No Pain (0) Comment: Data Unavailable   No LMP recorded (lmp unknown). Patient has had a hysterectomy.  Allergies reviewed: Yes  Medications reviewed: Yes    Medications: Medication refills not needed today.  Pharmacy name entered into [facility]:    [id number] - [place], [place] - [address] [facility] [place], [place] - [address] [place] [address]    Frailty Screening:   Is the patient here for a new oncology consult visit in cancer care? 2. No    PHQ9:  Did this patient require a PHQ9?: No      Clinical concerns: none      Gregoria Vega CMA            "

## 2025-03-06 NOTE — LETTER
"3/6/2025      Alma Johns  [address] [place] [address]      Dear Colleague,    Thank you for referring your patient, Alma Johns, to the [facility]. Please see a copy of my visit note below.    Oncology Rooming Note    March 6, 2025 11:33 AM   Alma Johns is a 89 year old female who presents for:    Chief Complaint   Patient presents with     Oncology Clinic Visit     Follow up labs     Initial Vitals: BP (!) 145/67 (BP Location: Right arm, Patient Position: Sitting, Cuff Size: Adult Regular)   Pulse (!) 44   Temp 98.4  F (36.9  C) (Oral)   Resp 16   Ht 1.499 m (4' 11\")   Wt 54.6 kg (120 lb 6.4 oz)   LMP  (LMP Unknown)   SpO2 96%   BMI 24.32 kg/m   Estimated body mass index is 24.32 kg/m  as calculated from the following:    Height as of this encounter: 1.499 m (4' 11\").    Weight as of this encounter: 54.6 kg (120 lb 6.4 oz). Body surface area is 1.51 meters squared.  No Pain (0) Comment: Data Unavailable   No LMP recorded (lmp unknown). Patient has had a hysterectomy.  Allergies reviewed: Yes  Medications reviewed: Yes    Medications: Medication refills not needed today.  Pharmacy name entered into [employer]:    [id number] - [place], [place] - [address] [facility] [place], [place] - [address] [place] [address]    Frailty Screening:   Is the patient here for a new oncology consult visit in cancer care? 2. No    PHQ9:  Did this patient require a PHQ9?: No      Clinical concerns: none      Gregoria Vega CMA              [facility] Hematology and Oncology Progress Note    Patient: Alma Johns  MRN: [id number]  Date of Service: Mar 6, 2025       Reason for Visit    I was consulted by   Lucy Ruano MD     For evaluation and management of newly diagnosed colon cancer.      Encounter Diagnoses Assessment and Plan:    Problem List Items Addressed This Visit       Colon adenocarcinoma (H) - Primary    Relevant Orders    CBC with " platelets differential    Comprehensive metabolic panel   Patient with newly diagnosed apple core adenocarcinoma of the hepatic flexure.  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  Patient does not wish to have chemotherapy.  She had a surgical consultation on 12/30/2024.  Palliative surgery was deferred until the patient developed obstructive symptoms.  We again discussed palliative chemotherapy.  Patient has agreed to a trial of capecitabine.  Treatment plan has been entered.     Update 3/6/2025.  Patient returns for day 1 cycle 2 of capecitabine.  She is tolerating it without difficulty.  Her EGFR has improved to 41.  Calcium is slightly increased at 10.7.  She did have laser lithotripsy on 1/31/2025.  She will continue with capecitabine 1000 mg twice daily for 2 weeks on and 1 week off.  Follow-up is planned for 3 weeks at the start of her next cycle.      ______________________________________________________________________________    Staging History   Cancer Staging   Colon adenocarcinoma (H)  Staging form: Colon and Rectum, AJCC 8th Edition  - Clinical stage from 1/9/2025: Stage IVB (cTX, cNX, cM1b) - Signed by Friedell, Peter E, MD on 1/9/2025    ECOG Performance  1 - Can't do physically strenuous work, but fully ambulatory and can do light sedentary work.    History of Present Illness      Alma Johns is an 89 year old woman with a history of hypercalcemia with elevated parathyroid hormone.  Over the last year she has had unexplained weight loss of about 20 pounds.  On 12/10/2024 she underwent CT scan of the abdomen and pelvis which showed probable lesion at the hepatic flexure of the colon and suspicious retroperitoneal adenopathy.  On 12/19/2024 she underwent colonoscopy which confirmed an adenocarcinoma of the colon.  Upper GI endoscopic ultrasound confirmed metastases in a upper abdominal lymph node adjacent to the pancreas near the mesenteric root.  Multiple pancreatic cysts appeared  "benign.    Presently she feels fairly well.  Her appetite is dexamethasone.  She does not have pain.  She does not have cough chest pain or shortness of breath.  She does not smoke cigarettes or use alcohol.  There is no family history of cancer.    Review of systems.  Pertinent Findings are included in the History of Present Illness    Physical Exam    BP (!) 145/67 (BP Location: Right arm, Patient Position: Sitting, Cuff Size: Adult Regular)   Pulse (!) 44   Temp 98.4  F (36.9  C) (Oral)   Resp 16   Ht 1.499 m (4' 11\")   Wt 54.6 kg (120 lb 6.4 oz)   LMP  (LMP Unknown)   SpO2 96%   BMI 24.32 kg/m       GENERAL APPEARANCE: 89-year-old woman in no apparent distress.  HEAD: Atraumatic; normocephalic; without lesions.  EYES: Conjunctiva, corneas and eyelids normal; pupils equal, round, reactive to light; No Icterus.  MOUTH/OROPHARYNX: Oral mucosa intact  NECK: Supple with no nodes.  LUNGS:  Clear to auscultation and percussion with no extra sounds.  HEART: Regular rhythm and rate; S1 and S2 normal; no murmurs noted.  ABDOMEN: Soft; no masses or tenderness, no hepatosplenomegaly.  NEUROLOGIC: Alert and oriented.  No obvious focal findings.  EXTREMITIES: No cyanosis, or edema.  SKIN: No abnormal bruising or bleeding. No suspicious lesions noted on exposed skin.  PSYCHIATRIC: Mental status normal; no apparent psychiatric issues    Medications:    Current Outpatient Medications   Medication Sig Dispense Refill     alendronate (FOSAMAX) 70 MG tablet Take 1 tablet (70 mg) by mouth every 7 days 12 tablet 0     amLODIPine (NORVASC) 10 MG tablet Take 1 tablet (10 mg) by mouth daily 90 tablet 3     capecitabine (XELODA) 500 MG tablet Take 2 tablets (1,000 mg) by mouth 2 times daily for 14 days. Days 1 through 14, then off for 7 days.Take with water within 30 minutes after a meal. 56 tablet 0     celecoxib (CELEBREX) 200 MG capsule TAKE 1 CAPSULE BY MOUTH DAILY 90 capsule 3     ciprofloxacin (CIPRO) 500 MG tablet Take 1 " tablet (500 mg) by mouth daily. 10 tablet 0     dexAMETHasone (DECADRON) 1 MG tablet Take 1 tablet (1 mg) by mouth daily (with breakfast). 30 tablet 1     doxazosin (CARDURA) 4 MG tablet TAKE ONE TABLET BY MOUTH ONE TIME DAILY. 90 tablet 3     latanoprost (XALATAN) 0.005 % ophthalmic solution [LATANOPROST (XALATAN) 0.005 % OPHTHALMIC SOLUTION] Administer 1 drop to both eyes bedtime.       LORazepam (ATIVAN) 0.5 MG tablet Take 1 tablet (0.5 mg) by mouth every 6 hours as needed for anxiety. 5 tablet 0     therapeutic multivitamin (THERAGRAN) tablet [THERAPEUTIC MULTIVITAMIN (THERAGRAN) TABLET] Take 1 tablet by mouth daily.       timolol maleate (TIMOPTIC) 0.5 % ophthalmic solution [TIMOLOL MALEATE (TIMOPTIC) 0.5 % OPHTHALMIC SOLUTION] INSTILL 1 DROP INTO BOTH EYES Q EVENING  3     escitalopram (LEXAPRO) 5 MG tablet Take 1 tablet (5 mg) by mouth daily. 90 tablet 3     fluconazole (DIFLUCAN) 200 MG tablet Take 1 tablet (200 mg) by mouth daily. 5 tablet 0     ondansetron (ZOFRAN) 4 MG tablet Take 1 tablet (4 mg) by mouth every 8 hours as needed for nausea. (Patient not taking: Reported on 3/6/2025) 90 tablet 3     prochlorperazine (COMPAZINE) 5 MG tablet Take 1 tablet (5 mg) by mouth every 6 hours as needed for nausea or vomiting. (Patient not taking: Reported on 3/6/2025) 30 tablet 2     No current facility-administered medications for this visit.           Past History    Past Medical History:   Diagnosis Date     Colon cancer (H)     Stage IV with presence in lymph nodes and other distant body parts     Complaint related to dreams 04/03/2019     Dyslipidemia     dyslipoproteinemia     Glaucoma      Hypercalcemia      Hyperparathyroidism      Hypertension      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     OA (osteoarthritis)      Osteopenia      PONV (postoperative nausea and vomiting)      Postmenopausal      Retinal vein occlusion (H)      Vitamin D deficiency      Past Surgical History:   Procedure Laterality Date      APPENDECTOMY  1985    Incidental     BIOPSY BREAST        SECTION      and      CHOLECYSTECTOMY  2014     COLONOSCOPY N/A 2024    Procedure: Colonoscopy with biopsies and polypectomy, endoscopic marker;  Surgeon: Ron Hogan MD;  Location: [facility] OR     ENTEROSCOPY SMALL BOWEL N/A 2024    Procedure: Enteroscopy small bowel;  Surgeon: Ron Hogan MD;  Location: [facility] OR     ESOPHAGOSCOPY, GASTROSCOPY, DUODENOSCOPY (EGD), COMBINED N/A 2024    Procedure: ESOPHAGOGASTRODUODENOSCOPY, WITH FINE NEEDLE ASPIRATION BIOPSY, WITH ENDOSCOPIC ULTRASOUND GUIDANCE;  Surgeon: Ron Hogan MD;  Location: [facility] OR     HYSTERECTOMY TOTAL ABDOMINAL, BILATERAL SALPINGO-OOPHORECTOMY, COMBINED       KIDNEY STONE SURGERY      extraction     LUMBAR LAMINECTOMY       TOTAL HIP ARTHROPLASTY Left 2009     TOTAL KNEE ARTHROPLASTY Right 2009     TUBAL LIGATION       No Known Allergies    No family history on file.  Social History     Socioeconomic History     Marital status: Single     Spouse name: None     Number of children: None     Years of education: None     Highest education level: None   Tobacco Use     Smoking status: Never     Passive exposure: Past     Smokeless tobacco: Never   Vaping Use     Vaping status: Never Used   Substance and Sexual Activity     Alcohol use: No     Drug use: No   Social History Narrative    Lives alone. 2 hip replacements and 1 knee replacement. Used to work in housekeeping at [employer]. Saw Dr Crabtree. Has a daughter and son, and grandchildren and great grandchildren.     Social Drivers of Health     Financial Resource Strain: Low Risk  (2024)    Financial Resource Strain      Within the past 12 months, have you or your family members you live with been unable to get utilities (heat, electricity) when it was really needed?: No   Food Insecurity: Low Risk  (2024)    Food Insecurity      Within the past 12 months, did you  worry that your food would run out before you got money to buy more?: No      Within the past 12 months, did the food you bought just not last and you didn t have money to get more?: No   Transportation Needs: Low Risk  (8/12/2024)    Transportation Needs      Within the past 12 months, has lack of transportation kept you from medical appointments, getting your medicines, non-medical meetings or appointments, work, or from getting things that you need?: No   Physical Activity: Inactive (8/12/2024)    Exercise Vital Sign      Days of Exercise per Week: 0 days      Minutes of Exercise per Session: 0 min   Stress: No Stress Concern Present (8/12/2024)    [place] [employer] - Occupational Stress Questionnaire      Feeling of Stress : Only a little   Social Connections: Unknown (8/12/2024)    Social Connection and Isolation Panel [NHANES]      Frequency of Social Gatherings with Friends and Family: Once a week   Interpersonal Safety: Low Risk  (12/19/2024)    Interpersonal Safety      Do you feel physically and emotionally safe where you currently live?: Yes      Within the past 12 months, have you been hit, slapped, kicked or otherwise physically hurt by someone?: No      Within the past 12 months, have you been humiliated or emotionally abused in other ways by your partner or ex-partner?: No   Housing Stability: Low Risk  (8/12/2024)    Housing Stability      Do you have housing? : Yes      Are you worried about losing your housing?: No           Lab Results    Recent Results (from the past 240 hours)   Comprehensive metabolic panel    Collection Time: 03/06/25 11:05 AM   Result Value Ref Range    Sodium 141 135 - 145 mmol/L    Potassium 3.6 3.4 - 5.3 mmol/L    Carbon Dioxide (CO2) 25 22 - 29 mmol/L    Anion Gap 9 7 - 15 mmol/L    Urea Nitrogen 23.7 (H) 8.0 - 23.0 mg/dL    Creatinine 1.25 (H) 0.51 - 0.95 mg/dL    GFR Estimate 41 (L) >60 mL/min/1.73m2    Calcium 10.7 (H) 8.8 - 10.4 mg/dL     Chloride 107 98 - 107 mmol/L    Glucose 96 70 - 99 mg/dL    Alkaline Phosphatase 85 40 - 150 U/L    AST 14 0 - 45 U/L    ALT 11 0 - 50 U/L    Protein Total 6.3 (L) 6.4 - 8.3 g/dL    Albumin 3.7 3.5 - 5.2 g/dL    Bilirubin Total 0.9 <=1.2 mg/dL   CBC with platelets and differential    Collection Time: 03/06/25 11:05 AM   Result Value Ref Range    WBC Count 12.1 (H) 4.0 - 11.0 10e3/uL    RBC Count 4.27 3.80 - 5.20 10e6/uL    Hemoglobin 12.2 11.7 - 15.7 g/dL    Hematocrit 37.2 35.0 - 47.0 %    MCV 87 78 - 100 fL    MCH 28.6 26.5 - 33.0 pg    MCHC 32.8 31.5 - 36.5 g/dL    RDW 15.9 (H) 10.0 - 15.0 %    Platelet Count 259 150 - 450 10e3/uL    % Neutrophils 63 %    % Lymphocytes 25 %    % Monocytes 9 %    % Eosinophils 1 %    % Basophils 0 %    % Immature Granulocytes 1 %    NRBCs per 100 WBC 0 <1 /100    Absolute Neutrophils 7.6 1.6 - 8.3 10e3/uL    Absolute Lymphocytes 3.0 0.8 - 5.3 10e3/uL    Absolute Monocytes 1.1 0.0 - 1.3 10e3/uL    Absolute Eosinophils 0.1 0.0 - 0.7 10e3/uL    Absolute Basophils 0.0 0.0 - 0.2 10e3/uL    Absolute Immature Granulocytes 0.1 <=0.4 10e3/uL    Absolute NRBCs 0.0 10e3/uL       Imaging Results    No results found.    The informed consent process has occurred, as I have provided the patient with an explanation of their colon cancer diagnosis as well as the prognosis of their disease. I discussed the risks, benefits and alternatives to treatment. Risks which are common with this treatment may include, but are not limited to anemia, infections and bleeding. Less common risks with this treatment may include, but are not limited to dryness and redness of the hands and feet. The patient was given the opportunity to ask questions, and their questions were answered. The patient has consented to capecitabine chemotherapy has palliation for adenocarcinoma of the colon..        I spent 34 minutes on the patient's visit today.  This included preparation for the visit, face-to-face time with the patient  and documentation following the visit.  It did not include teaching or procedure time.    Signed by: Peter E. Friedell, MD          Again, thank you for allowing me to participate in the care of your patient.        Sincerely,        Peter E. Friedell, MD    Electronically signed

## 2025-03-07 ENCOUNTER — TELEPHONE (OUTPATIENT)
Dept: INTERNAL MEDICINE | Facility: CLINIC | Age: OVER 89
End: 2025-03-07
Payer: MEDICARE

## 2025-03-07 NOTE — TELEPHONE ENCOUNTER
Please start prior authorization:     Disp Refills Start End AIRAM   LORazepam (ATIVAN) 0.5 MG tablet 5 tablet 0 3/7/2025 -- No   Sig - Route: Take 1 tablet (0.5 mg) by mouth every 6 hours as needed for anxiety. - Oral   Sent to pharmacy as: LORazepam 0.5 MG Oral Tablet (ATIVAN)   Class: E-Prescribe   Order: [id number]   E-Prescribing Status: Receipt confirmed by pharmacy (3/7/2025  9:09 AM CST)       Pharmacy    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

## 2025-03-12 NOTE — TELEPHONE ENCOUNTER
Prior Authorization Approval    Authorization Effective Date: 1/1/2025  Authorization Expiration Date: 3/12/2026  Medication: LORazepam 0.5MG tablets  Reference #:     Insurance Company: [employer] - Phone [phone] Fax [phone]  Which Pharmacy is filling the prescription (Not needed for infusion/clinic administered): [id number] - [place], [place] - [address]  Pharmacy Notified: Yes  Patient Notified: Instructed pharmacy to notify patient when script is ready to /ship.

## 2025-03-24 ENCOUNTER — HOSPITAL ENCOUNTER (OUTPATIENT)
Dept: ULTRASOUND IMAGING | Facility: HOSPITAL | Age: OVER 89
Discharge: HOME | End: 2025-03-24
Attending: UROLOGY
Payer: MEDICARE

## 2025-03-24 ENCOUNTER — HOSPITAL ENCOUNTER (OUTPATIENT)
Dept: GENERAL RADIOLOGY | Facility: HOSPITAL | Age: OVER 89
Discharge: HOME | End: 2025-03-24
Attending: UROLOGY
Payer: MEDICARE

## 2025-03-24 DIAGNOSIS — N20.0 KIDNEY STONE: ICD-10-CM

## 2025-03-24 PROCEDURE — 76770 US EXAM ABDO BACK WALL COMP: CPT

## 2025-03-24 PROCEDURE — 74019 RADEX ABDOMEN 2 VIEWS: CPT

## 2025-03-25 LAB — RADIOLOGIST FLAGS: ABNORMAL

## 2025-03-26 ENCOUNTER — TELEPHONE (OUTPATIENT)
Dept: UROLOGY | Facility: CLINIC | Age: OVER 89
End: 2025-03-26
Payer: MEDICARE

## 2025-03-26 NOTE — TELEPHONE ENCOUNTER
Spoke with Va, pt's daughter regarding HEAVEN results. Dr. Madrid sent message to this RN regarding urgent findings of worsening hydronephrosis. Moved appointment up to Tuesday 4/1 to discuss next steps.     KETAN Medel  Care Coordinator- Urology   [phone]

## 2025-03-26 NOTE — TELEPHONE ENCOUNTER
Reason for visit: Follow up - 1/31/25 (Cystoscopy, Right Ureteroscopy, Right Retrograde Pyelogram, Right Laser Lithotripsy, Right Ureteral Stent Exchange, Right Ureteral Stent Removal)     Relevant information: Plan of care post op was   -Stent removal in 1 week, follow-up 6 weeks with imaging (completed)     Records/imaging/labs/orders: IN [facility], CARE EVERYWHERE, AND PACS     At Rooming: Standard rooming    Larissa Tavares  3/26/2025  12:20 PM

## 2025-03-30 NOTE — PROGRESS NOTES
"Virtual Visit Details    Type of service:  Video Visit   Video Start Time: {video visit start/end time for provider to select:256012}  Video End Time:{video visit start/end time for provider to select:706570}    Originating Location (pt. Location): {video visit patient location:026276::\"Home\"}  {PROVIDER LOCATION On-site should be selected for visits conducted from your clinic location or adjoining [facility] hospital, academic office, or other nearby [facility] building. Off-site should be selected for all other provider locations, including home:582071}  Distant Location (provider location):  {virtual location provider:545115}  Platform used for Video Visit: {Virtual Visit Platforms:688724::\"[employer]\"}      UROLOGY OUTPATIENT VISIT      Chief Complaint:   Kidney Stone      Synopsis    Alma Johns is a very pleasant AGE: 89 year old year old person    Has hx of left staghorn renal calculus  Recently treated with          Medications     Current Outpatient Medications   Medication Sig Dispense Refill    alendronate (FOSAMAX) 70 MG tablet Take 1 tablet (70 mg) by mouth every 7 days 12 tablet 0    amLODIPine (NORVASC) 10 MG tablet Take 1 tablet (10 mg) by mouth daily 90 tablet 3    capecitabine (XELODA) 500 MG tablet Take 2 tablets (1,000 mg) by mouth 2 times daily for 14 days. Days 1 through 14, then off for 7 days.Take with water within 30 minutes after a meal. 56 tablet 0    celecoxib (CELEBREX) 200 MG capsule TAKE 1 CAPSULE BY MOUTH DAILY 90 capsule 3    ciprofloxacin (CIPRO) 500 MG tablet Take 1 tablet (500 mg) by mouth daily. 10 tablet 0    dexAMETHasone (DECADRON) 1 MG tablet Take 1 tablet (1 mg) by mouth daily (with breakfast). 30 tablet 1    doxazosin (CARDURA) 4 MG tablet TAKE ONE TABLET BY MOUTH ONE TIME DAILY. 90 tablet 3    escitalopram (LEXAPRO) 5 MG tablet Take 1 tablet (5 mg) by mouth daily. 90 tablet 3    fluconazole (DIFLUCAN) 200 MG tablet Take 1 tablet (200 mg) by mouth daily. 5 tablet 0    latanoprost " (XALATAN) 0.005 % ophthalmic solution [LATANOPROST (XALATAN) 0.005 % OPHTHALMIC SOLUTION] Administer 1 drop to both eyes bedtime.      LORazepam (ATIVAN) 0.5 MG tablet Take 1 tablet (0.5 mg) by mouth every 6 hours as needed for anxiety. 5 tablet 0    ondansetron (ZOFRAN) 4 MG tablet Take 1 tablet (4 mg) by mouth every 8 hours as needed for nausea. (Patient not taking: Reported on 3/6/2025) 90 tablet 3    prochlorperazine (COMPAZINE) 5 MG tablet Take 1 tablet (5 mg) by mouth every 6 hours as needed for nausea or vomiting. (Patient not taking: Reported on 3/6/2025) 30 tablet 2    therapeutic multivitamin (THERAGRAN) tablet [THERAPEUTIC MULTIVITAMIN (THERAGRAN) TABLET] Take 1 tablet by mouth daily.      timolol maleate (TIMOPTIC) 0.5 % ophthalmic solution [TIMOLOL MALEATE (TIMOPTIC) 0.5 % OPHTHALMIC SOLUTION] INSTILL 1 DROP INTO BOTH EYES Q EVENING  3     No current facility-administered medications for this visit.         The following *** distinct labs were reviewed    I personally reviewed all applicable laboratory data and went over findings with patient  Significant for:    CBC RESULTS:  Recent Labs   Lab Test 03/06/25  1105 02/20/25  1049 01/30/25  1258 12/30/24  0940   WBC 12.1* 6.3 7.2 6.6   HGB 12.2 12.8 11.9 12.1    275 228 254        BMP RESULTS:  Recent Labs   Lab Test 03/06/25  1105 02/20/25  1049 01/30/25  1258 01/06/25  1212 12/30/24  0940 08/06/21  1154 02/05/21  1436 10/17/19  1051 05/31/19  0901 04/03/19  1209    140 142  --  144   < > 143 141 143 140   POTASSIUM 3.6 3.9 4.4  --  3.6   < > 3.3* 3.7 3.7 4.4   CHLORIDE 107 102 107  --  108*   < > 104 102 109* 102   CO2 25 24 23  --  24   < > 25 28 24 26   ANIONGAP 9 14 12  --  12   < > 14 11 10 12   GLC 96 111* 127* 109* 114*   < > 124 114 110 109   BUN 23.7* 25.3* 25.8*  --  13.4   < > 15 15 17 22   CR 1.25* 1.61* 1.51*  --  1.22*   < > 1.06 0.92 0.87 1.25*   GFRESTIMATED 41* 30* 33*  --  42*   < > 49* 58* >60 41*   GFRESTBLACK  --   --    "--   --   --   --  60* >60 >60 50*    < > = values in this interval not displayed.       CALCIUM RESULTS:  Recent Labs   Lab Test 03/06/25  1105 02/20/25  1049 01/30/25  1258 12/30/24  0940   JIMMY 10.7* 10.7* 10.1 10.0       PTH RESULTS:  Recent Labs   Lab Test 08/12/24  0938 08/10/23  0949 08/08/22  1206 08/06/21  1154   PTHI 75* 22 106* 261*       HGB A1C RESULTS:  No results found for: \"A1C\"    UA RESULTS:   Recent Labs   Lab Test 01/06/25  1158 11/19/24  0946 08/10/23  0951   SG 1.013 1.020 1.010   URINEPH 5.5 5.5 6.0   NITRITE Negative Negative Negative   RBCU <1 0-2 10-25*   WBCU 46* * 10-25*       PSA RESULTS  No results found for: \"PSA\"      Recent Imaging Report    I personally reviewed all applicable imaging and went over the below findings with patient.    Results for orders placed or performed during the hospital encounter of 03/24/25   XR Abdomen 2 Views    Narrative    EXAM: XR ABDOMEN 2 VIEWS  LOCATION: [facility]  DATE: 3/24/2025    INDICATION: Kidney stone.  COMPARISON: CT abdomen and pelvis without IV contrast 12/10/2024.      Impression    IMPRESSION: Left renal pelvic staghorn calculus measures 3.8 x 3.8 cm. No additional urinary tract calculi. Cholecystectomy clips. Degenerative changes involving the spine and both SI joints. Bilateral hip arthroplasty, partially visualized.       Personal Review of Recent Imaging    I personally reviewed the above *** and based on my own interpretation ***           Assessment/Plan   89 year old year old person with   ***    ***Complex Medical Decision Making: {Complex MDM (Y/N):848400}    CC:  Lucy Ruano    *** minutes spent on clinical encounter including  Review of medical records  Review of outside records  Documentation  Coordinating follow-up medical care           Chronic    Acute   Progression   Severe    Kidney/Ureteral Stones                 ***       ***              ***               ***   LUTS                      "                        ***       ***              ***              ***    Enlarged Prostate                         ***        ***             ***               ***  Urinary Tract Infection                  ***        ***              ***               ***  ***           ***        ***             ***                ***

## 2025-04-01 ENCOUNTER — ONCOLOGY VISIT (OUTPATIENT)
Dept: ONCOLOGY | Facility: HOSPITAL | Age: OVER 89
End: 2025-04-01
Attending: INTERNAL MEDICINE
Payer: MEDICARE

## 2025-04-01 ENCOUNTER — LAB (OUTPATIENT)
Dept: INFUSION THERAPY | Facility: HOSPITAL | Age: OVER 89
End: 2025-04-01
Attending: INTERNAL MEDICINE
Payer: MEDICARE

## 2025-04-01 ENCOUNTER — VIRTUAL VISIT (OUTPATIENT)
Dept: UROLOGY | Facility: CLINIC | Age: OVER 89
End: 2025-04-01
Payer: MEDICARE

## 2025-04-01 ENCOUNTER — PRE VISIT (OUTPATIENT)
Dept: UROLOGY | Facility: CLINIC | Age: OVER 89
End: 2025-04-01

## 2025-04-01 VITALS
BODY MASS INDEX: 25.2 KG/M2 | SYSTOLIC BLOOD PRESSURE: 156 MMHG | HEIGHT: 59 IN | RESPIRATION RATE: 16 BRPM | TEMPERATURE: 98.8 F | HEART RATE: 57 BPM | OXYGEN SATURATION: 96 % | WEIGHT: 125 LBS | DIASTOLIC BLOOD PRESSURE: 69 MMHG

## 2025-04-01 VITALS — BODY MASS INDEX: 23.18 KG/M2 | HEIGHT: 59 IN | WEIGHT: 115 LBS

## 2025-04-01 DIAGNOSIS — C18.9 COLON ADENOCARCINOMA (H): Primary | ICD-10-CM

## 2025-04-01 DIAGNOSIS — N20.0 KIDNEY STONE: Primary | ICD-10-CM

## 2025-04-01 DIAGNOSIS — C18.9 COLON ADENOCARCINOMA (H): ICD-10-CM

## 2025-04-01 LAB
ALBUMIN SERPL BCG-MCNC: 3.5 G/DL (ref 3.5–5.2)
ALP SERPL-CCNC: 80 U/L (ref 40–150)
ALT SERPL W P-5'-P-CCNC: 15 U/L (ref 0–50)
ANION GAP SERPL CALCULATED.3IONS-SCNC: 11 MMOL/L (ref 7–15)
AST SERPL W P-5'-P-CCNC: 16 U/L (ref 0–45)
BASOPHILS # BLD AUTO: 0 10E3/UL (ref 0–0.2)
BASOPHILS NFR BLD AUTO: 0 %
BILIRUB SERPL-MCNC: 1.8 MG/DL
BUN SERPL-MCNC: 31.3 MG/DL (ref 8–23)
CALCIUM SERPL-MCNC: 9.3 MG/DL (ref 8.8–10.4)
CHLORIDE SERPL-SCNC: 111 MMOL/L (ref 98–107)
CREAT SERPL-MCNC: 2.11 MG/DL (ref 0.51–0.95)
EGFRCR SERPLBLD CKD-EPI 2021: 22 ML/MIN/1.73M2
EOSINOPHIL # BLD AUTO: 0.1 10E3/UL (ref 0–0.7)
EOSINOPHIL NFR BLD AUTO: 2 %
ERYTHROCYTE [DISTWIDTH] IN BLOOD BY AUTOMATED COUNT: 19.7 % (ref 10–15)
GLUCOSE SERPL-MCNC: 90 MG/DL (ref 70–99)
HCO3 SERPL-SCNC: 20 MMOL/L (ref 22–29)
HCT VFR BLD AUTO: 37 % (ref 35–47)
HGB BLD-MCNC: 12.2 G/DL (ref 11.7–15.7)
IMM GRANULOCYTES # BLD: 0.1 10E3/UL
IMM GRANULOCYTES NFR BLD: 1 %
LYMPHOCYTES # BLD AUTO: 2.2 10E3/UL (ref 0.8–5.3)
LYMPHOCYTES NFR BLD AUTO: 24 %
MCH RBC QN AUTO: 29.6 PG (ref 26.5–33)
MCHC RBC AUTO-ENTMCNC: 33 G/DL (ref 31.5–36.5)
MCV RBC AUTO: 90 FL (ref 78–100)
MONOCYTES # BLD AUTO: 1 10E3/UL (ref 0–1.3)
MONOCYTES NFR BLD AUTO: 11 %
NEUTROPHILS # BLD AUTO: 5.5 10E3/UL (ref 1.6–8.3)
NEUTROPHILS NFR BLD AUTO: 62 %
NRBC # BLD AUTO: 0 10E3/UL
NRBC BLD AUTO-RTO: 0 /100
PLATELET # BLD AUTO: 175 10E3/UL (ref 150–450)
POTASSIUM SERPL-SCNC: 3.7 MMOL/L (ref 3.4–5.3)
PROT SERPL-MCNC: 6 G/DL (ref 6.4–8.3)
RBC # BLD AUTO: 4.12 10E6/UL (ref 3.8–5.2)
SODIUM SERPL-SCNC: 142 MMOL/L (ref 135–145)
WBC # BLD AUTO: 8.9 10E3/UL (ref 4–11)

## 2025-04-01 PROCEDURE — 85014 HEMATOCRIT: CPT

## 2025-04-01 PROCEDURE — 98005 SYNCH AUDIO-VIDEO EST LOW 20: CPT | Performed by: UROLOGY

## 2025-04-01 PROCEDURE — 36415 COLL VENOUS BLD VENIPUNCTURE: CPT

## 2025-04-01 PROCEDURE — 82040 ASSAY OF SERUM ALBUMIN: CPT

## 2025-04-01 PROCEDURE — 1126F AMNT PAIN NOTED NONE PRSNT: CPT | Mod: 95 | Performed by: UROLOGY

## 2025-04-01 PROCEDURE — 99214 OFFICE O/P EST MOD 30 MIN: CPT | Performed by: INTERNAL MEDICINE

## 2025-04-01 PROCEDURE — 85004 AUTOMATED DIFF WBC COUNT: CPT

## 2025-04-01 PROCEDURE — G0463 HOSPITAL OUTPT CLINIC VISIT: HCPCS | Performed by: INTERNAL MEDICINE

## 2025-04-01 ASSESSMENT — PAIN SCALES - GENERAL: PAINLEVEL_OUTOF10: NO PAIN (0)

## 2025-04-01 NOTE — LETTER
4/1/2025       RE: Alma Johns  [address]  [place] [address]     Dear Colleague,    Thank you for referring your patient, Alma Johns, to the [facility] [place] at [facility]. Please see a copy of my visit note below.    Virtual Visit Details    Type of service:  Video Visit   Video Start Time:  8:00  Video End Time: 8:15    Originating Location (pt. Location): Home    Distant Location (provider location):  Off-site  Platform used for Video Visit: [facility]        UROLOGY OUTPATIENT VISIT      Chief Complaint:   Kidney Stone      Synopsis    Alma Johns is a very pleasant AGE: 89 year old year old person    Has hx of left staghorn renal calculus and right ureteral stones.    Recently treated right ureteral stones with ureteroscopic lithotripsy.  Stones were calcium oxalate.  She has been doing well since her procedure.  She has no flank pain or urinary symptoms.  Her left renal stone remains in place however she is asymptomatic from this.  She is currently undergoing chemotherapy for recently diagnosed advanced colon cancer.  Next Epson treatment are somewhat unclear and will depend on an upcoming oncology visit and pending response to chemotherapy which she is tolerating well.    A KUB was updated which reveals the left renal staghorn stone though no stones are seen in the area of the right kidney or ureter.  Renal ultrasound shows normal right kidney without stones.  Her left staghorn stone is evidence on the CT and there is moderate hydronephrosis kidney.    Her creatinine is near baseline in the mid ones.         Medications     Current Outpatient Medications   Medication Sig Dispense Refill     alendronate (FOSAMAX) 70 MG tablet Take 1 tablet (70 mg) by mouth every 7 days 12 tablet 0     amLODIPine (NORVASC) 10 MG tablet Take 1 tablet (10 mg) by mouth daily 90 tablet 3     capecitabine (XELODA) 500 MG tablet Take 2 tablets (1,000  mg) by mouth 2 times daily for 14 days. Days 1 through 14, then off for 7 days.Take with water within 30 minutes after a meal. 56 tablet 0     celecoxib (CELEBREX) 200 MG capsule TAKE 1 CAPSULE BY MOUTH DAILY 90 capsule 3     ciprofloxacin (CIPRO) 500 MG tablet Take 1 tablet (500 mg) by mouth daily. 10 tablet 0     dexAMETHasone (DECADRON) 1 MG tablet Take 1 tablet (1 mg) by mouth daily (with breakfast). 30 tablet 1     doxazosin (CARDURA) 4 MG tablet TAKE ONE TABLET BY MOUTH ONE TIME DAILY. 90 tablet 3     escitalopram (LEXAPRO) 5 MG tablet Take 1 tablet (5 mg) by mouth daily. 90 tablet 3     fluconazole (DIFLUCAN) 200 MG tablet Take 1 tablet (200 mg) by mouth daily. 5 tablet 0     latanoprost (XALATAN) 0.005 % ophthalmic solution [LATANOPROST (XALATAN) 0.005 % OPHTHALMIC SOLUTION] Administer 1 drop to both eyes bedtime.       LORazepam (ATIVAN) 0.5 MG tablet Take 1 tablet (0.5 mg) by mouth every 6 hours as needed for anxiety. 5 tablet 0     ondansetron (ZOFRAN) 4 MG tablet Take 1 tablet (4 mg) by mouth every 8 hours as needed for nausea. (Patient not taking: Reported on 3/6/2025) 90 tablet 3     prochlorperazine (COMPAZINE) 5 MG tablet Take 1 tablet (5 mg) by mouth every 6 hours as needed for nausea or vomiting. (Patient not taking: Reported on 3/6/2025) 30 tablet 2     therapeutic multivitamin (THERAGRAN) tablet [THERAPEUTIC MULTIVITAMIN (THERAGRAN) TABLET] Take 1 tablet by mouth daily.       timolol maleate (TIMOPTIC) 0.5 % ophthalmic solution [TIMOLOL MALEATE (TIMOPTIC) 0.5 % OPHTHALMIC SOLUTION] INSTILL 1 DROP INTO BOTH EYES Q EVENING  3     No current facility-administered medications for this visit.         The following  distinct labs were reviewed    I personally reviewed all applicable laboratory data and went over findings with patient  Significant for:    CBC RESULTS:  Recent Labs   Lab Test 03/06/25  1105 02/20/25  1049 01/30/25  1258 12/30/24  0940   WBC 12.1* 6.3 7.2 6.6   HGB 12.2 12.8 11.9 12.1  "   275 228 254        BMP RESULTS:  Recent Labs   Lab Test 03/06/25  1105 02/20/25  1049 01/30/25  1258 01/06/25  1212 12/30/24  0940 08/06/21  1154 02/05/21  1436 10/17/19  1051 05/31/19  0901 04/03/19  1209    140 142  --  144   < > 143 141 143 140   POTASSIUM 3.6 3.9 4.4  --  3.6   < > 3.3* 3.7 3.7 4.4   CHLORIDE 107 102 107  --  108*   < > 104 102 109* 102   CO2 25 24 23  --  24   < > 25 28 24 26   ANIONGAP 9 14 12  --  12   < > 14 11 10 12   GLC 96 111* 127* 109* 114*   < > 124 114 110 109   BUN 23.7* 25.3* 25.8*  --  13.4   < > 15 15 17 22   CR 1.25* 1.61* 1.51*  --  1.22*   < > 1.06 0.92 0.87 1.25*   GFRESTIMATED 41* 30* 33*  --  42*   < > 49* 58* >60 41*   GFRESTBLACK  --   --   --   --   --   --  60* >60 >60 50*    < > = values in this interval not displayed.       CALCIUM RESULTS:  Recent Labs   Lab Test 03/06/25  1105 02/20/25  1049 01/30/25  1258 12/30/24  0940   JIMMY 10.7* 10.7* 10.1 10.0       PTH RESULTS:  Recent Labs   Lab Test 08/12/24  0938 08/10/23  0949 08/08/22  1206 08/06/21  1154   PTHI 75* 22 106* 261*       HGB A1C RESULTS:  No results found for: \"A1C\"    UA RESULTS:   Recent Labs   Lab Test 01/06/25  1158 11/19/24  0946 08/10/23  0951   SG 1.013 1.020 1.010   URINEPH 5.5 5.5 6.0   NITRITE Negative Negative Negative   RBCU <1 0-2 10-25*   WBCU 46* * 10-25*       Recent Imaging Report    I personally reviewed all applicable imaging and went over the below findings with patient.    Results for orders placed or performed during the hospital encounter of 03/24/25   XR Abdomen 2 Views    Narrative    EXAM: XR ABDOMEN 2 VIEWS  LOCATION: [facility]  DATE: 3/24/2025    INDICATION: Kidney stone.  COMPARISON: CT abdomen and pelvis without IV contrast 12/10/2024.      Impression    IMPRESSION: Left renal pelvic staghorn calculus measures 3.8 x 3.8 cm. No additional urinary tract calculi. Cholecystectomy clips. Degenerative changes involving the spine and both " SI joints. Bilateral hip arthroplasty, partially visualized.                Assessment/Plan   89 year old year old person with advanced colon cancer, left staghorn renal calculus, right ureteral stones status post ureteroscopy.  -She is currently doing well despite the large stone in her kidney.  -We discussed potential treatment options.  Given advanced age and medical comorbidities including advanced colon cancer we made the shared decision to monitor her left renal stone for the time being.  Should the become symptomatic or associated with infection we would potentially proceed with stent placement for renal decompression.  Her creatinine is normal and she has no active signs of infection or any symptoms thus preference made to monitor for the time being.  -She will follow-up in 3 to 6 months for symptom check pending response to chemo      CC:  Lucy Ruano      Again, thank you for allowing me to participate in the care of your patient.      Sincerely,    Napoleon Madrid MD

## 2025-04-01 NOTE — NURSING NOTE
Current patient location: [address] [place] [address]    Is the patient currently in the state of [place]? YES    Visit mode: VIDEO    If the visit is dropped, the patient can be reconnected by:TELEPHONE VISIT: Phone number: [phone]    Will anyone else be joining the visit? Yes, daughter Alma is there with her.   (If patient encounters technical issues they should call [phone]    Are changes needed to the allergy or medication list? No    Are refills needed on medications prescribed by this physician? NO    Rooming Documentation:  Questionnaire(s) completed    Reason for visit: RECHECK    Kimberley REAL

## 2025-04-01 NOTE — PROGRESS NOTES
[facility] Hematology and Oncology Progress Note    Patient: Alma Johns  MRN: [id number]  Date of Service: Apr 1, 2025       Reason for Visit    I was consulted by   Lucy Ruano MD     For evaluation and management of newly diagnosed colon cancer.      Encounter Diagnoses Assessment and Plan:    Problem List Items Addressed This Visit       Colon adenocarcinoma (H) - Primary    Relevant Orders    CBC with Platelets & Differential    Comprehensive metabolic panel    CBC with Platelets & Differential    Comprehensive metabolic panel   Patient with newly diagnosed apple core adenocarcinoma of the hepatic flexure.  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  Patient does not wish to have chemotherapy.  She had a surgical consultation on 12/30/2024.  Palliative surgery was deferred until the patient developed obstructive symptoms.  We again discussed palliative chemotherapy.  Patient has agreed to a trial of capecitabine.  Treatment plan has been entered.     Update 3/6/2025.  Patient returns for day 1 cycle 2 of capecitabine.  She is tolerating it without difficulty.  Her EGFR has improved to 41.  Calcium is slightly increased at 10.7.  She did have laser lithotripsy on 1/31/2025.  She will continue with capecitabine 1000 mg twice daily for 2 weeks on and 1 week off.  Follow-up is planned for 3 weeks at the start of her next cycle.    Update 4/1/2025 patient returns after completing cycle 2 of capecitabine.  Unfortunately her EGFR has now fallen to 22.  I advised her to hold capecitabine for 2 weeks after which we will recheck her chemistry profile.  She had a recent ultrasound which shows an atrophic right kidney and a large staghorn calculus on the left.      ______________________________________________________________________________    Staging History   Cancer Staging   Colon adenocarcinoma (H)  Staging form: Colon and Rectum, AJCC 8th Edition  - Clinical stage from 1/9/2025: Stage IVB  "(cTX, cNX, cM1b) - Signed by Friedell, Peter E, MD on 1/9/2025    ECOG Performance  1 - Can't do physically strenuous work, but fully ambulatory and can do light sedentary work.    History of Present Illness      Alma Johns is an 89 year old woman with a history of hypercalcemia with elevated parathyroid hormone.  Over the last year she has had unexplained weight loss of about 20 pounds.  On 12/10/2024 she underwent CT scan of the abdomen and pelvis which showed probable lesion at the hepatic flexure of the colon and suspicious retroperitoneal adenopathy.  On 12/19/2024 she underwent colonoscopy which confirmed an adenocarcinoma of the colon.  Upper GI endoscopic ultrasound confirmed metastases in a upper abdominal lymph node adjacent to the pancreas near the mesenteric root.  Multiple pancreatic cysts appeared benign.    Presently she feels fairly well.  Her appetite is normal.  She does not have pain.  She does not have cough chest pain or shortness of breath.She doesnote some faa    Review of systems.  Pertinent Findings are included in the History of Present Illness    Physical Exam    BP (!) 156/69 (BP Location: Right arm, Patient Position: Sitting, Cuff Size: Adult Regular)   Pulse 57   Temp 98.8  F (37.1  C) (Oral)   Resp 16   Ht 1.499 m (4' 11\")   Wt 56.7 kg (125 lb)   LMP  (LMP Unknown)   SpO2 96%   BMI 25.25 kg/m       GENERAL APPEARANCE: 89-year-old woman in no apparent distress.  HEAD: Atraumatic; normocephalic; without lesions.  EYES: Conjunctiva, corneas and eyelids normal; pupils equal, round, reactive to light; No Icterus.  MOUTH/OROPHARYNX: Oral mucosa intact  NECK: Supple with no nodes.  LUNGS:  Clear to auscultation and percussion with no extra sounds.  HEART: Regular rhythm and rate; S1 and S2 normal; no murmurs noted.  ABDOMEN: Soft; no masses or tenderness, no hepatosplenomegaly.  NEUROLOGIC: Alert and oriented.  No obvious focal findings.  EXTREMITIES: No cyanosis, or edema.  SKIN: " No abnormal bruising or bleeding. No suspicious lesions noted on exposed skin.  PSYCHIATRIC: Mental status normal; no apparent psychiatric issues    Medications:    Current Outpatient Medications   Medication Sig Dispense Refill    alendronate (FOSAMAX) 70 MG tablet Take 1 tablet (70 mg) by mouth every 7 days 12 tablet 0    amLODIPine (NORVASC) 10 MG tablet Take 1 tablet (10 mg) by mouth daily 90 tablet 3    capecitabine (XELODA) 500 MG tablet Take 2 tablets (1,000 mg) by mouth 2 times daily for 14 days. Days 1 through 14, then off for 7 days.Take with water within 30 minutes after a meal. 56 tablet 0    celecoxib (CELEBREX) 200 MG capsule TAKE 1 CAPSULE BY MOUTH DAILY 90 capsule 3    ciprofloxacin (CIPRO) 500 MG tablet Take 1 tablet (500 mg) by mouth daily. 10 tablet 0    dexAMETHasone (DECADRON) 1 MG tablet Take 1 tablet (1 mg) by mouth daily (with breakfast). 30 tablet 1    doxazosin (CARDURA) 4 MG tablet TAKE ONE TABLET BY MOUTH ONE TIME DAILY. 90 tablet 3    escitalopram (LEXAPRO) 5 MG tablet Take 1 tablet (5 mg) by mouth daily. 90 tablet 3    fluconazole (DIFLUCAN) 200 MG tablet Take 1 tablet (200 mg) by mouth daily. 5 tablet 0    latanoprost (XALATAN) 0.005 % ophthalmic solution [LATANOPROST (XALATAN) 0.005 % OPHTHALMIC SOLUTION] Administer 1 drop to both eyes bedtime.      LORazepam (ATIVAN) 0.5 MG tablet Take 1 tablet (0.5 mg) by mouth every 6 hours as needed for anxiety. 5 tablet 0    ondansetron (ZOFRAN) 4 MG tablet Take 1 tablet (4 mg) by mouth every 8 hours as needed for nausea. 90 tablet 3    prochlorperazine (COMPAZINE) 5 MG tablet Take 1 tablet (5 mg) by mouth every 6 hours as needed for nausea or vomiting. 30 tablet 2    therapeutic multivitamin (THERAGRAN) tablet [THERAPEUTIC MULTIVITAMIN (THERAGRAN) TABLET] Take 1 tablet by mouth daily.      timolol maleate (TIMOPTIC) 0.5 % ophthalmic solution [TIMOLOL MALEATE (TIMOPTIC) 0.5 % OPHTHALMIC SOLUTION] INSTILL 1 DROP INTO BOTH EYES Q EVENING  3     No  current facility-administered medications for this visit.           Past History    Past Medical History:   Diagnosis Date    Colon cancer (H)     Stage IV with presence in lymph nodes and other distant body parts    Complaint related to dreams 2019    Dyslipidemia     dyslipoproteinemia    Glaucoma     Hypercalcemia     Hyperparathyroidism     Hypertension     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    OA (osteoarthritis)     Osteopenia     PONV (postoperative nausea and vomiting)     Postmenopausal     Retinal vein occlusion (H)     Vitamin D deficiency      Past Surgical History:   Procedure Laterality Date    APPENDECTOMY      Incidental    BIOPSY BREAST       SECTION      and     CHOLECYSTECTOMY      COLONOSCOPY N/A 2024    Procedure: Colonoscopy with biopsies and polypectomy, endoscopic marker;  Surgeon: Ron Hogan MD;  Location: [facility] OR    ENTEROSCOPY SMALL BOWEL N/A 2024    Procedure: Enteroscopy small bowel;  Surgeon: Ron Hogan MD;  Location: [facility] OR    ESOPHAGOSCOPY, GASTROSCOPY, DUODENOSCOPY (EGD), COMBINED N/A 2024    Procedure: ESOPHAGOGASTRODUODENOSCOPY, WITH FINE NEEDLE ASPIRATION BIOPSY, WITH ENDOSCOPIC ULTRASOUND GUIDANCE;  Surgeon: Ron Hogan MD;  Location: [facility] OR    HYSTERECTOMY TOTAL ABDOMINAL, BILATERAL SALPINGO-OOPHORECTOMY, COMBINED      KIDNEY STONE SURGERY      extraction    LUMBAR LAMINECTOMY      TOTAL HIP ARTHROPLASTY Left 2009    TOTAL KNEE ARTHROPLASTY Right 2009    TUBAL LIGATION       No Known Allergies    No family history on file.  Social History     Socioeconomic History    Marital status: Single     Spouse name: None    Number of children: None    Years of education: None    Highest education level: None   Tobacco Use    Smoking status: Never     Passive exposure: Past    Smokeless tobacco: Never   Vaping Use    Vaping status: Never Used   Substance and Sexual Activity    Alcohol use: No     Drug use: No   Social History Narrative    Lives alone. 2 hip replacements and 1 knee replacement. Used to work in housekeeping at [employer]. Saw  Leyda. Has a daughter and son, and grandchildren and great grandchildren.     Social Drivers of Health     Financial Resource Strain: Low Risk  (8/12/2024)    Financial Resource Strain     Within the past 12 months, have you or your family members you live with been unable to get utilities (heat, electricity) when it was really needed?: No   Food Insecurity: Low Risk  (8/12/2024)    Food Insecurity     Within the past 12 months, did you worry that your food would run out before you got money to buy more?: No     Within the past 12 months, did the food you bought just not last and you didn t have money to get more?: No   Transportation Needs: Low Risk  (8/12/2024)    Transportation Needs     Within the past 12 months, has lack of transportation kept you from medical appointments, getting your medicines, non-medical meetings or appointments, work, or from getting things that you need?: No   Physical Activity: Inactive (8/12/2024)    Exercise Vital Sign     Days of Exercise per Week: 0 days     Minutes of Exercise per Session: 0 min   Stress: No Stress Concern Present (8/12/2024)    [employer] - Occupational Stress Questionnaire     Feeling of Stress : Only a little   Social Connections: Unknown (8/12/2024)    Social Connection and Isolation Panel [NHANES]     Frequency of Social Gatherings with Friends and Family: Once a week   Interpersonal Safety: Low Risk  (12/19/2024)    Interpersonal Safety     Do you feel physically and emotionally safe where you currently live?: Yes     Within the past 12 months, have you been hit, slapped, kicked or otherwise physically hurt by someone?: No     Within the past 12 months, have you been humiliated or emotionally abused in other ways by your partner or ex-partner?: No   Housing Stability: Low  Risk  (8/12/2024)    Housing Stability     Do you have housing? : Yes     Are you worried about losing your housing?: No           Lab Results    Recent Results (from the past 240 hours)   US Renal Complete Non-Vascular    Collection Time: 03/24/25  3:07 PM   Result Value Ref Range    Radiologist flags Hydronephrosis (Urgent)    Comprehensive metabolic panel    Collection Time: 04/01/25 10:30 AM   Result Value Ref Range    Sodium 142 135 - 145 mmol/L    Potassium 3.7 3.4 - 5.3 mmol/L    Carbon Dioxide (CO2) 20 (L) 22 - 29 mmol/L    Anion Gap 11 7 - 15 mmol/L    Urea Nitrogen 31.3 (H) 8.0 - 23.0 mg/dL    Creatinine 2.11 (H) 0.51 - 0.95 mg/dL    GFR Estimate 22 (L) >60 mL/min/1.73m2    Calcium 9.3 8.8 - 10.4 mg/dL    Chloride 111 (H) 98 - 107 mmol/L    Glucose 90 70 - 99 mg/dL    Alkaline Phosphatase 80 40 - 150 U/L    AST 16 0 - 45 U/L    ALT 15 0 - 50 U/L    Protein Total 6.0 (L) 6.4 - 8.3 g/dL    Albumin 3.5 3.5 - 5.2 g/dL    Bilirubin Total 1.8 (H) <=1.2 mg/dL   CBC with platelets and differential    Collection Time: 04/01/25 10:30 AM   Result Value Ref Range    WBC Count 8.9 4.0 - 11.0 10e3/uL    RBC Count 4.12 3.80 - 5.20 10e6/uL    Hemoglobin 12.2 11.7 - 15.7 g/dL    Hematocrit 37.0 35.0 - 47.0 %    MCV 90 78 - 100 fL    MCH 29.6 26.5 - 33.0 pg    MCHC 33.0 31.5 - 36.5 g/dL    RDW 19.7 (H) 10.0 - 15.0 %    Platelet Count 175 150 - 450 10e3/uL    % Neutrophils 62 %    % Lymphocytes 24 %    % Monocytes 11 %    % Eosinophils 2 %    % Basophils 0 %    % Immature Granulocytes 1 %    NRBCs per 100 WBC 0 <1 /100    Absolute Neutrophils 5.5 1.6 - 8.3 10e3/uL    Absolute Lymphocytes 2.2 0.8 - 5.3 10e3/uL    Absolute Monocytes 1.0 0.0 - 1.3 10e3/uL    Absolute Eosinophils 0.1 0.0 - 0.7 10e3/uL    Absolute Basophils 0.0 0.0 - 0.2 10e3/uL    Absolute Immature Granulocytes 0.1 <=0.4 10e3/uL    Absolute NRBCs 0.0 10e3/uL       Imaging Results    US Renal Complete Non-Vascular    Result Date: 3/25/2025  EXAM: US RENAL  COMPLETE NON-VASCULAR LOCATION: [facility] DATE: 3/24/2025 INDICATION:  Kidney stone COMPARISON: Renal MRI 12/13/2024 TECHNIQUE: Routine Bilateral Renal and Bladder Ultrasound. FINDINGS: RIGHT KIDNEY: 9.6 x 5.1 x 4.5 cm. Echogenic parenchyma, suggesting underlying medical/renal disease. No hydronephrosis. Multiple simple cysts are identified throughout the kidney. No suspicious renal mass. LEFT KIDNEY: 13.3 x 6.6 x 7.4 cm. Echogenic parenchyma suggesting underlying medical/renal disease. Hydronephrosis identified, moderate/severe. Staghorn calculus occupying significant portion of the left renal hilum, measuring at least 3.5 cm. No suspicious renal mass. Scattered simple appearing cysts throughout the kidney. BLADDER: Decompressed.     IMPRESSION: 1.  Moderate/severe left hydronephrosis. 2.  Staghorn calculus left renal hilum. 3.  Echogenic renal parenchyma, suggesting underlying medical renal disease. 4.  Simple cysts both kidneys. No follow-up required. [Access Center: Hydronephrosis] This report will be copied to the [facility] to ensure a provider acknowledges the finding. Access Center is available Monday through Friday 8am-3:30 pm.        XR Abdomen 2 Views    Result Date: 3/25/2025  EXAM: XR ABDOMEN 2 VIEWS LOCATION: [facility] DATE: 3/24/2025 INDICATION: Kidney stone. COMPARISON: CT abdomen and pelvis without IV contrast 12/10/2024.     IMPRESSION: Left renal pelvic staghorn calculus measures 3.8 x 3.8 cm. No additional urinary tract calculi. Cholecystectomy clips. Degenerative changes involving the spine and both SI joints. Bilateral hip arthroplasty, partially visualized.       I spent 30 minutes on the patient's visit today.  This included preparation for the visit, face-to-face time with the patient and documentation following the visit.  It did not include teaching or procedure time.    Signed by: Peter E. Friedell, MD

## 2025-04-01 NOTE — LETTER
"4/1/2025      Alma Johns  [address] [place] [address]      Dear Colleague,    Thank you for referring your patient, Alma Johns, to the [facility] CANCER [facility]. Please see a copy of my visit note below.    Oncology Rooming Note    April 1, 2025 11:29 AM   Alma Johns is a 89 year old female who presents for:    Chief Complaint   Patient presents with     Oncology Clinic Visit     3 week lab follow up     Initial Vitals: BP (!) 156/69 (BP Location: Right arm, Patient Position: Sitting, Cuff Size: Adult Regular)   Pulse 57   Temp 98.8  F (37.1  C) (Oral)   Resp 16   Ht 1.499 m (4' 11\")   Wt 56.7 kg (125 lb)   LMP  (LMP Unknown)   SpO2 96%   BMI 25.25 kg/m   Estimated body mass index is 25.25 kg/m  as calculated from the following:    Height as of this encounter: 1.499 m (4' 11\").    Weight as of this encounter: 56.7 kg (125 lb). Body surface area is 1.54 meters squared.  No Pain (0) Comment: Data Unavailable   No LMP recorded (lmp unknown). Patient has had a hysterectomy.  Allergies reviewed: Yes  Medications reviewed: Yes    Medications: Medication refills not needed today.  Pharmacy name entered into [facility]:    [facility] - [place], [place] - [address] [facility] [place], [place] - [address] [facility] [id number] [facility] [employer] - [place], [place] - [address] [facility]    Frailty Screening:   Is the patient here for a new oncology consult visit in cancer care? 2. No    PHQ9:  Did this patient require a PHQ9?: No      Clinical concerns: none       Gregoria Vega CMA              [facility] Hematology and Oncology Progress Note    Patient: Alma Johns  MRN: [id number]  Date of Service: Apr 1, 2025       Reason for Visit    I was consulted by   Lucy Ruano MD     For evaluation and management of newly diagnosed colon cancer.      Encounter Diagnoses Assessment and Plan:    Problem List Items Addressed This Visit       Colon adenocarcinoma " (H) - Primary    Relevant Orders    CBC with Platelets & Differential    Comprehensive metabolic panel    CBC with Platelets & Differential    Comprehensive metabolic panel   Patient with newly diagnosed apple core adenocarcinoma of the hepatic flexure.  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  Patient does not wish to have chemotherapy.  She had a surgical consultation on 12/30/2024.  Palliative surgery was deferred until the patient developed obstructive symptoms.  We again discussed palliative chemotherapy.  Patient has agreed to a trial of capecitabine.  Treatment plan has been entered.     Update 3/6/2025.  Patient returns for day 1 cycle 2 of capecitabine.  She is tolerating it without difficulty.  Her EGFR has improved to 41.  Calcium is slightly increased at 10.7.  She did have laser lithotripsy on 1/31/2025.  She will continue with capecitabine 1000 mg twice daily for 2 weeks on and 1 week off.  Follow-up is planned for 3 weeks at the start of her next cycle.    Update 4/1/2025 patient returns after completing cycle 2 of capecitabine.  Unfortunately her EGFR has now fallen to 22.  I advised her to hold capecitabine for 2 weeks after which we will recheck her chemistry profile.  She had a recent ultrasound which shows an atrophic right kidney and a large staghorn calculus on the left.      ______________________________________________________________________________    Staging History   Cancer Staging   Colon adenocarcinoma (H)  Staging form: Colon and Rectum, AJCC 8th Edition  - Clinical stage from 1/9/2025: Stage IVB (cTX, cNX, cM1b) - Signed by Friedell, Peter E, MD on 1/9/2025    ECOG Performance  1 - Can't do physically strenuous work, but fully ambulatory and can do light sedentary work.    History of Present Illness      Alma Johns is an 89 year old woman with a history of hypercalcemia with elevated parathyroid hormone.  Over the last year she has had unexplained weight loss of about  "20 pounds.  On 12/10/2024 she underwent CT scan of the abdomen and pelvis which showed probable lesion at the hepatic flexure of the colon and suspicious retroperitoneal adenopathy.  On 12/19/2024 she underwent colonoscopy which confirmed an adenocarcinoma of the colon.  Upper GI endoscopic ultrasound confirmed metastases in a upper abdominal lymph node adjacent to the pancreas near the mesenteric root.  Multiple pancreatic cysts appeared benign.    Presently she feels fairly well.  Her appetite is normal.  She does not have pain.  She does not have cough chest pain or shortness of breath.She doesnote some faa    Review of systems.  Pertinent Findings are included in the History of Present Illness    Physical Exam    BP (!) 156/69 (BP Location: Right arm, Patient Position: Sitting, Cuff Size: Adult Regular)   Pulse 57   Temp 98.8  F (37.1  C) (Oral)   Resp 16   Ht 1.499 m (4' 11\")   Wt 56.7 kg (125 lb)   LMP  (LMP Unknown)   SpO2 96%   BMI 25.25 kg/m       GENERAL APPEARANCE: 89-year-old woman in no apparent distress.  HEAD: Atraumatic; normocephalic; without lesions.  EYES: Conjunctiva, corneas and eyelids normal; pupils equal, round, reactive to light; No Icterus.  MOUTH/OROPHARYNX: Oral mucosa intact  NECK: Supple with no nodes.  LUNGS:  Clear to auscultation and percussion with no extra sounds.  HEART: Regular rhythm and rate; S1 and S2 normal; no murmurs noted.  ABDOMEN: Soft; no masses or tenderness, no hepatosplenomegaly.  NEUROLOGIC: Alert and oriented.  No obvious focal findings.  EXTREMITIES: No cyanosis, or edema.  SKIN: No abnormal bruising or bleeding. No suspicious lesions noted on exposed skin.  PSYCHIATRIC: Mental status normal; no apparent psychiatric issues    Medications:    Current Outpatient Medications   Medication Sig Dispense Refill     alendronate (FOSAMAX) 70 MG tablet Take 1 tablet (70 mg) by mouth every 7 days 12 tablet 0     amLODIPine (NORVASC) 10 MG tablet Take 1 tablet (10 mg) " by mouth daily 90 tablet 3     capecitabine (XELODA) 500 MG tablet Take 2 tablets (1,000 mg) by mouth 2 times daily for 14 days. Days 1 through 14, then off for 7 days.Take with water within 30 minutes after a meal. 56 tablet 0     celecoxib (CELEBREX) 200 MG capsule TAKE 1 CAPSULE BY MOUTH DAILY 90 capsule 3     ciprofloxacin (CIPRO) 500 MG tablet Take 1 tablet (500 mg) by mouth daily. 10 tablet 0     dexAMETHasone (DECADRON) 1 MG tablet Take 1 tablet (1 mg) by mouth daily (with breakfast). 30 tablet 1     doxazosin (CARDURA) 4 MG tablet TAKE ONE TABLET BY MOUTH ONE TIME DAILY. 90 tablet 3     escitalopram (LEXAPRO) 5 MG tablet Take 1 tablet (5 mg) by mouth daily. 90 tablet 3     fluconazole (DIFLUCAN) 200 MG tablet Take 1 tablet (200 mg) by mouth daily. 5 tablet 0     latanoprost (XALATAN) 0.005 % ophthalmic solution [LATANOPROST (XALATAN) 0.005 % OPHTHALMIC SOLUTION] Administer 1 drop to both eyes bedtime.       LORazepam (ATIVAN) 0.5 MG tablet Take 1 tablet (0.5 mg) by mouth every 6 hours as needed for anxiety. 5 tablet 0     ondansetron (ZOFRAN) 4 MG tablet Take 1 tablet (4 mg) by mouth every 8 hours as needed for nausea. 90 tablet 3     prochlorperazine (COMPAZINE) 5 MG tablet Take 1 tablet (5 mg) by mouth every 6 hours as needed for nausea or vomiting. 30 tablet 2     therapeutic multivitamin (THERAGRAN) tablet [THERAPEUTIC MULTIVITAMIN (THERAGRAN) TABLET] Take 1 tablet by mouth daily.       timolol maleate (TIMOPTIC) 0.5 % ophthalmic solution [TIMOLOL MALEATE (TIMOPTIC) 0.5 % OPHTHALMIC SOLUTION] INSTILL 1 DROP INTO BOTH EYES Q EVENING  3     No current facility-administered medications for this visit.           Past History    Past Medical History:   Diagnosis Date     Colon cancer (H)     Stage IV with presence in lymph nodes and other distant body parts     Complaint related to dreams 04/03/2019     Dyslipidemia     dyslipoproteinemia     Glaucoma      Hypercalcemia      Hyperparathyroidism       Hypertension      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     OA (osteoarthritis)      Osteopenia      PONV (postoperative nausea and vomiting)      Postmenopausal      Retinal vein occlusion (H)      Vitamin D deficiency      Past Surgical History:   Procedure Laterality Date     APPENDECTOMY  1985    Incidental     BIOPSY BREAST        SECTION      and      CHOLECYSTECTOMY       COLONOSCOPY N/A 2024    Procedure: Colonoscopy with biopsies and polypectomy, endoscopic marker;  Surgeon: Ron Hogan MD;  Location: [facility] OR     ENTEROSCOPY SMALL BOWEL N/A 2024    Procedure: Enteroscopy small bowel;  Surgeon: Ron Hogan MD;  Location: [facility] OR     ESOPHAGOSCOPY, GASTROSCOPY, DUODENOSCOPY (EGD), COMBINED N/A 2024    Procedure: ESOPHAGOGASTRODUODENOSCOPY, WITH FINE NEEDLE ASPIRATION BIOPSY, WITH ENDOSCOPIC ULTRASOUND GUIDANCE;  Surgeon: Ron Hogan MD;  Location: [facility] OR     HYSTERECTOMY TOTAL ABDOMINAL, BILATERAL SALPINGO-OOPHORECTOMY, COMBINED       KIDNEY STONE SURGERY      extraction     LUMBAR LAMINECTOMY       TOTAL HIP ARTHROPLASTY Left 2009     TOTAL KNEE ARTHROPLASTY Right 2009     TUBAL LIGATION       No Known Allergies    No family history on file.  Social History     Socioeconomic History     Marital status: Single     Spouse name: None     Number of children: None     Years of education: None     Highest education level: None   Tobacco Use     Smoking status: Never     Passive exposure: Past     Smokeless tobacco: Never   Vaping Use     Vaping status: Never Used   Substance and Sexual Activity     Alcohol use: No     Drug use: No   Social History Narrative    Lives alone. 2 hip replacements and 1 knee replacement. Used to work in housekeeping at [employer]. Saw Dr Crabtree. Has a daughter and son, and grandchildren and great grandchildren.     Social Drivers of Health     Financial Resource Strain: Low Risk  (2024)     Financial Resource Strain      Within the past 12 months, have you or your family members you live with been unable to get utilities (heat, electricity) when it was really needed?: No   Food Insecurity: Low Risk  (8/12/2024)    Food Insecurity      Within the past 12 months, did you worry that your food would run out before you got money to buy more?: No      Within the past 12 months, did the food you bought just not last and you didn t have money to get more?: No   Transportation Needs: Low Risk  (8/12/2024)    Transportation Needs      Within the past 12 months, has lack of transportation kept you from medical appointments, getting your medicines, non-medical meetings or appointments, work, or from getting things that you need?: No   Physical Activity: Inactive (8/12/2024)    Exercise Vital Sign      Days of Exercise per Week: 0 days      Minutes of Exercise per Session: 0 min   Stress: No Stress Concern Present (8/12/2024)    [employer] [place] [employer] - Occupational Stress Questionnaire      Feeling of Stress : Only a little   Social Connections: Unknown (8/12/2024)    Social Connection and Isolation Panel [NHANES]      Frequency of Social Gatherings with Friends and Family: Once a week   Interpersonal Safety: Low Risk  (12/19/2024)    Interpersonal Safety      Do you feel physically and emotionally safe where you currently live?: Yes      Within the past 12 months, have you been hit, slapped, kicked or otherwise physically hurt by someone?: No      Within the past 12 months, have you been humiliated or emotionally abused in other ways by your partner or ex-partner?: No   Housing Stability: Low Risk  (8/12/2024)    Housing Stability      Do you have housing? : Yes      Are you worried about losing your housing?: No           Lab Results    Recent Results (from the past 240 hours)   US Renal Complete Non-Vascular    Collection Time: 03/24/25  3:07 PM   Result Value Ref Range    Radiologist flags  Hydronephrosis (Urgent)    Comprehensive metabolic panel    Collection Time: 04/01/25 10:30 AM   Result Value Ref Range    Sodium 142 135 - 145 mmol/L    Potassium 3.7 3.4 - 5.3 mmol/L    Carbon Dioxide (CO2) 20 (L) 22 - 29 mmol/L    Anion Gap 11 7 - 15 mmol/L    Urea Nitrogen 31.3 (H) 8.0 - 23.0 mg/dL    Creatinine 2.11 (H) 0.51 - 0.95 mg/dL    GFR Estimate 22 (L) >60 mL/min/1.73m2    Calcium 9.3 8.8 - 10.4 mg/dL    Chloride 111 (H) 98 - 107 mmol/L    Glucose 90 70 - 99 mg/dL    Alkaline Phosphatase 80 40 - 150 U/L    AST 16 0 - 45 U/L    ALT 15 0 - 50 U/L    Protein Total 6.0 (L) 6.4 - 8.3 g/dL    Albumin 3.5 3.5 - 5.2 g/dL    Bilirubin Total 1.8 (H) <=1.2 mg/dL   CBC with platelets and differential    Collection Time: 04/01/25 10:30 AM   Result Value Ref Range    WBC Count 8.9 4.0 - 11.0 10e3/uL    RBC Count 4.12 3.80 - 5.20 10e6/uL    Hemoglobin 12.2 11.7 - 15.7 g/dL    Hematocrit 37.0 35.0 - 47.0 %    MCV 90 78 - 100 fL    MCH 29.6 26.5 - 33.0 pg    MCHC 33.0 31.5 - 36.5 g/dL    RDW 19.7 (H) 10.0 - 15.0 %    Platelet Count 175 150 - 450 10e3/uL    % Neutrophils 62 %    % Lymphocytes 24 %    % Monocytes 11 %    % Eosinophils 2 %    % Basophils 0 %    % Immature Granulocytes 1 %    NRBCs per 100 WBC 0 <1 /100    Absolute Neutrophils 5.5 1.6 - 8.3 10e3/uL    Absolute Lymphocytes 2.2 0.8 - 5.3 10e3/uL    Absolute Monocytes 1.0 0.0 - 1.3 10e3/uL    Absolute Eosinophils 0.1 0.0 - 0.7 10e3/uL    Absolute Basophils 0.0 0.0 - 0.2 10e3/uL    Absolute Immature Granulocytes 0.1 <=0.4 10e3/uL    Absolute NRBCs 0.0 10e3/uL       Imaging Results    US Renal Complete Non-Vascular    Result Date: 3/25/2025  EXAM: US RENAL COMPLETE NON-VASCULAR LOCATION: [facility] DATE: 3/24/2025 INDICATION:  Kidney stone COMPARISON: Renal MRI 12/13/2024 TECHNIQUE: Routine Bilateral Renal and Bladder Ultrasound. FINDINGS: RIGHT KIDNEY: 9.6 x 5.1 x 4.5 cm. Echogenic parenchyma, suggesting underlying medical/renal  disease. No hydronephrosis. Multiple simple cysts are identified throughout the kidney. No suspicious renal mass. LEFT KIDNEY: 13.3 x 6.6 x 7.4 cm. Echogenic parenchyma suggesting underlying medical/renal disease. Hydronephrosis identified, moderate/severe. Staghorn calculus occupying significant portion of the left renal hilum, measuring at least 3.5 cm. No suspicious renal mass. Scattered simple appearing cysts throughout the kidney. BLADDER: Decompressed.     IMPRESSION: 1.  Moderate/severe left hydronephrosis. 2.  Staghorn calculus left renal hilum. 3.  Echogenic renal parenchyma, suggesting underlying medical renal disease. 4.  Simple cysts both kidneys. No follow-up required. [Access Center: Hydronephrosis] This report will be copied to the [facility] to ensure a provider acknowledges the finding. Access Center is available Monday through Friday 8am-3:30 pm.        XR Abdomen 2 Views    Result Date: 3/25/2025  EXAM: XR ABDOMEN 2 VIEWS LOCATION: [facility] DATE: 3/24/2025 INDICATION: Kidney stone. COMPARISON: CT abdomen and pelvis without IV contrast 12/10/2024.     IMPRESSION: Left renal pelvic staghorn calculus measures 3.8 x 3.8 cm. No additional urinary tract calculi. Cholecystectomy clips. Degenerative changes involving the spine and both SI joints. Bilateral hip arthroplasty, partially visualized.       I spent 30 minutes on the patient's visit today.  This included preparation for the visit, face-to-face time with the patient and documentation following the visit.  It did not include teaching or procedure time.    Signed by: Peter E. Friedell, MD          Again, thank you for allowing me to participate in the care of your patient.        Sincerely,        Peter E. Friedell, MD    Electronically signed

## 2025-04-01 NOTE — PROGRESS NOTES
"Oncology Rooming Note    April 1, 2025 11:29 AM   Alma Johns is a 89 year old female who presents for:    Chief Complaint   Patient presents with    Oncology Clinic Visit     3 week lab follow up     Initial Vitals: BP (!) 156/69 (BP Location: Right arm, Patient Position: Sitting, Cuff Size: Adult Regular)   Pulse 57   Temp 98.8  F (37.1  C) (Oral)   Resp 16   Ht 1.499 m (4' 11\")   Wt 56.7 kg (125 lb)   LMP  (LMP Unknown)   SpO2 96%   BMI 25.25 kg/m   Estimated body mass index is 25.25 kg/m  as calculated from the following:    Height as of this encounter: 1.499 m (4' 11\").    Weight as of this encounter: 56.7 kg (125 lb). Body surface area is 1.54 meters squared.  No Pain (0) Comment: Data Unavailable   No LMP recorded (lmp unknown). Patient has had a hysterectomy.  Allergies reviewed: Yes  Medications reviewed: Yes    Medications: Medication refills not needed today.  Pharmacy name entered into [facility]:    [facility] - [place], [place] - [address] [facility] [place], [place] - [address] [facility] [id number] [facility] [employer] - [place] - [address] [facility]    Frailty Screening:   Is the patient here for a new oncology consult visit in cancer care? 2. No    PHQ9:  Did this patient require a PHQ9?: No      Clinical concerns: none       Gregoria Vega CMA            " Yes

## 2025-04-02 ENCOUNTER — TELEPHONE (OUTPATIENT)
Dept: INTERNAL MEDICINE | Facility: CLINIC | Age: OVER 89
End: 2025-04-02
Payer: MEDICARE

## 2025-04-02 DIAGNOSIS — H61.20 IMPACTED CERUMEN, UNSPECIFIED LATERALITY: Primary | ICD-10-CM

## 2025-04-02 NOTE — TELEPHONE ENCOUNTER
Order/Referral Request    Who is requesting: patient/ daughter called    Orders being requested: ENT for ears plugged w/ wax.    Reason service is needed/diagnosis: in a month ago-nurse wasn't able to get all wax out-suggested see specialist. Daughter states one ear seems to be totally blocked now.     When are orders needed by: asap    Has this been discussed with Provider: No    Does patient have a preference on a Group/Provider/Facility? [place] location if poss    Does patient have an appointment scheduled?: No    Where to send orders: Place orders within Epic-daughter would like a call as she is setting up all her appts.    Could we send this information to you in [facility] or would you prefer to receive a phone call?:   Patient would prefer a phone call   Okay to leave a detailed message?: Yes at [phone]

## 2025-04-03 ENCOUNTER — PATIENT OUTREACH (OUTPATIENT)
Dept: ONCOLOGY | Facility: HOSPITAL | Age: OVER 89
End: 2025-04-03
Payer: MEDICARE

## 2025-04-03 NOTE — PROGRESS NOTES
[facility]: Cancer Care                                                                                          Situation: Chart reviewed by RN Care Coordinator.    Background: Patient was seen in clinic this week for follow-up regarding colon cancer.    Assessment: She has completed cycle 2 of capecitabine.  Her EGFR has fallen to 22.    Plan/Recommendations: She is to hold her capecitabine for 2 weeks.  We will check her labs at that time to see if she is good to resume/restart medication.      Signature:  Shannon Marino RN Care Coordinator  [facility]  Cancer [facility]

## 2025-04-14 ENCOUNTER — TELEPHONE (OUTPATIENT)
Dept: INTERNAL MEDICINE | Facility: CLINIC | Age: OVER 89
End: 2025-04-14
Payer: MEDICARE

## 2025-04-14 DIAGNOSIS — H61.20 IMPACTED CERUMEN, UNSPECIFIED LATERALITY: Primary | ICD-10-CM

## 2025-04-14 DIAGNOSIS — F41.9 ANXIETY DUE TO INVASIVE PROCEDURE: ICD-10-CM

## 2025-04-14 RX ORDER — LORAZEPAM 0.5 MG/1
0.5 TABLET ORAL EVERY 6 HOURS
Qty: 5 TABLET | Refills: 0 | Status: SHIPPED | OUTPATIENT
Start: 2025-04-14

## 2025-04-14 NOTE — TELEPHONE ENCOUNTER
Reason for Call:  Appointment Request    Patient requesting this type of appt: Procedure: Ear Wash    Requested provider: Lucy Ruano or Nurse     Reason patient unable to be scheduled: Not within requested timeframe    When does patient want to be seen/preferred time:  04/21/25    Comments: rai De Dios- would like to discusss ear wash due ENT appt scheduled for 7/14/25     Could we send this information to you in [facility] or would you prefer to receive a phone call?:   Patient would prefer a phone call   Okay to leave a detailed message?: Yes at Other phone number:  rai De Dios phone #     [phone]       Call taken on 4/14/2025 at 2:36 PM by Marisela Ramirez

## 2025-04-15 ENCOUNTER — LAB (OUTPATIENT)
Dept: INFUSION THERAPY | Facility: HOSPITAL | Age: OVER 89
End: 2025-04-15
Attending: INTERNAL MEDICINE
Payer: MEDICARE

## 2025-04-15 DIAGNOSIS — C18.9 COLON ADENOCARCINOMA (H): ICD-10-CM

## 2025-04-15 LAB
ALBUMIN SERPL BCG-MCNC: 3.7 G/DL (ref 3.5–5.2)
ALP SERPL-CCNC: 66 U/L (ref 40–150)
ALT SERPL W P-5'-P-CCNC: 12 U/L (ref 0–50)
ANION GAP SERPL CALCULATED.3IONS-SCNC: 10 MMOL/L (ref 7–15)
AST SERPL W P-5'-P-CCNC: 17 U/L (ref 0–45)
BASOPHILS # BLD AUTO: 0.1 10E3/UL (ref 0–0.2)
BASOPHILS NFR BLD AUTO: 0 %
BILIRUB SERPL-MCNC: 1.6 MG/DL
BUN SERPL-MCNC: 29.4 MG/DL (ref 8–23)
CALCIUM SERPL-MCNC: 10 MG/DL (ref 8.8–10.4)
CHLORIDE SERPL-SCNC: 106 MMOL/L (ref 98–107)
CREAT SERPL-MCNC: 2.07 MG/DL (ref 0.51–0.95)
EGFRCR SERPLBLD CKD-EPI 2021: 22 ML/MIN/1.73M2
EOSINOPHIL # BLD AUTO: 0.1 10E3/UL (ref 0–0.7)
EOSINOPHIL NFR BLD AUTO: 1 %
ERYTHROCYTE [DISTWIDTH] IN BLOOD BY AUTOMATED COUNT: 18.7 % (ref 10–15)
GLUCOSE SERPL-MCNC: 93 MG/DL (ref 70–99)
HCO3 SERPL-SCNC: 27 MMOL/L (ref 22–29)
HCT VFR BLD AUTO: 36.4 % (ref 35–47)
HGB BLD-MCNC: 12.4 G/DL (ref 11.7–15.7)
IMM GRANULOCYTES # BLD: 0.1 10E3/UL
IMM GRANULOCYTES NFR BLD: 1 %
LYMPHOCYTES # BLD AUTO: 2.7 10E3/UL (ref 0.8–5.3)
LYMPHOCYTES NFR BLD AUTO: 23 %
MCH RBC QN AUTO: 30.7 PG (ref 26.5–33)
MCHC RBC AUTO-ENTMCNC: 34.1 G/DL (ref 31.5–36.5)
MCV RBC AUTO: 90 FL (ref 78–100)
MONOCYTES # BLD AUTO: 0.9 10E3/UL (ref 0–1.3)
MONOCYTES NFR BLD AUTO: 8 %
NEUTROPHILS # BLD AUTO: 7.9 10E3/UL (ref 1.6–8.3)
NEUTROPHILS NFR BLD AUTO: 67 %
NRBC # BLD AUTO: 0 10E3/UL
NRBC BLD AUTO-RTO: 0 /100
PLATELET # BLD AUTO: 195 10E3/UL (ref 150–450)
POTASSIUM SERPL-SCNC: 3.8 MMOL/L (ref 3.4–5.3)
PROT SERPL-MCNC: 6.3 G/DL (ref 6.4–8.3)
RBC # BLD AUTO: 4.04 10E6/UL (ref 3.8–5.2)
SODIUM SERPL-SCNC: 143 MMOL/L (ref 135–145)
WBC # BLD AUTO: 11.8 10E3/UL (ref 4–11)

## 2025-04-15 PROCEDURE — 85004 AUTOMATED DIFF WBC COUNT: CPT

## 2025-04-15 PROCEDURE — 82247 BILIRUBIN TOTAL: CPT

## 2025-04-15 PROCEDURE — 84155 ASSAY OF PROTEIN SERUM: CPT

## 2025-04-15 PROCEDURE — 85041 AUTOMATED RBC COUNT: CPT

## 2025-04-15 PROCEDURE — 36415 COLL VENOUS BLD VENIPUNCTURE: CPT

## 2025-04-15 PROCEDURE — 82565 ASSAY OF CREATININE: CPT

## 2025-04-16 ENCOUNTER — TELEPHONE (OUTPATIENT)
Dept: ONCOLOGY | Facility: HOSPITAL | Age: OVER 89
End: 2025-04-16
Payer: MEDICARE

## 2025-04-16 DIAGNOSIS — C18.9 COLON ADENOCARCINOMA (H): ICD-10-CM

## 2025-04-16 RX ORDER — DEXAMETHASONE 1 MG
1 TABLET ORAL
Qty: 30 TABLET | Refills: 1 | Status: SHIPPED | OUTPATIENT
Start: 2025-04-16

## 2025-04-16 NOTE — TELEPHONE ENCOUNTER
Patient's daughter called today requesting a refill of her mom's dexamethasone. This was last refilled 2/20/25, #30, 1 refill. Will route to Dr. Friedell to review and refill.    Soledad Espinal RN on 4/16/2025 at 2:14 PM

## 2025-04-16 NOTE — ORAL ONC MGMT
"Oral Chemotherapy Monitoring Program   Spoke with [facility] that Dr. Friedell would like Alma to continue holding capecitabine based on yesterdays labs. She will follow up with him in two weeks.    Apurva Mojica, PharmD, [facility]  Oral Chemotherapy Pharmacist  [facility] - [place], [place] Phone: [phone]  In Basket Pools: \"[facility] ORAL CHEMOTHERAPY PHARMACIST\" or \"[facility] ORAL CHEMOTHERAPY PHARMACIST\"  April 16, 2025         "

## 2025-04-21 ENCOUNTER — TELEPHONE (OUTPATIENT)
Dept: INTERNAL MEDICINE | Facility: CLINIC | Age: OVER 89
End: 2025-04-21
Payer: MEDICARE

## 2025-04-21 NOTE — TELEPHONE ENCOUNTER
Incoming call from patient's daughter requesting help scheduling Alma for a bilateral ear lavage.    Patient has a history of frequent cerumen impactions and is scheduled to see ENT in July.  Last had ear lavage at the clinic in February with success.    Patient has been reporting worsening hearing again without any new symptoms of some significant concerns such as ear pain or abnormal ear discharge.    I do note that a standing/held order was placed on 14 April in regards to this.  Assisted with scheduling ear lavage appointment with a nurse this week.

## 2025-04-23 ENCOUNTER — ALLIED HEALTH/NURSE VISIT (OUTPATIENT)
Dept: FAMILY MEDICINE | Facility: CLINIC | Age: OVER 89
End: 2025-04-23
Payer: MEDICARE

## 2025-04-23 DIAGNOSIS — H61.20 IMPACTED CERUMEN, UNSPECIFIED LATERALITY: ICD-10-CM

## 2025-04-23 PROCEDURE — 99207 PR NO CHARGE NURSE ONLY: CPT

## 2025-04-23 PROCEDURE — 69209 REMOVE IMPACTED EAR WAX UNI: CPT | Mod: 50

## 2025-04-23 NOTE — PROGRESS NOTES
RN ear assessment completed prior to ear irrigation. Otoscopic exam reveals cerumen present and irrigation advised bilateral ears. Post Ear Irrigation exam completed and cerumen plug removed, tympanic membrane intact, and no bleeding noted.  Pain assessment completed.     Patient tolerated procedure:  yes    Jessica FLETCHER RN

## 2025-04-24 DIAGNOSIS — F41.9 ANXIETY DUE TO INVASIVE PROCEDURE: ICD-10-CM

## 2025-04-24 RX ORDER — LORAZEPAM 0.5 MG/1
0.5 TABLET ORAL EVERY 6 HOURS
Qty: 5 TABLET | Refills: 0 | Status: SHIPPED | OUTPATIENT
Start: 2025-04-24

## 2025-04-29 ENCOUNTER — LAB (OUTPATIENT)
Dept: INFUSION THERAPY | Facility: HOSPITAL | Age: OVER 89
End: 2025-04-29
Attending: INTERNAL MEDICINE
Payer: MEDICARE

## 2025-04-29 ENCOUNTER — ONCOLOGY VISIT (OUTPATIENT)
Dept: ONCOLOGY | Facility: HOSPITAL | Age: OVER 89
End: 2025-04-29
Attending: INTERNAL MEDICINE
Payer: MEDICARE

## 2025-04-29 ENCOUNTER — PATIENT OUTREACH (OUTPATIENT)
Dept: ONCOLOGY | Facility: HOSPITAL | Age: OVER 89
End: 2025-04-29

## 2025-04-29 VITALS
SYSTOLIC BLOOD PRESSURE: 144 MMHG | DIASTOLIC BLOOD PRESSURE: 65 MMHG | RESPIRATION RATE: 16 BRPM | HEIGHT: 59 IN | HEART RATE: 70 BPM | WEIGHT: 130 LBS | TEMPERATURE: 99 F | BODY MASS INDEX: 26.21 KG/M2 | OXYGEN SATURATION: 94 %

## 2025-04-29 DIAGNOSIS — C18.9 COLON ADENOCARCINOMA (H): Primary | ICD-10-CM

## 2025-04-29 DIAGNOSIS — C18.9 COLON ADENOCARCINOMA (H): ICD-10-CM

## 2025-04-29 LAB
ALBUMIN SERPL BCG-MCNC: 3.6 G/DL (ref 3.5–5.2)
ALP SERPL-CCNC: 65 U/L (ref 40–150)
ALT SERPL W P-5'-P-CCNC: 12 U/L (ref 0–50)
ANION GAP SERPL CALCULATED.3IONS-SCNC: 9 MMOL/L (ref 7–15)
AST SERPL W P-5'-P-CCNC: 14 U/L (ref 0–45)
BASOPHILS # BLD AUTO: 0.1 10E3/UL (ref 0–0.2)
BASOPHILS NFR BLD AUTO: 1 %
BILIRUB SERPL-MCNC: 0.8 MG/DL
BUN SERPL-MCNC: 28.7 MG/DL (ref 8–23)
CALCIUM SERPL-MCNC: 9.4 MG/DL (ref 8.8–10.4)
CHLORIDE SERPL-SCNC: 110 MMOL/L (ref 98–107)
CREAT SERPL-MCNC: 1.97 MG/DL (ref 0.51–0.95)
EGFRCR SERPLBLD CKD-EPI 2021: 24 ML/MIN/1.73M2
EOSINOPHIL # BLD AUTO: 0.1 10E3/UL (ref 0–0.7)
EOSINOPHIL NFR BLD AUTO: 1 %
ERYTHROCYTE [DISTWIDTH] IN BLOOD BY AUTOMATED COUNT: 17.2 % (ref 10–15)
GLUCOSE SERPL-MCNC: 163 MG/DL (ref 70–99)
HCO3 SERPL-SCNC: 24 MMOL/L (ref 22–29)
HCT VFR BLD AUTO: 37.2 % (ref 35–47)
HGB BLD-MCNC: 12.3 G/DL (ref 11.7–15.7)
IMM GRANULOCYTES # BLD: 0.1 10E3/UL
IMM GRANULOCYTES NFR BLD: 1 %
LYMPHOCYTES # BLD AUTO: 2.5 10E3/UL (ref 0.8–5.3)
LYMPHOCYTES NFR BLD AUTO: 25 %
MCH RBC QN AUTO: 30.9 PG (ref 26.5–33)
MCHC RBC AUTO-ENTMCNC: 33.1 G/DL (ref 31.5–36.5)
MCV RBC AUTO: 94 FL (ref 78–100)
MONOCYTES # BLD AUTO: 0.8 10E3/UL (ref 0–1.3)
MONOCYTES NFR BLD AUTO: 8 %
NEUTROPHILS # BLD AUTO: 6.2 10E3/UL (ref 1.6–8.3)
NEUTROPHILS NFR BLD AUTO: 63 %
NRBC # BLD AUTO: 0 10E3/UL
NRBC BLD AUTO-RTO: 0 /100
PLATELET # BLD AUTO: 223 10E3/UL (ref 150–450)
POTASSIUM SERPL-SCNC: 3.6 MMOL/L (ref 3.4–5.3)
PROT SERPL-MCNC: 6.1 G/DL (ref 6.4–8.3)
RBC # BLD AUTO: 3.98 10E6/UL (ref 3.8–5.2)
SODIUM SERPL-SCNC: 143 MMOL/L (ref 135–145)
WBC # BLD AUTO: 9.8 10E3/UL (ref 4–11)

## 2025-04-29 PROCEDURE — 85025 COMPLETE CBC W/AUTO DIFF WBC: CPT

## 2025-04-29 PROCEDURE — 36415 COLL VENOUS BLD VENIPUNCTURE: CPT

## 2025-04-29 PROCEDURE — G0463 HOSPITAL OUTPT CLINIC VISIT: HCPCS | Performed by: INTERNAL MEDICINE

## 2025-04-29 PROCEDURE — 99215 OFFICE O/P EST HI 40 MIN: CPT | Performed by: INTERNAL MEDICINE

## 2025-04-29 PROCEDURE — G2211 COMPLEX E/M VISIT ADD ON: HCPCS | Performed by: INTERNAL MEDICINE

## 2025-04-29 PROCEDURE — 82310 ASSAY OF CALCIUM: CPT

## 2025-04-29 ASSESSMENT — PAIN SCALES - GENERAL: PAINLEVEL_OUTOF10: NO PAIN (0)

## 2025-04-29 NOTE — PROGRESS NOTES
"Oncology Rooming Note    April 29, 2025 11:24 AM   Alma Johns is a 89 year old female who presents for:    Chief Complaint   Patient presents with    Oncology Clinic Visit     Follow up 4 week labs     Initial Vitals: LMP  (LMP Unknown)  Estimated body mass index is 25.25 kg/m  as calculated from the following:    Height as of 4/1/25: 1.499 m (4' 11\").    Weight as of 4/1/25: 56.7 kg (125 lb). There is no height or weight on file to calculate BSA.  Data Unavailable Comment: Data Unavailable   No LMP recorded (lmp unknown). Patient has had a hysterectomy.  Allergies reviewed: Yes  Medications reviewed: Yes    Medications: Medication refills not needed today.  Pharmacy name entered into [employer]:    [facility] [place], [place] - [address] [place] [id number] [employer] - [place], [place] - [address] [place] [address]    Frailty Screening:   Is the patient here for a new oncology consult visit in cancer care? 2. No    PHQ9:  Did this patient require a PHQ9?: No      Clinical concerns: none       Gregoria Vega CMA            "

## 2025-04-29 NOTE — LETTER
"4/29/2025      Alma Johns  [address] [place] [address]      Dear Colleague,    Thank you for referring your patient, Alma Johns, to the [facility]. Please see a copy of my visit note below.    Oncology Rooming Note    April 29, 2025 11:24 AM   Alma Johns is a 89 year old female who presents for:    Chief Complaint   Patient presents with     Oncology Clinic Visit     Follow up 4 week labs     Initial Vitals: LMP  (LMP Unknown)  Estimated body mass index is 25.25 kg/m  as calculated from the following:    Height as of 4/1/25: 1.499 m (4' 11\").    Weight as of 4/1/25: 56.7 kg (125 lb). There is no height or weight on file to calculate BSA.  Data Unavailable Comment: Data Unavailable   No LMP recorded (lmp unknown). Patient has had a hysterectomy.  Allergies reviewed: Yes  Medications reviewed: Yes    Medications: Medication refills not needed today.  Pharmacy name entered into [facility]:    [facility] [address] [place] [id number] [facility] - [place], [place] - [address]    Frailty Screening:   Is the patient here for a new oncology consult visit in cancer care? 2. No    PHQ9:  Did this patient require a PHQ9?: No      Clinical concerns: none       Gregoria Vega [facility] Hematology and Oncology Progress Note    Patient: Alma Johns  MRN: [id number]  Date of Service: Apr 29, 2025       Reason for Visit    I was consulted by   Lucy Ruano MD     For evaluation and management of newly diagnosed colon cancer.      Encounter Diagnoses Assessment and Plan:    Problem List Items Addressed This Visit       Colon adenocarcinoma (H) - Primary    Relevant Orders    CBC with Platelets & Differential    Comprehensive metabolic panel   Patient with newly diagnosed apple core adenocarcinoma of the hepatic flexure.  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  Patient does not wish to have " chemotherapy.  She had a surgical consultation on 12/30/2024.  Palliative surgery was deferred until the patient developed obstructive symptoms.  We again discussed palliative chemotherapy.  Patient has agreed to a trial of capecitabine.  Treatment plan has been entered.     Update 3/6/2025.  Patient returns for day 1 cycle 2 of capecitabine.  She is tolerating it without difficulty.  Her EGFR has improved to 41.  Calcium is slightly increased at 10.7.  She did have laser lithotripsy on 1/31/2025.  She will continue with capecitabine 1000 mg twice daily for 2 weeks on and 1 week off.  Follow-up is planned for 3 weeks at the start of her next cycle.    Update 4/29/2025 Patient returns after completing 2 cycles of capecitabine.  Unfortunately her EGFR still low at 24.  I advised her to hold capecitabine for 4 more weeks after which we will recheck her chemistry profile.  She had a recent ultrasound which shows an atrophic right kidney and a large staghorn calculus on the left.      ______________________________________________________________________________    Staging History   Cancer Staging   Colon adenocarcinoma (H)  Staging form: Colon and Rectum, AJCC 8th Edition  - Clinical stage from 1/9/2025: Stage IVB (cTX, cNX, cM1b) - Signed by Friedell, Peter E, MD on 1/9/2025    ECOG Performance  1 - Can't do physically strenuous work, but fully ambulatory and can do light sedentary work.    History of Present Illness      Alma Johns is an 89 year old woman with a history of hypercalcemia with elevated parathyroid hormone.  Over the last year she has had unexplained weight loss of about 20 pounds.  On 12/10/2024 she underwent CT scan of the abdomen and pelvis which showed probable lesion at the hepatic flexure of the colon and suspicious retroperitoneal adenopathy.  On 12/19/2024 she underwent colonoscopy which confirmed an adenocarcinoma of the colon.  Upper GI endoscopic ultrasound confirmed metastases in a upper  "abdominal lymph node adjacent to the pancreas near the mesenteric root.  Multiple pancreatic cysts appeared benign.    Presently she feels fairly well.  Her appetite is normal.  She is regained about 10 pounds since starting her on dexamethasone.  She is having no difficulty with bowel movements.  She does not have pain.  She does not have cough chest pain or shortness of breath.She does note some fatigue and takes an afternoon nap.    Review of systems.  Pertinent Findings are included in the History of Present Illness    Physical Exam    BP (!) 144/65 (BP Location: Left arm, Patient Position: Sitting, Cuff Size: Adult Regular)   Pulse 70   Temp 99  F (37.2  C) (Oral)   Resp 16   Ht 1.499 m (4' 11\")   Wt 59 kg (130 lb)   LMP  (LMP Unknown)   SpO2 94%   BMI 26.26 kg/m       GENERAL APPEARANCE: 89-year-old woman in no apparent distress.  HEAD: Atraumatic; normocephalic; without lesions.  EYES: Conjunctiva, corneas and eyelids normal; pupils equal, round, reactive to light; No Icterus.  MOUTH/OROPHARYNX: Oral mucosa intact  NECK: Supple with no nodes.  LUNGS:  Clear to auscultation and percussion with no extra sounds.  HEART: Regular rhythm and rate; S1 and S2 normal; no murmurs noted.  ABDOMEN: Soft; no masses or tenderness, no hepatosplenomegaly.  NEUROLOGIC: Alert and oriented.  No obvious focal findings.  EXTREMITIES: No cyanosis, or edema.  SKIN: No abnormal bruising or bleeding. No suspicious lesions noted on exposed skin.  PSYCHIATRIC: Mental status normal; no apparent psychiatric issues    Medications:    Current Outpatient Medications   Medication Sig Dispense Refill     alendronate (FOSAMAX) 70 MG tablet Take 1 tablet (70 mg) by mouth every 7 days 12 tablet 0     amLODIPine (NORVASC) 10 MG tablet Take 1 tablet (10 mg) by mouth daily 90 tablet 3     celecoxib (CELEBREX) 200 MG capsule TAKE 1 CAPSULE BY MOUTH DAILY 90 capsule 3     ciprofloxacin (CIPRO) 500 MG tablet Take 1 tablet (500 mg) by mouth " daily. 10 tablet 0     dexAMETHasone (DECADRON) 1 MG tablet Take 1 tablet (1 mg) by mouth daily (with breakfast). 30 tablet 1     doxazosin (CARDURA) 4 MG tablet TAKE ONE TABLET BY MOUTH ONE TIME DAILY. 90 tablet 3     escitalopram (LEXAPRO) 5 MG tablet Take 1 tablet (5 mg) by mouth daily. 90 tablet 3     fluconazole (DIFLUCAN) 200 MG tablet Take 1 tablet (200 mg) by mouth daily. 5 tablet 0     latanoprost (XALATAN) 0.005 % ophthalmic solution [LATANOPROST (XALATAN) 0.005 % OPHTHALMIC SOLUTION] Administer 1 drop to both eyes bedtime.       LORazepam (ATIVAN) 0.5 MG tablet TAKE 1 TABLET BY MOUTH EVERY 6 HOURS AS NEEDED FOR ANXIETY 5 tablet 0     ondansetron (ZOFRAN) 4 MG tablet Take 1 tablet (4 mg) by mouth every 8 hours as needed for nausea. 90 tablet 3     prochlorperazine (COMPAZINE) 5 MG tablet Take 1 tablet (5 mg) by mouth every 6 hours as needed for nausea or vomiting. 30 tablet 2     therapeutic multivitamin (THERAGRAN) tablet [THERAPEUTIC MULTIVITAMIN (THERAGRAN) TABLET] Take 1 tablet by mouth daily.       timolol maleate (TIMOPTIC) 0.5 % ophthalmic solution [TIMOLOL MALEATE (TIMOPTIC) 0.5 % OPHTHALMIC SOLUTION] INSTILL 1 DROP INTO BOTH EYES Q EVENING  3     capecitabine (XELODA) 500 MG tablet Take 2 tablets (1,000 mg) by mouth 2 times daily for 14 days. Days 1 through 14, then off for 7 days.Take with water within 30 minutes after a meal. 56 tablet 0     No current facility-administered medications for this visit.           Past History    Past Medical History:   Diagnosis Date     Colon cancer (H)     Stage IV with presence in lymph nodes and other distant body parts     Complaint related to dreams 04/03/2019     Dyslipidemia     dyslipoproteinemia     Glaucoma      Hypercalcemia      Hyperparathyroidism      Hypertension      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     OA (osteoarthritis)      Osteopenia      PONV (postoperative nausea and vomiting)      Postmenopausal      Retinal vein occlusion (H)       Vitamin D deficiency      Past Surgical History:   Procedure Laterality Date     APPENDECTOMY  1985    Incidental     BIOPSY BREAST        SECTION      and      CHOLECYSTECTOMY       COLONOSCOPY N/A 2024    Procedure: Colonoscopy with biopsies and polypectomy, endoscopic marker;  Surgeon: Ron Hogan MD;  Location: [facility] OR     ENTEROSCOPY SMALL BOWEL N/A 2024    Procedure: Enteroscopy small bowel;  Surgeon: Ron Hogan MD;  Location: [facility] OR     ESOPHAGOSCOPY, GASTROSCOPY, DUODENOSCOPY (EGD), COMBINED N/A 2024    Procedure: ESOPHAGOGASTRODUODENOSCOPY, WITH FINE NEEDLE ASPIRATION BIOPSY, WITH ENDOSCOPIC ULTRASOUND GUIDANCE;  Surgeon: Ron Hogan MD;  Location: [facility] OR     HYSTERECTOMY TOTAL ABDOMINAL, BILATERAL SALPINGO-OOPHORECTOMY, COMBINED       KIDNEY STONE SURGERY      extraction     LUMBAR LAMINECTOMY       TOTAL HIP ARTHROPLASTY Left 2009     TOTAL KNEE ARTHROPLASTY Right 2009     TUBAL LIGATION       No Known Allergies    No family history on file.  Social History     Socioeconomic History     Marital status: Single     Spouse name: None     Number of children: None     Years of education: None     Highest education level: None   Tobacco Use     Smoking status: Never     Passive exposure: Past     Smokeless tobacco: Never   Vaping Use     Vaping status: Never Used   Substance and Sexual Activity     Alcohol use: No     Drug use: No   Social History Narrative    Lives alone. 2 hip replacements and 1 knee replacement. Used to work in housekeeping at [employer]. Saw Dr Crabtree. Has a daughter and son, and grandchildren and great grandchildren.     Social Drivers of Health     Financial Resource Strain: Low Risk  (2024)    Financial Resource Strain      Within the past 12 months, have you or your family members you live with been unable to get utilities (heat, electricity) when it was really needed?: No   Food Insecurity:  Low Risk  (8/12/2024)    Food Insecurity      Within the past 12 months, did you worry that your food would run out before you got money to buy more?: No      Within the past 12 months, did the food you bought just not last and you didn t have money to get more?: No   Transportation Needs: Low Risk  (8/12/2024)    Transportation Needs      Within the past 12 months, has lack of transportation kept you from medical appointments, getting your medicines, non-medical meetings or appointments, work, or from getting things that you need?: No   Physical Activity: Inactive (8/12/2024)    Exercise Vital Sign      Days of Exercise per Week: 0 days      Minutes of Exercise per Session: 0 min   Stress: No Stress Concern Present (8/12/2024)    [employer] - Occupational Stress Questionnaire      Feeling of Stress : Only a little   Social Connections: Unknown (8/12/2024)    Social Connection and Isolation Panel [NHANES]      Frequency of Social Gatherings with Friends and Family: Once a week   Interpersonal Safety: Low Risk  (12/19/2024)    Interpersonal Safety      Do you feel physically and emotionally safe where you currently live?: Yes      Within the past 12 months, have you been hit, slapped, kicked or otherwise physically hurt by someone?: No      Within the past 12 months, have you been humiliated or emotionally abused in other ways by your partner or ex-partner?: No   Housing Stability: Low Risk  (8/12/2024)    Housing Stability      Do you have housing? : Yes      Are you worried about losing your housing?: No           Lab Results    Recent Results (from the past 240 hours)   Comprehensive metabolic panel    Collection Time: 04/29/25 10:47 AM   Result Value Ref Range    Sodium 143 135 - 145 mmol/L    Potassium 3.6 3.4 - 5.3 mmol/L    Carbon Dioxide (CO2) 24 22 - 29 mmol/L    Anion Gap 9 7 - 15 mmol/L    Urea Nitrogen 28.7 (H) 8.0 - 23.0 mg/dL    Creatinine 1.97 (H) 0.51 - 0.95 mg/dL    GFR  Estimate 24 (L) >60 mL/min/1.73m2    Calcium 9.4 8.8 - 10.4 mg/dL    Chloride 110 (H) 98 - 107 mmol/L    Glucose 163 (H) 70 - 99 mg/dL    Alkaline Phosphatase 65 40 - 150 U/L    AST 14 0 - 45 U/L    ALT 12 0 - 50 U/L    Protein Total 6.1 (L) 6.4 - 8.3 g/dL    Albumin 3.6 3.5 - 5.2 g/dL    Bilirubin Total 0.8 <=1.2 mg/dL   CBC with platelets and differential    Collection Time: 04/29/25 10:47 AM   Result Value Ref Range    WBC Count 9.8 4.0 - 11.0 10e3/uL    RBC Count 3.98 3.80 - 5.20 10e6/uL    Hemoglobin 12.3 11.7 - 15.7 g/dL    Hematocrit 37.2 35.0 - 47.0 %    MCV 94 78 - 100 fL    MCH 30.9 26.5 - 33.0 pg    MCHC 33.1 31.5 - 36.5 g/dL    RDW 17.2 (H) 10.0 - 15.0 %    Platelet Count 223 150 - 450 10e3/uL    % Neutrophils 63 %    % Lymphocytes 25 %    % Monocytes 8 %    % Eosinophils 1 %    % Basophils 1 %    % Immature Granulocytes 1 %    NRBCs per 100 WBC 0 <1 /100    Absolute Neutrophils 6.2 1.6 - 8.3 10e3/uL    Absolute Lymphocytes 2.5 0.8 - 5.3 10e3/uL    Absolute Monocytes 0.8 0.0 - 1.3 10e3/uL    Absolute Eosinophils 0.1 0.0 - 0.7 10e3/uL    Absolute Basophils 0.1 0.0 - 0.2 10e3/uL    Absolute Immature Granulocytes 0.1 <=0.4 10e3/uL    Absolute NRBCs 0.0 10e3/uL       Imaging Results    No results found.      I spent 48 minutes on the patient's visit today.  This included preparation for the visit, face-to-face time with the patient and documentation following the visit.  It did not include teaching or procedure time.    Signed by: Peter E. Friedell, MD          Again, thank you for allowing me to participate in the care of your patient.        Sincerely,        Peter E. Friedell, MD    Electronically signed

## 2025-04-29 NOTE — PROGRESS NOTES
[facility]: Cancer Care                                                                                          Situation: Chart reviewed by RN Care Coordinator.    Background: Patient was seen in clinic today for follow-up regarding colon cancer.    Assessment: Has completed 2 cycles of capecitabine.  Her EGFR still low at 24.    Plan/Recommendations: Patient is to hold capecitabine.  She will return to clinic in 4 weeks with lab work.      Signature:  Shannon Marino RN Care Coordinator  [facility]  Cancer [facility]

## 2025-04-29 NOTE — PROGRESS NOTES
[facility] Hematology and Oncology Progress Note    Patient: Alma Johns  MRN: [id number]  Date of Service: Apr 29, 2025       Reason for Visit    I was consulted by   Lucy Ruano MD     For evaluation and management of newly diagnosed colon cancer.      Encounter Diagnoses Assessment and Plan:    Problem List Items Addressed This Visit       Colon adenocarcinoma (H) - Primary    Relevant Orders    CBC with Platelets & Differential    Comprehensive metabolic panel   Patient with newly diagnosed apple core adenocarcinoma of the hepatic flexure.  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  Patient does not wish to have chemotherapy.  She had a surgical consultation on 12/30/2024.  Palliative surgery was deferred until the patient developed obstructive symptoms.  We again discussed palliative chemotherapy.  Patient has agreed to a trial of capecitabine.  Treatment plan has been entered.     Update 3/6/2025.  Patient returns for day 1 cycle 2 of capecitabine.  She is tolerating it without difficulty.  Her EGFR has improved to 41.  Calcium is slightly increased at 10.7.  She did have laser lithotripsy on 1/31/2025.  She will continue with capecitabine 1000 mg twice daily for 2 weeks on and 1 week off.  Follow-up is planned for 3 weeks at the start of her next cycle.    Update 4/29/2025 Patient returns after completing 2 cycles of capecitabine.  Unfortunately her EGFR still low at 24.  I advised her to hold capecitabine for 4 more weeks after which we will recheck her chemistry profile.  She had a recent ultrasound which shows an atrophic right kidney and a large staghorn calculus on the left.      ______________________________________________________________________________    Staging History   Cancer Staging   Colon adenocarcinoma (H)  Staging form: Colon and Rectum, AJCC 8th Edition  - Clinical stage from 1/9/2025: Stage IVB (cTX, cNX, cM1b) - Signed by Friedell, Peter E, MD on 1/9/2025    ECOG  "Performance  1 - Can't do physically strenuous work, but fully ambulatory and can do light sedentary work.    History of Present Illness      Alma Johns is an 89 year old woman with a history of hypercalcemia with elevated parathyroid hormone.  Over the last year she has had unexplained weight loss of about 20 pounds.  On 12/10/2024 she underwent CT scan of the abdomen and pelvis which showed probable lesion at the hepatic flexure of the colon and suspicious retroperitoneal adenopathy.  On 12/19/2024 she underwent colonoscopy which confirmed an adenocarcinoma of the colon.  Upper GI endoscopic ultrasound confirmed metastases in a upper abdominal lymph node adjacent to the pancreas near the mesenteric root.  Multiple pancreatic cysts appeared benign.    Presently she feels fairly well.  Her appetite is normal.  She is regained about 10 pounds since starting her on dexamethasone.  She is having no difficulty with bowel movements.  She does not have pain.  She does not have cough chest pain or shortness of breath.She does note some fatigue and takes an afternoon nap.    Review of systems.  Pertinent Findings are included in the History of Present Illness    Physical Exam    BP (!) 144/65 (BP Location: Left arm, Patient Position: Sitting, Cuff Size: Adult Regular)   Pulse 70   Temp 99  F (37.2  C) (Oral)   Resp 16   Ht 1.499 m (4' 11\")   Wt 59 kg (130 lb)   LMP  (LMP Unknown)   SpO2 94%   BMI 26.26 kg/m       GENERAL APPEARANCE: 89-year-old woman in no apparent distress.  HEAD: Atraumatic; normocephalic; without lesions.  EYES: Conjunctiva, corneas and eyelids normal; pupils equal, round, reactive to light; No Icterus.  MOUTH/OROPHARYNX: Oral mucosa intact  NECK: Supple with no nodes.  LUNGS:  Clear to auscultation and percussion with no extra sounds.  HEART: Regular rhythm and rate; S1 and S2 normal; no murmurs noted.  ABDOMEN: Soft; no masses or tenderness, no hepatosplenomegaly.  NEUROLOGIC: Alert and " oriented.  No obvious focal findings.  EXTREMITIES: No cyanosis, or edema.  SKIN: No abnormal bruising or bleeding. No suspicious lesions noted on exposed skin.  PSYCHIATRIC: Mental status normal; no apparent psychiatric issues    Medications:    Current Outpatient Medications   Medication Sig Dispense Refill    alendronate (FOSAMAX) 70 MG tablet Take 1 tablet (70 mg) by mouth every 7 days 12 tablet 0    amLODIPine (NORVASC) 10 MG tablet Take 1 tablet (10 mg) by mouth daily 90 tablet 3    celecoxib (CELEBREX) 200 MG capsule TAKE 1 CAPSULE BY MOUTH DAILY 90 capsule 3    ciprofloxacin (CIPRO) 500 MG tablet Take 1 tablet (500 mg) by mouth daily. 10 tablet 0    dexAMETHasone (DECADRON) 1 MG tablet Take 1 tablet (1 mg) by mouth daily (with breakfast). 30 tablet 1    doxazosin (CARDURA) 4 MG tablet TAKE ONE TABLET BY MOUTH ONE TIME DAILY. 90 tablet 3    escitalopram (LEXAPRO) 5 MG tablet Take 1 tablet (5 mg) by mouth daily. 90 tablet 3    fluconazole (DIFLUCAN) 200 MG tablet Take 1 tablet (200 mg) by mouth daily. 5 tablet 0    latanoprost (XALATAN) 0.005 % ophthalmic solution [LATANOPROST (XALATAN) 0.005 % OPHTHALMIC SOLUTION] Administer 1 drop to both eyes bedtime.      LORazepam (ATIVAN) 0.5 MG tablet TAKE 1 TABLET BY MOUTH EVERY 6 HOURS AS NEEDED FOR ANXIETY 5 tablet 0    ondansetron (ZOFRAN) 4 MG tablet Take 1 tablet (4 mg) by mouth every 8 hours as needed for nausea. 90 tablet 3    prochlorperazine (COMPAZINE) 5 MG tablet Take 1 tablet (5 mg) by mouth every 6 hours as needed for nausea or vomiting. 30 tablet 2    therapeutic multivitamin (THERAGRAN) tablet [THERAPEUTIC MULTIVITAMIN (THERAGRAN) TABLET] Take 1 tablet by mouth daily.      timolol maleate (TIMOPTIC) 0.5 % ophthalmic solution [TIMOLOL MALEATE (TIMOPTIC) 0.5 % OPHTHALMIC SOLUTION] INSTILL 1 DROP INTO BOTH EYES Q EVENING  3    capecitabine (XELODA) 500 MG tablet Take 2 tablets (1,000 mg) by mouth 2 times daily for 14 days. Days 1 through 14, then off for 7  days.Take with water within 30 minutes after a meal. 56 tablet 0     No current facility-administered medications for this visit.           Past History    Past Medical History:   Diagnosis Date    Colon cancer (H)     Stage IV with presence in lymph nodes and other distant body parts    Complaint related to dreams 2019    Dyslipidemia     dyslipoproteinemia    Glaucoma     Hypercalcemia     Hyperparathyroidism     Hypertension     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    OA (osteoarthritis)     Osteopenia     PONV (postoperative nausea and vomiting)     Postmenopausal     Retinal vein occlusion (H)     Vitamin D deficiency      Past Surgical History:   Procedure Laterality Date    APPENDECTOMY      Incidental    BIOPSY BREAST       SECTION      and     CHOLECYSTECTOMY      COLONOSCOPY N/A 2024    Procedure: Colonoscopy with biopsies and polypectomy, endoscopic marker;  Surgeon: Ron Hogan MD;  Location: [facility] OR    ENTEROSCOPY SMALL BOWEL N/A 2024    Procedure: Enteroscopy small bowel;  Surgeon: Ron Hogan MD;  Location: [facility] OR    ESOPHAGOSCOPY, GASTROSCOPY, DUODENOSCOPY (EGD), COMBINED N/A 2024    Procedure: ESOPHAGOGASTRODUODENOSCOPY, WITH FINE NEEDLE ASPIRATION BIOPSY, WITH ENDOSCOPIC ULTRASOUND GUIDANCE;  Surgeon: Ron Hogan MD;  Location: [facility] OR    HYSTERECTOMY TOTAL ABDOMINAL, BILATERAL SALPINGO-OOPHORECTOMY, COMBINED      KIDNEY STONE SURGERY      extraction    LUMBAR LAMINECTOMY      TOTAL HIP ARTHROPLASTY Left 2009    TOTAL KNEE ARTHROPLASTY Right 2009    TUBAL LIGATION       No Known Allergies    No family history on file.  Social History     Socioeconomic History    Marital status: Single     Spouse name: None    Number of children: None    Years of education: None    Highest education level: None   Tobacco Use    Smoking status: Never     Passive exposure: Past    Smokeless tobacco: Never   Vaping Use     Vaping status: Never Used   Substance and Sexual Activity    Alcohol use: No    Drug use: No   Social History Narrative    Lives alone. 2 hip replacements and 1 knee replacement. Used to work in housekeeping at [employer]. Saw Dr Crabtree. Has a daughter and son, and grandchildren and great grandchildren.     Social Drivers of Health     Financial Resource Strain: Low Risk  (8/12/2024)    Financial Resource Strain     Within the past 12 months, have you or your family members you live with been unable to get utilities (heat, electricity) when it was really needed?: No   Food Insecurity: Low Risk  (8/12/2024)    Food Insecurity     Within the past 12 months, did you worry that your food would run out before you got money to buy more?: No     Within the past 12 months, did the food you bought just not last and you didn t have money to get more?: No   Transportation Needs: Low Risk  (8/12/2024)    Transportation Needs     Within the past 12 months, has lack of transportation kept you from medical appointments, getting your medicines, non-medical meetings or appointments, work, or from getting things that you need?: No   Physical Activity: Inactive (8/12/2024)    Exercise Vital Sign     Days of Exercise per Week: 0 days     Minutes of Exercise per Session: 0 min   Stress: No Stress Concern Present (8/12/2024)    [employer] [place] [employer] - Occupational Stress Questionnaire     Feeling of Stress : Only a little   Social Connections: Unknown (8/12/2024)    Social Connection and Isolation Panel [NHANES]     Frequency of Social Gatherings with Friends and Family: Once a week   Interpersonal Safety: Low Risk  (12/19/2024)    Interpersonal Safety     Do you feel physically and emotionally safe where you currently live?: Yes     Within the past 12 months, have you been hit, slapped, kicked or otherwise physically hurt by someone?: No     Within the past 12 months, have you been humiliated or emotionally abused  in other ways by your partner or ex-partner?: No   Housing Stability: Low Risk  (8/12/2024)    Housing Stability     Do you have housing? : Yes     Are you worried about losing your housing?: No           Lab Results    Recent Results (from the past 240 hours)   Comprehensive metabolic panel    Collection Time: 04/29/25 10:47 AM   Result Value Ref Range    Sodium 143 135 - 145 mmol/L    Potassium 3.6 3.4 - 5.3 mmol/L    Carbon Dioxide (CO2) 24 22 - 29 mmol/L    Anion Gap 9 7 - 15 mmol/L    Urea Nitrogen 28.7 (H) 8.0 - 23.0 mg/dL    Creatinine 1.97 (H) 0.51 - 0.95 mg/dL    GFR Estimate 24 (L) >60 mL/min/1.73m2    Calcium 9.4 8.8 - 10.4 mg/dL    Chloride 110 (H) 98 - 107 mmol/L    Glucose 163 (H) 70 - 99 mg/dL    Alkaline Phosphatase 65 40 - 150 U/L    AST 14 0 - 45 U/L    ALT 12 0 - 50 U/L    Protein Total 6.1 (L) 6.4 - 8.3 g/dL    Albumin 3.6 3.5 - 5.2 g/dL    Bilirubin Total 0.8 <=1.2 mg/dL   CBC with platelets and differential    Collection Time: 04/29/25 10:47 AM   Result Value Ref Range    WBC Count 9.8 4.0 - 11.0 10e3/uL    RBC Count 3.98 3.80 - 5.20 10e6/uL    Hemoglobin 12.3 11.7 - 15.7 g/dL    Hematocrit 37.2 35.0 - 47.0 %    MCV 94 78 - 100 fL    MCH 30.9 26.5 - 33.0 pg    MCHC 33.1 31.5 - 36.5 g/dL    RDW 17.2 (H) 10.0 - 15.0 %    Platelet Count 223 150 - 450 10e3/uL    % Neutrophils 63 %    % Lymphocytes 25 %    % Monocytes 8 %    % Eosinophils 1 %    % Basophils 1 %    % Immature Granulocytes 1 %    NRBCs per 100 WBC 0 <1 /100    Absolute Neutrophils 6.2 1.6 - 8.3 10e3/uL    Absolute Lymphocytes 2.5 0.8 - 5.3 10e3/uL    Absolute Monocytes 0.8 0.0 - 1.3 10e3/uL    Absolute Eosinophils 0.1 0.0 - 0.7 10e3/uL    Absolute Basophils 0.1 0.0 - 0.2 10e3/uL    Absolute Immature Granulocytes 0.1 <=0.4 10e3/uL    Absolute NRBCs 0.0 10e3/uL       Imaging Results    No results found.      I spent 48 minutes on the patient's visit today.  This included preparation for the visit, face-to-face time with the patient and  documentation following the visit.  It did not include teaching or procedure time.    Signed by: Peter E. Friedell, MD

## 2025-05-01 ENCOUNTER — TELEPHONE (OUTPATIENT)
Dept: INTERNAL MEDICINE | Facility: CLINIC | Age: OVER 89
End: 2025-05-01
Payer: MEDICARE

## 2025-05-01 NOTE — TELEPHONE ENCOUNTER
Medication Question or Refill    Contacts       Contact Date/Time Type Contact Phone/Fax    05/01/2025 12:18 PM CDT Phone (Incoming) Va Valdez (Emergency Contact) [phone] (M)            What medication are you calling about (include dose and sig)?: Lorazapam    Preferred Pharmacy:   [facility] [address]  [address]  [address]  [facility] [address]  Phone: [phone] Fax: [phone]    [facility] [address] [facility] [employer] - [place], [place] - [address]  [address] [place] [address]  Phone: [phone] Fax: [phone]      Controlled Substance Agreement on file:   CSA -- Patient Level:    CSA: None found at the patient level.       Who prescribed the medication?: Kamal    Do you need a refill? Change in med and Daughter Va has questions regarding med.    When refilled only 7 pills, take one every 6 hours as needed.    Original prescription said take 1 a day.    When did you use the medication last? 4/30            Could we send this information to you in [facility] or would you prefer to receive a phone call?:   Patient would prefer a phone call   Okay to leave a detailed message?: Yes at Cell number on file:    Telephone Information:   Mobile [phone]

## 2025-05-01 NOTE — TELEPHONE ENCOUNTER
Called brian. Discussed Lorazepam script has never been for daily use upon chart review. It has not been prescribed by an outside provider.     She states pt is doing well on the Lexapro and they do not need a refill of the lorazepam as she may just stick with the lexapro only for anxiety.

## 2025-05-01 NOTE — TELEPHONE ENCOUNTER
Called Va back to gather more information as script was just sent in.    LORazepam (ATIVAN) 0.5 MG tablet 5 tablet 0 4/24/2025 -- No   Sig - Route: TAKE 1 TABLET BY MOUTH EVERY 6 HOURS AS NEEDED FOR ANXIETY - Oral   Sent to pharmacy as: LORazepam 0.5 MG Oral Tablet (ATIVAN)   Class: E-Prescribe   Notes to Pharmacy: Not to exceed 4 additional fills before 10/11/2025   Order: [id number]   E-Prescribing Status: Receipt confirmed by pharmacy (4/24/2025  1:49 PM C     She is wondering why the script changed?   I advised this is how the script has always been sent in.    She is wondering what medication is used to her anxiety and depression?      Please call Va on CTC.

## 2025-05-27 ENCOUNTER — ONCOLOGY VISIT (OUTPATIENT)
Dept: ONCOLOGY | Facility: HOSPITAL | Age: OVER 89
End: 2025-05-27
Attending: INTERNAL MEDICINE
Payer: MEDICARE

## 2025-05-27 ENCOUNTER — LAB (OUTPATIENT)
Dept: INFUSION THERAPY | Facility: HOSPITAL | Age: OVER 89
End: 2025-05-27
Attending: INTERNAL MEDICINE
Payer: MEDICARE

## 2025-05-27 VITALS
BODY MASS INDEX: 27.24 KG/M2 | HEART RATE: 75 BPM | DIASTOLIC BLOOD PRESSURE: 64 MMHG | WEIGHT: 135.1 LBS | HEIGHT: 59 IN | TEMPERATURE: 98 F | OXYGEN SATURATION: 96 % | RESPIRATION RATE: 22 BRPM | SYSTOLIC BLOOD PRESSURE: 142 MMHG

## 2025-05-27 DIAGNOSIS — C18.9 COLON ADENOCARCINOMA (H): ICD-10-CM

## 2025-05-27 DIAGNOSIS — C18.9 COLON ADENOCARCINOMA (H): Primary | ICD-10-CM

## 2025-05-27 DIAGNOSIS — C18.2 MALIGNANT NEOPLASM OF ASCENDING COLON (H): ICD-10-CM

## 2025-05-27 LAB
ALBUMIN SERPL BCG-MCNC: 3.7 G/DL (ref 3.5–5.2)
ALP SERPL-CCNC: 64 U/L (ref 40–150)
ALT SERPL W P-5'-P-CCNC: 13 U/L (ref 0–50)
ANION GAP SERPL CALCULATED.3IONS-SCNC: 14 MMOL/L (ref 7–15)
AST SERPL W P-5'-P-CCNC: 15 U/L (ref 0–45)
BASOPHILS # BLD AUTO: 0 10E3/UL (ref 0–0.2)
BASOPHILS NFR BLD AUTO: 0 %
BILIRUB SERPL-MCNC: 0.8 MG/DL
BUN SERPL-MCNC: 42.4 MG/DL (ref 8–23)
CALCIUM SERPL-MCNC: 9.9 MG/DL (ref 8.8–10.4)
CHLORIDE SERPL-SCNC: 106 MMOL/L (ref 98–107)
CREAT SERPL-MCNC: 2.47 MG/DL (ref 0.51–0.95)
EGFRCR SERPLBLD CKD-EPI 2021: 18 ML/MIN/1.73M2
EOSINOPHIL # BLD AUTO: 0 10E3/UL (ref 0–0.7)
EOSINOPHIL NFR BLD AUTO: 0 %
ERYTHROCYTE [DISTWIDTH] IN BLOOD BY AUTOMATED COUNT: 13.5 % (ref 10–15)
GLUCOSE SERPL-MCNC: 185 MG/DL (ref 70–99)
HCO3 SERPL-SCNC: 21 MMOL/L (ref 22–29)
HCT VFR BLD AUTO: 36.8 % (ref 35–47)
HGB BLD-MCNC: 12.1 G/DL (ref 11.7–15.7)
IMM GRANULOCYTES # BLD: 0.2 10E3/UL
IMM GRANULOCYTES NFR BLD: 2 %
LYMPHOCYTES # BLD AUTO: 1.2 10E3/UL (ref 0.8–5.3)
LYMPHOCYTES NFR BLD AUTO: 11 %
MCH RBC QN AUTO: 30.9 PG (ref 26.5–33)
MCHC RBC AUTO-ENTMCNC: 32.9 G/DL (ref 31.5–36.5)
MCV RBC AUTO: 94 FL (ref 78–100)
MONOCYTES # BLD AUTO: 0.4 10E3/UL (ref 0–1.3)
MONOCYTES NFR BLD AUTO: 3 %
NEUTROPHILS # BLD AUTO: 9.1 10E3/UL (ref 1.6–8.3)
NEUTROPHILS NFR BLD AUTO: 84 %
NRBC # BLD AUTO: 0 10E3/UL
NRBC BLD AUTO-RTO: 0 /100
PLATELET # BLD AUTO: 230 10E3/UL (ref 150–450)
POTASSIUM SERPL-SCNC: 4 MMOL/L (ref 3.4–5.3)
PROT SERPL-MCNC: 6.5 G/DL (ref 6.4–8.3)
RBC # BLD AUTO: 3.92 10E6/UL (ref 3.8–5.2)
SODIUM SERPL-SCNC: 141 MMOL/L (ref 135–145)
WBC # BLD AUTO: 10.9 10E3/UL (ref 4–11)

## 2025-05-27 PROCEDURE — G0463 HOSPITAL OUTPT CLINIC VISIT: HCPCS | Performed by: INTERNAL MEDICINE

## 2025-05-27 PROCEDURE — G2211 COMPLEX E/M VISIT ADD ON: HCPCS | Performed by: INTERNAL MEDICINE

## 2025-05-27 PROCEDURE — 36415 COLL VENOUS BLD VENIPUNCTURE: CPT

## 2025-05-27 PROCEDURE — 84155 ASSAY OF PROTEIN SERUM: CPT

## 2025-05-27 PROCEDURE — 85025 COMPLETE CBC W/AUTO DIFF WBC: CPT

## 2025-05-27 PROCEDURE — 99215 OFFICE O/P EST HI 40 MIN: CPT | Performed by: INTERNAL MEDICINE

## 2025-05-27 RX ORDER — METHYLPREDNISOLONE SODIUM SUCCINATE 40 MG/ML
40 INJECTION INTRAMUSCULAR; INTRAVENOUS
Start: 2025-05-27

## 2025-05-27 RX ORDER — ALBUTEROL SULFATE 0.83 MG/ML
2.5 SOLUTION RESPIRATORY (INHALATION)
OUTPATIENT
Start: 2025-06-10

## 2025-05-27 RX ORDER — HEPARIN SODIUM (PORCINE) LOCK FLUSH IV SOLN 100 UNIT/ML 100 UNIT/ML
5 SOLUTION INTRAVENOUS
OUTPATIENT
Start: 2025-06-10

## 2025-05-27 RX ORDER — HEPARIN SODIUM,PORCINE 10 UNIT/ML
5-20 VIAL (ML) INTRAVENOUS DAILY PRN
OUTPATIENT
Start: 2025-05-27

## 2025-05-27 RX ORDER — ALBUTEROL SULFATE 90 UG/1
1-2 INHALANT RESPIRATORY (INHALATION)
Start: 2025-05-27

## 2025-05-27 RX ORDER — EPINEPHRINE 1 MG/ML
0.3 INJECTION, SOLUTION INTRAMUSCULAR; SUBCUTANEOUS EVERY 5 MIN PRN
OUTPATIENT
Start: 2025-05-27

## 2025-05-27 RX ORDER — DIPHENHYDRAMINE HYDROCHLORIDE 50 MG/ML
50 INJECTION, SOLUTION INTRAMUSCULAR; INTRAVENOUS
Start: 2025-05-27

## 2025-05-27 RX ORDER — DIPHENHYDRAMINE HYDROCHLORIDE 50 MG/ML
50 INJECTION, SOLUTION INTRAMUSCULAR; INTRAVENOUS
Start: 2025-06-10

## 2025-05-27 RX ORDER — MEPERIDINE HYDROCHLORIDE 25 MG/ML
25 INJECTION INTRAMUSCULAR; INTRAVENOUS; SUBCUTANEOUS
OUTPATIENT
Start: 2025-05-27

## 2025-05-27 RX ORDER — HEPARIN SODIUM (PORCINE) LOCK FLUSH IV SOLN 100 UNIT/ML 100 UNIT/ML
5 SOLUTION INTRAVENOUS
OUTPATIENT
Start: 2025-05-27

## 2025-05-27 RX ORDER — DIPHENHYDRAMINE HYDROCHLORIDE 50 MG/ML
25 INJECTION, SOLUTION INTRAMUSCULAR; INTRAVENOUS
Start: 2025-05-27

## 2025-05-27 RX ORDER — MEPERIDINE HYDROCHLORIDE 25 MG/ML
25 INJECTION INTRAMUSCULAR; INTRAVENOUS; SUBCUTANEOUS
OUTPATIENT
Start: 2025-06-10

## 2025-05-27 RX ORDER — ALBUTEROL SULFATE 0.83 MG/ML
2.5 SOLUTION RESPIRATORY (INHALATION)
OUTPATIENT
Start: 2025-05-27

## 2025-05-27 RX ORDER — HEPARIN SODIUM,PORCINE 10 UNIT/ML
5-20 VIAL (ML) INTRAVENOUS DAILY PRN
OUTPATIENT
Start: 2025-06-10

## 2025-05-27 RX ORDER — DIPHENHYDRAMINE HYDROCHLORIDE 50 MG/ML
25 INJECTION, SOLUTION INTRAMUSCULAR; INTRAVENOUS
Start: 2025-06-10

## 2025-05-27 RX ORDER — ALBUTEROL SULFATE 90 UG/1
1-2 INHALANT RESPIRATORY (INHALATION)
Start: 2025-06-10

## 2025-05-27 RX ORDER — EPINEPHRINE 1 MG/ML
0.3 INJECTION, SOLUTION INTRAMUSCULAR; SUBCUTANEOUS EVERY 5 MIN PRN
OUTPATIENT
Start: 2025-06-10

## 2025-05-27 RX ORDER — DOXYCYCLINE 100 MG/1
100 CAPSULE ORAL DAILY
Qty: 30 CAPSULE | Refills: 3 | Status: SHIPPED | OUTPATIENT
Start: 2025-05-27

## 2025-05-27 RX ORDER — LORAZEPAM 2 MG/ML
0.5 INJECTION INTRAMUSCULAR EVERY 4 HOURS PRN
OUTPATIENT
Start: 2025-05-27

## 2025-05-27 RX ORDER — METHYLPREDNISOLONE SODIUM SUCCINATE 40 MG/ML
40 INJECTION INTRAMUSCULAR; INTRAVENOUS
Start: 2025-06-10

## 2025-05-27 RX ORDER — LORAZEPAM 2 MG/ML
0.5 INJECTION INTRAMUSCULAR EVERY 4 HOURS PRN
OUTPATIENT
Start: 2025-06-10

## 2025-05-27 ASSESSMENT — PAIN SCALES - GENERAL: PAINLEVEL_OUTOF10: NO PAIN (0)

## 2025-05-27 NOTE — LETTER
"5/27/2025      Alma Johns  [address] [place] [address]      Dear Colleague,    Thank you for referring your patient, Alma Johns, to the [facility]. Please see a copy of my visit note below.    Oncology Rooming Note    May 27, 2025 1:52 PM   Alma Johns is a 89 year old female who presents for:    Chief Complaint   Patient presents with     Oncology Clinic Visit     Colon adenocarcinoma, Follow Up     Initial Vitals: BP (!) 142/64 (BP Location: Left arm, Patient Position: Sitting, Cuff Size: Adult Regular)   Pulse 75   Temp 98  F (36.7  C) (Oral)   Resp 22   Ht 1.499 m (4' 11\")   Wt 61.3 kg (135 lb 1.6 oz)   LMP  (LMP Unknown)   SpO2 96%   BMI 27.29 kg/m   Estimated body mass index is 27.29 kg/m  as calculated from the following:    Height as of this encounter: 1.499 m (4' 11\").    Weight as of this encounter: 61.3 kg (135 lb 1.6 oz). Body surface area is 1.6 meters squared.  No Pain (0) Comment: Data Unavailable   No LMP recorded (lmp unknown). Patient has had a hysterectomy.  Allergies reviewed: Yes  Medications reviewed: Yes    Medications: Medication refills not needed today.  Pharmacy name entered into [employer]:    [facility] [place], [place] - [address] [place] [id number] [facility] - [place], [place] - [address] [place] [address]    Frailty Screening:   Is the patient here for a new oncology consult visit in cancer care? 2. No        Clinical concerns: None.      Morgan Blunt MA              [facility] Hematology and Oncology Progress Note    Patient: Alma Johns  MRN: [id number]  Date of Service: May 27, 2025       Reason for Visit    I was consulted by   Lucy Ruano MD     For evaluation and management of newly diagnosed colon cancer.      Encounter Diagnoses Assessment and Plan:    Problem List Items Addressed This Visit       Colon adenocarcinoma (H) - Primary    Relevant Medications    doxycycline hyclate (VIBRAMYCIN) 100 MG " capsule    Other Relevant Orders    Infusion Appointment Request - Adult    CBC with platelets and differential    Comprehensive metabolic panel    Infusion Appointment Request - Adult    CBC with platelets and differential    Comprehensive metabolic panel    PET Oncology (Eyes to Thighs)     Other Visit Diagnoses         Malignant neoplasm of ascending colon (H)        Relevant Orders    PET Oncology (Eyes to Thighs)        Patient with newly diagnosed apple core adenocarcinoma of the hepatic flexure.  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  Patient does not wish to have chemotherapy.  She had a surgical consultation on 12/30/2024.  Palliative surgery was deferred until the patient developed obstructive symptoms.  We again discussed palliative chemotherapy.  Patient has agreed to a trial of capecitabine.  Treatment plan has been entered.     Update 3/6/2025.  Patient returns for day 1 cycle 2 of capecitabine.  She is tolerating it without difficulty.  Her EGFR has improved to 41.  Calcium is slightly increased at 10.7.  She did have laser lithotripsy on 1/31/2025.  She will continue with capecitabine 1000 mg twice daily for 2 weeks on and 1 week off.  Follow-up is planned for 3 weeks at the start of her next cycle.    Update 4/29/2025 Patient returns after completing 2 cycles of capecitabine.  Unfortunately her EGFR still low at 24.  I advised her to hold capecitabine for 4 more weeks after which we will recheck her chemistry profile.  She had a recent ultrasound which shows an atrophic right kidney and a large staghorn calculus on the left.    Update 5/27/2025 patient returns for follow-up.  She has been off capecitabine chemotherapy for 4 weeks.  Unfortunately her renal function continues to decline and her EGFR is now at 18.  I advised a trial of panitumumab and treatment plan is entered.      ______________________________________________________________________________    Staging History    "Cancer Staging   Colon adenocarcinoma (H)  Staging form: Colon and Rectum, AJCC 8th Edition  - Clinical stage from 1/9/2025: Stage IVB (cTX, cNX, cM1b) - Signed by Friedell, Peter E, MD on 1/9/2025    ECOG Performance  1 - Can't do physically strenuous work, but fully ambulatory and can do light sedentary work.    History of Present Illness      Alma Johns is an 89 year old woman with a history of hypercalcemia with elevated parathyroid hormone.  Over the last year she has had unexplained weight loss of about 20 pounds.  On 12/10/2024 she underwent CT scan of the abdomen and pelvis which showed probable lesion at the hepatic flexure of the colon and suspicious retroperitoneal adenopathy.  On 12/19/2024 she underwent colonoscopy which confirmed an adenocarcinoma of the colon.  Upper GI endoscopic ultrasound confirmed metastases in a upper abdominal lymph node adjacent to the pancreas near the mesenteric root.  Multiple pancreatic cysts appeared benign.    Presently she feels fairly well.  Her appetite is normal.  She is regained about 10 pounds since starting her on dexamethasone.  She is having no difficulty with bowel movements.  She does not have pain.  She does not have cough chest pain or shortness of breath.She does note some fatigue and takes an afternoon nap.    Review of systems.  Pertinent Findings are included in the History of Present Illness    Physical Exam    BP (!) 142/64 (BP Location: Left arm, Patient Position: Sitting, Cuff Size: Adult Regular)   Pulse 75   Temp 98  F (36.7  C) (Oral)   Resp 22   Ht 1.499 m (4' 11\")   Wt 61.3 kg (135 lb 1.6 oz)   LMP  (LMP Unknown)   SpO2 96%   BMI 27.29 kg/m       GENERAL APPEARANCE: 89-year-old woman in no apparent distress.  HEAD: Atraumatic; normocephalic; without lesions.  EYES: Conjunctiva, corneas and eyelids normal; pupils equal, round, reactive to light; No Icterus.  MOUTH/OROPHARYNX: Oral mucosa intact  NECK: Supple with no nodes.  LUNGS:  Clear " to auscultation and percussion with no extra sounds.  HEART: Regular rhythm and rate; S1 and S2 normal; no murmurs noted.  ABDOMEN: Soft; no masses or tenderness, no hepatosplenomegaly.  NEUROLOGIC: Alert and oriented.  No obvious focal findings.  EXTREMITIES: No cyanosis, or edema.  SKIN: No abnormal bruising or bleeding. No suspicious lesions noted on exposed skin.  PSYCHIATRIC: Mental status normal; no apparent psychiatric issues    Medications:    Current Outpatient Medications   Medication Sig Dispense Refill     alendronate (FOSAMAX) 70 MG tablet Take 1 tablet (70 mg) by mouth every 7 days 12 tablet 0     amLODIPine (NORVASC) 10 MG tablet Take 1 tablet (10 mg) by mouth daily 90 tablet 3     celecoxib (CELEBREX) 200 MG capsule TAKE 1 CAPSULE BY MOUTH DAILY 90 capsule 3     ciprofloxacin (CIPRO) 500 MG tablet Take 1 tablet (500 mg) by mouth daily. 10 tablet 0     dexAMETHasone (DECADRON) 1 MG tablet Take 1 tablet (1 mg) by mouth daily (with breakfast). 30 tablet 1     doxazosin (CARDURA) 4 MG tablet TAKE ONE TABLET BY MOUTH ONE TIME DAILY. 90 tablet 3     doxycycline hyclate (VIBRAMYCIN) 100 MG capsule Take 1 capsule (100 mg) by mouth daily. 30 capsule 3     escitalopram (LEXAPRO) 5 MG tablet Take 1 tablet (5 mg) by mouth daily. 90 tablet 3     fluconazole (DIFLUCAN) 200 MG tablet Take 1 tablet (200 mg) by mouth daily. 5 tablet 0     latanoprost (XALATAN) 0.005 % ophthalmic solution [LATANOPROST (XALATAN) 0.005 % OPHTHALMIC SOLUTION] Administer 1 drop to both eyes bedtime.       LORazepam (ATIVAN) 0.5 MG tablet TAKE 1 TABLET BY MOUTH EVERY 6 HOURS AS NEEDED FOR ANXIETY 5 tablet 0     ondansetron (ZOFRAN) 4 MG tablet Take 1 tablet (4 mg) by mouth every 8 hours as needed for nausea. 90 tablet 3     therapeutic multivitamin (THERAGRAN) tablet [THERAPEUTIC MULTIVITAMIN (THERAGRAN) TABLET] Take 1 tablet by mouth daily.       timolol maleate (TIMOPTIC) 0.5 % ophthalmic solution [TIMOLOL MALEATE (TIMOPTIC) 0.5 %  OPHTHALMIC SOLUTION] INSTILL 1 DROP INTO BOTH EYES Q EVENING  3     capecitabine (XELODA) 500 MG tablet Take 2 tablets (1,000 mg) by mouth 2 times daily for 14 days. Days 1 through 14, then off for 7 days.Take with water within 30 minutes after a meal. 56 tablet 0     No current facility-administered medications for this visit.           Past History    Past Medical History:   Diagnosis Date     Colon cancer (H)     Stage IV with presence in lymph nodes and other distant body parts     Complaint related to dreams 2019     Dyslipidemia     dyslipoproteinemia     Glaucoma      Hypercalcemia      Hyperparathyroidism      Hypertension      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     OA (osteoarthritis)      Osteopenia      PONV (postoperative nausea and vomiting)      Postmenopausal      Retinal vein occlusion (H)      Vitamin D deficiency      Past Surgical History:   Procedure Laterality Date     APPENDECTOMY      Incidental     BIOPSY BREAST        SECTION      and      CHOLECYSTECTOMY       COLONOSCOPY N/A 2024    Procedure: Colonoscopy with biopsies and polypectomy, endoscopic marker;  Surgeon: Ron Hogan MD;  Location: [facility] OR     ENTEROSCOPY SMALL BOWEL N/A 2024    Procedure: Enteroscopy small bowel;  Surgeon: Ron Hogan MD;  Location: [facility] OR     ESOPHAGOSCOPY, GASTROSCOPY, DUODENOSCOPY (EGD), COMBINED N/A 2024    Procedure: ESOPHAGOGASTRODUODENOSCOPY, WITH FINE NEEDLE ASPIRATION BIOPSY, WITH ENDOSCOPIC ULTRASOUND GUIDANCE;  Surgeon: Ron Hogan MD;  Location: [facility] OR     HYSTERECTOMY TOTAL ABDOMINAL, BILATERAL SALPINGO-OOPHORECTOMY, COMBINED       KIDNEY STONE SURGERY      extraction     LUMBAR LAMINECTOMY       TOTAL HIP ARTHROPLASTY Left 2009     TOTAL KNEE ARTHROPLASTY Right 2009     TUBAL LIGATION       No Known Allergies    No family history on file.  Social History     Socioeconomic History     Marital status:  Single     Spouse name: None     Number of children: None     Years of education: None     Highest education level: None   Tobacco Use     Smoking status: Never     Passive exposure: Past     Smokeless tobacco: Never   Vaping Use     Vaping status: Never Used   Substance and Sexual Activity     Alcohol use: No     Drug use: No   Social History Narrative    Lives alone. 2 hip replacements and 1 knee replacement. Used to work in housekeeping at [employer]. Saw Dr Crabtree. Has a daughter and son, and grandchildren and great grandchildren.     Social Drivers of Health     Financial Resource Strain: Low Risk  (8/12/2024)    Financial Resource Strain      Within the past 12 months, have you or your family members you live with been unable to get utilities (heat, electricity) when it was really needed?: No   Food Insecurity: Low Risk  (8/12/2024)    Food Insecurity      Within the past 12 months, did you worry that your food would run out before you got money to buy more?: No      Within the past 12 months, did the food you bought just not last and you didn t have money to get more?: No   Transportation Needs: Low Risk  (8/12/2024)    Transportation Needs      Within the past 12 months, has lack of transportation kept you from medical appointments, getting your medicines, non-medical meetings or appointments, work, or from getting things that you need?: No   Physical Activity: Inactive (8/12/2024)    Exercise Vital Sign      Days of Exercise per Week: 0 days      Minutes of Exercise per Session: 0 min   Stress: No Stress Concern Present (8/12/2024)    [employer] [place] [employer] - Occupational Stress Questionnaire      Feeling of Stress : Only a little   Social Connections: Unknown (8/12/2024)    Social Connection and Isolation Panel [NHANES]      Frequency of Social Gatherings with Friends and Family: Once a week   Interpersonal Safety: Low Risk  (12/19/2024)    Interpersonal Safety      Do you feel  physically and emotionally safe where you currently live?: Yes      Within the past 12 months, have you been hit, slapped, kicked or otherwise physically hurt by someone?: No      Within the past 12 months, have you been humiliated or emotionally abused in other ways by your partner or ex-partner?: No   Housing Stability: Low Risk  (8/12/2024)    Housing Stability      Do you have housing? : Yes      Are you worried about losing your housing?: No           Lab Results    Recent Results (from the past 240 hours)   Comprehensive metabolic panel    Collection Time: 05/27/25  1:34 PM   Result Value Ref Range    Sodium 141 135 - 145 mmol/L    Potassium 4.0 3.4 - 5.3 mmol/L    Carbon Dioxide (CO2) 21 (L) 22 - 29 mmol/L    Anion Gap 14 7 - 15 mmol/L    Urea Nitrogen 42.4 (H) 8.0 - 23.0 mg/dL    Creatinine 2.47 (H) 0.51 - 0.95 mg/dL    GFR Estimate 18 (L) >60 mL/min/1.73m2    Calcium 9.9 8.8 - 10.4 mg/dL    Chloride 106 98 - 107 mmol/L    Glucose 185 (H) 70 - 99 mg/dL    Alkaline Phosphatase 64 40 - 150 U/L    AST 15 0 - 45 U/L    ALT 13 0 - 50 U/L    Protein Total 6.5 6.4 - 8.3 g/dL    Albumin 3.7 3.5 - 5.2 g/dL    Bilirubin Total 0.8 <=1.2 mg/dL   CBC with platelets and differential    Collection Time: 05/27/25  1:34 PM   Result Value Ref Range    WBC Count 10.9 4.0 - 11.0 10e3/uL    RBC Count 3.92 3.80 - 5.20 10e6/uL    Hemoglobin 12.1 11.7 - 15.7 g/dL    Hematocrit 36.8 35.0 - 47.0 %    MCV 94 78 - 100 fL    MCH 30.9 26.5 - 33.0 pg    MCHC 32.9 31.5 - 36.5 g/dL    RDW 13.5 10.0 - 15.0 %    Platelet Count 230 150 - 450 10e3/uL    % Neutrophils 84 %    % Lymphocytes 11 %    % Monocytes 3 %    % Eosinophils 0 %    % Basophils 0 %    % Immature Granulocytes 2 %    NRBCs per 100 WBC 0 <1 /100    Absolute Neutrophils 9.1 (H) 1.6 - 8.3 10e3/uL    Absolute Lymphocytes 1.2 0.8 - 5.3 10e3/uL    Absolute Monocytes 0.4 0.0 - 1.3 10e3/uL    Absolute Eosinophils 0.0 0.0 - 0.7 10e3/uL    Absolute Basophils 0.0 0.0 - 0.2 10e3/uL     Absolute Immature Granulocytes 0.2 <=0.4 10e3/uL    Absolute NRBCs 0.0 10e3/uL       Imaging Results    No results found.    The informed consent process has occurred, as I have provided the patient with an explanation of their Colon Cancer diagnosis as well as the prognosis of their disease. I discussed the risks, benefits and alternatives to treatment. Risks which are common with this treatment may include, but are not limited to skin rash . Less common risks with this treatment may include, but are not limited to diarrhea. The patient was given the opportunity to ask questions, and their questions were answered. The patient has consented to Panitumumab for treatment of metastatic colon cancer..      I spent 47 minutes on the patient's visit today.  This included preparation for the visit, face-to-face time with the patient and documentation following the visit.  It did not include teaching or procedure time.    Signed by: Peter E. Friedell, MD          Again, thank you for allowing me to participate in the care of your patient.        Sincerely,        Peter E. Friedell, MD    Electronically signed

## 2025-05-27 NOTE — PROGRESS NOTES
"Oncology Rooming Note    May 27, 2025 1:52 PM   Alma Johns is a 89 year old female who presents for:    Chief Complaint   Patient presents with    Oncology Clinic Visit     Colon adenocarcinoma, Follow Up     Initial Vitals: BP (!) 142/64 (BP Location: Left arm, Patient Position: Sitting, Cuff Size: Adult Regular)   Pulse 75   Temp 98  F (36.7  C) (Oral)   Resp 22   Ht 1.499 m (4' 11\")   Wt 61.3 kg (135 lb 1.6 oz)   LMP  (LMP Unknown)   SpO2 96%   BMI 27.29 kg/m   Estimated body mass index is 27.29 kg/m  as calculated from the following:    Height as of this encounter: 1.499 m (4' 11\").    Weight as of this encounter: 61.3 kg (135 lb 1.6 oz). Body surface area is 1.6 meters squared.  No Pain (0) Comment: Data Unavailable   No LMP recorded (lmp unknown). Patient has had a hysterectomy.  Allergies reviewed: Yes  Medications reviewed: Yes    Medications: Medication refills not needed today.  Pharmacy name entered into [facility]:    [facility] [place], [place] - [address] [facility] [id number] [facility] - [place], [place] - [address] [place] [address]    Frailty Screening:   Is the patient here for a new oncology consult visit in cancer care? 2. No        Clinical concerns: None.      Morgan Blunt MA            "

## 2025-05-27 NOTE — PROGRESS NOTES
[facility] Hematology and Oncology Progress Note    Patient: Alma Johns  MRN: [id number]  Date of Service: May 27, 2025       Reason for Visit    I was consulted by   Lucy Ruano MD     For evaluation and management of newly diagnosed colon cancer.      Encounter Diagnoses Assessment and Plan:    Problem List Items Addressed This Visit       Colon adenocarcinoma (H) - Primary    Relevant Medications    doxycycline hyclate (VIBRAMYCIN) 100 MG capsule    Other Relevant Orders    Infusion Appointment Request - Adult    CBC with platelets and differential    Comprehensive metabolic panel    Infusion Appointment Request - Adult    CBC with platelets and differential    Comprehensive metabolic panel    PET Oncology (Eyes to Thighs)     Other Visit Diagnoses         Malignant neoplasm of ascending colon (H)        Relevant Orders    PET Oncology (Eyes to Thighs)        Patient with newly diagnosed apple core adenocarcinoma of the hepatic flexure.  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  Patient does not wish to have chemotherapy.  She had a surgical consultation on 12/30/2024.  Palliative surgery was deferred until the patient developed obstructive symptoms.  We again discussed palliative chemotherapy.  Patient has agreed to a trial of capecitabine.  Treatment plan has been entered.     Update 3/6/2025.  Patient returns for day 1 cycle 2 of capecitabine.  She is tolerating it without difficulty.  Her EGFR has improved to 41.  Calcium is slightly increased at 10.7.  She did have laser lithotripsy on 1/31/2025.  She will continue with capecitabine 1000 mg twice daily for 2 weeks on and 1 week off.  Follow-up is planned for 3 weeks at the start of her next cycle.    Update 4/29/2025 Patient returns after completing 2 cycles of capecitabine.  Unfortunately her EGFR still low at 24.  I advised her to hold capecitabine for 4 more weeks after which we will recheck her chemistry profile.  She had a  recent ultrasound which shows an atrophic right kidney and a large staghorn calculus on the left.    Update 5/27/2025 patient returns for follow-up.  She has been off capecitabine chemotherapy for 4 weeks.  Unfortunately her renal function continues to decline and her EGFR is now at 18.  I advised a trial of panitumumab and treatment plan is entered.      ______________________________________________________________________________    Staging History   Cancer Staging   Colon adenocarcinoma (H)  Staging form: Colon and Rectum, AJCC 8th Edition  - Clinical stage from 1/9/2025: Stage IVB (cTX, cNX, cM1b) - Signed by Friedell, Peter E, MD on 1/9/2025    ECOG Performance  1 - Can't do physically strenuous work, but fully ambulatory and can do light sedentary work.    History of Present Illness      Alma Johns is an 89 year old woman with a history of hypercalcemia with elevated parathyroid hormone.  Over the last year she has had unexplained weight loss of about 20 pounds.  On 12/10/2024 she underwent CT scan of the abdomen and pelvis which showed probable lesion at the hepatic flexure of the colon and suspicious retroperitoneal adenopathy.  On 12/19/2024 she underwent colonoscopy which confirmed an adenocarcinoma of the colon.  Upper GI endoscopic ultrasound confirmed metastases in a upper abdominal lymph node adjacent to the pancreas near the mesenteric root.  Multiple pancreatic cysts appeared benign.    Presently she feels fairly well.  Her appetite is normal.  She is regained about 10 pounds since starting her on dexamethasone.  She is having no difficulty with bowel movements.  She does not have pain.  She does not have cough chest pain or shortness of breath.She does note some fatigue and takes an afternoon nap.    Review of systems.  Pertinent Findings are included in the History of Present Illness    Physical Exam    BP (!) 142/64 (BP Location: Left arm, Patient Position: Sitting, Cuff Size: Adult Regular)    "Pulse 75   Temp 98  F (36.7  C) (Oral)   Resp 22   Ht 1.499 m (4' 11\")   Wt 61.3 kg (135 lb 1.6 oz)   LMP  (LMP Unknown)   SpO2 96%   BMI 27.29 kg/m       GENERAL APPEARANCE: 89-year-old woman in no apparent distress.  HEAD: Atraumatic; normocephalic; without lesions.  EYES: Conjunctiva, corneas and eyelids normal; pupils equal, round, reactive to light; No Icterus.  MOUTH/OROPHARYNX: Oral mucosa intact  NECK: Supple with no nodes.  LUNGS:  Clear to auscultation and percussion with no extra sounds.  HEART: Regular rhythm and rate; S1 and S2 normal; no murmurs noted.  ABDOMEN: Soft; no masses or tenderness, no hepatosplenomegaly.  NEUROLOGIC: Alert and oriented.  No obvious focal findings.  EXTREMITIES: No cyanosis, or edema.  SKIN: No abnormal bruising or bleeding. No suspicious lesions noted on exposed skin.  PSYCHIATRIC: Mental status normal; no apparent psychiatric issues    Medications:    Current Outpatient Medications   Medication Sig Dispense Refill    alendronate (FOSAMAX) 70 MG tablet Take 1 tablet (70 mg) by mouth every 7 days 12 tablet 0    amLODIPine (NORVASC) 10 MG tablet Take 1 tablet (10 mg) by mouth daily 90 tablet 3    celecoxib (CELEBREX) 200 MG capsule TAKE 1 CAPSULE BY MOUTH DAILY 90 capsule 3    ciprofloxacin (CIPRO) 500 MG tablet Take 1 tablet (500 mg) by mouth daily. 10 tablet 0    dexAMETHasone (DECADRON) 1 MG tablet Take 1 tablet (1 mg) by mouth daily (with breakfast). 30 tablet 1    doxazosin (CARDURA) 4 MG tablet TAKE ONE TABLET BY MOUTH ONE TIME DAILY. 90 tablet 3    doxycycline hyclate (VIBRAMYCIN) 100 MG capsule Take 1 capsule (100 mg) by mouth daily. 30 capsule 3    escitalopram (LEXAPRO) 5 MG tablet Take 1 tablet (5 mg) by mouth daily. 90 tablet 3    fluconazole (DIFLUCAN) 200 MG tablet Take 1 tablet (200 mg) by mouth daily. 5 tablet 0    latanoprost (XALATAN) 0.005 % ophthalmic solution [LATANOPROST (XALATAN) 0.005 % OPHTHALMIC SOLUTION] Administer 1 drop to both eyes bedtime. "      LORazepam (ATIVAN) 0.5 MG tablet TAKE 1 TABLET BY MOUTH EVERY 6 HOURS AS NEEDED FOR ANXIETY 5 tablet 0    ondansetron (ZOFRAN) 4 MG tablet Take 1 tablet (4 mg) by mouth every 8 hours as needed for nausea. 90 tablet 3    therapeutic multivitamin (THERAGRAN) tablet [THERAPEUTIC MULTIVITAMIN (THERAGRAN) TABLET] Take 1 tablet by mouth daily.      timolol maleate (TIMOPTIC) 0.5 % ophthalmic solution [TIMOLOL MALEATE (TIMOPTIC) 0.5 % OPHTHALMIC SOLUTION] INSTILL 1 DROP INTO BOTH EYES Q EVENING  3    capecitabine (XELODA) 500 MG tablet Take 2 tablets (1,000 mg) by mouth 2 times daily for 14 days. Days 1 through 14, then off for 7 days.Take with water within 30 minutes after a meal. 56 tablet 0     No current facility-administered medications for this visit.           Past History    Past Medical History:   Diagnosis Date    Colon cancer (H)     Stage IV with presence in lymph nodes and other distant body parts    Complaint related to dreams 2019    Dyslipidemia     dyslipoproteinemia    Glaucoma     Hypercalcemia     Hyperparathyroidism     Hypertension     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    OA (osteoarthritis)     Osteopenia     PONV (postoperative nausea and vomiting)     Postmenopausal     Retinal vein occlusion (H)     Vitamin D deficiency      Past Surgical History:   Procedure Laterality Date    APPENDECTOMY  1985    Incidental    BIOPSY BREAST       SECTION      and     CHOLECYSTECTOMY      COLONOSCOPY N/A 2024    Procedure: Colonoscopy with biopsies and polypectomy, endoscopic marker;  Surgeon: Ron Hogan MD;  Location: [facility] OR    ENTEROSCOPY SMALL BOWEL N/A 2024    Procedure: Enteroscopy small bowel;  Surgeon: Ron Hogan MD;  Location: [facility] OR    ESOPHAGOSCOPY, GASTROSCOPY, DUODENOSCOPY (EGD), COMBINED N/A 2024    Procedure: ESOPHAGOGASTRODUODENOSCOPY, WITH FINE NEEDLE ASPIRATION BIOPSY, WITH ENDOSCOPIC ULTRASOUND GUIDANCE;  Surgeon: Samira  Ron Ford MD;  Location: [facility] OR    HYSTERECTOMY TOTAL ABDOMINAL, BILATERAL SALPINGO-OOPHORECTOMY, COMBINED  1985    KIDNEY STONE SURGERY      extraction    LUMBAR LAMINECTOMY  2014    TOTAL HIP ARTHROPLASTY Left 01/01/2009    TOTAL KNEE ARTHROPLASTY Right 01/01/2009    TUBAL LIGATION       No Known Allergies    No family history on file.  Social History     Socioeconomic History    Marital status: Single     Spouse name: None    Number of children: None    Years of education: None    Highest education level: None   Tobacco Use    Smoking status: Never     Passive exposure: Past    Smokeless tobacco: Never   Vaping Use    Vaping status: Never Used   Substance and Sexual Activity    Alcohol use: No    Drug use: No   Social History Narrative    Lives alone. 2 hip replacements and 1 knee replacement. Used to work in housekeeping at [employer]. Saw Dr Crabtree. Has a daughter and son, and grandchildren and great grandchildren.     Social Drivers of Health     Financial Resource Strain: Low Risk  (8/12/2024)    Financial Resource Strain     Within the past 12 months, have you or your family members you live with been unable to get utilities (heat, electricity) when it was really needed?: No   Food Insecurity: Low Risk  (8/12/2024)    Food Insecurity     Within the past 12 months, did you worry that your food would run out before you got money to buy more?: No     Within the past 12 months, did the food you bought just not last and you didn t have money to get more?: No   Transportation Needs: Low Risk  (8/12/2024)    Transportation Needs     Within the past 12 months, has lack of transportation kept you from medical appointments, getting your medicines, non-medical meetings or appointments, work, or from getting things that you need?: No   Physical Activity: Inactive (8/12/2024)    Exercise Vital Sign     Days of Exercise per Week: 0 days     Minutes of Exercise per Session: 0 min   Stress: No Stress Concern Present  (8/12/2024)    [employer] - Occupational Stress Questionnaire     Feeling of Stress : Only a little   Social Connections: Unknown (8/12/2024)    Social Connection and Isolation Panel [NHANES]     Frequency of Social Gatherings with Friends and Family: Once a week   Interpersonal Safety: Low Risk  (12/19/2024)    Interpersonal Safety     Do you feel physically and emotionally safe where you currently live?: Yes     Within the past 12 months, have you been hit, slapped, kicked or otherwise physically hurt by someone?: No     Within the past 12 months, have you been humiliated or emotionally abused in other ways by your partner or ex-partner?: No   Housing Stability: Low Risk  (8/12/2024)    Housing Stability     Do you have housing? : Yes     Are you worried about losing your housing?: No           Lab Results    Recent Results (from the past 240 hours)   Comprehensive metabolic panel    Collection Time: 05/27/25  1:34 PM   Result Value Ref Range    Sodium 141 135 - 145 mmol/L    Potassium 4.0 3.4 - 5.3 mmol/L    Carbon Dioxide (CO2) 21 (L) 22 - 29 mmol/L    Anion Gap 14 7 - 15 mmol/L    Urea Nitrogen 42.4 (H) 8.0 - 23.0 mg/dL    Creatinine 2.47 (H) 0.51 - 0.95 mg/dL    GFR Estimate 18 (L) >60 mL/min/1.73m2    Calcium 9.9 8.8 - 10.4 mg/dL    Chloride 106 98 - 107 mmol/L    Glucose 185 (H) 70 - 99 mg/dL    Alkaline Phosphatase 64 40 - 150 U/L    AST 15 0 - 45 U/L    ALT 13 0 - 50 U/L    Protein Total 6.5 6.4 - 8.3 g/dL    Albumin 3.7 3.5 - 5.2 g/dL    Bilirubin Total 0.8 <=1.2 mg/dL   CBC with platelets and differential    Collection Time: 05/27/25  1:34 PM   Result Value Ref Range    WBC Count 10.9 4.0 - 11.0 10e3/uL    RBC Count 3.92 3.80 - 5.20 10e6/uL    Hemoglobin 12.1 11.7 - 15.7 g/dL    Hematocrit 36.8 35.0 - 47.0 %    MCV 94 78 - 100 fL    MCH 30.9 26.5 - 33.0 pg    MCHC 32.9 31.5 - 36.5 g/dL    RDW 13.5 10.0 - 15.0 %    Platelet Count 230 150 - 450 10e3/uL    % Neutrophils 84 %     % Lymphocytes 11 %    % Monocytes 3 %    % Eosinophils 0 %    % Basophils 0 %    % Immature Granulocytes 2 %    NRBCs per 100 WBC 0 <1 /100    Absolute Neutrophils 9.1 (H) 1.6 - 8.3 10e3/uL    Absolute Lymphocytes 1.2 0.8 - 5.3 10e3/uL    Absolute Monocytes 0.4 0.0 - 1.3 10e3/uL    Absolute Eosinophils 0.0 0.0 - 0.7 10e3/uL    Absolute Basophils 0.0 0.0 - 0.2 10e3/uL    Absolute Immature Granulocytes 0.2 <=0.4 10e3/uL    Absolute NRBCs 0.0 10e3/uL       Imaging Results    No results found.    The informed consent process has occurred, as I have provided the patient with an explanation of their Colon Cancer diagnosis as well as the prognosis of their disease. I discussed the risks, benefits and alternatives to treatment. Risks which are common with this treatment may include, but are not limited to skin rash . Less common risks with this treatment may include, but are not limited to diarrhea. The patient was given the opportunity to ask questions, and their questions were answered. The patient has consented to Panitumumab for treatment of metastatic colon cancer..      I spent 47 minutes on the patient's visit today.  This included preparation for the visit, face-to-face time with the patient and documentation following the visit.  It did not include teaching or procedure time.    Signed by: Peter E. Friedell, MD

## 2025-05-28 NOTE — ORAL ONC MGMT
"Thank you for the opportunity to be a part in this patient's oral chemotherapy. The oral chemotherapy pharmacy team will no longer be following this patient for oral chemotherapy. If there are any questions or the plan changes, feel free to contact us.  Napoleon Dc, PharmD, [facility]  Oral Chemotherapy Pharmacist  [facility] Phone: [phone]  In Basket Pools: \"[facility] ORAL CHEMOTHERAPY PHARMACIST\" or \"[facility] ORAL CHEMOTHERAPY PHARMACIST\"  May 28, 2025    "

## 2025-05-29 ENCOUNTER — PATIENT OUTREACH (OUTPATIENT)
Dept: ONCOLOGY | Facility: HOSPITAL | Age: OVER 89
End: 2025-05-29
Payer: MEDICARE

## 2025-05-29 NOTE — PROGRESS NOTES
[facility]: Cancer Care                                                                                          Situation: Chart reviewed by RN Care Coordinator.    Background: Patient was seen in clinic this week for follow-up regarding colon cancer.    Assessment: Has been off capecitabine for 4 weeks.  Renal function continues to decline.    Plan/Recommendations: Patient is to do panitumumab.  Treatment plan entered.    PET scan ordered and scheduled.      Signature:  Shannon Marino RN Care Coordinator  [facility]

## 2025-06-02 ENCOUNTER — INFUSION THERAPY VISIT (OUTPATIENT)
Dept: INFUSION THERAPY | Facility: HOSPITAL | Age: OVER 89
End: 2025-06-02
Attending: INTERNAL MEDICINE
Payer: MEDICARE

## 2025-06-02 VITALS
SYSTOLIC BLOOD PRESSURE: 160 MMHG | OXYGEN SATURATION: 97 % | TEMPERATURE: 98.6 F | DIASTOLIC BLOOD PRESSURE: 68 MMHG | RESPIRATION RATE: 16 BRPM | HEART RATE: 63 BPM

## 2025-06-02 DIAGNOSIS — C18.9 COLON ADENOCARCINOMA (H): Primary | ICD-10-CM

## 2025-06-02 DIAGNOSIS — M54.42 CHRONIC BILATERAL LOW BACK PAIN WITH LEFT-SIDED SCIATICA: ICD-10-CM

## 2025-06-02 DIAGNOSIS — G89.29 CHRONIC BILATERAL LOW BACK PAIN WITH LEFT-SIDED SCIATICA: ICD-10-CM

## 2025-06-02 PROCEDURE — 250N000011 HC RX IP 250 OP 636: Mod: JZ | Performed by: INTERNAL MEDICINE

## 2025-06-02 PROCEDURE — 258N000003 HC RX IP 258 OP 636: Performed by: INTERNAL MEDICINE

## 2025-06-02 PROCEDURE — 96413 CHEMO IV INFUSION 1 HR: CPT

## 2025-06-02 RX ORDER — CELECOXIB 200 MG/1
200 CAPSULE ORAL DAILY
Qty: 90 CAPSULE | Refills: 1 | OUTPATIENT
Start: 2025-06-02

## 2025-06-02 RX ADMIN — PANITUMUMAB 368 MG: 400 SOLUTION INTRAVENOUS at 13:56

## 2025-06-02 NOTE — PROGRESS NOTES
Infusion Nursing Note:  Alma Johns presents today for cycle 1 day 1 treatent.    Patient seen by provider today: No   present during visit today: Not Applicable.    Note: PT here in wheelchair able to self transfer to recliner. Reviewed txt with pt and daughter and possible side effects. Iv started and txt administered. PT tolerated txt without any problems. Txt completed and iv dc'd with pressure dressing to site. PT dc'd in wheelchair with daughter.      Intravenous Access:  Peripheral IV placed.    Treatment Conditions:  Results reviewed, labs MET treatment parameters, ok to proceed with treatment.      Post Infusion Assessment:  Patient tolerated infusion without incident.       Discharge Plan:   Follow up reviewed.      Anat Ballard RN

## 2025-06-09 DIAGNOSIS — C18.9 COLON ADENOCARCINOMA (H): ICD-10-CM

## 2025-06-09 RX ORDER — DEXAMETHASONE 1 MG
1 TABLET ORAL
Qty: 30 TABLET | Refills: 0 | Status: SHIPPED | OUTPATIENT
Start: 2025-06-09

## 2025-06-09 NOTE — TELEPHONE ENCOUNTER
Last Written Prescription Date:  4/16/25  Last Fill Quantity: 30,  # refills: 1   Last office visit provider:  5/27/25 with Dr. Friedell     Requested Prescriptions   Pending Prescriptions Disp Refills    dexAMETHasone (DECADRON) 1 MG tablet 30 tablet 1     Sig: Take 1 tablet (1 mg) by mouth daily (with breakfast).       There is no refill protocol information for this order          Lurdes Martinez RN 06/09/25 11:11 AM

## 2025-06-10 ENCOUNTER — HOSPITAL ENCOUNTER (OUTPATIENT)
Dept: PET IMAGING | Facility: HOSPITAL | Age: OVER 89
Discharge: HOME | End: 2025-06-10
Attending: INTERNAL MEDICINE
Payer: MEDICARE

## 2025-06-10 DIAGNOSIS — C18.9 COLON ADENOCARCINOMA (H): ICD-10-CM

## 2025-06-10 DIAGNOSIS — C18.2 MALIGNANT NEOPLASM OF ASCENDING COLON (H): ICD-10-CM

## 2025-06-10 LAB — GLUCOSE BLDC GLUCOMTR-MCNC: 120 MG/DL (ref 70–99)

## 2025-06-10 PROCEDURE — 343N000001 HC RX 343 MED OP 636: Performed by: INTERNAL MEDICINE

## 2025-06-10 PROCEDURE — A9552 F18 FDG: HCPCS | Performed by: INTERNAL MEDICINE

## 2025-06-10 PROCEDURE — 78816 PET IMAGE W/CT FULL BODY: CPT | Mod: PS

## 2025-06-10 PROCEDURE — 82962 GLUCOSE BLOOD TEST: CPT

## 2025-06-10 RX ORDER — FLUDEOXYGLUCOSE F 18 200 MCI/ML
7-18 INJECTION, SOLUTION INTRAVENOUS ONCE
Status: COMPLETED | OUTPATIENT
Start: 2025-06-10 | End: 2025-06-10

## 2025-06-10 RX ADMIN — FLUDEOXYGLUCOSE F 18 10.17 MILLICURIE: 200 INJECTION, SOLUTION INTRAVENOUS at 12:13

## 2025-06-12 ENCOUNTER — PATIENT OUTREACH (OUTPATIENT)
Dept: ONCOLOGY | Facility: HOSPITAL | Age: OVER 89
End: 2025-06-12

## 2025-06-12 ENCOUNTER — ONCOLOGY VISIT (OUTPATIENT)
Dept: ONCOLOGY | Facility: HOSPITAL | Age: OVER 89
End: 2025-06-12
Payer: MEDICARE

## 2025-06-12 ENCOUNTER — LAB (OUTPATIENT)
Dept: INFUSION THERAPY | Facility: HOSPITAL | Age: OVER 89
End: 2025-06-12
Payer: MEDICARE

## 2025-06-12 VITALS
TEMPERATURE: 98.3 F | BODY MASS INDEX: 26.03 KG/M2 | OXYGEN SATURATION: 98 % | WEIGHT: 128.9 LBS | DIASTOLIC BLOOD PRESSURE: 60 MMHG | HEART RATE: 55 BPM | SYSTOLIC BLOOD PRESSURE: 124 MMHG

## 2025-06-12 DIAGNOSIS — C18.9 COLON ADENOCARCINOMA (H): ICD-10-CM

## 2025-06-12 DIAGNOSIS — C18.9 COLON ADENOCARCINOMA (H): Primary | ICD-10-CM

## 2025-06-12 LAB
ALBUMIN SERPL BCG-MCNC: 3.5 G/DL (ref 3.5–5.2)
ALP SERPL-CCNC: 67 U/L (ref 40–150)
ALT SERPL W P-5'-P-CCNC: 16 U/L (ref 0–50)
ANION GAP SERPL CALCULATED.3IONS-SCNC: 12 MMOL/L (ref 7–15)
AST SERPL W P-5'-P-CCNC: 14 U/L (ref 0–45)
BASOPHILS # BLD AUTO: 0.1 10E3/UL (ref 0–0.2)
BASOPHILS NFR BLD AUTO: 0 %
BILIRUB SERPL-MCNC: 0.8 MG/DL
BUN SERPL-MCNC: 30.3 MG/DL (ref 8–23)
CALCIUM SERPL-MCNC: 9.5 MG/DL (ref 8.8–10.4)
CHLORIDE SERPL-SCNC: 102 MMOL/L (ref 98–107)
CREAT SERPL-MCNC: 1.85 MG/DL (ref 0.51–0.95)
EGFRCR SERPLBLD CKD-EPI 2021: 25 ML/MIN/1.73M2
EOSINOPHIL # BLD AUTO: 0.1 10E3/UL (ref 0–0.7)
EOSINOPHIL NFR BLD AUTO: 1 %
ERYTHROCYTE [DISTWIDTH] IN BLOOD BY AUTOMATED COUNT: 12.3 % (ref 10–15)
GLUCOSE SERPL-MCNC: 147 MG/DL (ref 70–99)
HCO3 SERPL-SCNC: 24 MMOL/L (ref 22–29)
HCT VFR BLD AUTO: 35.7 % (ref 35–47)
HGB BLD-MCNC: 12 G/DL (ref 11.7–15.7)
IMM GRANULOCYTES # BLD: 0.2 10E3/UL
IMM GRANULOCYTES NFR BLD: 2 %
LYMPHOCYTES # BLD AUTO: 2.5 10E3/UL (ref 0.8–5.3)
LYMPHOCYTES NFR BLD AUTO: 18 %
MCH RBC QN AUTO: 30.4 PG (ref 26.5–33)
MCHC RBC AUTO-ENTMCNC: 33.6 G/DL (ref 31.5–36.5)
MCV RBC AUTO: 90 FL (ref 78–100)
MONOCYTES # BLD AUTO: 1.2 10E3/UL (ref 0–1.3)
MONOCYTES NFR BLD AUTO: 9 %
NEUTROPHILS # BLD AUTO: 9.6 10E3/UL (ref 1.6–8.3)
NEUTROPHILS NFR BLD AUTO: 70 %
NRBC # BLD AUTO: 0 10E3/UL
NRBC BLD AUTO-RTO: 0 /100
PLATELET # BLD AUTO: 242 10E3/UL (ref 150–450)
POTASSIUM SERPL-SCNC: 3 MMOL/L (ref 3.4–5.3)
PROT SERPL-MCNC: 6.1 G/DL (ref 6.4–8.3)
RBC # BLD AUTO: 3.95 10E6/UL (ref 3.8–5.2)
SODIUM SERPL-SCNC: 138 MMOL/L (ref 135–145)
WBC # BLD AUTO: 13.6 10E3/UL (ref 4–11)

## 2025-06-12 PROCEDURE — G0463 HOSPITAL OUTPT CLINIC VISIT: HCPCS | Performed by: INTERNAL MEDICINE

## 2025-06-12 PROCEDURE — 85025 COMPLETE CBC W/AUTO DIFF WBC: CPT

## 2025-06-12 PROCEDURE — 84132 ASSAY OF SERUM POTASSIUM: CPT

## 2025-06-12 PROCEDURE — 36415 COLL VENOUS BLD VENIPUNCTURE: CPT

## 2025-06-12 RX ORDER — EPINEPHRINE 1 MG/ML
0.3 INJECTION, SOLUTION INTRAMUSCULAR; SUBCUTANEOUS EVERY 5 MIN PRN
OUTPATIENT
Start: 2025-06-30

## 2025-06-12 RX ORDER — HEPARIN SODIUM (PORCINE) LOCK FLUSH IV SOLN 100 UNIT/ML 100 UNIT/ML
5 SOLUTION INTRAVENOUS
OUTPATIENT
Start: 2025-06-30

## 2025-06-12 RX ORDER — ALBUTEROL SULFATE 90 UG/1
1-2 INHALANT RESPIRATORY (INHALATION)
Start: 2025-06-30

## 2025-06-12 RX ORDER — ALBUTEROL SULFATE 0.83 MG/ML
2.5 SOLUTION RESPIRATORY (INHALATION)
OUTPATIENT
Start: 2025-07-14

## 2025-06-12 RX ORDER — DIPHENHYDRAMINE HYDROCHLORIDE 50 MG/ML
50 INJECTION, SOLUTION INTRAMUSCULAR; INTRAVENOUS
Start: 2025-07-14

## 2025-06-12 RX ORDER — HEPARIN SODIUM,PORCINE 10 UNIT/ML
5-20 VIAL (ML) INTRAVENOUS DAILY PRN
OUTPATIENT
Start: 2025-06-30

## 2025-06-12 RX ORDER — LORAZEPAM 2 MG/ML
0.5 INJECTION INTRAMUSCULAR EVERY 4 HOURS PRN
OUTPATIENT
Start: 2025-06-30

## 2025-06-12 RX ORDER — ALBUTEROL SULFATE 90 UG/1
1-2 INHALANT RESPIRATORY (INHALATION)
Start: 2025-07-14

## 2025-06-12 RX ORDER — HEPARIN SODIUM,PORCINE 10 UNIT/ML
5-20 VIAL (ML) INTRAVENOUS DAILY PRN
OUTPATIENT
Start: 2025-07-14

## 2025-06-12 RX ORDER — LORAZEPAM 2 MG/ML
0.5 INJECTION INTRAMUSCULAR EVERY 4 HOURS PRN
OUTPATIENT
Start: 2025-07-14

## 2025-06-12 RX ORDER — DIPHENHYDRAMINE HYDROCHLORIDE 50 MG/ML
50 INJECTION, SOLUTION INTRAMUSCULAR; INTRAVENOUS
Start: 2025-06-30

## 2025-06-12 RX ORDER — HEPARIN SODIUM (PORCINE) LOCK FLUSH IV SOLN 100 UNIT/ML 100 UNIT/ML
5 SOLUTION INTRAVENOUS
OUTPATIENT
Start: 2025-07-14

## 2025-06-12 RX ORDER — METHYLPREDNISOLONE SODIUM SUCCINATE 40 MG/ML
40 INJECTION INTRAMUSCULAR; INTRAVENOUS
Start: 2025-06-30

## 2025-06-12 RX ORDER — MEPERIDINE HYDROCHLORIDE 25 MG/ML
25 INJECTION INTRAMUSCULAR; INTRAVENOUS; SUBCUTANEOUS
OUTPATIENT
Start: 2025-06-30

## 2025-06-12 RX ORDER — EPINEPHRINE 1 MG/ML
0.3 INJECTION, SOLUTION INTRAMUSCULAR; SUBCUTANEOUS EVERY 5 MIN PRN
OUTPATIENT
Start: 2025-07-14

## 2025-06-12 RX ORDER — ALBUTEROL SULFATE 0.83 MG/ML
2.5 SOLUTION RESPIRATORY (INHALATION)
OUTPATIENT
Start: 2025-06-30

## 2025-06-12 RX ORDER — DIPHENHYDRAMINE HYDROCHLORIDE 50 MG/ML
25 INJECTION, SOLUTION INTRAMUSCULAR; INTRAVENOUS
Start: 2025-06-30

## 2025-06-12 RX ORDER — MEPERIDINE HYDROCHLORIDE 25 MG/ML
25 INJECTION INTRAMUSCULAR; INTRAVENOUS; SUBCUTANEOUS
OUTPATIENT
Start: 2025-07-14

## 2025-06-12 RX ORDER — DIPHENHYDRAMINE HYDROCHLORIDE 50 MG/ML
25 INJECTION, SOLUTION INTRAMUSCULAR; INTRAVENOUS
Start: 2025-07-14

## 2025-06-12 RX ORDER — METHYLPREDNISOLONE SODIUM SUCCINATE 40 MG/ML
40 INJECTION INTRAMUSCULAR; INTRAVENOUS
Start: 2025-07-14

## 2025-06-12 ASSESSMENT — PAIN SCALES - GENERAL: PAINLEVEL_OUTOF10: NO PAIN (0)

## 2025-06-12 NOTE — PROGRESS NOTES
"Oncology Rooming Note    June 12, 2025 10:59 AM   Alma Johns is a 89 year old female who presents for:    Chief Complaint   Patient presents with    Oncology Clinic Visit     Follow up      Initial Vitals: /60 (BP Location: Left arm, Patient Position: Sitting, Cuff Size: Adult Regular)   Pulse 55   Temp 98.3  F (36.8  C) (Oral)   Wt 58.5 kg (128 lb 14.4 oz)   LMP  (LMP Unknown)   SpO2 98%   BMI 26.03 kg/m   Estimated body mass index is 26.03 kg/m  as calculated from the following:    Height as of 5/27/25: 1.499 m (4' 11\").    Weight as of this encounter: 58.5 kg (128 lb 14.4 oz). Body surface area is 1.56 meters squared.  No Pain (0) Comment: Data Unavailable   No LMP recorded (lmp unknown). Patient has had a hysterectomy.  Allergies reviewed: Yes  Medications reviewed: Yes    Medications: Medication refills not needed today.  Pharmacy name entered into [employer]:    [facility] [place], [place] - [address] [facility] [id number] [facility] [employer] - [place], [place] - [address] [place] [address]    Frailty Screening:   Is the patient here for a new oncology consult visit in cancer care? 2. No    PHQ9:  Did this patient require a PHQ9?: No    Side effect dizzy and falling from shawn Jaramillo MA            "

## 2025-06-12 NOTE — PROGRESS NOTES
[facility]: Cancer Care                                                                                          Situation: Chart reviewed by RN Care Coordinator.    Background: Patient was seen today for follow-up regarding colon cancer.     Assessment: Patient received her first dose of panitumumab on 6/2/2025. She has had no adverse effects. PET CT scan of 6/10/2025 shows decreased FDG avidity in the lymph nodes below the diaphragm.     Plan/Recommendations: She will return on 6/16/2025 for day 15 of panitumumab. She will return on 7/1/2025 to begin day 1 cycle 2 of panitumumab.       Signature:  Shannon Marino RN Care Coordinator  [facility]  Cancer [facility]

## 2025-06-12 NOTE — PROGRESS NOTES
[facility] Hematology and Oncology Progress Note    Patient: Alma Johns  MRN: [id number]  Date of Service: Jun 12, 2025       Reason for Visit    I was consulted by   Lucy Ruano MD     For evaluation and management of newly diagnosed colon cancer.      Encounter Diagnoses Assessment and Plan:    Problem List Items Addressed This Visit       Colon adenocarcinoma (H) - Primary    Relevant Orders    Infusion Appointment Request - Adult    CBC with platelets and differential    Comprehensive metabolic panel    Infusion Appointment Request - Adult    CBC with platelets and differential    Comprehensive metabolic panel   Patient with newly diagnosed apple core adenocarcinoma of the hepatic flexure.  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  Patient does not wish to have chemotherapy.  She had a surgical consultation on 12/30/2024.  Palliative surgery was deferred until the patient developed obstructive symptoms.  We again discussed palliative chemotherapy.  Patient has agreed to a trial of capecitabine.  Treatment plan has been entered.     Update 3/6/2025.  Patient returns for day 1 cycle 2 of capecitabine.  She is tolerating it without difficulty.  Her EGFR has improved to 41.  Calcium is slightly increased at 10.7.  She did have laser lithotripsy on 1/31/2025.  She will continue with capecitabine 1000 mg twice daily for 2 weeks on and 1 week off.  Follow-up is planned for 3 weeks at the start of her next cycle.    Update 4/29/2025 Patient returns after completing 2 cycles of capecitabine.  Unfortunately her EGFR still low at 24.  I advised her to hold capecitabine for 4 more weeks after which we will recheck her chemistry profile.  She had a recent ultrasound which shows an atrophic right kidney and a large staghorn calculus on the left.    Update 5/27/2025 patient returns for follow-up.  She has been off capecitabine chemotherapy for 4 weeks.  Unfortunately her renal function continues to  decline and her EGFR is now at 18.  I advised a trial of panitumumab and treatment plan is entered.    Update 6/12/2025: Patient received her first dose of panitumumab on 6/2/2025.  She has had no adverse effects.  However she did not tolerate doxycycline prophylaxis.  She has discontinued that and so far has no evidence of skin rash.  PET CT scan of 6/10/2025 shows decreased FDG avidity in the lymph nodes below the diaphragm.  She will return on 6/16/2025 for day 15 of panitumumab.  She will return on 7/1/2025 to begin day 1 cycle 2 of panitumumab.          ______________________________________________________________________________    Staging History   Cancer Staging   Colon adenocarcinoma (H)  Staging form: Colon and Rectum, AJCC 8th Edition  - Clinical stage from 1/9/2025: Stage IVB (cTX, cNX, cM1b) - Signed by Friedell, Peter E, MD on 1/9/2025    ECOG Performance  1 - Can't do physically strenuous work, but fully ambulatory and can do light sedentary work.    History of Present Illness      Alma Johns is a 89 year old woman with a history of hypercalcemia with elevated parathyroid hormone.  Over the last year she has had unexplained weight loss of about 20 pounds.  On 12/10/2024 she underwent CT scan of the abdomen and pelvis which showed probable lesion at the hepatic flexure of the colon and suspicious retroperitoneal adenopathy.  On 12/19/2024 she underwent colonoscopy which confirmed an adenocarcinoma of the colon.  Upper GI endoscopic ultrasound confirmed metastases in a upper abdominal lymph node adjacent to the pancreas near the mesenteric root.  Multiple pancreatic cysts appeared benign.      Diagnosis:  12/10/2024 CT scan of the abdomen shows wall thickening in the right transverse colon  12/19/2025 upper GI endoscopic ultrasound shows metastatic adenocarcinoma of the colon  Proteins are intact by immunohistochemistry  NGS panel shows   APC R283*  PIK3CA R38H  TMB score 5.115   mut/Mb  K-ramin NRAS  BRAF mutations are not detected  1/6/2025 PET/CT scan shows FDG avidity in the colon tumor and FDG avid lymphadenopathy above and below the diaphragm  6/10/2025 PET CT scan shows decreased FDG avidity of lymph nodes above and below the diaphragm    Treatment:  2/3/2025 capecitabine 1000 mg twice daily  4/11/2025 capecitabine discontinued because of progressive renal insufficiency  6/2/2025 panitumumab started 6 mg/kg    Interval history:    Presently she feels fairly well.  Her appetite was decreased while taking doxycycline.  She stopped that drug after 1 week and her appetite improved.  Overall she lost about 7 pounds since the last visit.  She is having no difficulty with bowel movements.  She does not have pain.  She does not have cough, chest pain or shortness of breath.She does note some fatigue and takes an afternoon nap.        Review of systems.  Pertinent Findings are included in the History of Present Illness    Physical Exam    /60 (BP Location: Left arm, Patient Position: Sitting, Cuff Size: Adult Regular)   Pulse 55   Temp 98.3  F (36.8  C) (Oral)   Wt 58.5 kg (128 lb 14.4 oz)   LMP  (LMP Unknown)   SpO2 98%   BMI 26.03 kg/m       GENERAL APPEARANCE: 89year-old woman in no apparent distress.  HEAD: Atraumatic; normocephalic; without lesions.  EYES: Conjunctiva, corneas and eyelids normal; pupils equal, round, reactive to light; No Icterus.  MOUTH/OROPHARYNX: Oral mucosa intact  NECK: Supple with no nodes.  LUNGS:  Clear to auscultation and percussion with no extra sounds.  HEART: Regular rhythm and rate; S1 and S2 normal; no murmurs noted.  ABDOMEN: Soft; no masses or tenderness, no hepatosplenomegaly.  NEUROLOGIC: Alert and oriented.  No obvious focal findings.  EXTREMITIES: No cyanosis, or edema.  SKIN: No abnormal bruising or bleeding. No suspicious lesions noted on exposed skin.  PSYCHIATRIC: Mental status normal; no apparent psychiatric issues    Medications:    Current Outpatient  Medications   Medication Sig Dispense Refill    alendronate (FOSAMAX) 70 MG tablet Take 1 tablet (70 mg) by mouth every 7 days 12 tablet 0    amLODIPine (NORVASC) 10 MG tablet Take 1 tablet (10 mg) by mouth daily 90 tablet 3    celecoxib (CELEBREX) 200 MG capsule TAKE 1 CAPSULE BY MOUTH DAILY 90 capsule 3    ciprofloxacin (CIPRO) 500 MG tablet Take 1 tablet (500 mg) by mouth daily. 10 tablet 0    dexAMETHasone (DECADRON) 1 MG tablet Take 1 tablet (1 mg) by mouth daily (with breakfast). 30 tablet 0    doxazosin (CARDURA) 4 MG tablet TAKE ONE TABLET BY MOUTH ONE TIME DAILY. 90 tablet 3    escitalopram (LEXAPRO) 5 MG tablet Take 1 tablet (5 mg) by mouth daily. 90 tablet 3    fluconazole (DIFLUCAN) 200 MG tablet Take 1 tablet (200 mg) by mouth daily. 5 tablet 0    latanoprost (XALATAN) 0.005 % ophthalmic solution [LATANOPROST (XALATAN) 0.005 % OPHTHALMIC SOLUTION] Administer 1 drop to both eyes bedtime.      LORazepam (ATIVAN) 0.5 MG tablet TAKE 1 TABLET BY MOUTH EVERY 6 HOURS AS NEEDED FOR ANXIETY 5 tablet 0    ondansetron (ZOFRAN) 4 MG tablet Take 1 tablet (4 mg) by mouth every 8 hours as needed for nausea. 90 tablet 3    therapeutic multivitamin (THERAGRAN) tablet [THERAPEUTIC MULTIVITAMIN (THERAGRAN) TABLET] Take 1 tablet by mouth daily.      timolol maleate (TIMOPTIC) 0.5 % ophthalmic solution [TIMOLOL MALEATE (TIMOPTIC) 0.5 % OPHTHALMIC SOLUTION] INSTILL 1 DROP INTO BOTH EYES Q EVENING  3    capecitabine (XELODA) 500 MG tablet Take 2 tablets (1,000 mg) by mouth 2 times daily for 14 days. Days 1 through 14, then off for 7 days.Take with water within 30 minutes after a meal. 56 tablet 0    doxycycline hyclate (VIBRAMYCIN) 100 MG capsule Take 1 capsule (100 mg) by mouth daily. (Patient not taking: Reported on 6/12/2025) 30 capsule 3     No current facility-administered medications for this visit.           Past History    Past Medical History:   Diagnosis Date    Colon cancer (H)     Stage IV with presence in lymph  nodes and other distant body parts    Complaint related to dreams 2019    Dyslipidemia     dyslipoproteinemia    Glaucoma     Hypercalcemia     Hyperparathyroidism     Hypertension     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    OA (osteoarthritis)     Osteopenia     PONV (postoperative nausea and vomiting)     Postmenopausal     Retinal vein occlusion (H)     Vitamin D deficiency      Past Surgical History:   Procedure Laterality Date    APPENDECTOMY  1985    Incidental    BIOPSY BREAST       SECTION      and     CHOLECYSTECTOMY      COLONOSCOPY N/A 2024    Procedure: Colonoscopy with biopsies and polypectomy, endoscopic marker;  Surgeon: Ron Hogan MD;  Location: [facility] OR    ENTEROSCOPY SMALL BOWEL N/A 2024    Procedure: Enteroscopy small bowel;  Surgeon: Ron Hogan MD;  Location: [facility] OR    ESOPHAGOSCOPY, GASTROSCOPY, DUODENOSCOPY (EGD), COMBINED N/A 2024    Procedure: ESOPHAGOGASTRODUODENOSCOPY, WITH FINE NEEDLE ASPIRATION BIOPSY, WITH ENDOSCOPIC ULTRASOUND GUIDANCE;  Surgeon: Ron Hogan MD;  Location: [facility] OR    HYSTERECTOMY TOTAL ABDOMINAL, BILATERAL SALPINGO-OOPHORECTOMY, COMBINED      KIDNEY STONE SURGERY      extraction    LUMBAR LAMINECTOMY      TOTAL HIP ARTHROPLASTY Left 2009    TOTAL KNEE ARTHROPLASTY Right 2009    TUBAL LIGATION       No Known Allergies    No family history on file.  Social History     Socioeconomic History    Marital status: Single     Spouse name: None    Number of children: None    Years of education: None    Highest education level: None   Tobacco Use    Smoking status: Never     Passive exposure: Past    Smokeless tobacco: Never   Vaping Use    Vaping status: Never Used   Substance and Sexual Activity    Alcohol use: No    Drug use: No   Social History Narrative    Lives alone. 2 hip replacements and 1 knee replacement. Used to work in housekeeping at [employer]. Saw Dr Crabtree. Has a daughter  and son, and grandchildren and great grandchildren.     Social Drivers of Health     Financial Resource Strain: Low Risk  (8/12/2024)    Financial Resource Strain     Within the past 12 months, have you or your family members you live with been unable to get utilities (heat, electricity) when it was really needed?: No   Food Insecurity: Low Risk  (8/12/2024)    Food Insecurity     Within the past 12 months, did you worry that your food would run out before you got money to buy more?: No     Within the past 12 months, did the food you bought just not last and you didn t have money to get more?: No   Transportation Needs: Low Risk  (8/12/2024)    Transportation Needs     Within the past 12 months, has lack of transportation kept you from medical appointments, getting your medicines, non-medical meetings or appointments, work, or from getting things that you need?: No   Physical Activity: Inactive (8/12/2024)    Exercise Vital Sign     Days of Exercise per Week: 0 days     Minutes of Exercise per Session: 0 min   Stress: No Stress Concern Present (8/12/2024)    [employer] [place] [employer] - Occupational Stress Questionnaire     Feeling of Stress : Only a little   Social Connections: Unknown (8/12/2024)    Social Connection and Isolation Panel [NHANES]     Frequency of Social Gatherings with Friends and Family: Once a week   Interpersonal Safety: Low Risk  (12/19/2024)    Interpersonal Safety     Do you feel physically and emotionally safe where you currently live?: Yes     Within the past 12 months, have you been hit, slapped, kicked or otherwise physically hurt by someone?: No     Within the past 12 months, have you been humiliated or emotionally abused in other ways by your partner or ex-partner?: No   Housing Stability: Low Risk  (8/12/2024)    Housing Stability     Do you have housing? : Yes     Are you worried about losing your housing?: No           Lab Results    Recent Results (from the past 240  hours)   Glucose by meter    Collection Time: 06/10/25 12:09 PM   Result Value Ref Range    GLUCOSE BY METER POCT 120 (H) 70 - 99 mg/dL   Comprehensive metabolic panel    Collection Time: 06/12/25 10:19 AM   Result Value Ref Range    Sodium 138 135 - 145 mmol/L    Potassium 3.0 (L) 3.4 - 5.3 mmol/L    Carbon Dioxide (CO2) 24 22 - 29 mmol/L    Anion Gap 12 7 - 15 mmol/L    Urea Nitrogen 30.3 (H) 8.0 - 23.0 mg/dL    Creatinine 1.85 (H) 0.51 - 0.95 mg/dL    GFR Estimate 25 (L) >60 mL/min/1.73m2    Calcium 9.5 8.8 - 10.4 mg/dL    Chloride 102 98 - 107 mmol/L    Glucose 147 (H) 70 - 99 mg/dL    Alkaline Phosphatase 67 40 - 150 U/L    AST 14 0 - 45 U/L    ALT 16 0 - 50 U/L    Protein Total 6.1 (L) 6.4 - 8.3 g/dL    Albumin 3.5 3.5 - 5.2 g/dL    Bilirubin Total 0.8 <=1.2 mg/dL   CBC with platelets and differential    Collection Time: 06/12/25 10:19 AM   Result Value Ref Range    WBC Count 13.6 (H) 4.0 - 11.0 10e3/uL    RBC Count 3.95 3.80 - 5.20 10e6/uL    Hemoglobin 12.0 11.7 - 15.7 g/dL    Hematocrit 35.7 35.0 - 47.0 %    MCV 90 78 - 100 fL    MCH 30.4 26.5 - 33.0 pg    MCHC 33.6 31.5 - 36.5 g/dL    RDW 12.3 10.0 - 15.0 %    Platelet Count 242 150 - 450 10e3/uL    % Neutrophils 70 %    % Lymphocytes 18 %    % Monocytes 9 %    % Eosinophils 1 %    % Basophils 0 %    % Immature Granulocytes 2 %    NRBCs per 100 WBC 0 <1 /100    Absolute Neutrophils 9.6 (H) 1.6 - 8.3 10e3/uL    Absolute Lymphocytes 2.5 0.8 - 5.3 10e3/uL    Absolute Monocytes 1.2 0.0 - 1.3 10e3/uL    Absolute Eosinophils 0.1 0.0 - 0.7 10e3/uL    Absolute Basophils 0.1 0.0 - 0.2 10e3/uL    Absolute Immature Granulocytes 0.2 <=0.4 10e3/uL    Absolute NRBCs 0.0 10e3/uL       Imaging Results    PET Oncology Whole Body  Result Date: 6/11/2025  EXAM: PET ONCOLOGY WHOLE BODY LOCATION: [facility] DATE: 6/10/2025 INDICATION: Subsequent treatment planning and restaging for malignant neoplasm of the hepatic flexure. Currently receiving  chemotherapy/immunotherapy. Monitor treatment response COMPARISON: FDG PET/CT dated 1/6/2025 CONTRAST: None TECHNIQUE: Serum glucose level 120 mg/dL. One hour post intravenous administration of 10.17mCi F18 FDG, PET imaging was performed from the skull vertex to feet, utilizing attenuation correction with concurrent axial CT and PET/CT image fusion. Dose reduction techniques were used. PET/CT FINDINGS: While there is persistently FDG avid lesion in the ascending colon near the hepatic flexure (Max SUV 10.6, previously 10.9), there is residual mesenteric (Max SUV 4.2, previously 11.5) and retroperitoneal (Max SUV 6.2, previously 12.1), and left retrocrural (Max SUV 3.4, previously 6.0), left axillary (Max SUV 7.8, previously 10.7), and left lower cervical chain (Max SUV 2.4, previously 7.3) lymph nodes suggesting partial response to therapy. Mild esophagitis. Degenerative shoulder, hip, and left knee synovitis. CT FINDINGS: Mild to moderate senescent intercranial changes. Mild paranasal sinus mucosal thickening. Mild carotid artery bifurcation calcification. Thyroid goiter. Moderate coronary artery calcium. Mitral annulus dose patient. Small sliding-type hiatal  hernia. Cholecystectomy. Pancreatic head cystic lesion measuring up to 4.6 cm. Hepatic lipoma. Nonobstructing right renal calculi and Staghorn renal calculus measuring up to 3.8 cm. Sigmoid diverticulosis. Bilateral hip and right knee arthroplasties, otherwise lower extremities are unremarkable. Multilevel degenerative changes of the spine     IMPRESSION: While there is persistently FDG avid lesion in the ascending colon near the hepatic flexure, there is decreasing metabolic activity of lymph nodes above/below the diaphragm suggesting partial response to therapy            I spent  56 minutes on the patient's visit today.  This included preparation for the visit, face-to-face time with the patient and documentation following the visit.  It did not include  teaching or procedure time.    Signed by: Peter E. Friedell, MD

## 2025-06-12 NOTE — LETTER
"6/12/2025      Alma Johns  [address] [place] [address]      Dear Colleague,    Thank you for referring your patient, Alma Johns, to the [facility] CANC[facility]. Please see a copy of my visit note below.    Oncology Rooming Note    June 12, 2025 10:59 AM   Alma Johns is a 89 year old female who presents for:    Chief Complaint   Patient presents with     Oncology Clinic Visit     Follow up      Initial Vitals: /60 (BP Location: Left arm, Patient Position: Sitting, Cuff Size: Adult Regular)   Pulse 55   Temp 98.3  F (36.8  C) (Oral)   Wt 58.5 kg (128 lb 14.4 oz)   LMP  (LMP Unknown)   SpO2 98%   BMI 26.03 kg/m   Estimated body mass index is 26.03 kg/m  as calculated from the following:    Height as of 5/27/25: 1.499 m (4' 11\").    Weight as of this encounter: 58.5 kg (128 lb 14.4 oz). Body surface area is 1.56 meters squared.  No Pain (0) Comment: Data Unavailable   No LMP recorded (lmp unknown). Patient has had a hysterectomy.  Allergies reviewed: Yes  Medications reviewed: Yes    Medications: Medication refills not needed today.  Pharmacy name entered into [employer]:    [facility] [place], [place] - [address] [place] [id number] [facility] - [place], [place] - [address] [place] [address]    Frailty Screening:   Is the patient here for a new oncology consult visit in cancer care? 2. No    PHQ9:  Did this patient require a PHQ9?: No    Side effect dizzy and falling from doxy      Isabela Jaramillo MA              [facility] Hematology and Oncology Progress Note    Patient: Alma Johns  MRN: [id number]  Date of Service: Jun 12, 2025       Reason for Visit    I was consulted by   Lucy Ruano MD     For evaluation and management of newly diagnosed colon cancer.      Encounter Diagnoses Assessment and Plan:    Problem List Items Addressed This Visit       Colon adenocarcinoma (H) - Primary    Relevant Orders    Infusion Appointment Request - Adult    CBC with " platelets and differential    Comprehensive metabolic panel    Infusion Appointment Request - Adult    CBC with platelets and differential    Comprehensive metabolic panel   Patient with newly diagnosed apple core adenocarcinoma of the hepatic flexure.  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  Patient does not wish to have chemotherapy.  She had a surgical consultation on 12/30/2024.  Palliative surgery was deferred until the patient developed obstructive symptoms.  We again discussed palliative chemotherapy.  Patient has agreed to a trial of capecitabine.  Treatment plan has been entered.     Update 3/6/2025.  Patient returns for day 1 cycle 2 of capecitabine.  She is tolerating it without difficulty.  Her EGFR has improved to 41.  Calcium is slightly increased at 10.7.  She did have laser lithotripsy on 1/31/2025.  She will continue with capecitabine 1000 mg twice daily for 2 weeks on and 1 week off.  Follow-up is planned for 3 weeks at the start of her next cycle.    Update 4/29/2025 Patient returns after completing 2 cycles of capecitabine.  Unfortunately her EGFR still low at 24.  I advised her to hold capecitabine for 4 more weeks after which we will recheck her chemistry profile.  She had a recent ultrasound which shows an atrophic right kidney and a large staghorn calculus on the left.    Update 5/27/2025 patient returns for follow-up.  She has been off capecitabine chemotherapy for 4 weeks.  Unfortunately her renal function continues to decline and her EGFR is now at 18.  I advised a trial of panitumumab and treatment plan is entered.    Update 6/12/2025: Patient received her first dose of panitumumab on 6/2/2025.  She has had no adverse effects.  However she did not tolerate doxycycline prophylaxis.  She has discontinued that and so far has no evidence of skin rash.  PET CT scan of 6/10/2025 shows decreased FDG avidity in the lymph nodes below the diaphragm.  She will return on 6/16/2025 for  day 15 of panitumumab.  She will return on 7/1/2025 to begin day 1 cycle 2 of panitumumab.          ______________________________________________________________________________    Staging History   Cancer Staging   Colon adenocarcinoma (H)  Staging form: Colon and Rectum, AJCC 8th Edition  - Clinical stage from 1/9/2025: Stage IVB (cTX, cNX, cM1b) - Signed by Friedell, Peter E, MD on 1/9/2025    ECOG Performance  1 - Can't do physically strenuous work, but fully ambulatory and can do light sedentary work.    History of Present Illness      Alma Jonhs is a 89 year old woman with a history of hypercalcemia with elevated parathyroid hormone.  Over the last year she has had unexplained weight loss of about 20 pounds.  On 12/10/2024 she underwent CT scan of the abdomen and pelvis which showed probable lesion at the hepatic flexure of the colon and suspicious retroperitoneal adenopathy.  On 12/19/2024 she underwent colonoscopy which confirmed an adenocarcinoma of the colon.  Upper GI endoscopic ultrasound confirmed metastases in a upper abdominal lymph node adjacent to the pancreas near the mesenteric root.  Multiple pancreatic cysts appeared benign.      Diagnosis:  12/10/2024 CT scan of the abdomen shows wall thickening in the right transverse colon  12/19/2025 upper GI endoscopic ultrasound shows metastatic adenocarcinoma of the colon  Proteins are intact by immunohistochemistry  NGS panel shows   APC R283*  PIK3CA R38H  TMB score 5.115   mut/Mb  K-ramin NRAS BRAF mutations are not detected  1/6/2025 PET/CT scan shows FDG avidity in the colon tumor and FDG avid lymphadenopathy above and below the diaphragm  6/10/2025 PET CT scan shows decreased FDG avidity of lymph nodes above and below the diaphragm    Treatment:  2/3/2025 capecitabine 1000 mg twice daily  4/11/2025 capecitabine discontinued because of progressive renal insufficiency  6/2/2025 panitumumab started 6 mg/kg    Interval history:    Presently she feels  fairly well.  Her appetite was decreased while taking doxycycline.  She stopped that drug after 1 week and her appetite improved.  Overall she lost about 7 pounds since the last visit.  She is having no difficulty with bowel movements.  She does not have pain.  She does not have cough, chest pain or shortness of breath.She does note some fatigue and takes an afternoon nap.        Review of systems.  Pertinent Findings are included in the History of Present Illness    Physical Exam    /60 (BP Location: Left arm, Patient Position: Sitting, Cuff Size: Adult Regular)   Pulse 55   Temp 98.3  F (36.8  C) (Oral)   Wt 58.5 kg (128 lb 14.4 oz)   LMP  (LMP Unknown)   SpO2 98%   BMI 26.03 kg/m       GENERAL APPEARANCE: 89year-old woman in no apparent distress.  HEAD: Atraumatic; normocephalic; without lesions.  EYES: Conjunctiva, corneas and eyelids normal; pupils equal, round, reactive to light; No Icterus.  MOUTH/OROPHARYNX: Oral mucosa intact  NECK: Supple with no nodes.  LUNGS:  Clear to auscultation and percussion with no extra sounds.  HEART: Regular rhythm and rate; S1 and S2 normal; no murmurs noted.  ABDOMEN: Soft; no masses or tenderness, no hepatosplenomegaly.  NEUROLOGIC: Alert and oriented.  No obvious focal findings.  EXTREMITIES: No cyanosis, or edema.  SKIN: No abnormal bruising or bleeding. No suspicious lesions noted on exposed skin.  PSYCHIATRIC: Mental status normal; no apparent psychiatric issues    Medications:    Current Outpatient Medications   Medication Sig Dispense Refill     alendronate (FOSAMAX) 70 MG tablet Take 1 tablet (70 mg) by mouth every 7 days 12 tablet 0     amLODIPine (NORVASC) 10 MG tablet Take 1 tablet (10 mg) by mouth daily 90 tablet 3     celecoxib (CELEBREX) 200 MG capsule TAKE 1 CAPSULE BY MOUTH DAILY 90 capsule 3     ciprofloxacin (CIPRO) 500 MG tablet Take 1 tablet (500 mg) by mouth daily. 10 tablet 0     dexAMETHasone (DECADRON) 1 MG tablet Take 1 tablet (1 mg) by  mouth daily (with breakfast). 30 tablet 0     doxazosin (CARDURA) 4 MG tablet TAKE ONE TABLET BY MOUTH ONE TIME DAILY. 90 tablet 3     escitalopram (LEXAPRO) 5 MG tablet Take 1 tablet (5 mg) by mouth daily. 90 tablet 3     fluconazole (DIFLUCAN) 200 MG tablet Take 1 tablet (200 mg) by mouth daily. 5 tablet 0     latanoprost (XALATAN) 0.005 % ophthalmic solution [LATANOPROST (XALATAN) 0.005 % OPHTHALMIC SOLUTION] Administer 1 drop to both eyes bedtime.       LORazepam (ATIVAN) 0.5 MG tablet TAKE 1 TABLET BY MOUTH EVERY 6 HOURS AS NEEDED FOR ANXIETY 5 tablet 0     ondansetron (ZOFRAN) 4 MG tablet Take 1 tablet (4 mg) by mouth every 8 hours as needed for nausea. 90 tablet 3     therapeutic multivitamin (THERAGRAN) tablet [THERAPEUTIC MULTIVITAMIN (THERAGRAN) TABLET] Take 1 tablet by mouth daily.       timolol maleate (TIMOPTIC) 0.5 % ophthalmic solution [TIMOLOL MALEATE (TIMOPTIC) 0.5 % OPHTHALMIC SOLUTION] INSTILL 1 DROP INTO BOTH EYES Q EVENING  3     capecitabine (XELODA) 500 MG tablet Take 2 tablets (1,000 mg) by mouth 2 times daily for 14 days. Days 1 through 14, then off for 7 days.Take with water within 30 minutes after a meal. 56 tablet 0     doxycycline hyclate (VIBRAMYCIN) 100 MG capsule Take 1 capsule (100 mg) by mouth daily. (Patient not taking: Reported on 6/12/2025) 30 capsule 3     No current facility-administered medications for this visit.           Past History    Past Medical History:   Diagnosis Date     Colon cancer (H)     Stage IV with presence in lymph nodes and other distant body parts     Complaint related to dreams 04/03/2019     Dyslipidemia     dyslipoproteinemia     Glaucoma      Hypercalcemia      Hyperparathyroidism      Hypertension      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     OA (osteoarthritis)      Osteopenia      PONV (postoperative nausea and vomiting)      Postmenopausal      Retinal vein occlusion (H)      Vitamin D deficiency      Past Surgical History:   Procedure Laterality  Date     APPENDECTOMY  1985    Incidental     BIOPSY BREAST        SECTION      and      CHOLECYSTECTOMY       COLONOSCOPY N/A 2024    Procedure: Colonoscopy with biopsies and polypectomy, endoscopic marker;  Surgeon: Ron Hogan MD;  Location: [facility] OR     ENTEROSCOPY SMALL BOWEL N/A 2024    Procedure: Enteroscopy small bowel;  Surgeon: Ron Hogan MD;  Location: [facility] OR     ESOPHAGOSCOPY, GASTROSCOPY, DUODENOSCOPY (EGD), COMBINED N/A 2024    Procedure: ESOPHAGOGASTRODUODENOSCOPY, WITH FINE NEEDLE ASPIRATION BIOPSY, WITH ENDOSCOPIC ULTRASOUND GUIDANCE;  Surgeon: Ron Hogan MD;  Location: [facility] OR     HYSTERECTOMY TOTAL ABDOMINAL, BILATERAL SALPINGO-OOPHORECTOMY, COMBINED       KIDNEY STONE SURGERY      extraction     LUMBAR LAMINECTOMY       TOTAL HIP ARTHROPLASTY Left 2009     TOTAL KNEE ARTHROPLASTY Right 2009     TUBAL LIGATION       No Known Allergies    No family history on file.  Social History     Socioeconomic History     Marital status: Single     Spouse name: None     Number of children: None     Years of education: None     Highest education level: None   Tobacco Use     Smoking status: Never     Passive exposure: Past     Smokeless tobacco: Never   Vaping Use     Vaping status: Never Used   Substance and Sexual Activity     Alcohol use: No     Drug use: No   Social History Narrative    Lives alone. 2 hip replacements and 1 knee replacement. Used to work in housekeeping at [employer]. Saw Dr Crabtree. Has a daughter and son, and grandchildren and great grandchildren.     Social Drivers of Health     Financial Resource Strain: Low Risk  (2024)    Financial Resource Strain      Within the past 12 months, have you or your family members you live with been unable to get utilities (heat, electricity) when it was really needed?: No   Food Insecurity: Low Risk  (2024)    Food Insecurity      Within the past 12 months,  did you worry that your food would run out before you got money to buy more?: No      Within the past 12 months, did the food you bought just not last and you didn t have money to get more?: No   Transportation Needs: Low Risk  (8/12/2024)    Transportation Needs      Within the past 12 months, has lack of transportation kept you from medical appointments, getting your medicines, non-medical meetings or appointments, work, or from getting things that you need?: No   Physical Activity: Inactive (8/12/2024)    Exercise Vital Sign      Days of Exercise per Week: 0 days      Minutes of Exercise per Session: 0 min   Stress: No Stress Concern Present (8/12/2024)    [employer] - Occupational Stress Questionnaire      Feeling of Stress : Only a little   Social Connections: Unknown (8/12/2024)    Social Connection and Isolation Panel [NHANES]      Frequency of Social Gatherings with Friends and Family: Once a week   Interpersonal Safety: Low Risk  (12/19/2024)    Interpersonal Safety      Do you feel physically and emotionally safe where you currently live?: Yes      Within the past 12 months, have you been hit, slapped, kicked or otherwise physically hurt by someone?: No      Within the past 12 months, have you been humiliated or emotionally abused in other ways by your partner or ex-partner?: No   Housing Stability: Low Risk  (8/12/2024)    Housing Stability      Do you have housing? : Yes      Are you worried about losing your housing?: No           Lab Results    Recent Results (from the past 240 hours)   Glucose by meter    Collection Time: 06/10/25 12:09 PM   Result Value Ref Range    GLUCOSE BY METER POCT 120 (H) 70 - 99 mg/dL   Comprehensive metabolic panel    Collection Time: 06/12/25 10:19 AM   Result Value Ref Range    Sodium 138 135 - 145 mmol/L    Potassium 3.0 (L) 3.4 - 5.3 mmol/L    Carbon Dioxide (CO2) 24 22 - 29 mmol/L    Anion Gap 12 7 - 15 mmol/L    Urea Nitrogen 30.3 (H) 8.0  - 23.0 mg/dL    Creatinine 1.85 (H) 0.51 - 0.95 mg/dL    GFR Estimate 25 (L) >60 mL/min/1.73m2    Calcium 9.5 8.8 - 10.4 mg/dL    Chloride 102 98 - 107 mmol/L    Glucose 147 (H) 70 - 99 mg/dL    Alkaline Phosphatase 67 40 - 150 U/L    AST 14 0 - 45 U/L    ALT 16 0 - 50 U/L    Protein Total 6.1 (L) 6.4 - 8.3 g/dL    Albumin 3.5 3.5 - 5.2 g/dL    Bilirubin Total 0.8 <=1.2 mg/dL   CBC with platelets and differential    Collection Time: 06/12/25 10:19 AM   Result Value Ref Range    WBC Count 13.6 (H) 4.0 - 11.0 10e3/uL    RBC Count 3.95 3.80 - 5.20 10e6/uL    Hemoglobin 12.0 11.7 - 15.7 g/dL    Hematocrit 35.7 35.0 - 47.0 %    MCV 90 78 - 100 fL    MCH 30.4 26.5 - 33.0 pg    MCHC 33.6 31.5 - 36.5 g/dL    RDW 12.3 10.0 - 15.0 %    Platelet Count 242 150 - 450 10e3/uL    % Neutrophils 70 %    % Lymphocytes 18 %    % Monocytes 9 %    % Eosinophils 1 %    % Basophils 0 %    % Immature Granulocytes 2 %    NRBCs per 100 WBC 0 <1 /100    Absolute Neutrophils 9.6 (H) 1.6 - 8.3 10e3/uL    Absolute Lymphocytes 2.5 0.8 - 5.3 10e3/uL    Absolute Monocytes 1.2 0.0 - 1.3 10e3/uL    Absolute Eosinophils 0.1 0.0 - 0.7 10e3/uL    Absolute Basophils 0.1 0.0 - 0.2 10e3/uL    Absolute Immature Granulocytes 0.2 <=0.4 10e3/uL    Absolute NRBCs 0.0 10e3/uL       Imaging Results    PET Oncology Whole Body  Result Date: 6/11/2025  EXAM: PET ONCOLOGY WHOLE BODY LOCATION: [facility] DATE: 6/10/2025 INDICATION: Subsequent treatment planning and restaging for malignant neoplasm of the hepatic flexure. Currently receiving chemotherapy/immunotherapy. Monitor treatment response COMPARISON: FDG PET/CT dated 1/6/2025 CONTRAST: None TECHNIQUE: Serum glucose level 120 mg/dL. One hour post intravenous administration of 10.17mCi F18 FDG, PET imaging was performed from the skull vertex to feet, utilizing attenuation correction with concurrent axial CT and PET/CT image fusion. Dose reduction techniques were used. PET/CT FINDINGS:  While there is persistently FDG avid lesion in the ascending colon near the hepatic flexure (Max SUV 10.6, previously 10.9), there is residual mesenteric (Max SUV 4.2, previously 11.5) and retroperitoneal (Max SUV 6.2, previously 12.1), and left retrocrural (Max SUV 3.4, previously 6.0), left axillary (Max SUV 7.8, previously 10.7), and left lower cervical chain (Max SUV 2.4, previously 7.3) lymph nodes suggesting partial response to therapy. Mild esophagitis. Degenerative shoulder, hip, and left knee synovitis. CT FINDINGS: Mild to moderate senescent intercranial changes. Mild paranasal sinus mucosal thickening. Mild carotid artery bifurcation calcification. Thyroid goiter. Moderate coronary artery calcium. Mitral annulus dose patient. Small sliding-type hiatal  hernia. Cholecystectomy. Pancreatic head cystic lesion measuring up to 4.6 cm. Hepatic lipoma. Nonobstructing right renal calculi and Staghorn renal calculus measuring up to 3.8 cm. Sigmoid diverticulosis. Bilateral hip and right knee arthroplasties, otherwise lower extremities are unremarkable. Multilevel degenerative changes of the spine     IMPRESSION: While there is persistently FDG avid lesion in the ascending colon near the hepatic flexure, there is decreasing metabolic activity of lymph nodes above/below the diaphragm suggesting partial response to therapy            I spent  56 minutes on the patient's visit today.  This included preparation for the visit, face-to-face time with the patient and documentation following the visit.  It did not include teaching or procedure time.    Signed by: Peter E. Friedell, MD          Again, thank you for allowing me to participate in the care of your patient.        Sincerely,        Peter E. Friedell, MD    Electronically signed

## 2025-06-16 ENCOUNTER — TELEPHONE (OUTPATIENT)
Dept: ONCOLOGY | Facility: HOSPITAL | Age: OVER 89
End: 2025-06-16
Payer: MEDICARE

## 2025-06-16 NOTE — TELEPHONE ENCOUNTER
Patient's daughter Va calls to give update to Dr. Friedell. Patient had a stroke this morning and is in the hospital. Patient was supposed to have an infusion this morning. Va is wondering what to do with further appointments at this time.    Lurdes Martinez RN

## 2025-06-19 ENCOUNTER — TELEPHONE (OUTPATIENT)
Dept: ONCOLOGY | Facility: HOSPITAL | Age: OVER 89
End: 2025-06-19
Payer: MEDICARE

## 2025-06-19 ENCOUNTER — PATIENT OUTREACH (OUTPATIENT)
Dept: ONCOLOGY | Facility: HOSPITAL | Age: OVER 89
End: 2025-06-19
Payer: MEDICARE

## 2025-06-19 NOTE — TELEPHONE ENCOUNTER
"Pt's daughter Va called in today informing us that her mother was in the hospital in [place] with a stroke last week. She is home from the hospital now. She states this was called a \"mild stroke\" and she does not have sequelae from it.     She wonders if the treatment schedule needs to be adjusted. Next appt is 7/1/25 with provide visit and infusion.     Writer can see evidence of this episode in Care Everywhere in [facility]. Assured her I would relay information to Dr. Friedell and RNCLAUDIA Marino. They will let her know by phone call or MyC if they want her schedule adjusted, but that usually when another medical situation occurs, we would give the patient the time to recover before starting treatment again.     She voiced understanding.     Bethanie Dyer RN  "

## 2025-06-19 NOTE — PROGRESS NOTES
[facility]: Cancer Care                                                                                          Patient's daughter, Va reaches out to make sure that Dr. Friedell is still agreeable to seeing Alma on Tuesday, 7/01.    I reviewed this with Dr. Friedell and he indicates that he will do what ever the patient's preference is.  I relayed this message to Va.  She and Alma would like to keep the appointment.  We will leave things as is.      Signature:  Shannon Marino  RN Care Coordinator  [facility]  Cancer Care [facility]

## 2025-06-26 ENCOUNTER — OFFICE VISIT (OUTPATIENT)
Dept: INTERNAL MEDICINE | Facility: CLINIC | Age: OVER 89
End: 2025-06-26
Payer: MEDICARE

## 2025-06-26 VITALS
WEIGHT: 130 LBS | HEART RATE: 72 BPM | TEMPERATURE: 98.9 F | OXYGEN SATURATION: 98 % | BODY MASS INDEX: 26.21 KG/M2 | HEIGHT: 59 IN | DIASTOLIC BLOOD PRESSURE: 60 MMHG | SYSTOLIC BLOOD PRESSURE: 118 MMHG | RESPIRATION RATE: 19 BRPM

## 2025-06-26 DIAGNOSIS — N18.4 CKD (CHRONIC KIDNEY DISEASE) STAGE 4, GFR 15-29 ML/MIN (H): ICD-10-CM

## 2025-06-26 DIAGNOSIS — E55.9 VITAMIN D DEFICIENCY: ICD-10-CM

## 2025-06-26 DIAGNOSIS — N18.32 STAGE 3B CHRONIC KIDNEY DISEASE (H): Primary | ICD-10-CM

## 2025-06-26 DIAGNOSIS — E01.0 THYROMEGALY: ICD-10-CM

## 2025-06-26 DIAGNOSIS — F32.A DEPRESSION, UNSPECIFIED DEPRESSION TYPE: ICD-10-CM

## 2025-06-26 DIAGNOSIS — F41.9 ANXIETY DUE TO INVASIVE PROCEDURE: ICD-10-CM

## 2025-06-26 DIAGNOSIS — C18.9 COLON ADENOCARCINOMA (H): ICD-10-CM

## 2025-06-26 DIAGNOSIS — Z23 NEED FOR VACCINATION: ICD-10-CM

## 2025-06-26 DIAGNOSIS — G45.9 TIA (TRANSIENT ISCHEMIC ATTACK): ICD-10-CM

## 2025-06-26 DIAGNOSIS — R19.7 DIARRHEA, UNSPECIFIED TYPE: ICD-10-CM

## 2025-06-26 LAB — FOLATE SERPL-MCNC: 19.7 NG/ML (ref 4.6–34.8)

## 2025-06-26 RX ORDER — CLOPIDOGREL BISULFATE 75 MG/1
75 TABLET ORAL DAILY
COMMUNITY
Start: 2025-06-19

## 2025-06-26 RX ORDER — LORAZEPAM 0.5 MG/1
0.5 TABLET ORAL EVERY 6 HOURS
Qty: 30 TABLET | Refills: 0 | Status: SHIPPED | OUTPATIENT
Start: 2025-06-26

## 2025-06-26 RX ORDER — ESCITALOPRAM OXALATE 10 MG/1
10 TABLET ORAL DAILY
Qty: 90 TABLET | Refills: 3 | Status: SHIPPED | OUTPATIENT
Start: 2025-06-26

## 2025-06-26 RX ORDER — ASPIRIN 81 MG/1
81 TABLET, CHEWABLE ORAL DAILY
COMMUNITY
Start: 2025-06-19

## 2025-06-26 RX ORDER — ATORVASTATIN CALCIUM 40 MG/1
40 TABLET, FILM COATED ORAL AT BEDTIME
COMMUNITY
Start: 2025-06-18

## 2025-06-26 RX ORDER — LOPERAMIDE HYDROCHLORIDE 2 MG/1
2 TABLET ORAL 4 TIMES DAILY PRN
Qty: 30 TABLET | Refills: 2 | Status: SHIPPED | OUTPATIENT
Start: 2025-06-26

## 2025-06-26 ASSESSMENT — PATIENT HEALTH QUESTIONNAIRE - PHQ9
SUM OF ALL RESPONSES TO PHQ QUESTIONS 1-9: 10
5. POOR APPETITE OR OVEREATING: NOT AT ALL
10. IF YOU CHECKED OFF ANY PROBLEMS, HOW DIFFICULT HAVE THESE PROBLEMS MADE IT FOR YOU TO DO YOUR WORK, TAKE CARE OF THINGS AT HOME, OR GET ALONG WITH OTHER PEOPLE: SOMEWHAT DIFFICULT
5. POOR APPETITE OR OVEREATING: NOT AT ALL
SUM OF ALL RESPONSES TO PHQ QUESTIONS 1-9: 10

## 2025-06-26 ASSESSMENT — ANXIETY QUESTIONNAIRES
5. BEING SO RESTLESS THAT IT IS HARD TO SIT STILL: NOT AT ALL
6. BECOMING EASILY ANNOYED OR IRRITABLE: MORE THAN HALF THE DAYS
2. NOT BEING ABLE TO STOP OR CONTROL WORRYING: NEARLY EVERY DAY
7. FEELING AFRAID AS IF SOMETHING AWFUL MIGHT HAPPEN: NEARLY EVERY DAY
IF YOU CHECKED OFF ANY PROBLEMS ON THIS QUESTIONNAIRE, HOW DIFFICULT HAVE THESE PROBLEMS MADE IT FOR YOU TO DO YOUR WORK, TAKE CARE OF THINGS AT HOME, OR GET ALONG WITH OTHER PEOPLE: SOMEWHAT DIFFICULT
1. FEELING NERVOUS, ANXIOUS, OR ON EDGE: MORE THAN HALF THE DAYS
GAD7 TOTAL SCORE: 13
GAD7 TOTAL SCORE: 13
6. BECOMING EASILY ANNOYED OR IRRITABLE: MORE THAN HALF THE DAYS
2. NOT BEING ABLE TO STOP OR CONTROL WORRYING: NEARLY EVERY DAY
3. WORRYING TOO MUCH ABOUT DIFFERENT THINGS: NEARLY EVERY DAY
GAD7 TOTAL SCORE: 13
IF YOU CHECKED OFF ANY PROBLEMS ON THIS QUESTIONNAIRE, HOW DIFFICULT HAVE THESE PROBLEMS MADE IT FOR YOU TO DO YOUR WORK, TAKE CARE OF THINGS AT HOME, OR GET ALONG WITH OTHER PEOPLE: SOMEWHAT DIFFICULT
5. BEING SO RESTLESS THAT IT IS HARD TO SIT STILL: NOT AT ALL
1. FEELING NERVOUS, ANXIOUS, OR ON EDGE: MORE THAN HALF THE DAYS
7. FEELING AFRAID AS IF SOMETHING AWFUL MIGHT HAPPEN: NEARLY EVERY DAY
3. WORRYING TOO MUCH ABOUT DIFFERENT THINGS: NEARLY EVERY DAY

## 2025-06-26 ASSESSMENT — ENCOUNTER SYMPTOMS: NERVOUS/ANXIOUS: 1

## 2025-06-26 NOTE — PROGRESS NOTES
"  Assessment & Plan     Need for vaccination      Stage 3b chronic kidney disease (H)  Possibly related to her chemotherapy is currently stable is still getting her Fe infusion    Anxiety due to invasive procedure  We will give her more lorazepam to use as needed  - LORazepam (ATIVAN) 0.5 MG tablet; Take 1 tablet (0.5 mg) by mouth every 6 hours.    Thyromegaly  Not palpable on exam this was noted in the CT angiogram.  Of note her TSH was suppressed not sure if that was because she was under stress we will recheck if TSH receptor is suppressed she will need nuclear medicine thyroid scan    TIA (transient ischemic attack)  Reassured her that she does not have an infarction present in the brain.  Given that Plavix was added with along with the aspirin and high dose statin her risk for TIA is low reassured her but she needs home care assessment for OT PT at home.  She already began with mild cognitive impairment she appears to be independent but needs home assessment she has a mild orthostatic drop in her blood pressure reduce \"doxazosin to half.  She has had elevated blood pressure in the past so will not reduce the amlodipine for now.  She has a mild pill-rolling tremors of both hands without any rigidity but is ambulating well with a cane    - Home Care Referral    CKD (chronic kidney disease) stage 4, GFR 15-29 ml/min (H)      Diarrhea, unspecified type  This is related to stress she can take Imodium as needed  - loperamide (IMODIUM A-D) 2 MG tablet; Take 1 tablet (2 mg) by mouth 4 times daily as needed for diarrhea.  - TSH; Future  - Thyroid peroxidase antibody; Future  - T3, Free; Future  - Thyroid peroxidase antibody; Future  - Thyrotropin Receptor Antibody; Future  - Methylmalonic Acid; Future  - Vitamin D Deficiency; Future  - Folate; Future  - Prealbumin; Future  - TSH  - Thyroid peroxidase antibody  - T3, Free  - Thyrotropin Receptor Antibody  - Methylmalonic Acid  - Vitamin D Deficiency  - Folate  - " "Prealbumin    Vitamin D deficiency    - Vitamin D Deficiency; Future  - Vitamin D Deficiency    Depression, unspecified depression type  Increase Lexapro to 10 mg  - escitalopram (LEXAPRO) 10 MG tablet; Take 1 tablet (10 mg) by mouth daily.    Colon adenocarcinoma (H)  She is on sporadic palliative chemotherapy defer to oncology    Overall surprisingly doing quite well.  Will check nutritional markers  Labs and hospital notes were reviewed      MED REC REQUIRED  Post Medication Reconciliation Status: discharge medications reconciled, continue medications without change  BMI  Estimated body mass index is 26.26 kg/m  as calculated from the following:    Height as of this encounter: 1.499 m (4' 11\").    Weight as of this encounter: 59 kg (130 lb).       Depression Screening Follow Up        6/26/2025     2:09 PM   PHQ   PHQ-9 Total Score 10   Q9: Thoughts of better off dead/self-harm past 2 weeks Not at all           Follow Up Actions Taken  Crisis resource information provided in After Visit Summary           Chau Marquez is a 89 year old, presenting for the following health issues:    Hospital Course   Alma Johns is 89-year-old woman with past medical history of hypertension, hyperlipidemia, hypercalcemia, colon adenocarcinoma (Stage Ivb) who presented to the [facility] on 6/16/2025 with dysarthria.     Her last known well 0730 on 6/16. Her daughter observed that her speech was garbled. Symptoms lasted 10 minutes prior to resolving. On evaluation in the ED, NIHSS 0. CT head without contrast was negative for acute pathology. CTA head and neck demonstrated high-grade stenosis versus occlusion of the right M2 branchs with distal reconstitution. [facility] stroke team was contacted, recommending aspirin 81 mg, Plavix 300 mg load prior to 75 mg daily, and MRI brain. She was not a thrombolytic candidate. MRI brain was performed which did not show evidence of acute stroke. Transferred to [facility] in " [place] for TIA workup. Additionally had an episode of transient left upper extremity numbness which resolved after 1 day several weeks ago, also concerning for TIA.     She was seen and evaluated by stroke neurology who recommended DAPT x 3 months. She was evaluated by therapies who felt she was at her baseline and safe to discharge home. She remained free of new neurological deficits. TTE with bubble revealed normal EF, wall motion, no significant valvular disease, and NEGATIVE bubble study.    Patient with newly diagnosed apple core adenocarcinoma of the hepatic flexure.  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  Patient does not wish to have chemotherapy.  She had a surgical consultation on 12/30/2024.  Palliative surgery was deferred until the patient developed obstructive symptoms.  We again discussed palliative chemotherapy.  Patient has agreed to a trial of capecitabine.  Treatment plan has been entered.      Update 3/6/2025.  Patient returns for day 1 cycle 2 of capecitabine.  She is tolerating it without difficulty.  Her EGFR has improved to 41.  Calcium is slightly increased at 10.7.  She did have laser lithotripsy on 1/31/2025.  She will continue with capecitabine 1000 mg twice daily for 2 weeks on and 1 week off.  Follow-up is planned for 3 weeks at the start of her next cycle.     Update 4/29/2025 Patient returns after completing 2 cycles of capecitabine.  Unfortunately her EGFR still low at 24.  I advised her to hold capecitabine for 4 more weeks after which we will recheck her chemistry profile.  She had a recent ultrasound which shows an atrophic right kidney and a large staghorn calculus on the left.     Update 5/27/2025 patient returns for follow-up.  She has been off capecitabine chemotherapy for 4 weeks.  Unfortunately her renal function continues to decline and her EGFR is now at 18.  I advised a trial of panitumumab and treatment plan is entered.     Update 6/12/2025: Patient  "received her first dose of panitumumab on 6/2/2025.  She has had no adverse effects.  However she did not tolerate doxycycline prophylaxis.  She has discontinued that and so far has no evidence of skin rash.  PET CT scan of 6/10/2025 shows decreased FDG avidity in the lymph nodes below the diaphragm.  She will return on 6/16/2025 for day 15 of panitumumab.  She will return on 7/1/2025 to begin day 1 cycle 2 of panitumumab.  Hospital F/U (See at [facility] 6/16-6/18 for TIA ) and Anxiety (Family notes increased anxiety in the last several wks - discuss possible medication dose adjustments )      6/26/2025     1:17 PM   Additional Questions   Roomed by Radha AGUIRRE   Accompanied by Va - daughter         6/26/2025     1:17 PM   Patient Reported Additional Medications   Patient reports taking the following new medications none     Anxiety              Hospital Follow-up Visit:    Hospital/Nursing Home/IP Rehab Facility: [facility]  Date of Admission: 6/16/25  Date of Discharge: 6/18/25  Reason(s) for Admission: TIA  Do you have any other stressors you would like to discuss with your provider? Health Concerns    Problems taking medications regularly:  None  Medication changes since discharge: Yes, Asa, Lipitor & Plavix  Problems adhering to non-medication therapy:  None    Summary of hospitalization:  CareEverywhere information obtained and reviewed  Diagnostic Tests/Treatments reviewed.  Follow up needed: none  Other Healthcare Providers Involved in Patient s Care:         None  Update since discharge: improved.         Plan of care communicated with patient                       Objective    /60 (BP Location: Left arm, Patient Position: Sitting, Cuff Size: Adult Regular)   Pulse 72   Temp 98.9  F (37.2  C) (Tympanic)   Resp 19   Ht 1.499 m (4' 11\")   Wt 59 kg (130 lb)   LMP  (LMP Unknown)   SpO2 98%   BMI 26.26 kg/m    Body mass index is 26.26 kg/m .  Physical Exam   GENERAL: alert " and no distress  NECK: no adenopathy, no asymmetry, masses, or scars  RESP: lungs clear to auscultation - no rales, rhonchi or wheezes  CV: regular rate and rhythm, normal S1 S2, no S3 or S4, no murmur, click or rub, no peripheral edema  ABDOMEN: soft, nontender, no hepatosplenomegaly, no masses and bowel sounds normal  MS: no gross musculoskeletal defects noted, no edema            Signed Electronically by: Lucy Ruano MD

## 2025-07-01 ENCOUNTER — PATIENT OUTREACH (OUTPATIENT)
Dept: ONCOLOGY | Facility: HOSPITAL | Age: OVER 89
End: 2025-07-01

## 2025-07-01 ENCOUNTER — ONCOLOGY VISIT (OUTPATIENT)
Dept: ONCOLOGY | Facility: HOSPITAL | Age: OVER 89
End: 2025-07-01
Attending: INTERNAL MEDICINE
Payer: MEDICARE

## 2025-07-01 ENCOUNTER — TELEPHONE (OUTPATIENT)
Dept: INTERNAL MEDICINE | Facility: CLINIC | Age: OVER 89
End: 2025-07-01

## 2025-07-01 ENCOUNTER — LAB (OUTPATIENT)
Dept: INFUSION THERAPY | Facility: HOSPITAL | Age: OVER 89
End: 2025-07-01
Attending: INTERNAL MEDICINE
Payer: MEDICARE

## 2025-07-01 VITALS
HEART RATE: 72 BPM | WEIGHT: 127.6 LBS | TEMPERATURE: 97.8 F | RESPIRATION RATE: 16 BRPM | OXYGEN SATURATION: 97 % | BODY MASS INDEX: 25.72 KG/M2 | SYSTOLIC BLOOD PRESSURE: 119 MMHG | HEIGHT: 59 IN | DIASTOLIC BLOOD PRESSURE: 58 MMHG

## 2025-07-01 DIAGNOSIS — C18.9 COLON ADENOCARCINOMA (H): Primary | ICD-10-CM

## 2025-07-01 DIAGNOSIS — C18.9 COLON ADENOCARCINOMA (H): ICD-10-CM

## 2025-07-01 LAB
ALBUMIN SERPL BCG-MCNC: 3.1 G/DL (ref 3.5–5.2)
ALP SERPL-CCNC: 76 U/L (ref 40–150)
ALT SERPL W P-5'-P-CCNC: 13 U/L (ref 0–50)
ANION GAP SERPL CALCULATED.3IONS-SCNC: 11 MMOL/L (ref 7–15)
AST SERPL W P-5'-P-CCNC: 16 U/L (ref 0–45)
BASOPHILS # BLD AUTO: 0.1 10E3/UL (ref 0–0.2)
BASOPHILS NFR BLD AUTO: 1 %
BILIRUB SERPL-MCNC: 0.7 MG/DL
BUN SERPL-MCNC: 25 MG/DL (ref 8–23)
CALCIUM SERPL-MCNC: 9.2 MG/DL (ref 8.8–10.4)
CHLORIDE SERPL-SCNC: 106 MMOL/L (ref 98–107)
CREAT SERPL-MCNC: 1.86 MG/DL (ref 0.51–0.95)
EGFRCR SERPLBLD CKD-EPI 2021: 25 ML/MIN/1.73M2
EOSINOPHIL # BLD AUTO: 0.2 10E3/UL (ref 0–0.7)
EOSINOPHIL NFR BLD AUTO: 1 %
ERYTHROCYTE [DISTWIDTH] IN BLOOD BY AUTOMATED COUNT: 12.2 % (ref 10–15)
GLUCOSE SERPL-MCNC: 176 MG/DL (ref 70–99)
HCO3 SERPL-SCNC: 26 MMOL/L (ref 22–29)
HCT VFR BLD AUTO: 33.4 % (ref 35–47)
HGB BLD-MCNC: 11.1 G/DL (ref 11.7–15.7)
IMM GRANULOCYTES # BLD: 0.3 10E3/UL
IMM GRANULOCYTES NFR BLD: 2 %
LYMPHOCYTES # BLD AUTO: 2.5 10E3/UL (ref 0.8–5.3)
LYMPHOCYTES NFR BLD AUTO: 17 %
MCH RBC QN AUTO: 30.1 PG (ref 26.5–33)
MCHC RBC AUTO-ENTMCNC: 33.2 G/DL (ref 31.5–36.5)
MCV RBC AUTO: 91 FL (ref 78–100)
MONOCYTES # BLD AUTO: 1 10E3/UL (ref 0–1.3)
MONOCYTES NFR BLD AUTO: 6 %
NEUTROPHILS # BLD AUTO: 10.8 10E3/UL (ref 1.6–8.3)
NEUTROPHILS NFR BLD AUTO: 73 %
NRBC # BLD AUTO: 0 10E3/UL
NRBC BLD AUTO-RTO: 0 /100
PLATELET # BLD AUTO: 248 10E3/UL (ref 150–450)
POTASSIUM SERPL-SCNC: 3.4 MMOL/L (ref 3.4–5.3)
PROT SERPL-MCNC: 5.9 G/DL (ref 6.4–8.3)
RBC # BLD AUTO: 3.69 10E6/UL (ref 3.8–5.2)
SODIUM SERPL-SCNC: 143 MMOL/L (ref 135–145)
WBC # BLD AUTO: 14.8 10E3/UL (ref 4–11)

## 2025-07-01 PROCEDURE — 99215 OFFICE O/P EST HI 40 MIN: CPT | Performed by: INTERNAL MEDICINE

## 2025-07-01 PROCEDURE — 250N000011 HC RX IP 250 OP 636: Performed by: INTERNAL MEDICINE

## 2025-07-01 PROCEDURE — 82947 ASSAY GLUCOSE BLOOD QUANT: CPT

## 2025-07-01 PROCEDURE — 85004 AUTOMATED DIFF WBC COUNT: CPT

## 2025-07-01 PROCEDURE — G0463 HOSPITAL OUTPT CLINIC VISIT: HCPCS | Performed by: INTERNAL MEDICINE

## 2025-07-01 PROCEDURE — 36415 COLL VENOUS BLD VENIPUNCTURE: CPT

## 2025-07-01 PROCEDURE — 96413 CHEMO IV INFUSION 1 HR: CPT

## 2025-07-01 PROCEDURE — 258N000003 HC RX IP 258 OP 636: Performed by: INTERNAL MEDICINE

## 2025-07-01 RX ORDER — MEPERIDINE HYDROCHLORIDE 25 MG/ML
25 INJECTION INTRAMUSCULAR; INTRAVENOUS; SUBCUTANEOUS
Status: DISCONTINUED | OUTPATIENT
Start: 2025-07-01 | End: 2025-07-01 | Stop reason: HOSPADM

## 2025-07-01 RX ORDER — DIPHENHYDRAMINE HYDROCHLORIDE 50 MG/ML
25 INJECTION, SOLUTION INTRAMUSCULAR; INTRAVENOUS
Status: DISCONTINUED | OUTPATIENT
Start: 2025-07-01 | End: 2025-07-01 | Stop reason: HOSPADM

## 2025-07-01 RX ORDER — ALBUTEROL SULFATE 90 UG/1
1-2 INHALANT RESPIRATORY (INHALATION)
Status: DISCONTINUED | OUTPATIENT
Start: 2025-07-01 | End: 2025-07-01 | Stop reason: HOSPADM

## 2025-07-01 RX ORDER — EPINEPHRINE 1 MG/ML
0.3 INJECTION, SOLUTION INTRAMUSCULAR; SUBCUTANEOUS EVERY 5 MIN PRN
Status: DISCONTINUED | OUTPATIENT
Start: 2025-07-01 | End: 2025-07-01 | Stop reason: HOSPADM

## 2025-07-01 RX ORDER — ALBUTEROL SULFATE 0.83 MG/ML
2.5 SOLUTION RESPIRATORY (INHALATION)
Status: DISCONTINUED | OUTPATIENT
Start: 2025-07-01 | End: 2025-07-01 | Stop reason: HOSPADM

## 2025-07-01 RX ORDER — DIPHENHYDRAMINE HYDROCHLORIDE 50 MG/ML
50 INJECTION, SOLUTION INTRAMUSCULAR; INTRAVENOUS
Status: DISCONTINUED | OUTPATIENT
Start: 2025-07-01 | End: 2025-07-01 | Stop reason: HOSPADM

## 2025-07-01 RX ORDER — METHYLPREDNISOLONE SODIUM SUCCINATE 40 MG/ML
40 INJECTION INTRAMUSCULAR; INTRAVENOUS
Status: DISCONTINUED | OUTPATIENT
Start: 2025-07-01 | End: 2025-07-01 | Stop reason: HOSPADM

## 2025-07-01 RX ADMIN — PANITUMUMAB 368 MG: 400 SOLUTION INTRAVENOUS at 12:49

## 2025-07-01 ASSESSMENT — PAIN SCALES - GENERAL: PAINLEVEL_OUTOF10: MILD PAIN (1)

## 2025-07-01 NOTE — TELEPHONE ENCOUNTER
July 1, 2025    Home health orders was received via fax for Dr. Ruano.  Patient label was attached to paperwork and placed in provider's inbox to be signed.    Monica De Paz

## 2025-07-01 NOTE — PROGRESS NOTES
"Oncology Rooming Note    July 1, 2025 11:31 AM   Alma Johns is a 89 year old female who presents for:    Chief Complaint   Patient presents with    Oncology Clinic Visit     Return visit with lab and infusion. Colon adenocarcinoma.     Initial Vitals: /58 (BP Location: Right arm, Patient Position: Sitting, Cuff Size: Adult Regular)   Pulse 72   Temp 97.8  F (36.6  C) (Oral)   Resp 16   Ht 1.499 m (4' 11\")   Wt 57.9 kg (127 lb 9.6 oz)   LMP  (LMP Unknown)   SpO2 97%   BMI 25.77 kg/m   Estimated body mass index is 25.77 kg/m  as calculated from the following:    Height as of this encounter: 1.499 m (4' 11\").    Weight as of this encounter: 57.9 kg (127 lb 9.6 oz). Body surface area is 1.55 meters squared.  Mild Pain (1) Comment: Data Unavailable   No LMP recorded (lmp unknown). Patient has had a hysterectomy.  Allergies reviewed: Yes  Medications reviewed: Yes    Medications: Medication refills not needed today.  Pharmacy name entered into [employer]:    [facility] [place], [place] - [address] [place] [id number] [facility] [employer] - [place], [place] - [address] [place] [address]    Frailty Screening:   Is the patient here for a new oncology consult visit in cancer care? 2. No    PHQ9:  Did this patient require a PHQ9?: No      Clinical concerns: left side of head and neck pain that started after being taken off Celebrex. Dr Friedell was notified.      Pamela Roy [facility]            "

## 2025-07-01 NOTE — LETTER
"7/1/2025      Alma Johns  [address] [place] [address]      Dear Colleague,    Thank you for referring your patient, Alma Johns, to the [facility]. Please see a copy of my visit note below.    Oncology Rooming Note    July 1, 2025 11:31 AM   Alma Johns is a 89 year old female who presents for:    Chief Complaint   Patient presents with     Oncology Clinic Visit     Return visit with lab and infusion. Colon adenocarcinoma.     Initial Vitals: /58 (BP Location: Right arm, Patient Position: Sitting, Cuff Size: Adult Regular)   Pulse 72   Temp 97.8  F (36.6  C) (Oral)   Resp 16   Ht 1.499 m (4' 11\")   Wt 57.9 kg (127 lb 9.6 oz)   LMP  (LMP Unknown)   SpO2 97%   BMI 25.77 kg/m   Estimated body mass index is 25.77 kg/m  as calculated from the following:    Height as of this encounter: 1.499 m (4' 11\").    Weight as of this encounter: 57.9 kg (127 lb 9.6 oz). Body surface area is 1.55 meters squared.  Mild Pain (1) Comment: Data Unavailable   No LMP recorded (lmp unknown). Patient has had a hysterectomy.  Allergies reviewed: Yes  Medications reviewed: Yes    Medications: Medication refills not needed today.  Pharmacy name entered into [email or web address]:    [facility] [place], [place] - [address] [place] [id number] [facility] - [place], [place] - [address] [place] [address]    Frailty Screening:   Is the patient here for a new oncology consult visit in cancer care? 2. No    PHQ9:  Did this patient require a PHQ9?: No      Clinical concerns: left side of head and neck pain that started after being taken off Celebrex. Dr Friedell was notified.      Pamela Roy, [facility] Hematology and Oncology Progress Note    Patient: Alma Johns  MRN: [id number]  Date of Service: Jul 1, 2025       Reason for Visit    I was consulted by   Lucy Ruano MD     For evaluation and management of newly diagnosed colon cancer.      Encounter Diagnoses " Assessment and Plan:    Problem List Items Addressed This Visit       Colon adenocarcinoma (H) - Primary     Patient with newly diagnosed apple core adenocarcinoma of the hepatic flexure.  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  Patient does not wish to have chemotherapy.  She had a surgical consultation on 12/30/2024.  Palliative surgery was deferred until the patient developed obstructive symptoms.  We again discussed palliative chemotherapy.  Patient has agreed to a trial of capecitabine.  Treatment plan has been entered.     Update 3/6/2025.  Patient returns for day 1 cycle 2 of capecitabine.  She is tolerating it without difficulty.  Her EGFR has improved to 41.  Calcium is slightly increased at 10.7.  She did have laser lithotripsy on 1/31/2025.  She will continue with capecitabine 1000 mg twice daily for 2 weeks on and 1 week off.  Follow-up is planned for 3 weeks at the start of her next cycle.    Update 4/29/2025 Patient returns after completing 2 cycles of capecitabine.  Unfortunately her EGFR still low at 24.  I advised her to hold capecitabine for 4 more weeks after which we will recheck her chemistry profile.  She had a recent ultrasound which shows an atrophic right kidney and a large staghorn calculus on the left.    Update 5/27/2025 patient returns for follow-up.  She has been off capecitabine chemotherapy for 4 weeks.  Unfortunately her renal function continues to decline and her EGFR is now at 18.  I advised a trial of panitumumab and treatment plan is entered.    Update 6/12/2025: Patient received her first dose of panitumumab on 6/2/2025.  She has had no adverse effects.  However she did not tolerate doxycycline prophylaxis.  She has discontinued that and so far has no evidence of skin rash.  PET CT scan of 6/10/2025 shows decreased FDG avidity in the lymph nodes below the diaphragm.  She will return on 6/16/2025 for day 15 of panitumumab.  She will return on 7/1/2025 to begin day  1 cycle 2 of panitumumab.    Update 7/1/2025.  Patient returns to continue panitumumab.  She missed day 15 of cycle 1 as she was hospitalized for a TIA.  She has recovered from the TIA however she is now on aspirin and Plavix.  This will increase the risk of her bleeding from her colon cancer..  She is struggling with neck pain.  She was taking Celebrex for this but has discontinued while on antiplatelet therapy.  Advised continuing with Tylenol and using heat along with a soft cervical collar at night.  She will receive panitumumab today and return in 2 weeks for her next infusion.          ______________________________________________________________________________    Staging History   Cancer Staging   Colon adenocarcinoma (H)  Staging form: Colon and Rectum, AJCC 8th Edition  - Clinical stage from 1/9/2025: Stage IVB (cTX, cNX, cM1b) - Signed by Friedell, Peter E, MD on 1/9/2025    ECOG Performance  1 - Can't do physically strenuous work, but fully ambulatory and can do light sedentary work.    History of Present Illness      Alma Johns is a 89 year old woman with a history of hypercalcemia with elevated parathyroid hormone.  Over the last year she has had unexplained weight loss of about 20 pounds.  On 12/10/2024 she underwent CT scan of the abdomen and pelvis which showed probable lesion at the hepatic flexure of the colon and suspicious retroperitoneal adenopathy.  On 12/19/2024 she underwent colonoscopy which confirmed an adenocarcinoma of the colon.  Upper GI endoscopic ultrasound confirmed metastases in a upper abdominal lymph node adjacent to the pancreas near the mesenteric root.  Multiple pancreatic cysts appeared benign.      Diagnosis:  12/10/2024 CT scan of the abdomen shows wall thickening in the right transverse colon  12/19/2025 upper GI endoscopic ultrasound shows metastatic adenocarcinoma of the colon  Proteins are intact by immunohistochemistry  NGS panel shows   APC R283*  PIK3CA R38H  TMB  "score 5.115   mut/Mb  K-ramin NRAS BRAF mutations are not detected  1/6/2025 PET/CT scan shows FDG avidity in the colon tumor and FDG avid lymphadenopathy above and below the diaphragm  6/10/2025 PET CT scan shows decreased FDG avidity of lymph nodes above and below the diaphragm    Treatment:  2/3/2025 capecitabine 1000 mg twice daily  4/11/2025 capecitabine discontinued because of progressive renal insufficiency  6/2/2025 panitumumab started 6 mg/kg    Interval history:    Presently she feels fairly well.  Her appetite was decreased while taking doxycycline.  She stopped that drug after 1 week and her appetite improved.  Overall she lost about 1 pound since the last visit.  She is having no difficulty with bowel movements.  She does not have pain.  She does not have cough, chest pain or shortness of breath.she is struggling with pain on the left side of her neck.  She was taking Celebrex for that previously but discontinued as she was hospitalized for a TIA June 16 through 18.  She was placed on aspirin and Plavix and then discharged from the hospital.  She has no persistent neurologic deficit.        Review of systems.  Pertinent Findings are included in the History of Present Illness    Physical Exam    /58 (BP Location: Right arm, Patient Position: Sitting, Cuff Size: Adult Regular)   Pulse 72   Temp 97.8  F (36.6  C) (Oral)   Resp 16   Ht 1.499 m (4' 11\")   Wt 57.9 kg (127 lb 9.6 oz)   LMP  (LMP Unknown)   SpO2 97%   BMI 25.77 kg/m       GENERAL APPEARANCE: 89year-old woman in no apparent distress.  HEAD: Atraumatic; normocephalic; without lesions.  EYES: Conjunctiva, corneas and eyelids normal; pupils equal, round, reactive to light; No Icterus.  MOUTH/OROPHARYNX: Oral mucosa intact  NECK: Supple with no nodes.  LUNGS:  Clear to auscultation and percussion with no extra sounds.  HEART: Regular rhythm and rate; S1 and S2 normal; no murmurs noted.  ABDOMEN: Soft; no masses or tenderness, no " hepatosplenomegaly.  NEUROLOGIC: Alert and oriented.  No obvious focal findings.  EXTREMITIES: No cyanosis, or edema.  SKIN: No abnormal bruising or bleeding. No suspicious lesions noted on exposed skin.  PSYCHIATRIC: Mental status normal; no apparent psychiatric issues    Medications:    Current Outpatient Medications   Medication Sig Dispense Refill     alendronate (FOSAMAX) 70 MG tablet Take 1 tablet (70 mg) by mouth every 7 days 12 tablet 0     amLODIPine (NORVASC) 10 MG tablet Take 1 tablet (10 mg) by mouth daily 90 tablet 3     aspirin (ASA) 81 MG chewable tablet Take 81 mg by mouth daily.       atorvastatin (LIPITOR) 40 MG tablet Take 40 mg by mouth at bedtime.       clopidogrel (PLAVIX) 75 MG tablet Take 75 mg by mouth daily.       dexAMETHasone (DECADRON) 1 MG tablet Take 1 tablet (1 mg) by mouth daily (with breakfast). 30 tablet 0     doxazosin (CARDURA) 4 MG tablet TAKE ONE TABLET BY MOUTH ONE TIME DAILY. 90 tablet 3     escitalopram (LEXAPRO) 10 MG tablet Take 1 tablet (10 mg) by mouth daily. 90 tablet 3     escitalopram (LEXAPRO) 5 MG tablet Take 1 tablet (5 mg) by mouth daily. 90 tablet 3     latanoprost (XALATAN) 0.005 % ophthalmic solution [LATANOPROST (XALATAN) 0.005 % OPHTHALMIC SOLUTION] Administer 1 drop to both eyes bedtime.       loperamide (IMODIUM A-D) 2 MG tablet Take 1 tablet (2 mg) by mouth 4 times daily as needed for diarrhea. 30 tablet 2     LORazepam (ATIVAN) 0.5 MG tablet Take 1 tablet (0.5 mg) by mouth every 6 hours. 30 tablet 0     timolol maleate (TIMOPTIC) 0.5 % ophthalmic solution [TIMOLOL MALEATE (TIMOPTIC) 0.5 % OPHTHALMIC SOLUTION] INSTILL 1 DROP INTO BOTH EYES Q EVENING  3     therapeutic multivitamin (THERAGRAN) tablet [THERAPEUTIC MULTIVITAMIN (THERAGRAN) TABLET] Take 1 tablet by mouth daily. (Patient not taking: Reported on 7/1/2025)       No current facility-administered medications for this visit.     Facility-Administered Medications Ordered in Other Visits   Medication  Dose Route Frequency Provider Last Rate Last Admin     albuterol (PROVENTIL HFA/VENTOLIN HFA) inhaler  1-2 puff Inhalation Once PRN Friedell, Peter E, MD         albuterol (PROVENTIL) neb solution 2.5 mg  2.5 mg Nebulization Once PRN Friedell, Peter E, MD         diphenhydrAMINE (BENADRYL) injection 25 mg  25 mg Intravenous Once PRN Friedell, Peter E, MD         diphenhydrAMINE (BENADRYL) injection 50 mg  50 mg Intravenous Once PRN Friedell, Peter E, MD         EPINEPHrine (Anaphylaxis) (ADRENALIN) injection (vial) 0.3 mg  0.3 mg Intramuscular Q5 Min PRN Friedell, Peter E, MD         famotidine (PEPCID) injection 20 mg  20 mg Intravenous Once PRN Friedell, Peter E, MD         meperidine (DEMEROL) injection 25 mg  25 mg Intravenous Once PRN Friedell, Peter E, MD         methylPREDNISolone sodium succinate (SOLU-MEDROL) injection 40 mg  40 mg Intravenous Once PRN Friedell, Peter E, MD         panitumumab (VECTIBIX) 368 mg in sodium chloride 0.9 % 128.4 mL infusion  6 mg/kg (Treatment Plan Recorded) Intravenous Once Friedell, Peter E, .8 mL/hr at 07/01/25 1249 368 mg at 07/01/25 1249     sodium chloride (PF) 0.9% PF flush 3-20 mL  3-20 mL Intracatheter q1 min prn Friedell, Peter E, MD         sodium chloride 0.9% BOLUS 1,000 mL  1,000 mL Intravenous Once PRN Friedell, Peter E, MD               Past History    Past Medical History:   Diagnosis Date     Colon cancer (H)     Stage IV with presence in lymph nodes and other distant body parts     Complaint related to dreams 04/03/2019     Dyslipidemia     dyslipoproteinemia     Glaucoma      Hypercalcemia      Hyperparathyroidism      Hypertension      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     OA (osteoarthritis)      Osteopenia      PONV (postoperative nausea and vomiting)      Postmenopausal      Retinal vein occlusion (H)      Vitamin D deficiency      Past Surgical History:   Procedure Laterality Date     APPENDECTOMY  1985    Incidental     BIOPSY BREAST         SECTION      and      CHOLECYSTECTOMY       COLONOSCOPY N/A 2024    Procedure: Colonoscopy with biopsies and polypectomy, endoscopic marker;  Surgeon: Ron Hogan MD;  Location: [facility] OR     ENTEROSCOPY SMALL BOWEL N/A 2024    Procedure: Enteroscopy small bowel;  Surgeon: Ron Hogan MD;  Location: [facility] OR     ESOPHAGOSCOPY, GASTROSCOPY, DUODENOSCOPY (EGD), COMBINED N/A 2024    Procedure: ESOPHAGOGASTRODUODENOSCOPY, WITH FINE NEEDLE ASPIRATION BIOPSY, WITH ENDOSCOPIC ULTRASOUND GUIDANCE;  Surgeon: Ron Hogan MD;  Location: [facility] OR     HYSTERECTOMY TOTAL ABDOMINAL, BILATERAL SALPINGO-OOPHORECTOMY, COMBINED       KIDNEY STONE SURGERY      extraction     LUMBAR LAMINECTOMY       TOTAL HIP ARTHROPLASTY Left 2009     TOTAL KNEE ARTHROPLASTY Right 2009     TUBAL LIGATION       No Known Allergies    No family history on file.  Social History     Socioeconomic History     Marital status: Single     Spouse name: None     Number of children: None     Years of education: None     Highest education level: None   Tobacco Use     Smoking status: Never     Passive exposure: Past     Smokeless tobacco: Never   Vaping Use     Vaping status: Never Used   Substance and Sexual Activity     Alcohol use: No     Drug use: No   Social History Narrative    Lives alone. 2 hip replacements and 1 knee replacement. Used to work in housekeeping at [employer]. Saw Dr Crabtree. Has a daughter and son, and grandchildren and great grandchildren.     Social Drivers of Health     Financial Resource Strain: Low Risk  (2024)    Financial Resource Strain      Within the past 12 months, have you or your family members you live with been unable to get utilities (heat, electricity) when it was really needed?: No   Food Insecurity: Low Risk  (2024)    Food Insecurity      Within the past 12 months, did you worry that your food would run out before you got money to  buy more?: No      Within the past 12 months, did the food you bought just not last and you didn t have money to get more?: No   Transportation Needs: Low Risk  (8/12/2024)    Transportation Needs      Within the past 12 months, has lack of transportation kept you from medical appointments, getting your medicines, non-medical meetings or appointments, work, or from getting things that you need?: No   Physical Activity: Inactive (8/12/2024)    Exercise Vital Sign      Days of Exercise per Week: 0 days      Minutes of Exercise per Session: 0 min   Stress: No Stress Concern Present (8/12/2024)    [place] [employer] - Occupational Stress Questionnaire      Feeling of Stress : Only a little   Social Connections: Unknown (8/12/2024)    Social Connection and Isolation Panel [NHANES]      Frequency of Social Gatherings with Friends and Family: Once a week   Interpersonal Safety: Low Risk  (12/19/2024)    Interpersonal Safety      Do you feel physically and emotionally safe where you currently live?: Yes      Within the past 12 months, have you been hit, slapped, kicked or otherwise physically hurt by someone?: No      Within the past 12 months, have you been humiliated or emotionally abused in other ways by your partner or ex-partner?: No   Housing Stability: Low Risk  (8/12/2024)    Housing Stability      Do you have housing? : Yes      Are you worried about losing your housing?: No           Lab Results    Recent Results (from the past 240 hours)   TSH    Collection Time: 06/26/25  2:20 PM   Result Value Ref Range    TSH 0.06 (L) 0.30 - 4.20 uIU/mL   Thyroid peroxidase antibody    Collection Time: 06/26/25  2:20 PM   Result Value Ref Range    Thyroid Peroxidase Antibody <10 <35 IU/mL   T3, Free    Collection Time: 06/26/25  2:20 PM   Result Value Ref Range    T3 Free 2.2 2.0 - 4.4 pg/mL   Thyrotropin Receptor Antibody    Collection Time: 06/26/25  2:20 PM   Result Value Ref Range    Thyrotropin  Receptor Antibody <1.10 0.00 - 1.75 IU/L   Vitamin D Deficiency    Collection Time: 06/26/25  2:20 PM   Result Value Ref Range    Vitamin D, Total (25-Hydroxy) 50 20 - 50 ng/mL   Folate    Collection Time: 06/26/25  2:20 PM   Result Value Ref Range    Folic Acid 19.7 4.6 - 34.8 ng/mL   Prealbumin    Collection Time: 06/26/25  2:20 PM   Result Value Ref Range    Prealbumin 18.3 (L) 20.0 - 40.0 mg/dL   Comprehensive metabolic panel    Collection Time: 07/01/25 11:09 AM   Result Value Ref Range    Sodium 143 135 - 145 mmol/L    Potassium 3.4 3.4 - 5.3 mmol/L    Carbon Dioxide (CO2) 26 22 - 29 mmol/L    Anion Gap 11 7 - 15 mmol/L    Urea Nitrogen 25.0 (H) 8.0 - 23.0 mg/dL    Creatinine 1.86 (H) 0.51 - 0.95 mg/dL    GFR Estimate 25 (L) >60 mL/min/1.73m2    Calcium 9.2 8.8 - 10.4 mg/dL    Chloride 106 98 - 107 mmol/L    Glucose 176 (H) 70 - 99 mg/dL    Alkaline Phosphatase 76 40 - 150 U/L    AST 16 0 - 45 U/L    ALT 13 0 - 50 U/L    Protein Total 5.9 (L) 6.4 - 8.3 g/dL    Albumin 3.1 (L) 3.5 - 5.2 g/dL    Bilirubin Total 0.7 <=1.2 mg/dL   CBC with platelets and differential    Collection Time: 07/01/25 11:09 AM   Result Value Ref Range    WBC Count 14.8 (H) 4.0 - 11.0 10e3/uL    RBC Count 3.69 (L) 3.80 - 5.20 10e6/uL    Hemoglobin 11.1 (L) 11.7 - 15.7 g/dL    Hematocrit 33.4 (L) 35.0 - 47.0 %    MCV 91 78 - 100 fL    MCH 30.1 26.5 - 33.0 pg    MCHC 33.2 31.5 - 36.5 g/dL    RDW 12.2 10.0 - 15.0 %    Platelet Count 248 150 - 450 10e3/uL    % Neutrophils 73 %    % Lymphocytes 17 %    % Monocytes 6 %    % Eosinophils 1 %    % Basophils 1 %    % Immature Granulocytes 2 %    NRBCs per 100 WBC 0 <1 /100    Absolute Neutrophils 10.8 (H) 1.6 - 8.3 10e3/uL    Absolute Lymphocytes 2.5 0.8 - 5.3 10e3/uL    Absolute Monocytes 1.0 0.0 - 1.3 10e3/uL    Absolute Eosinophils 0.2 0.0 - 0.7 10e3/uL    Absolute Basophils 0.1 0.0 - 0.2 10e3/uL    Absolute Immature Granulocytes 0.3 <=0.4 10e3/uL    Absolute NRBCs 0.0 10e3/uL       Imaging  Results    MR HEAD BRAIN WO  Result Date: 6/16/2025  EXAM: MR HEAD BRAIN WO INDICATION: Facial droop and dysarthria. TECHNIQUE: Multiplanar, multisequence magnetic resonance imaging of the brain is performed without IV contrast administration. COMPARISON: CT of the head without contrast on June 16, 2025. FINDINGS: The ventricles are normal in size, shape, and position.  The CSF spaces are mildly prominent indicating chronic generalized atrophy.  No mass, mass effect, or midline shift.  No acute intracranial hemorrhage.  No restricted diffusion to suggest acute ischemia.  Moderate chronic white matter microvascular disease.  There is good gray-white differentiation.  No abnormal intra-axial hemosiderin deposition.  No abnormal extra-axial fluid collections.  Flow sensitive sequences demonstrate appropriate intracranial flow voids.  Midline structures intact.  Craniocervical junction normal.    1. No acute intracranial process. No ischemia, hemorrhage, or mass. 2. Chronic generalized atrophy and moderate chronic white matter microvascular disease.    CTA HEAD NECK CAROTID STROKE PROTOCOL LULY CANDIDAT  Result Date: 6/16/2025  EXAM: CTA HEAD NECK CAROTID STROKE PROTOCOL LULY CANDIDATE INDICATION: Facial droop and dysarthria. TECHNIQUE: CT angiography of the neck and head was performed following intravenous contrast administration. Multiplanar and 3D reconstructions performed by both the technologist and interpreting radiologist using both the [employer] workstation and PACS.  Extracranial ICA stenosis calculation is relative to the post-bulbar ICA. While obtaining CT images, dose reduction techniques were utilized including: Automated exposure control, adjustment of mA and/or kV according to patient size, and/or use of iterative reconstruction technique. CONTRAST: 90 mL Omnipaque 350. RADIATION DOSE: 193.5 DLP (mGy cm) COMPARISON: No prior angiographic imaging available.  Concurrent head CT reviewed. FINDINGS: CTA Head:   There is diminished filling of the right M2 branches within the sylvian fissure, suspect focal high-grade stenosis or occlusion of a few mid to distal branches in the upper division.  Suspect diminished filling in the M3 and cortical branches of the of the superior branch of the right MCA.  The ICA and M1 segments are normally patent bilaterally.  Mild atherosclerosis in the cavernous and paraclinoid ICAs without high-grade luminal stenosis.  Mild atherosclerosis of the vertebrobasilar system which is normally patent, noting anatomic variant of hypoplastic distal right vertebral artery.  No evidence of intracranial aneurysm. CTA Neck: The visualized aorta is normal in caliber.  Calcified and noncalcified plaque noted in the arch.  Normal three-vessel arch anatomy. Brachiocephalic and both subclavian arteries patent, without hemodynamically substantial vessel narrowing.Both vertebral arteries patent, without hemodynamically substantial vessel narrowing.  Right vertebral artery mildly hypoplastic throughout its course.  There is atherosclerosis of the carotid bifurcations with noncalcified plaque components slightly greater on the left. About 20% stenosis of the proximal ICAs bilaterally by NASCET criteria no evidence for dissection. Other: Visualized lung apices are grossly clear.  There is marked enlargement of the left thyroid lobe which extends into the upper mediastinum, and more mild enlargement of the right thyroid lobe.  No pathologic cervical lymph nodes by imaging criteria.  Degenerative changes in the spine.  Paranasal sinus disease again noted.    1. Suspected high-grade stenosis/occlusion of a few right M2 branches within the sylvian fissure with diminished filling of the more distal right M3 and cortical branches in the superior MCA distribution.  The ICA and M1 segments are patent bilaterally. 2. Patent cervical carotid and vertebral arteries without hemodynamically significant stenosis. 3. No evidence  of intracranial aneurysm. 4. Thyromegaly left-greater-than-right which can be further evaluated with ultrasound as clinically appropriate in this setting.    CT Head w/o Contrast  Result Date: 6/16/2025  EXAM: CT HEAD BRAIN WO INDICATION: Left-sided facial droop and left leg weakness and drift.  Left hand weakness. TECHNIQUE: Sequential axial images are obtained through the head from skull base to vertex without intravenous contrast.  Multiplanar reconstructions obtained. While obtaining CT images, dose reduction techniques were utilized including: Automated exposure control, adjustment of mA and/or kV according to patient size, and/or use of iterative reconstruction technique. RADIATION DOSE: 1226.0 DLP (mGy cm) COMPARISON: None available. FINDINGS: The ventricles, sulci, and basilar cisterns are moderately enlarged but proportional.  There is moderate patchy areas decreased attenuation the cerebral white matter.  Gray-white interface is maintained.  There is no sulcal effacement.  There is incidental note of bilateral benign basal ganglia calcification.  There is no hemorrhage, mass, mass effect, or midline shift. The calvarium is intact. There is chronic wall thickening and mucosal thickening of the left sphenoid sinus.  The remaining sinuses are clear.  There are no air-fluid levels.  Mastoid air cells are clear.  Orbits are unremarkable.    1. Cerebral atrophy and chronic microangiopathic change. 2. No acute intracranial pathology. 3. Chronic left sphenoid sinusitis. COMMUNICATION: The information above was relayed directly by me by telephone to Dr. Mckeon at 09:47 on 6/16/2025.    PET Oncology Whole Body  Result Date: 6/11/2025  EXAM: PET ONCOLOGY WHOLE BODY LOCATION: [facility] DATE: 6/10/2025 INDICATION: Subsequent treatment planning and restaging for malignant neoplasm of the hepatic flexure. Currently receiving chemotherapy/immunotherapy. Monitor treatment response COMPARISON: FDG  PET/CT dated 1/6/2025 CONTRAST: None TECHNIQUE: Serum glucose level 120 mg/dL. One hour post intravenous administration of 10.17mCi F18 FDG, PET imaging was performed from the skull vertex to feet, utilizing attenuation correction with concurrent axial CT and PET/CT image fusion. Dose reduction techniques were used. PET/CT FINDINGS: While there is persistently FDG avid lesion in the ascending colon near the hepatic flexure (Max SUV 10.6, previously 10.9), there is residual mesenteric (Max SUV 4.2, previously 11.5) and retroperitoneal (Max SUV 6.2, previously 12.1), and left retrocrural (Max SUV 3.4, previously 6.0), left axillary (Max SUV 7.8, previously 10.7), and left lower cervical chain (Max SUV 2.4, previously 7.3) lymph nodes suggesting partial response to therapy. Mild esophagitis. Degenerative shoulder, hip, and left knee synovitis. CT FINDINGS: Mild to moderate senescent intercranial changes. Mild paranasal sinus mucosal thickening. Mild carotid artery bifurcation calcification. Thyroid goiter. Moderate coronary artery calcium. Mitral annulus dose patient. Small sliding-type hiatal  hernia. Cholecystectomy. Pancreatic head cystic lesion measuring up to 4.6 cm. Hepatic lipoma. Nonobstructing right renal calculi and Staghorn renal calculus measuring up to 3.8 cm. Sigmoid diverticulosis. Bilateral hip and right knee arthroplasties, otherwise lower extremities are unremarkable. Multilevel degenerative changes of the spine     IMPRESSION: While there is persistently FDG avid lesion in the ascending colon near the hepatic flexure, there is decreasing metabolic activity of lymph nodes above/below the diaphragm suggesting partial response to therapy            I spent  45  minutes on the patient's visit today.  This included preparation for the visit, face-to-face time with the patient and documentation following the visit.  It did not include teaching or procedure time.    Signed by: Peter E. Friedell,  MD          Again, thank you for allowing me to participate in the care of your patient.        Sincerely,        Peter E. Friedell, MD    Electronically signed

## 2025-07-01 NOTE — PROGRESS NOTES
Infusion Nursing Note:  Alma Johns presents today for D15 C1 panitumumab infusion.    Patient seen by provider today: Yes: Dr. Friedell   present during visit today: Not Applicable.    Note: Alma arrived via wheelchair after having labs drawn and provider visit on second floor. She denies any side effects from her first infusion of panitumumab. Alma received treatment as ordered and tolerated well. She has her next appointment for labs and visit with Dr. Elliott on 7/15/25.      Intravenous Access:  Peripheral IV placed.    Treatment Conditions:  Lab Results   Component Value Date    HGB 11.1 (L) 07/01/2025    WBC 14.8 (H) 07/01/2025    ANEU 10.8 (H) 07/01/2025     07/01/2025        Lab Results   Component Value Date     07/01/2025    POTASSIUM 3.4 07/01/2025    CR 1.86 (H) 07/01/2025    JIMMY 9.2 07/01/2025    BILITOTAL 0.7 07/01/2025    ALBUMIN 3.1 (L) 07/01/2025    ALT 13 07/01/2025    AST 16 07/01/2025         Post Infusion Assessment:  Patient tolerated infusion without incident.  Site patent and intact, free from redness, edema or discomfort.  Access discontinued per protocol.       Discharge Plan:   Patient discharged in stable condition accompanied by: daughter.  Departure Mode: Wheelchair.      Melia Basurto RN

## 2025-07-01 NOTE — PROGRESS NOTES
[facility] Hematology and Oncology Progress Note    Patient: Alma Johns  MRN: [id number]  Date of Service: Jul 1, 2025       Reason for Visit    I was consulted by   Lucy Ruano MD     For evaluation and management of newly diagnosed colon cancer.      Encounter Diagnoses Assessment and Plan:    Problem List Items Addressed This Visit       Colon adenocarcinoma (H) - Primary     Patient with newly diagnosed apple core adenocarcinoma of the hepatic flexure.  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  Patient does not wish to have chemotherapy.  She had a surgical consultation on 12/30/2024.  Palliative surgery was deferred until the patient developed obstructive symptoms.  We again discussed palliative chemotherapy.  Patient has agreed to a trial of capecitabine.  Treatment plan has been entered.     Update 3/6/2025.  Patient returns for day 1 cycle 2 of capecitabine.  She is tolerating it without difficulty.  Her EGFR has improved to 41.  Calcium is slightly increased at 10.7.  She did have laser lithotripsy on 1/31/2025.  She will continue with capecitabine 1000 mg twice daily for 2 weeks on and 1 week off.  Follow-up is planned for 3 weeks at the start of her next cycle.    Update 4/29/2025 Patient returns after completing 2 cycles of capecitabine.  Unfortunately her EGFR still low at 24.  I advised her to hold capecitabine for 4 more weeks after which we will recheck her chemistry profile.  She had a recent ultrasound which shows an atrophic right kidney and a large staghorn calculus on the left.    Update 5/27/2025 patient returns for follow-up.  She has been off capecitabine chemotherapy for 4 weeks.  Unfortunately her renal function continues to decline and her EGFR is now at 18.  I advised a trial of panitumumab and treatment plan is entered.    Update 6/12/2025: Patient received her first dose of panitumumab on 6/2/2025.  She has had no adverse effects.  However she did not tolerate  doxycycline prophylaxis.  She has discontinued that and so far has no evidence of skin rash.  PET CT scan of 6/10/2025 shows decreased FDG avidity in the lymph nodes below the diaphragm.  She will return on 6/16/2025 for day 15 of panitumumab.  She will return on 7/1/2025 to begin day 1 cycle 2 of panitumumab.    Update 7/1/2025.  Patient returns to continue panitumumab.  She missed day 15 of cycle 1 as she was hospitalized for a TIA.  She has recovered from the TIA however she is now on aspirin and Plavix.  This will increase the risk of her bleeding from her colon cancer..  She is struggling with neck pain.  She was taking Celebrex for this but has discontinued while on antiplatelet therapy.  Advised continuing with Tylenol and using heat along with a soft cervical collar at night.  She will receive panitumumab today and return in 2 weeks for her next infusion.          ______________________________________________________________________________    Staging History   Cancer Staging   Colon adenocarcinoma (H)  Staging form: Colon and Rectum, AJCC 8th Edition  - Clinical stage from 1/9/2025: Stage IVB (cTX, cNX, cM1b) - Signed by Friedell, Peter E, MD on 1/9/2025    ECOG Performance  1 - Can't do physically strenuous work, but fully ambulatory and can do light sedentary work.    History of Present Illness      Alma Johns is a 89 year old woman with a history of hypercalcemia with elevated parathyroid hormone.  Over the last year she has had unexplained weight loss of about 20 pounds.  On 12/10/2024 she underwent CT scan of the abdomen and pelvis which showed probable lesion at the hepatic flexure of the colon and suspicious retroperitoneal adenopathy.  On 12/19/2024 she underwent colonoscopy which confirmed an adenocarcinoma of the colon.  Upper GI endoscopic ultrasound confirmed metastases in a upper abdominal lymph node adjacent to the pancreas near the mesenteric root.  Multiple pancreatic cysts appeared  "benign.      Diagnosis:  12/10/2024 CT scan of the abdomen shows wall thickening in the right transverse colon  12/19/2025 upper GI endoscopic ultrasound shows metastatic adenocarcinoma of the colon  Proteins are intact by immunohistochemistry  NGS panel shows   APC R283*  PIK3CA R38H  TMB score 5.115   mut/Mb  K-ramin NRAS BRAF mutations are not detected  1/6/2025 PET/CT scan shows FDG avidity in the colon tumor and FDG avid lymphadenopathy above and below the diaphragm  6/10/2025 PET CT scan shows decreased FDG avidity of lymph nodes above and below the diaphragm    Treatment:  2/3/2025 capecitabine 1000 mg twice daily  4/11/2025 capecitabine discontinued because of progressive renal insufficiency  6/2/2025 panitumumab started 6 mg/kg    Interval history:    Presently she feels fairly well.  Her appetite was decreased while taking doxycycline.  She stopped that drug after 1 week and her appetite improved.  Overall she lost about 1 pound since the last visit.  She is having no difficulty with bowel movements.  She does not have pain.  She does not have cough, chest pain or shortness of breath.she is struggling with pain on the left side of her neck.  She was taking Celebrex for that previously but discontinued as she was hospitalized for a TIA June 16 through 18.  She was placed on aspirin and Plavix and then discharged from the hospital.  She has no persistent neurologic deficit.        Review of systems.  Pertinent Findings are included in the History of Present Illness    Physical Exam    /58 (BP Location: Right arm, Patient Position: Sitting, Cuff Size: Adult Regular)   Pulse 72   Temp 97.8  F (36.6  C) (Oral)   Resp 16   Ht 1.499 m (4' 11\")   Wt 57.9 kg (127 lb 9.6 oz)   LMP  (LMP Unknown)   SpO2 97%   BMI 25.77 kg/m       GENERAL APPEARANCE: 89year-old woman in no apparent distress.  HEAD: Atraumatic; normocephalic; without lesions.  EYES: Conjunctiva, corneas and eyelids normal; pupils equal, " round, reactive to light; No Icterus.  MOUTH/OROPHARYNX: Oral mucosa intact  NECK: Supple with no nodes.  LUNGS:  Clear to auscultation and percussion with no extra sounds.  HEART: Regular rhythm and rate; S1 and S2 normal; no murmurs noted.  ABDOMEN: Soft; no masses or tenderness, no hepatosplenomegaly.  NEUROLOGIC: Alert and oriented.  No obvious focal findings.  EXTREMITIES: No cyanosis, or edema.  SKIN: No abnormal bruising or bleeding. No suspicious lesions noted on exposed skin.  PSYCHIATRIC: Mental status normal; no apparent psychiatric issues    Medications:    Current Outpatient Medications   Medication Sig Dispense Refill    alendronate (FOSAMAX) 70 MG tablet Take 1 tablet (70 mg) by mouth every 7 days 12 tablet 0    amLODIPine (NORVASC) 10 MG tablet Take 1 tablet (10 mg) by mouth daily 90 tablet 3    aspirin (ASA) 81 MG chewable tablet Take 81 mg by mouth daily.      atorvastatin (LIPITOR) 40 MG tablet Take 40 mg by mouth at bedtime.      clopidogrel (PLAVIX) 75 MG tablet Take 75 mg by mouth daily.      dexAMETHasone (DECADRON) 1 MG tablet Take 1 tablet (1 mg) by mouth daily (with breakfast). 30 tablet 0    doxazosin (CARDURA) 4 MG tablet TAKE ONE TABLET BY MOUTH ONE TIME DAILY. 90 tablet 3    escitalopram (LEXAPRO) 10 MG tablet Take 1 tablet (10 mg) by mouth daily. 90 tablet 3    escitalopram (LEXAPRO) 5 MG tablet Take 1 tablet (5 mg) by mouth daily. 90 tablet 3    latanoprost (XALATAN) 0.005 % ophthalmic solution [LATANOPROST (XALATAN) 0.005 % OPHTHALMIC SOLUTION] Administer 1 drop to both eyes bedtime.      loperamide (IMODIUM A-D) 2 MG tablet Take 1 tablet (2 mg) by mouth 4 times daily as needed for diarrhea. 30 tablet 2    LORazepam (ATIVAN) 0.5 MG tablet Take 1 tablet (0.5 mg) by mouth every 6 hours. 30 tablet 0    timolol maleate (TIMOPTIC) 0.5 % ophthalmic solution [TIMOLOL MALEATE (TIMOPTIC) 0.5 % OPHTHALMIC SOLUTION] INSTILL 1 DROP INTO BOTH EYES Q EVENING  3    therapeutic multivitamin  (THERAGRAN) tablet [THERAPEUTIC MULTIVITAMIN (THERAGRAN) TABLET] Take 1 tablet by mouth daily. (Patient not taking: Reported on 7/1/2025)       No current facility-administered medications for this visit.     Facility-Administered Medications Ordered in Other Visits   Medication Dose Route Frequency Provider Last Rate Last Admin    albuterol (PROVENTIL HFA/VENTOLIN HFA) inhaler  1-2 puff Inhalation Once PRN Friedell, Peter E, MD        albuterol (PROVENTIL) neb solution 2.5 mg  2.5 mg Nebulization Once PRN Friedell, Peter E, MD        diphenhydrAMINE (BENADRYL) injection 25 mg  25 mg Intravenous Once PRN Friedell, Peter E, MD        diphenhydrAMINE (BENADRYL) injection 50 mg  50 mg Intravenous Once PRN Friedell, Peter E, MD        EPINEPHrine (Anaphylaxis) (ADRENALIN) injection (vial) 0.3 mg  0.3 mg Intramuscular Q5 Min PRN Friedell, Peter E, MD        famotidine (PEPCID) injection 20 mg  20 mg Intravenous Once PRN Friedell, Peter E, MD        meperidine (DEMEROL) injection 25 mg  25 mg Intravenous Once PRN Friedell, Peter E, MD        methylPREDNISolone sodium succinate (SOLU-MEDROL) injection 40 mg  40 mg Intravenous Once PRN Friedell, Peter E, MD        panitumumab (VECTIBIX) 368 mg in sodium chloride 0.9 % 128.4 mL infusion  6 mg/kg (Treatment Plan Recorded) Intravenous Once Friedell, Peter E, .8 mL/hr at 07/01/25 1249 368 mg at 07/01/25 1249    sodium chloride (PF) 0.9% PF flush 3-20 mL  3-20 mL Intracatheter q1 min prn Friedell, Peter E, MD        sodium chloride 0.9% BOLUS 1,000 mL  1,000 mL Intravenous Once PRN Friedell, Peter E, MD               Past History    Past Medical History:   Diagnosis Date    Colon cancer (H)     Stage IV with presence in lymph nodes and other distant body parts    Complaint related to dreams 04/03/2019    Dyslipidemia     dyslipoproteinemia    Glaucoma     Hypercalcemia     Hyperparathyroidism     Hypertension     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    OA (osteoarthritis)      Osteopenia     PONV (postoperative nausea and vomiting)     Postmenopausal     Retinal vein occlusion (H)     Vitamin D deficiency      Past Surgical History:   Procedure Laterality Date    APPENDECTOMY  1985    Incidental    BIOPSY BREAST       SECTION      and     CHOLECYSTECTOMY      COLONOSCOPY N/A 2024    Procedure: Colonoscopy with biopsies and polypectomy, endoscopic marker;  Surgeon: Ron Hogan MD;  Location: [facility] OR    ENTEROSCOPY SMALL BOWEL N/A 2024    Procedure: Enteroscopy small bowel;  Surgeon: Ron Hogan MD;  Location: [facility] OR    ESOPHAGOSCOPY, GASTROSCOPY, DUODENOSCOPY (EGD), COMBINED N/A 2024    Procedure: ESOPHAGOGASTRODUODENOSCOPY, WITH FINE NEEDLE ASPIRATION BIOPSY, WITH ENDOSCOPIC ULTRASOUND GUIDANCE;  Surgeon: Ron Hogan MD;  Location: [facility] OR    HYSTERECTOMY TOTAL ABDOMINAL, BILATERAL SALPINGO-OOPHORECTOMY, COMBINED      KIDNEY STONE SURGERY      extraction    LUMBAR LAMINECTOMY      TOTAL HIP ARTHROPLASTY Left 2009    TOTAL KNEE ARTHROPLASTY Right 2009    TUBAL LIGATION       No Known Allergies    No family history on file.  Social History     Socioeconomic History    Marital status: Single     Spouse name: None    Number of children: None    Years of education: None    Highest education level: None   Tobacco Use    Smoking status: Never     Passive exposure: Past    Smokeless tobacco: Never   Vaping Use    Vaping status: Never Used   Substance and Sexual Activity    Alcohol use: No    Drug use: No   Social History Narrative    Lives alone. 2 hip replacements and 1 knee replacement. Used to work in housekeeping at [employer]. Saw Dr Crabtree. Has a daughter and son, and grandchildren and great grandchildren.     Social Drivers of Health     Financial Resource Strain: Low Risk  (2024)    Financial Resource Strain     Within the past 12 months, have you or your family members you live with been unable  to get utilities (heat, electricity) when it was really needed?: No   Food Insecurity: Low Risk  (8/12/2024)    Food Insecurity     Within the past 12 months, did you worry that your food would run out before you got money to buy more?: No     Within the past 12 months, did the food you bought just not last and you didn t have money to get more?: No   Transportation Needs: Low Risk  (8/12/2024)    Transportation Needs     Within the past 12 months, has lack of transportation kept you from medical appointments, getting your medicines, non-medical meetings or appointments, work, or from getting things that you need?: No   Physical Activity: Inactive (8/12/2024)    Exercise Vital Sign     Days of Exercise per Week: 0 days     Minutes of Exercise per Session: 0 min   Stress: No Stress Concern Present (8/12/2024)    [employer] - Occupational Stress Questionnaire     Feeling of Stress : Only a little   Social Connections: Unknown (8/12/2024)    Social Connection and Isolation Panel [NHANES]     Frequency of Social Gatherings with Friends and Family: Once a week   Interpersonal Safety: Low Risk  (12/19/2024)    Interpersonal Safety     Do you feel physically and emotionally safe where you currently live?: Yes     Within the past 12 months, have you been hit, slapped, kicked or otherwise physically hurt by someone?: No     Within the past 12 months, have you been humiliated or emotionally abused in other ways by your partner or ex-partner?: No   Housing Stability: Low Risk  (8/12/2024)    Housing Stability     Do you have housing? : Yes     Are you worried about losing your housing?: No           Lab Results    Recent Results (from the past 240 hours)   TSH    Collection Time: 06/26/25  2:20 PM   Result Value Ref Range    TSH 0.06 (L) 0.30 - 4.20 uIU/mL   Thyroid peroxidase antibody    Collection Time: 06/26/25  2:20 PM   Result Value Ref Range    Thyroid Peroxidase Antibody <10 <35 IU/mL   T3,  Free    Collection Time: 06/26/25  2:20 PM   Result Value Ref Range    T3 Free 2.2 2.0 - 4.4 pg/mL   Thyrotropin Receptor Antibody    Collection Time: 06/26/25  2:20 PM   Result Value Ref Range    Thyrotropin Receptor Antibody <1.10 0.00 - 1.75 IU/L   Vitamin D Deficiency    Collection Time: 06/26/25  2:20 PM   Result Value Ref Range    Vitamin D, Total (25-Hydroxy) 50 20 - 50 ng/mL   Folate    Collection Time: 06/26/25  2:20 PM   Result Value Ref Range    Folic Acid 19.7 4.6 - 34.8 ng/mL   Prealbumin    Collection Time: 06/26/25  2:20 PM   Result Value Ref Range    Prealbumin 18.3 (L) 20.0 - 40.0 mg/dL   Comprehensive metabolic panel    Collection Time: 07/01/25 11:09 AM   Result Value Ref Range    Sodium 143 135 - 145 mmol/L    Potassium 3.4 3.4 - 5.3 mmol/L    Carbon Dioxide (CO2) 26 22 - 29 mmol/L    Anion Gap 11 7 - 15 mmol/L    Urea Nitrogen 25.0 (H) 8.0 - 23.0 mg/dL    Creatinine 1.86 (H) 0.51 - 0.95 mg/dL    GFR Estimate 25 (L) >60 mL/min/1.73m2    Calcium 9.2 8.8 - 10.4 mg/dL    Chloride 106 98 - 107 mmol/L    Glucose 176 (H) 70 - 99 mg/dL    Alkaline Phosphatase 76 40 - 150 U/L    AST 16 0 - 45 U/L    ALT 13 0 - 50 U/L    Protein Total 5.9 (L) 6.4 - 8.3 g/dL    Albumin 3.1 (L) 3.5 - 5.2 g/dL    Bilirubin Total 0.7 <=1.2 mg/dL   CBC with platelets and differential    Collection Time: 07/01/25 11:09 AM   Result Value Ref Range    WBC Count 14.8 (H) 4.0 - 11.0 10e3/uL    RBC Count 3.69 (L) 3.80 - 5.20 10e6/uL    Hemoglobin 11.1 (L) 11.7 - 15.7 g/dL    Hematocrit 33.4 (L) 35.0 - 47.0 %    MCV 91 78 - 100 fL    MCH 30.1 26.5 - 33.0 pg    MCHC 33.2 31.5 - 36.5 g/dL    RDW 12.2 10.0 - 15.0 %    Platelet Count 248 150 - 450 10e3/uL    % Neutrophils 73 %    % Lymphocytes 17 %    % Monocytes 6 %    % Eosinophils 1 %    % Basophils 1 %    % Immature Granulocytes 2 %    NRBCs per 100 WBC 0 <1 /100    Absolute Neutrophils 10.8 (H) 1.6 - 8.3 10e3/uL    Absolute Lymphocytes 2.5 0.8 - 5.3 10e3/uL    Absolute Monocytes 1.0  0.0 - 1.3 10e3/uL    Absolute Eosinophils 0.2 0.0 - 0.7 10e3/uL    Absolute Basophils 0.1 0.0 - 0.2 10e3/uL    Absolute Immature Granulocytes 0.3 <=0.4 10e3/uL    Absolute NRBCs 0.0 10e3/uL       Imaging Results    MR HEAD BRAIN WO  Result Date: 6/16/2025  EXAM: MR HEAD BRAIN WO INDICATION: Facial droop and dysarthria. TECHNIQUE: Multiplanar, multisequence magnetic resonance imaging of the brain is performed without IV contrast administration. COMPARISON: CT of the head without contrast on June 16, 2025. FINDINGS: The ventricles are normal in size, shape, and position.  The CSF spaces are mildly prominent indicating chronic generalized atrophy.  No mass, mass effect, or midline shift.  No acute intracranial hemorrhage.  No restricted diffusion to suggest acute ischemia.  Moderate chronic white matter microvascular disease.  There is good gray-white differentiation.  No abnormal intra-axial hemosiderin deposition.  No abnormal extra-axial fluid collections.  Flow sensitive sequences demonstrate appropriate intracranial flow voids.  Midline structures intact.  Craniocervical junction normal.    1. No acute intracranial process. No ischemia, hemorrhage, or mass. 2. Chronic generalized atrophy and moderate chronic white matter microvascular disease.    CTA HEAD NECK CAROTID STROKE PROTOCOL LULY CANDIDAT  Result Date: 6/16/2025  EXAM: CTA HEAD NECK CAROTID STROKE PROTOCOL LULY CANDIDATE INDICATION: Facial droop and dysarthria. TECHNIQUE: CT angiography of the neck and head was performed following intravenous contrast administration. Multiplanar and 3D reconstructions performed by both the technologist and interpreting radiologist using both the [employer] workstation and PACS.  Extracranial ICA stenosis calculation is relative to the post-bulbar ICA. While obtaining CT images, dose reduction techniques were utilized including: Automated exposure control, adjustment of mA and/or kV according to patient size, and/or use of  iterative reconstruction technique. CONTRAST: 90 mL Omnipaque 350. RADIATION DOSE: 193.5 DLP (mGy cm) COMPARISON: No prior angiographic imaging available.  Concurrent head CT reviewed. FINDINGS: CTA Head:  There is diminished filling of the right M2 branches within the sylvian fissure, suspect focal high-grade stenosis or occlusion of a few mid to distal branches in the upper division.  Suspect diminished filling in the M3 and cortical branches of the of the superior branch of the right MCA.  The ICA and M1 segments are normally patent bilaterally.  Mild atherosclerosis in the cavernous and paraclinoid ICAs without high-grade luminal stenosis.  Mild atherosclerosis of the vertebrobasilar system which is normally patent, noting anatomic variant of hypoplastic distal right vertebral artery.  No evidence of intracranial aneurysm. CTA Neck: The visualized aorta is normal in caliber.  Calcified and noncalcified plaque noted in the arch.  Normal three-vessel arch anatomy. Brachiocephalic and both subclavian arteries patent, without hemodynamically substantial vessel narrowing.Both vertebral arteries patent, without hemodynamically substantial vessel narrowing.  Right vertebral artery mildly hypoplastic throughout its course.  There is atherosclerosis of the carotid bifurcations with noncalcified plaque components slightly greater on the left. About 20% stenosis of the proximal ICAs bilaterally by NASCET criteria no evidence for dissection. Other: Visualized lung apices are grossly clear.  There is marked enlargement of the left thyroid lobe which extends into the upper mediastinum, and more mild enlargement of the right thyroid lobe.  No pathologic cervical lymph nodes by imaging criteria.  Degenerative changes in the spine.  Paranasal sinus disease again noted.    1. Suspected high-grade stenosis/occlusion of a few right M2 branches within the sylvian fissure with diminished filling of the more distal right M3 and  cortical branches in the superior MCA distribution.  The ICA and M1 segments are patent bilaterally. 2. Patent cervical carotid and vertebral arteries without hemodynamically significant stenosis. 3. No evidence of intracranial aneurysm. 4. Thyromegaly left-greater-than-right which can be further evaluated with ultrasound as clinically appropriate in this setting.    CT Head w/o Contrast  Result Date: 6/16/2025  EXAM: CT HEAD BRAIN WO INDICATION: Left-sided facial droop and left leg weakness and drift.  Left hand weakness. TECHNIQUE: Sequential axial images are obtained through the head from skull base to vertex without intravenous contrast.  Multiplanar reconstructions obtained. While obtaining CT images, dose reduction techniques were utilized including: Automated exposure control, adjustment of mA and/or kV according to patient size, and/or use of iterative reconstruction technique. RADIATION DOSE: 1226.0 DLP (mGy cm) COMPARISON: None available. FINDINGS: The ventricles, sulci, and basilar cisterns are moderately enlarged but proportional.  There is moderate patchy areas decreased attenuation the cerebral white matter.  Gray-white interface is maintained.  There is no sulcal effacement.  There is incidental note of bilateral benign basal ganglia calcification.  There is no hemorrhage, mass, mass effect, or midline shift. The calvarium is intact. There is chronic wall thickening and mucosal thickening of the left sphenoid sinus.  The remaining sinuses are clear.  There are no air-fluid levels.  Mastoid air cells are clear.  Orbits are unremarkable.    1. Cerebral atrophy and chronic microangiopathic change. 2. No acute intracranial pathology. 3. Chronic left sphenoid sinusitis. COMMUNICATION: The information above was relayed directly by me by telephone to Dr. Mckeon at 09:47 on 6/16/2025.    PET Oncology Whole Body  Result Date: 6/11/2025  EXAM: PET ONCOLOGY WHOLE BODY LOCATION: [facility]  HOSPITAL DATE: 6/10/2025 INDICATION: Subsequent treatment planning and restaging for malignant neoplasm of the hepatic flexure. Currently receiving chemotherapy/immunotherapy. Monitor treatment response COMPARISON: FDG PET/CT dated 1/6/2025 CONTRAST: None TECHNIQUE: Serum glucose level 120 mg/dL. One hour post intravenous administration of 10.17mCi F18 FDG, PET imaging was performed from the skull vertex to feet, utilizing attenuation correction with concurrent axial CT and PET/CT image fusion. Dose reduction techniques were used. PET/CT FINDINGS: While there is persistently FDG avid lesion in the ascending colon near the hepatic flexure (Max SUV 10.6, previously 10.9), there is residual mesenteric (Max SUV 4.2, previously 11.5) and retroperitoneal (Max SUV 6.2, previously 12.1), and left retrocrural (Max SUV 3.4, previously 6.0), left axillary (Max SUV 7.8, previously 10.7), and left lower cervical chain (Max SUV 2.4, previously 7.3) lymph nodes suggesting partial response to therapy. Mild esophagitis. Degenerative shoulder, hip, and left knee synovitis. CT FINDINGS: Mild to moderate senescent intercranial changes. Mild paranasal sinus mucosal thickening. Mild carotid artery bifurcation calcification. Thyroid goiter. Moderate coronary artery calcium. Mitral annulus dose patient. Small sliding-type hiatal  hernia. Cholecystectomy. Pancreatic head cystic lesion measuring up to 4.6 cm. Hepatic lipoma. Nonobstructing right renal calculi and Staghorn renal calculus measuring up to 3.8 cm. Sigmoid diverticulosis. Bilateral hip and right knee arthroplasties, otherwise lower extremities are unremarkable. Multilevel degenerative changes of the spine     IMPRESSION: While there is persistently FDG avid lesion in the ascending colon near the hepatic flexure, there is decreasing metabolic activity of lymph nodes above/below the diaphragm suggesting partial response to therapy            I spent  45  minutes on the patient's  visit today.  This included preparation for the visit, face-to-face time with the patient and documentation following the visit.  It did not include teaching or procedure time.    Signed by: Peter E. Friedell, MD

## 2025-07-01 NOTE — PROGRESS NOTES
[facility]: Cancer Care                                                                                          Situation: Chart reviewed by RN Care Coordinator.    Background: Patient was seen in clinic today for follow-up regarding colon cancer.    Assessment: Patient getting Vectibix.  She missed day 15 of cycle 1 as she was hospitalized for a TIA.  Has recovered and is now on aspirin and Plavix.    Plan/Recommendations: Continue on Vectibix regimen.  She is to return in 2 weeks for next infusion and office visit.      Signature:  Shannon Marino RN Care Coordinator  [facility]  Cancer [facility]

## 2025-07-02 ENCOUNTER — MEDICAL CORRESPONDENCE (OUTPATIENT)
Dept: HEALTH INFORMATION MANAGEMENT | Facility: CLINIC | Age: OVER 89
End: 2025-07-02

## 2025-07-02 LAB — METHYLMALONATE SERPL-SCNC: 0.93 UMOL/L (ref 0–0.4)

## 2025-07-07 ENCOUNTER — TELEPHONE (OUTPATIENT)
Dept: INTERNAL MEDICINE | Facility: CLINIC | Age: OVER 89
End: 2025-07-07
Payer: MEDICARE

## 2025-07-07 NOTE — TELEPHONE ENCOUNTER
Home Care is calling regarding an established patient with [facility].  Requesting orders from: Lucy Ruano  RN APPROVED: RN able to provide verbal orders.  Home Care will send orders for signature.  RN will close encounter.  Is this a request for a temporary pause in the home care episode?  No    Orders Requested    Occupational Therapy  Request for initial certification (first set of orders)   Frequency: 1x week for 1 week   Then 1x every other week for 4 weeks  RN gave verbal order: Yes        Contacts       Contact Date/Time Type Contact Phone/Fax    07/07/2025 11:03 AM CDT Phone (Incoming) [facility] (Home Care) [phone]          Tam Paredes RN

## 2025-07-14 ENCOUNTER — TELEPHONE (OUTPATIENT)
Dept: INTERNAL MEDICINE | Facility: CLINIC | Age: OVER 89
End: 2025-07-14

## 2025-07-14 ENCOUNTER — OFFICE VISIT (OUTPATIENT)
Dept: AUDIOLOGY | Facility: CLINIC | Age: OVER 89
End: 2025-07-14
Attending: INTERNAL MEDICINE
Payer: MEDICARE

## 2025-07-14 ENCOUNTER — OFFICE VISIT (OUTPATIENT)
Dept: OTOLARYNGOLOGY | Facility: CLINIC | Age: OVER 89
End: 2025-07-14
Attending: INTERNAL MEDICINE
Payer: MEDICARE

## 2025-07-14 VITALS
HEIGHT: 59 IN | OXYGEN SATURATION: 96 % | HEART RATE: 71 BPM | DIASTOLIC BLOOD PRESSURE: 51 MMHG | BODY MASS INDEX: 25.6 KG/M2 | SYSTOLIC BLOOD PRESSURE: 99 MMHG | WEIGHT: 127 LBS | TEMPERATURE: 97.6 F

## 2025-07-14 DIAGNOSIS — Z53.9 DIAGNOSIS NOT YET DEFINED: Primary | ICD-10-CM

## 2025-07-14 DIAGNOSIS — H61.21 IMPACTED CERUMEN OF RIGHT EAR: Primary | ICD-10-CM

## 2025-07-14 DIAGNOSIS — H90.3 SENSORINEURAL HEARING LOSS (SNHL) OF BOTH EARS: Primary | ICD-10-CM

## 2025-07-14 PROCEDURE — G0180 MD CERTIFICATION HHA PATIENT: HCPCS | Performed by: INTERNAL MEDICINE

## 2025-07-14 PROCEDURE — 3078F DIAST BP <80 MM HG: CPT | Performed by: REGISTERED NURSE

## 2025-07-14 PROCEDURE — 3074F SYST BP LT 130 MM HG: CPT | Performed by: REGISTERED NURSE

## 2025-07-14 PROCEDURE — 69210 REMOVE IMPACTED EAR WAX UNI: CPT | Mod: RT | Performed by: REGISTERED NURSE

## 2025-07-14 PROCEDURE — 1126F AMNT PAIN NOTED NONE PRSNT: CPT | Performed by: REGISTERED NURSE

## 2025-07-14 ASSESSMENT — PAIN SCALES - GENERAL: PAINLEVEL_OUTOF10: NO PAIN (0)

## 2025-07-14 NOTE — PROGRESS NOTES
Otolaryngology Clinic  July 14, 2025    HPI:  Alma Johns is here for cerumen impaction removal prior to audiogram. Patient has a longstanding history of bilateral sensorineural hearing loss.  Patient wears hearing aids through [facility].  Does not feel that their hearing aids are working well.  Patient also has a history of bilateral cerumen impaction requiring ear irrigation in primary care.  Patient was recently told by their hearing aid provider that they had bilateral cerumen impaction.    Patient denies any history of frequent ear infections or ear surgeries.      Otologic microscope exam:    Biocular Microscopy exam is needed due to deep impaction of cerumen of bilateral ears, requiring direct visualization for use of cleaning instruments.    Right ear was examined under the microscope.  Cerumen impaction noted. It was cleaned with suction and alligator forceps. Once cleaned, TM visualized under microscope. Normal appearing TM, nicely aerated middle ear space.     Left ear was also examined under the microscope. Ear is clean and dry, free of cerumen. Normal appearing TM, nicely aerated middle ear space.     Assessment and Plan:  1. Impacted cerumen of right ear (Primary)  Patient presents with cerumen impaction of right ear.  The patient's ears are cleaned today.  Healthy exam after cleaning. May proceed with audiogram as scheduled. Return as needed for cleaning.     Naida Mendez DNP, APRN, CNP  Otolaryngology  Head & Neck Surgery  [phone]    15 minutes spent on the date of the encounter doing chart review, history and exam, documentation and further activities per the note excluding time performing ear cleaning under microscopy.

## 2025-07-14 NOTE — PROGRESS NOTES
AUDIOLOGY REPORT    SUMMARY: Audiology visit completed. See audiogram for results.      RECOMMENDATIONS: Follow-up with ENT.  Patient was given a hard copy of today's audiogram to hand carry to [facility] for continued hearing aid care.     Sobeida Bueno, CCC-A, [facility]  Licensed Audiologist  [place] #[id number]

## 2025-07-14 NOTE — LETTER
7/14/2025       RE: Alma Johns  [address]  [place] [address]     Dear Colleague,    Thank you for referring your patient, Alma Johns, to the [facility] [place] at [facility]. Please see a copy of my visit note below.      Otolaryngology Clinic  July 14, 2025    HPI:  Alma Johns is here for cerumen impaction removal prior to audiogram. Patient has a longstanding history of bilateral sensorineural hearing loss.  Patient wears hearing aids through [facility].  Does not feel that their hearing aids are working well.  Patient also has a history of bilateral cerumen impaction requiring ear irrigation in primary care.  Patient was recently told by their hearing aid provider that they had bilateral cerumen impaction.    Patient denies any history of frequent ear infections or ear surgeries.      Otologic microscope exam:    Biocular Microscopy exam is needed due to deep impaction of cerumen of bilateral ears, requiring direct visualization for use of cleaning instruments.    Right ear was examined under the microscope.  Cerumen impaction noted. It was cleaned with suction and alligator forceps. Once cleaned, TM visualized under microscope. Normal appearing TM, nicely aerated middle ear space.     Left ear was also examined under the microscope. Ear is clean and dry, free of cerumen. Normal appearing TM, nicely aerated middle ear space.     Assessment and Plan:  1. Impacted cerumen of right ear (Primary)  Patient presents with cerumen impaction of right ear.  The patient's ears are cleaned today.  Healthy exam after cleaning. May proceed with audiogram as scheduled. Return as needed for cleaning.     Naida Mendez DNP, APRN, CNP  Otolaryngology  Head & Neck Surgery  [phone]    15 minutes spent on the date of the encounter doing chart review, history and exam, documentation and further activities per the note excluding time  performing ear cleaning under microscopy.      Again, thank you for allowing me to participate in the care of your patient.      Sincerely,    Yesenia Mendez NP

## 2025-07-14 NOTE — NURSING NOTE
"Chief Complaint   Patient presents with    Consult     Cerumen impaction     Blood pressure 99/51, pulse 71, temperature 97.6  F (36.4  C), height 1.499 m (4' 11\"), weight 57.6 kg (127 lb), SpO2 96%, not currently breastfeeding.  Josh Medina LPN    "

## 2025-07-14 NOTE — TELEPHONE ENCOUNTER
July 14, 2025    Home health orders was received via fax for Dr. Ruano.  Patient label was attached to paperwork and placed in provider's inbox to be signed.    Monica De Paz

## 2025-07-15 ENCOUNTER — ONCOLOGY VISIT (OUTPATIENT)
Dept: ONCOLOGY | Facility: HOSPITAL | Age: OVER 89
End: 2025-07-15
Attending: INTERNAL MEDICINE
Payer: MEDICARE

## 2025-07-15 ENCOUNTER — LAB (OUTPATIENT)
Dept: INFUSION THERAPY | Facility: HOSPITAL | Age: OVER 89
End: 2025-07-15
Attending: INTERNAL MEDICINE
Payer: MEDICARE

## 2025-07-15 ENCOUNTER — RESULTS FOLLOW-UP (OUTPATIENT)
Dept: INTERNAL MEDICINE | Facility: CLINIC | Age: OVER 89
End: 2025-07-15

## 2025-07-15 ENCOUNTER — PATIENT OUTREACH (OUTPATIENT)
Dept: ONCOLOGY | Facility: HOSPITAL | Age: OVER 89
End: 2025-07-15

## 2025-07-15 VITALS
RESPIRATION RATE: 16 BRPM | WEIGHT: 125 LBS | TEMPERATURE: 98 F | HEART RATE: 56 BPM | OXYGEN SATURATION: 95 % | SYSTOLIC BLOOD PRESSURE: 130 MMHG | DIASTOLIC BLOOD PRESSURE: 60 MMHG | BODY MASS INDEX: 25.2 KG/M2 | HEIGHT: 59 IN

## 2025-07-15 DIAGNOSIS — C18.2 MALIGNANT NEOPLASM OF ASCENDING COLON (H): Primary | ICD-10-CM

## 2025-07-15 DIAGNOSIS — F41.9 ANXIETY DISORDER, UNSPECIFIED TYPE: ICD-10-CM

## 2025-07-15 DIAGNOSIS — N18.32 STAGE 3B CHRONIC KIDNEY DISEASE (H): ICD-10-CM

## 2025-07-15 DIAGNOSIS — R79.89 ABNORMAL TSH: Primary | ICD-10-CM

## 2025-07-15 DIAGNOSIS — C18.9 COLON ADENOCARCINOMA (H): Primary | ICD-10-CM

## 2025-07-15 LAB
ALBUMIN SERPL BCG-MCNC: 3.1 G/DL (ref 3.5–5.2)
ALP SERPL-CCNC: 52 U/L (ref 40–150)
ALT SERPL W P-5'-P-CCNC: 17 U/L (ref 0–50)
ANION GAP SERPL CALCULATED.3IONS-SCNC: 11 MMOL/L (ref 7–15)
AST SERPL W P-5'-P-CCNC: 17 U/L (ref 0–45)
BASOPHILS # BLD AUTO: 0 10E3/UL (ref 0–0.2)
BASOPHILS NFR BLD AUTO: 0 %
BILIRUB SERPL-MCNC: 0.7 MG/DL
BUN SERPL-MCNC: 29 MG/DL (ref 8–23)
CALCIUM SERPL-MCNC: 9.5 MG/DL (ref 8.8–10.4)
CHLORIDE SERPL-SCNC: 108 MMOL/L (ref 98–107)
CREAT SERPL-MCNC: 1.43 MG/DL (ref 0.51–0.95)
EGFRCR SERPLBLD CKD-EPI 2021: 35 ML/MIN/1.73M2
EOSINOPHIL # BLD AUTO: 0 10E3/UL (ref 0–0.7)
EOSINOPHIL NFR BLD AUTO: 0 %
ERYTHROCYTE [DISTWIDTH] IN BLOOD BY AUTOMATED COUNT: 13.1 % (ref 10–15)
GLUCOSE SERPL-MCNC: 117 MG/DL (ref 70–99)
HCO3 SERPL-SCNC: 22 MMOL/L (ref 22–29)
HCT VFR BLD AUTO: 34.5 % (ref 35–47)
HGB BLD-MCNC: 11 G/DL (ref 11.7–15.7)
IMM GRANULOCYTES # BLD: 0.2 10E3/UL
IMM GRANULOCYTES NFR BLD: 1 %
LYMPHOCYTES # BLD AUTO: 2 10E3/UL (ref 0.8–5.3)
LYMPHOCYTES NFR BLD AUTO: 14 %
MAGNESIUM SERPL-MCNC: 1.1 MG/DL (ref 1.7–2.3)
MCH RBC QN AUTO: 29.5 PG (ref 26.5–33)
MCHC RBC AUTO-ENTMCNC: 31.9 G/DL (ref 31.5–36.5)
MCV RBC AUTO: 93 FL (ref 78–100)
MONOCYTES # BLD AUTO: 1 10E3/UL (ref 0–1.3)
MONOCYTES NFR BLD AUTO: 7 %
NEUTROPHILS # BLD AUTO: 10.7 10E3/UL (ref 1.6–8.3)
NEUTROPHILS NFR BLD AUTO: 77 %
NRBC # BLD AUTO: 0 10E3/UL
NRBC BLD AUTO-RTO: 0 /100
PLATELET # BLD AUTO: 259 10E3/UL (ref 150–450)
POTASSIUM SERPL-SCNC: 3.4 MMOL/L (ref 3.4–5.3)
PROT SERPL-MCNC: 5.5 G/DL (ref 6.4–8.3)
RBC # BLD AUTO: 3.73 10E6/UL (ref 3.8–5.2)
SODIUM SERPL-SCNC: 141 MMOL/L (ref 135–145)
WBC # BLD AUTO: 13.9 10E3/UL (ref 4–11)

## 2025-07-15 PROCEDURE — 83735 ASSAY OF MAGNESIUM: CPT | Performed by: INTERNAL MEDICINE

## 2025-07-15 PROCEDURE — 36415 COLL VENOUS BLD VENIPUNCTURE: CPT

## 2025-07-15 PROCEDURE — 82310 ASSAY OF CALCIUM: CPT

## 2025-07-15 PROCEDURE — 99213 OFFICE O/P EST LOW 20 MIN: CPT | Performed by: INTERNAL MEDICINE

## 2025-07-15 PROCEDURE — 85004 AUTOMATED DIFF WBC COUNT: CPT

## 2025-07-15 PROCEDURE — G0463 HOSPITAL OUTPT CLINIC VISIT: HCPCS | Performed by: INTERNAL MEDICINE

## 2025-07-15 ASSESSMENT — PAIN SCALES - GENERAL: PAINLEVEL_OUTOF10: NO PAIN (0)

## 2025-07-15 NOTE — PROGRESS NOTES
[facility] Hematology and Oncology Progress Note    Patient: Alma Johns  MRN: [id number]  Date of Service: Jul 15, 2025       Reason for Visit    I was consulted by   Lucy Ruano MD     For evaluation and management of newly diagnosed colon cancer.      Encounter Diagnoses Assessment and Plan:    Problem List Items Addressed This Visit       Stage 3b chronic kidney disease (H)    Relevant Orders    CBC with Platelets & Differential    Comprehensive metabolic panel    Magnesium     Other Visit Diagnoses         Malignant neoplasm of ascending colon (H)    -  Primary    Relevant Orders    CBC with Platelets & Differential    Comprehensive metabolic panel    Magnesium          Patient with newly diagnosed apple core adenocarcinoma of the hepatic flexure.  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  Patient does not wish to have chemotherapy.  She had a surgical consultation on 12/30/2024.  Palliative surgery was deferred until the patient developed obstructive symptoms.  We again discussed palliative chemotherapy.  Patient has agreed to a trial of capecitabine.  Treatment plan has been entered.     Update 3/6/2025.  Patient returns for day 1 cycle 2 of capecitabine.  She is tolerating it without difficulty.  Her EGFR has improved to 41.  Calcium is slightly increased at 10.7.  She did have laser lithotripsy on 1/31/2025.  She will continue with capecitabine 1000 mg twice daily for 2 weeks on and 1 week off.  Follow-up is planned for 3 weeks at the start of her next cycle.    Update 4/29/2025 Patient returns after completing 2 cycles of capecitabine.  Unfortunately her EGFR still low at 24.  I advised her to hold capecitabine for 4 more weeks after which we will recheck her chemistry profile.  She had a recent ultrasound which shows an atrophic right kidney and a large staghorn calculus on the left.    Update 5/27/2025 patient returns for follow-up.  She has been off capecitabine chemotherapy for  4 weeks.  Unfortunately her renal function continues to decline and her EGFR is now at 18.  I advised a trial of panitumumab and treatment plan is entered.    Update 6/12/2025: Patient received her first dose of panitumumab on 6/2/2025.  She has had no adverse effects.  However she did not tolerate doxycycline prophylaxis.  She has discontinued that and so far has no evidence of skin rash.  PET CT scan of 6/10/2025 shows decreased FDG avidity in the lymph nodes below the diaphragm.  She will return on 6/16/2025 for day 15 of panitumumab.  She will return on 7/1/2025 to begin day 1 cycle 2 of panitumumab.    Update 7/1/2025.  Patient returns to continue panitumumab.  She missed day 15 of cycle 1 as she was hospitalized for a TIA.  She has recovered from the TIA however she is now on aspirin and Plavix.  This will increase the risk of her bleeding from her colon cancer..  She is struggling with neck pain.  She was taking Celebrex for this but has discontinued while on antiplatelet therapy.  Advised continuing with Tylenol and using heat along with a soft cervical collar at night.  She will receive panitumumab today and return in 2 weeks for her next infusion.    Update 7/15/2025: Patient returns for follow-up.  She notes continued fatigue.  She believes it is related to her panitumumab infusion.  She will defer for 2 weeks before starting cycle 2.  Her renal function has improved.  This may be because she has discontinued Celebrex.          ______________________________________________________________________________    Staging History   Cancer Staging   Colon adenocarcinoma (H)  Staging form: Colon and Rectum, AJCC 8th Edition  - Clinical stage from 1/9/2025: Stage IVB (cTX, cNX, cM1b) - Signed by Friedell, Peter E, MD on 1/9/2025    ECOG Performance  1 - Can't do physically strenuous work, but fully ambulatory and can do light sedentary work.    History of Present Illness      Alma Johns is a 89 year old woman  with a history of hypercalcemia with elevated parathyroid hormone.  Over the last year she has had unexplained weight loss of about 20 pounds.  On 12/10/2024 she underwent CT scan of the abdomen and pelvis which showed probable lesion at the hepatic flexure of the colon and suspicious retroperitoneal adenopathy.  On 12/19/2024 she underwent colonoscopy which confirmed an adenocarcinoma of the colon.  Upper GI endoscopic ultrasound confirmed metastases in a upper abdominal lymph node adjacent to the pancreas near the mesenteric root.  Multiple pancreatic cysts appeared benign.      Diagnosis:  12/10/2024 CT scan of the abdomen shows wall thickening in the right transverse colon  12/19/2025 upper GI endoscopic ultrasound shows metastatic adenocarcinoma of the colon  Proteins are intact by immunohistochemistry  NGS panel shows   APC R283*  PIK3CA R38H  TMB score 5.115   mut/Mb  K-ramin NRAS BRAF mutations are not detected  1/6/2025 PET/CT scan shows FDG avidity in the colon tumor and FDG avid lymphadenopathy above and below the diaphragm  6/10/2025 PET CT scan shows decreased FDG avidity of lymph nodes above and below the diaphragm    Treatment:  2/3/2025 capecitabine 1000 mg twice daily  4/11/2025 capecitabine discontinued because of progressive renal insufficiency  6/2/2025 panitumumab started 6 mg/kg    Interval history:    Presently she feels fairly well.  Her appetite was decreased while taking doxycycline.  She stopped that drug after 1 week and her appetite improved.  Overall she lost about 1 pound since the last visit.  She is having no difficulty with bowel movements.  She does not have pain.  She does not have cough, chest pain or shortness of breath.  She was taking Celebrex  but discontinued as she was hospitalized for a TIA June 16 through 18.  She was placed on aspirin and Plavix and then discharged from the hospital.  She has no persistent neurologic deficit.        Review of systems.  Pertinent Findings  "are included in the History of Present Illness    Physical Exam    /60 (BP Location: Left arm, Patient Position: Sitting, Cuff Size: Adult Regular)   Pulse 56   Temp 98  F (36.7  C) (Oral)   Resp 16   Ht 1.499 m (4' 11\")   Wt 56.7 kg (125 lb)   LMP  (LMP Unknown)   SpO2 95%   BMI 25.25 kg/m       GENERAL APPEARANCE: 89year-old woman in no apparent distress.  HEAD: Atraumatic; normocephalic; without lesions.  EYES: Conjunctiva, corneas and eyelids normal; pupils equal, round, reactive to light; No Icterus.  MOUTH/OROPHARYNX: Oral mucosa intact  NECK: Supple with no nodes.  LUNGS:  Clear to auscultation and percussion with no extra sounds.  HEART: Regular rhythm and rate; S1 and S2 normal; no murmurs noted.  ABDOMEN: Soft; no masses or tenderness, no hepatosplenomegaly.  NEUROLOGIC: Alert and oriented.  No obvious focal findings.  EXTREMITIES: No cyanosis, or edema.  SKIN: Increased bruising while on aspirin and Plavix no suspicious lesions noted on exposed skin.  PSYCHIATRIC: Mental status normal; no apparent psychiatric issues    Medications:    Current Outpatient Medications   Medication Sig Dispense Refill    alendronate (FOSAMAX) 70 MG tablet Take 1 tablet (70 mg) by mouth every 7 days 12 tablet 0    amLODIPine (NORVASC) 10 MG tablet Take 1 tablet (10 mg) by mouth daily 90 tablet 3    aspirin (ASA) 81 MG chewable tablet Take 81 mg by mouth daily.      atorvastatin (LIPITOR) 40 MG tablet Take 40 mg by mouth at bedtime.      clopidogrel (PLAVIX) 75 MG tablet Take 75 mg by mouth daily.      dexAMETHasone (DECADRON) 1 MG tablet Take 1 tablet (1 mg) by mouth daily (with breakfast). 30 tablet 0    doxazosin (CARDURA) 4 MG tablet TAKE ONE TABLET BY MOUTH ONE TIME DAILY. 90 tablet 3    escitalopram (LEXAPRO) 10 MG tablet Take 1 tablet (10 mg) by mouth daily. 90 tablet 3    escitalopram (LEXAPRO) 5 MG tablet Take 1 tablet (5 mg) by mouth daily. 90 tablet 3    latanoprost (XALATAN) 0.005 % ophthalmic solution " [LATANOPROST (XALATAN) 0.005 % OPHTHALMIC SOLUTION] Administer 1 drop to both eyes bedtime.      loperamide (IMODIUM A-D) 2 MG tablet Take 1 tablet (2 mg) by mouth 4 times daily as needed for diarrhea. 30 tablet 2    LORazepam (ATIVAN) 0.5 MG tablet Take 1 tablet (0.5 mg) by mouth every 6 hours. 30 tablet 0    therapeutic multivitamin (THERAGRAN) tablet [THERAPEUTIC MULTIVITAMIN (THERAGRAN) TABLET] Take 1 tablet by mouth daily.      timolol maleate (TIMOPTIC) 0.5 % ophthalmic solution [TIMOLOL MALEATE (TIMOPTIC) 0.5 % OPHTHALMIC SOLUTION] INSTILL 1 DROP INTO BOTH EYES Q EVENING  3     No current facility-administered medications for this visit.           Past History    Past Medical History:   Diagnosis Date    Colon cancer (H)     Stage IV with presence in lymph nodes and other distant body parts    Complaint related to dreams 2019    Depressive disorder     Dyslipidemia     dyslipoproteinemia    Glaucoma     Hearing problem     Hypercalcemia     Hyperparathyroidism     Hypertension     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    OA (osteoarthritis)     Osteopenia     PONV (postoperative nausea and vomiting)     Postmenopausal     Retinal vein occlusion (H)     Thyroid disease     Vitamin D deficiency      Past Surgical History:   Procedure Laterality Date    APPENDECTOMY  1985    Incidental    BIOPSY BREAST       SECTION      and     CHOLECYSTECTOMY      COLONOSCOPY N/A 2024    Procedure: Colonoscopy with biopsies and polypectomy, endoscopic marker;  Surgeon: Ron Hogan MD;  Location: [facility] OR    ENTEROSCOPY SMALL BOWEL N/A 2024    Procedure: Enteroscopy small bowel;  Surgeon: Ron Hogan MD;  Location: [facility] OR    ESOPHAGOSCOPY, GASTROSCOPY, DUODENOSCOPY (EGD), COMBINED N/A 2024    Procedure: ESOPHAGOGASTRODUODENOSCOPY, WITH FINE NEEDLE ASPIRATION BIOPSY, WITH ENDOSCOPIC ULTRASOUND GUIDANCE;  Surgeon: Ron Hogan MD;  Location: [facility] OR    HYSTERECTOMY  TOTAL ABDOMINAL, BILATERAL SALPINGO-OOPHORECTOMY, COMBINED  1985    KIDNEY STONE SURGERY      extraction    LUMBAR LAMINECTOMY  2014    TOTAL HIP ARTHROPLASTY Left 01/01/2009    TOTAL KNEE ARTHROPLASTY Right 01/01/2009    TUBAL LIGATION       No Known Allergies    No family history on file.  Social History     Socioeconomic History    Marital status: Single     Spouse name: None    Number of children: None    Years of education: None    Highest education level: None   Tobacco Use    Smoking status: Never     Passive exposure: Past    Smokeless tobacco: Never   Vaping Use    Vaping status: Never Used   Substance and Sexual Activity    Alcohol use: No    Drug use: No   Social History Narrative    Lives alone. 2 hip replacements and 1 knee replacement. Used to work in housekeeping at [employer]. Saw Dr Crabtree. Has a daughter and son, and grandchildren and great grandchildren.     Social Drivers of Health     Financial Resource Strain: Low Risk  (8/12/2024)    Financial Resource Strain     Within the past 12 months, have you or your family members you live with been unable to get utilities (heat, electricity) when it was really needed?: No   Food Insecurity: Low Risk  (8/12/2024)    Food Insecurity     Within the past 12 months, did you worry that your food would run out before you got money to buy more?: No     Within the past 12 months, did the food you bought just not last and you didn t have money to get more?: No   Transportation Needs: Low Risk  (8/12/2024)    Transportation Needs     Within the past 12 months, has lack of transportation kept you from medical appointments, getting your medicines, non-medical meetings or appointments, work, or from getting things that you need?: No   Physical Activity: Inactive (8/12/2024)    Exercise Vital Sign     Days of Exercise per Week: 0 days     Minutes of Exercise per Session: 0 min   Stress: No Stress Concern Present (8/12/2024)    [employer] - Occupational Stress Questionnaire     Feeling of Stress : Only a little   Social Connections: Unknown (8/12/2024)    Social Connection and Isolation Panel [NHANES]     Frequency of Social Gatherings with Friends and Family: Once a week   Interpersonal Safety: Low Risk  (12/19/2024)    Interpersonal Safety     Do you feel physically and emotionally safe where you currently live?: Yes     Within the past 12 months, have you been hit, slapped, kicked or otherwise physically hurt by someone?: No     Within the past 12 months, have you been humiliated or emotionally abused in other ways by your partner or ex-partner?: No   Housing Stability: Low Risk  (8/12/2024)    Housing Stability     Do you have housing? : Yes     Are you worried about losing your housing?: No           Lab Results    Recent Results (from the past 240 hours)   Comprehensive metabolic panel    Collection Time: 07/15/25  8:04 AM   Result Value Ref Range    Sodium 141 135 - 145 mmol/L    Potassium 3.4 3.4 - 5.3 mmol/L    Carbon Dioxide (CO2) 22 22 - 29 mmol/L    Anion Gap 11 7 - 15 mmol/L    Urea Nitrogen 29.0 (H) 8.0 - 23.0 mg/dL    Creatinine 1.43 (H) 0.51 - 0.95 mg/dL    GFR Estimate 35 (L) >60 mL/min/1.73m2    Calcium 9.5 8.8 - 10.4 mg/dL    Chloride 108 (H) 98 - 107 mmol/L    Glucose 117 (H) 70 - 99 mg/dL    Alkaline Phosphatase 52 40 - 150 U/L    AST 17 0 - 45 U/L    ALT 17 0 - 50 U/L    Protein Total 5.5 (L) 6.4 - 8.3 g/dL    Albumin 3.1 (L) 3.5 - 5.2 g/dL    Bilirubin Total 0.7 <=1.2 mg/dL   CBC with platelets and differential    Collection Time: 07/15/25  8:04 AM   Result Value Ref Range    WBC Count 13.9 (H) 4.0 - 11.0 10e3/uL    RBC Count 3.73 (L) 3.80 - 5.20 10e6/uL    Hemoglobin 11.0 (L) 11.7 - 15.7 g/dL    Hematocrit 34.5 (L) 35.0 - 47.0 %    MCV 93 78 - 100 fL    MCH 29.5 26.5 - 33.0 pg    MCHC 31.9 31.5 - 36.5 g/dL    RDW 13.1 10.0 - 15.0 %    Platelet Count 259 150 - 450 10e3/uL    % Neutrophils 77 %    % Lymphocytes 14 %    %  Monocytes 7 %    % Eosinophils 0 %    % Basophils 0 %    % Immature Granulocytes 1 %    NRBCs per 100 WBC 0 <1 /100    Absolute Neutrophils 10.7 (H) 1.6 - 8.3 10e3/uL    Absolute Lymphocytes 2.0 0.8 - 5.3 10e3/uL    Absolute Monocytes 1.0 0.0 - 1.3 10e3/uL    Absolute Eosinophils 0.0 0.0 - 0.7 10e3/uL    Absolute Basophils 0.0 0.0 - 0.2 10e3/uL    Absolute Immature Granulocytes 0.2 <=0.4 10e3/uL    Absolute NRBCs 0.0 10e3/uL   Magnesium    Collection Time: 07/15/25  8:04 AM   Result Value Ref Range    Magnesium 1.1 (L) 1.7 - 2.3 mg/dL       Imaging Results    MR HEAD BRAIN WO  Result Date: 6/16/2025  EXAM: MR HEAD BRAIN WO INDICATION: Facial droop and dysarthria. TECHNIQUE: Multiplanar, multisequence magnetic resonance imaging of the brain is performed without IV contrast administration. COMPARISON: CT of the head without contrast on June 16, 2025. FINDINGS: The ventricles are normal in size, shape, and position.  The CSF spaces are mildly prominent indicating chronic generalized atrophy.  No mass, mass effect, or midline shift.  No acute intracranial hemorrhage.  No restricted diffusion to suggest acute ischemia.  Moderate chronic white matter microvascular disease.  There is good gray-white differentiation.  No abnormal intra-axial hemosiderin deposition.  No abnormal extra-axial fluid collections.  Flow sensitive sequences demonstrate appropriate intracranial flow voids.  Midline structures intact.  Craniocervical junction normal.    1. No acute intracranial process. No ischemia, hemorrhage, or mass. 2. Chronic generalized atrophy and moderate chronic white matter microvascular disease.    CTA HEAD NECK CAROTID STROKE PROTOCOL LULY CANDIDAT  Result Date: 6/16/2025  EXAM: CTA HEAD NECK CAROTID STROKE PROTOCOL LULY CANDIDATE INDICATION: Facial droop and dysarthria. TECHNIQUE: CT angiography of the neck and head was performed following intravenous contrast administration. Multiplanar and 3D reconstructions performed  by both the technologist and interpreting radiologist using both the [employer] workstation and PACS.  Extracranial ICA stenosis calculation is relative to the post-bulbar ICA. While obtaining CT images, dose reduction techniques were utilized including: Automated exposure control, adjustment of mA and/or kV according to patient size, and/or use of iterative reconstruction technique. CONTRAST: 90 mL Omnipaque 350. RADIATION DOSE: 193.5 DLP (mGy cm) COMPARISON: No prior angiographic imaging available.  Concurrent head CT reviewed. FINDINGS: CTA Head:  There is diminished filling of the right M2 branches within the sylvian fissure, suspect focal high-grade stenosis or occlusion of a few mid to distal branches in the upper division.  Suspect diminished filling in the M3 and cortical branches of the of the superior branch of the right MCA.  The ICA and M1 segments are normally patent bilaterally.  Mild atherosclerosis in the cavernous and paraclinoid ICAs without high-grade luminal stenosis.  Mild atherosclerosis of the vertebrobasilar system which is normally patent, noting anatomic variant of hypoplastic distal right vertebral artery.  No evidence of intracranial aneurysm. CTA Neck: The visualized aorta is normal in caliber.  Calcified and noncalcified plaque noted in the arch.  Normal three-vessel arch anatomy. Brachiocephalic and both subclavian arteries patent, without hemodynamically substantial vessel narrowing.Both vertebral arteries patent, without hemodynamically substantial vessel narrowing.  Right vertebral artery mildly hypoplastic throughout its course.  There is atherosclerosis of the carotid bifurcations with noncalcified plaque components slightly greater on the left. About 20% stenosis of the proximal ICAs bilaterally by NASCET criteria no evidence for dissection. Other: Visualized lung apices are grossly clear.  There is marked enlargement of the left thyroid lobe which extends into the upper  mediastinum, and more mild enlargement of the right thyroid lobe.  No pathologic cervical lymph nodes by imaging criteria.  Degenerative changes in the spine.  Paranasal sinus disease again noted.    1. Suspected high-grade stenosis/occlusion of a few right M2 branches within the sylvian fissure with diminished filling of the more distal right M3 and cortical branches in the superior MCA distribution.  The ICA and M1 segments are patent bilaterally. 2. Patent cervical carotid and vertebral arteries without hemodynamically significant stenosis. 3. No evidence of intracranial aneurysm. 4. Thyromegaly left-greater-than-right which can be further evaluated with ultrasound as clinically appropriate in this setting.    CT Head w/o Contrast  Result Date: 6/16/2025  EXAM: CT HEAD BRAIN WO INDICATION: Left-sided facial droop and left leg weakness and drift.  Left hand weakness. TECHNIQUE: Sequential axial images are obtained through the head from skull base to vertex without intravenous contrast.  Multiplanar reconstructions obtained. While obtaining CT images, dose reduction techniques were utilized including: Automated exposure control, adjustment of mA and/or kV according to patient size, and/or use of iterative reconstruction technique. RADIATION DOSE: 1226.0 DLP (mGy cm) COMPARISON: None available. FINDINGS: The ventricles, sulci, and basilar cisterns are moderately enlarged but proportional.  There is moderate patchy areas decreased attenuation the cerebral white matter.  Gray-white interface is maintained.  There is no sulcal effacement.  There is incidental note of bilateral benign basal ganglia calcification.  There is no hemorrhage, mass, mass effect, or midline shift. The calvarium is intact. There is chronic wall thickening and mucosal thickening of the left sphenoid sinus.  The remaining sinuses are clear.  There are no air-fluid levels.  Mastoid air cells are clear.  Orbits are unremarkable.    1. Cerebral  atrophy and chronic microangiopathic change. 2. No acute intracranial pathology. 3. Chronic left sphenoid sinusitis. COMMUNICATION: The information above was relayed directly by me by telephone to Dr. Mckeon at 09:47 on 6/16/2025.            I spent 24  minutes on the patient's visit today.  This included preparation for the visit, face-to-face time with the patient and documentation following the visit.  It did not include teaching or procedure time.    Signed by: Peter E. Friedell, MD

## 2025-07-15 NOTE — PROGRESS NOTES
"Oncology Rooming Note    July 15, 2025 8:23 AM   Alma Johns is a 89 year old female who presents for:    Chief Complaint   Patient presents with    Oncology Clinic Visit     Follow up lab and infusion     Initial Vitals: LMP  (LMP Unknown)  Estimated body mass index is 25.65 kg/m  as calculated from the following:    Height as of 7/14/25: 1.499 m (4' 11\").    Weight as of 7/14/25: 57.6 kg (127 lb). There is no height or weight on file to calculate BSA.  Data Unavailable Comment: Data Unavailable   No LMP recorded (lmp unknown). Patient has had a hysterectomy.  Allergies reviewed: Yes  Medications reviewed: Yes    Medications: Medication refills not needed today.  Pharmacy name entered into [employer]:    [facility] [place], [place] - [address] [facility] [address] [facility] - [place], [place] - [address] [place] [address]    PHQ9:  Did this patient require a PHQ9?: No      Clinical concerns: none       Gregoria Vega CMA            "

## 2025-07-15 NOTE — PROGRESS NOTES
[facility]: Cancer Care                                                                                          Situation: Chart reviewed by RN Care Coordinator.    Background: Patient was seen in clinic today for follow-up regarding colon cancer.    Assessment: Patient reports continued fatigue.  She believes it is related to her panitumumab infusion.    Plan/Recommendations: Treatment will be deferred for 2 weeks.  Will see her again at the end of the month.      Signature:  Shannon Marino  RN Care Coordinator  [facility]  Cancer [facility]

## 2025-07-15 NOTE — LETTER
"7/15/2025      Alma Johns  [address] [place] [address]      Dear Colleague,    Thank you for referring your patient, Alma Johns, to the [facility] CANCER [facility]. Please see a copy of my visit note below.    Oncology Rooming Note    July 15, 2025 8:23 AM   Alma Johns is a 89 year old female who presents for:    Chief Complaint   Patient presents with     Oncology Clinic Visit     Follow up lab and infusion     Initial Vitals: LMP  (LMP Unknown)  Estimated body mass index is 25.65 kg/m  as calculated from the following:    Height as of 7/14/25: 1.499 m (4' 11\").    Weight as of 7/14/25: 57.6 kg (127 lb). There is no height or weight on file to calculate BSA.  Data Unavailable Comment: Data Unavailable   No LMP recorded (lmp unknown). Patient has had a hysterectomy.  Allergies reviewed: Yes  Medications reviewed: Yes    Medications: Medication refills not needed today.  Pharmacy name entered into K[facility]:    [facility] [address] [place] [id number] [facility] - [place], [place] - [address]    PHQ9:  Did this patient require a PHQ9?: No      Clinical concerns: none       Gregoria Vega, [facility] Hematology and Oncology Progress Note    Patient: Alma Johns  MRN: [id number]  Date of Service: Jul 15, 2025       Reason for Visit    I was consulted by   Lucy Ruano MD     For evaluation and management of newly diagnosed colon cancer.      Encounter Diagnoses Assessment and Plan:    Problem List Items Addressed This Visit       Stage 3b chronic kidney disease (H)    Relevant Orders    CBC with Platelets & Differential    Comprehensive metabolic panel    Magnesium     Other Visit Diagnoses         Malignant neoplasm of ascending colon (H)    -  Primary    Relevant Orders    CBC with Platelets & Differential    Comprehensive metabolic panel    Magnesium          Patient with newly diagnosed apple core adenocarcinoma of " the hepatic flexure.  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  Patient does not wish to have chemotherapy.  She had a surgical consultation on 12/30/2024.  Palliative surgery was deferred until the patient developed obstructive symptoms.  We again discussed palliative chemotherapy.  Patient has agreed to a trial of capecitabine.  Treatment plan has been entered.     Update 3/6/2025.  Patient returns for day 1 cycle 2 of capecitabine.  She is tolerating it without difficulty.  Her EGFR has improved to 41.  Calcium is slightly increased at 10.7.  She did have laser lithotripsy on 1/31/2025.  She will continue with capecitabine 1000 mg twice daily for 2 weeks on and 1 week off.  Follow-up is planned for 3 weeks at the start of her next cycle.    Update 4/29/2025 Patient returns after completing 2 cycles of capecitabine.  Unfortunately her EGFR still low at 24.  I advised her to hold capecitabine for 4 more weeks after which we will recheck her chemistry profile.  She had a recent ultrasound which shows an atrophic right kidney and a large staghorn calculus on the left.    Update 5/27/2025 patient returns for follow-up.  She has been off capecitabine chemotherapy for 4 weeks.  Unfortunately her renal function continues to decline and her EGFR is now at 18.  I advised a trial of panitumumab and treatment plan is entered.    Update 6/12/2025: Patient received her first dose of panitumumab on 6/2/2025.  She has had no adverse effects.  However she did not tolerate doxycycline prophylaxis.  She has discontinued that and so far has no evidence of skin rash.  PET CT scan of 6/10/2025 shows decreased FDG avidity in the lymph nodes below the diaphragm.  She will return on 6/16/2025 for day 15 of panitumumab.  She will return on 7/1/2025 to begin day 1 cycle 2 of panitumumab.    Update 7/1/2025.  Patient returns to continue panitumumab.  She missed day 15 of cycle 1 as she was hospitalized for a TIA.  She has  recovered from the TIA however she is now on aspirin and Plavix.  This will increase the risk of her bleeding from her colon cancer..  She is struggling with neck pain.  She was taking Celebrex for this but has discontinued while on antiplatelet therapy.  Advised continuing with Tylenol and using heat along with a soft cervical collar at night.  She will receive panitumumab today and return in 2 weeks for her next infusion.    Update 7/15/2025: Patient returns for follow-up.  She notes continued fatigue.  She believes it is related to her panitumumab infusion.  She will defer for 2 weeks before starting cycle 2.  Her renal function has improved.  This may be because she has discontinued Celebrex.          ______________________________________________________________________________    Staging History   Cancer Staging   Colon adenocarcinoma (H)  Staging form: Colon and Rectum, AJCC 8th Edition  - Clinical stage from 1/9/2025: Stage IVB (cTX, cNX, cM1b) - Signed by Friedell, Peter E, MD on 1/9/2025    ECOG Performance  1 - Can't do physically strenuous work, but fully ambulatory and can do light sedentary work.    History of Present Illness      Alma Johns is a 89 year old woman with a history of hypercalcemia with elevated parathyroid hormone.  Over the last year she has had unexplained weight loss of about 20 pounds.  On 12/10/2024 she underwent CT scan of the abdomen and pelvis which showed probable lesion at the hepatic flexure of the colon and suspicious retroperitoneal adenopathy.  On 12/19/2024 she underwent colonoscopy which confirmed an adenocarcinoma of the colon.  Upper GI endoscopic ultrasound confirmed metastases in a upper abdominal lymph node adjacent to the pancreas near the mesenteric root.  Multiple pancreatic cysts appeared benign.      Diagnosis:  12/10/2024 CT scan of the abdomen shows wall thickening in the right transverse colon  12/19/2025 upper GI endoscopic ultrasound shows metastatic  "adenocarcinoma of the colon  Proteins are intact by immunohistochemistry  NGS panel shows   APC R283*  PIK3CA R38H  TMB score 5.115   mut/Mb  K-ramin NRAS BRAF mutations are not detected  1/6/2025 PET/CT scan shows FDG avidity in the colon tumor and FDG avid lymphadenopathy above and below the diaphragm  6/10/2025 PET CT scan shows decreased FDG avidity of lymph nodes above and below the diaphragm    Treatment:  2/3/2025 capecitabine 1000 mg twice daily  4/11/2025 capecitabine discontinued because of progressive renal insufficiency  6/2/2025 panitumumab started 6 mg/kg    Interval history:    Presently she feels fairly well.  Her appetite was decreased while taking doxycycline.  She stopped that drug after 1 week and her appetite improved.  Overall she lost about 1 pound since the last visit.  She is having no difficulty with bowel movements.  She does not have pain.  She does not have cough, chest pain or shortness of breath.  She was taking Celebrex  but discontinued as she was hospitalized for a TIA June 16 through 18.  She was placed on aspirin and Plavix and then discharged from the hospital.  She has no persistent neurologic deficit.        Review of systems.  Pertinent Findings are included in the History of Present Illness    Physical Exam    /60 (BP Location: Left arm, Patient Position: Sitting, Cuff Size: Adult Regular)   Pulse 56   Temp 98  F (36.7  C) (Oral)   Resp 16   Ht 1.499 m (4' 11\")   Wt 56.7 kg (125 lb)   LMP  (LMP Unknown)   SpO2 95%   BMI 25.25 kg/m       GENERAL APPEARANCE: 89year-old woman in no apparent distress.  HEAD: Atraumatic; normocephalic; without lesions.  EYES: Conjunctiva, corneas and eyelids normal; pupils equal, round, reactive to light; No Icterus.  MOUTH/OROPHARYNX: Oral mucosa intact  NECK: Supple with no nodes.  LUNGS:  Clear to auscultation and percussion with no extra sounds.  HEART: Regular rhythm and rate; S1 and S2 normal; no murmurs noted.  ABDOMEN: Soft; no " masses or tenderness, no hepatosplenomegaly.  NEUROLOGIC: Alert and oriented.  No obvious focal findings.  EXTREMITIES: No cyanosis, or edema.  SKIN: Increased bruising while on aspirin and Plavix no suspicious lesions noted on exposed skin.  PSYCHIATRIC: Mental status normal; no apparent psychiatric issues    Medications:    Current Outpatient Medications   Medication Sig Dispense Refill     alendronate (FOSAMAX) 70 MG tablet Take 1 tablet (70 mg) by mouth every 7 days 12 tablet 0     amLODIPine (NORVASC) 10 MG tablet Take 1 tablet (10 mg) by mouth daily 90 tablet 3     aspirin (ASA) 81 MG chewable tablet Take 81 mg by mouth daily.       atorvastatin (LIPITOR) 40 MG tablet Take 40 mg by mouth at bedtime.       clopidogrel (PLAVIX) 75 MG tablet Take 75 mg by mouth daily.       dexAMETHasone (DECADRON) 1 MG tablet Take 1 tablet (1 mg) by mouth daily (with breakfast). 30 tablet 0     doxazosin (CARDURA) 4 MG tablet TAKE ONE TABLET BY MOUTH ONE TIME DAILY. 90 tablet 3     escitalopram (LEXAPRO) 10 MG tablet Take 1 tablet (10 mg) by mouth daily. 90 tablet 3     escitalopram (LEXAPRO) 5 MG tablet Take 1 tablet (5 mg) by mouth daily. 90 tablet 3     latanoprost (XALATAN) 0.005 % ophthalmic solution [LATANOPROST (XALATAN) 0.005 % OPHTHALMIC SOLUTION] Administer 1 drop to both eyes bedtime.       loperamide (IMODIUM A-D) 2 MG tablet Take 1 tablet (2 mg) by mouth 4 times daily as needed for diarrhea. 30 tablet 2     LORazepam (ATIVAN) 0.5 MG tablet Take 1 tablet (0.5 mg) by mouth every 6 hours. 30 tablet 0     therapeutic multivitamin (THERAGRAN) tablet [THERAPEUTIC MULTIVITAMIN (THERAGRAN) TABLET] Take 1 tablet by mouth daily.       timolol maleate (TIMOPTIC) 0.5 % ophthalmic solution [TIMOLOL MALEATE (TIMOPTIC) 0.5 % OPHTHALMIC SOLUTION] INSTILL 1 DROP INTO BOTH EYES Q EVENING  3     No current facility-administered medications for this visit.           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     OA (osteoarthritis)      Osteopenia      PONV (postoperative nausea and vomiting)      Postmenopausal      Retinal vein occlusion (H)      Thyroid disease      Vitamin D deficiency      Past Surgical History:   Procedure Laterality Date     APPENDECTOMY  1985    Incidental     BIOPSY BREAST        SECTION      and      CHOLECYSTECTOMY       COLONOSCOPY N/A 2024    Procedure: Colonoscopy with biopsies and polypectomy, endoscopic marker;  Surgeon: Ron Hogan MD;  Location: [facility] OR     ENTEROSCOPY SMALL BOWEL N/A 2024    Procedure: Enteroscopy small bowel;  Surgeon: Ron Hogan MD;  Location: [facility] OR     ESOPHAGOSCOPY, GASTROSCOPY, DUODENOSCOPY (EGD), COMBINED N/A 2024    Procedure: ESOPHAGOGASTRODUODENOSCOPY, WITH FINE NEEDLE ASPIRATION BIOPSY, WITH ENDOSCOPIC ULTRASOUND GUIDANCE;  Surgeon: Ron Hogan MD;  Location: [facility] OR     HYSTERECTOMY TOTAL ABDOMINAL, BILATERAL SALPINGO-OOPHORECTOMY, COMBINED       KIDNEY STONE SURGERY      extraction     LUMBAR LAMINECTOMY       TOTAL HIP ARTHROPLASTY Left 2009     TOTAL KNEE ARTHROPLASTY Right 2009     TUBAL LIGATION       No Known Allergies    No family history on file.  Social History     Socioeconomic History     Marital status: Single     Spouse name: None     Number of children: None     Years of education: None     Highest education level: None   Tobacco Use     Smoking status: Never     Passive exposure: Past     Smokeless tobacco: Never   Vaping Use     Vaping status: Never Used   Substance and Sexual Activity     Alcohol use: No     Drug use: No   Social  History Narrative    Lives alone. 2 hip replacements and 1 knee replacement. Used to work in housekeeping at [employer]. Saw Dr Crabtree. Has a daughter and son, and grandchildren and great grandchildren.     Social Drivers of Health     Financial Resource Strain: Low Risk  (8/12/2024)    Financial Resource Strain      Within the past 12 months, have you or your family members you live with been unable to get utilities (heat, electricity) when it was really needed?: No   Food Insecurity: Low Risk  (8/12/2024)    Food Insecurity      Within the past 12 months, did you worry that your food would run out before you got money to buy more?: No      Within the past 12 months, did the food you bought just not last and you didn t have money to get more?: No   Transportation Needs: Low Risk  (8/12/2024)    Transportation Needs      Within the past 12 months, has lack of transportation kept you from medical appointments, getting your medicines, non-medical meetings or appointments, work, or from getting things that you need?: No   Physical Activity: Inactive (8/12/2024)    Exercise Vital Sign      Days of Exercise per Week: 0 days      Minutes of Exercise per Session: 0 min   Stress: No Stress Concern Present (8/12/2024)    [employer] - Occupational Stress Questionnaire      Feeling of Stress : Only a little   Social Connections: Unknown (8/12/2024)    Social Connection and Isolation Panel [NHANES]      Frequency of Social Gatherings with Friends and Family: Once a week   Interpersonal Safety: Low Risk  (12/19/2024)    Interpersonal Safety      Do you feel physically and emotionally safe where you currently live?: Yes      Within the past 12 months, have you been hit, slapped, kicked or otherwise physically hurt by someone?: No      Within the past 12 months, have you been humiliated or emotionally abused in other ways by your partner or ex-partner?: No   Housing Stability: Low Risk   (8/12/2024)    Housing Stability      Do you have housing? : Yes      Are you worried about losing your housing?: No           Lab Results    Recent Results (from the past 240 hours)   Comprehensive metabolic panel    Collection Time: 07/15/25  8:04 AM   Result Value Ref Range    Sodium 141 135 - 145 mmol/L    Potassium 3.4 3.4 - 5.3 mmol/L    Carbon Dioxide (CO2) 22 22 - 29 mmol/L    Anion Gap 11 7 - 15 mmol/L    Urea Nitrogen 29.0 (H) 8.0 - 23.0 mg/dL    Creatinine 1.43 (H) 0.51 - 0.95 mg/dL    GFR Estimate 35 (L) >60 mL/min/1.73m2    Calcium 9.5 8.8 - 10.4 mg/dL    Chloride 108 (H) 98 - 107 mmol/L    Glucose 117 (H) 70 - 99 mg/dL    Alkaline Phosphatase 52 40 - 150 U/L    AST 17 0 - 45 U/L    ALT 17 0 - 50 U/L    Protein Total 5.5 (L) 6.4 - 8.3 g/dL    Albumin 3.1 (L) 3.5 - 5.2 g/dL    Bilirubin Total 0.7 <=1.2 mg/dL   CBC with platelets and differential    Collection Time: 07/15/25  8:04 AM   Result Value Ref Range    WBC Count 13.9 (H) 4.0 - 11.0 10e3/uL    RBC Count 3.73 (L) 3.80 - 5.20 10e6/uL    Hemoglobin 11.0 (L) 11.7 - 15.7 g/dL    Hematocrit 34.5 (L) 35.0 - 47.0 %    MCV 93 78 - 100 fL    MCH 29.5 26.5 - 33.0 pg    MCHC 31.9 31.5 - 36.5 g/dL    RDW 13.1 10.0 - 15.0 %    Platelet Count 259 150 - 450 10e3/uL    % Neutrophils 77 %    % Lymphocytes 14 %    % Monocytes 7 %    % Eosinophils 0 %    % Basophils 0 %    % Immature Granulocytes 1 %    NRBCs per 100 WBC 0 <1 /100    Absolute Neutrophils 10.7 (H) 1.6 - 8.3 10e3/uL    Absolute Lymphocytes 2.0 0.8 - 5.3 10e3/uL    Absolute Monocytes 1.0 0.0 - 1.3 10e3/uL    Absolute Eosinophils 0.0 0.0 - 0.7 10e3/uL    Absolute Basophils 0.0 0.0 - 0.2 10e3/uL    Absolute Immature Granulocytes 0.2 <=0.4 10e3/uL    Absolute NRBCs 0.0 10e3/uL   Magnesium    Collection Time: 07/15/25  8:04 AM   Result Value Ref Range    Magnesium 1.1 (L) 1.7 - 2.3 mg/dL       Imaging Results    MR HEAD BRAIN WO  Result Date: 6/16/2025  EXAM: MR HEAD BRAIN WO INDICATION: Facial droop and  dysarthria. TECHNIQUE: Multiplanar, multisequence magnetic resonance imaging of the brain is performed without IV contrast administration. COMPARISON: CT of the head without contrast on June 16, 2025. FINDINGS: The ventricles are normal in size, shape, and position.  The CSF spaces are mildly prominent indicating chronic generalized atrophy.  No mass, mass effect, or midline shift.  No acute intracranial hemorrhage.  No restricted diffusion to suggest acute ischemia.  Moderate chronic white matter microvascular disease.  There is good gray-white differentiation.  No abnormal intra-axial hemosiderin deposition.  No abnormal extra-axial fluid collections.  Flow sensitive sequences demonstrate appropriate intracranial flow voids.  Midline structures intact.  Craniocervical junction normal.    1. No acute intracranial process. No ischemia, hemorrhage, or mass. 2. Chronic generalized atrophy and moderate chronic white matter microvascular disease.    CTA HEAD NECK CAROTID STROKE PROTOCOL LULY CANDIDAT  Result Date: 6/16/2025  EXAM: CTA HEAD NECK CAROTID STROKE PROTOCOL LULY CANDIDATE INDICATION: Facial droop and dysarthria. TECHNIQUE: CT angiography of the neck and head was performed following intravenous contrast administration. Multiplanar and 3D reconstructions performed by both the technologist and interpreting radiologist using both the Epocrates workstation and PACS.  Extracranial ICA stenosis calculation is relative to the post-bulbar ICA. While obtaining CT images, dose reduction techniques were utilized including: Automated exposure control, adjustment of mA and/or kV according to patient size, and/or use of iterative reconstruction technique. CONTRAST: 90 mL Omnipaque 350. RADIATION DOSE: 193.5 DLP (mGy cm) COMPARISON: No prior angiographic imaging available.  Concurrent head CT reviewed. FINDINGS: CTA Head:  There is diminished filling of the right M2 branches within the sylvian fissure, suspect focal  high-grade stenosis or occlusion of a few mid to distal branches in the upper division.  Suspect diminished filling in the M3 and cortical branches of the of the superior branch of the right MCA.  The ICA and M1 segments are normally patent bilaterally.  Mild atherosclerosis in the cavernous and paraclinoid ICAs without high-grade luminal stenosis.  Mild atherosclerosis of the vertebrobasilar system which is normally patent, noting anatomic variant of hypoplastic distal right vertebral artery.  No evidence of intracranial aneurysm. CTA Neck: The visualized aorta is normal in caliber.  Calcified and noncalcified plaque noted in the arch.  Normal three-vessel arch anatomy. Brachiocephalic and both subclavian arteries patent, without hemodynamically substantial vessel narrowing.Both vertebral arteries patent, without hemodynamically substantial vessel narrowing.  Right vertebral artery mildly hypoplastic throughout its course.  There is atherosclerosis of the carotid bifurcations with noncalcified plaque components slightly greater on the left. About 20% stenosis of the proximal ICAs bilaterally by NASCET criteria no evidence for dissection. Other: Visualized lung apices are grossly clear.  There is marked enlargement of the left thyroid lobe which extends into the upper mediastinum, and more mild enlargement of the right thyroid lobe.  No pathologic cervical lymph nodes by imaging criteria.  Degenerative changes in the spine.  Paranasal sinus disease again noted.    1. Suspected high-grade stenosis/occlusion of a few right M2 branches within the sylvian fissure with diminished filling of the more distal right M3 and cortical branches in the superior MCA distribution.  The ICA and M1 segments are patent bilaterally. 2. Patent cervical carotid and vertebral arteries without hemodynamically significant stenosis. 3. No evidence of intracranial aneurysm. 4. Thyromegaly left-greater-than-right which can be further evaluated  with ultrasound as clinically appropriate in this setting.    CT Head w/o Contrast  Result Date: 6/16/2025  EXAM: CT HEAD BRAIN WO INDICATION: Left-sided facial droop and left leg weakness and drift.  Left hand weakness. TECHNIQUE: Sequential axial images are obtained through the head from skull base to vertex without intravenous contrast.  Multiplanar reconstructions obtained. While obtaining CT images, dose reduction techniques were utilized including: Automated exposure control, adjustment of mA and/or kV according to patient size, and/or use of iterative reconstruction technique. RADIATION DOSE: 1226.0 DLP (mGy cm) COMPARISON: None available. FINDINGS: The ventricles, sulci, and basilar cisterns are moderately enlarged but proportional.  There is moderate patchy areas decreased attenuation the cerebral white matter.  Gray-white interface is maintained.  There is no sulcal effacement.  There is incidental note of bilateral benign basal ganglia calcification.  There is no hemorrhage, mass, mass effect, or midline shift. The calvarium is intact. There is chronic wall thickening and mucosal thickening of the left sphenoid sinus.  The remaining sinuses are clear.  There are no air-fluid levels.  Mastoid air cells are clear.  Orbits are unremarkable.    1. Cerebral atrophy and chronic microangiopathic change. 2. No acute intracranial pathology. 3. Chronic left sphenoid sinusitis. COMMUNICATION: The information above was relayed directly by me by telephone to Dr. Mckeon at 09:47 on 6/16/2025.            I spent 24  minutes on the patient's visit today.  This included preparation for the visit, face-to-face time with the patient and documentation following the visit.  It did not include teaching or procedure time.    Signed by: Peter E. Friedell, MD          Again, thank you for allowing me to participate in the care of your patient.        Sincerely,        Peter E. Friedell, MD    Electronically signed

## 2025-07-15 NOTE — TELEPHONE ENCOUNTER
July 15, 2025    Home health orders was picked up from outbox of Dr. Ruano and sent via fax to [phone] 15, 2025

## 2025-07-16 NOTE — TELEPHONE ENCOUNTER
Spoke with patient's daughter and relayed information below from Dr Ruano. She verbalized understanding and states the patient has no additional symptoms to report. Lab appointment scheduled and daughter states she will also relay this information to the patient.

## 2025-07-23 DIAGNOSIS — I10 HTN (HYPERTENSION): ICD-10-CM

## 2025-07-23 DIAGNOSIS — M19.90 ARTHRITIS: Primary | ICD-10-CM

## 2025-07-24 RX ORDER — CELECOXIB 200 MG/1
200 CAPSULE ORAL DAILY
Qty: 90 CAPSULE | Refills: 1 | Status: SHIPPED | OUTPATIENT
Start: 2025-07-24

## 2025-07-24 RX ORDER — DOXAZOSIN 4 MG/1
4 TABLET ORAL DAILY
Qty: 90 TABLET | Refills: 3 | Status: SHIPPED | OUTPATIENT
Start: 2025-07-24

## 2025-07-28 ENCOUNTER — TELEPHONE (OUTPATIENT)
Dept: INTERNAL MEDICINE | Facility: CLINIC | Age: OVER 89
End: 2025-07-28
Payer: MEDICARE

## 2025-07-28 NOTE — TELEPHONE ENCOUNTER
July 28, 2025    Home health orders was received via fax for Dr. Ruano.  Patient label was attached to paperwork and placed in provider's inbox to be signed.    Monica De Paz

## 2025-07-28 NOTE — TELEPHONE ENCOUNTER
Attempted to contact Jovita Nurse with [facility], at direct extension. Left voicemail message requesting return call to clinic for verbal orders. Unable to confirm confidential voicemail box.     KETAN Holder

## 2025-07-28 NOTE — TELEPHONE ENCOUNTER
Spoke with Intake nurse at [facility] to reach Jovita, Hospice Nurse. Per intake staff, this patient is not a patient of theirs.

## 2025-07-28 NOTE — TELEPHONE ENCOUNTER
Home Care is calling regarding an established patient with [facility].  Requesting orders from: Lucy Ruano  PROVIDER AUTHORIZATION REQUIRED: RN unable to provide verbal approval for all orders.  See below for additional information.  RN will contact Home care with information after provider review.  Is this a request for a temporary pause in the home care episode?  No    Orders Requested    Skilled Nursing for Behavioral Health   Request for initial certification (first set of orders)   Frequency: 1x/wk for 4 wks starting 8/4/25.   Plan to work on depression and assist with adjusting to end of life.   RN gave verbal order: No: will ask for PCP approval for verbal order as behavioral health visit not included in nurse standing order.        Contacts       Contact Date/Time Type Contact Phone/Fax    07/28/2025 01:04 PM CDT Phone (Incoming) Nurse Jovita with [facility] (Home Care) [phone]     Jovita's Direct Extension: [phone]          Candace Orourke RN

## 2025-07-29 NOTE — TELEPHONE ENCOUNTER
July 29, 2025    Home health orders was picked up from outbox of Dr. Ruano and sent via fax to [phone].    Monica De Paz

## 2025-07-29 NOTE — TELEPHONE ENCOUNTER
Notified Jovita, Nurse with [facility], of verbal order for skilled nursing for behavioral health approved by Dr. Ruano. Jovita verbalized understanding.     KETAN Holder   [facility]

## 2025-07-30 ENCOUNTER — NURSE TRIAGE (OUTPATIENT)
Dept: INTERNAL MEDICINE | Facility: CLINIC | Age: OVER 89
End: 2025-07-30
Payer: MEDICARE

## 2025-07-30 DIAGNOSIS — F41.9 ANXIETY DUE TO INVASIVE PROCEDURE: ICD-10-CM

## 2025-07-30 DIAGNOSIS — N39.0 RECURRENT UTI: Primary | ICD-10-CM

## 2025-07-30 DIAGNOSIS — R06.02 SOB (SHORTNESS OF BREATH): ICD-10-CM

## 2025-07-30 RX ORDER — LORAZEPAM 0.5 MG/1
0.5 TABLET ORAL EVERY 6 HOURS
Qty: 30 TABLET | Refills: 0 | Status: SHIPPED | OUTPATIENT
Start: 2025-07-30

## 2025-07-30 NOTE — TELEPHONE ENCOUNTER
Spoke with patient's daughter Va (CTC on file) and relayed information below from Dr Ruano. Va verbalized understanding and agrees with the plan. States they will see the oncologist as planned and is ok with the E-consult.

## 2025-07-30 NOTE — TELEPHONE ENCOUNTER
I pended [facility] , recommend chest xray as well . I have ordered it she can get it done tomorrow   Her thyroid test is abnormal , I think its because of her cancer and not a new separate thyroid problem . I am going to consult endocrinology with an E consult that is an online consult which is billed to medicare only because I want to confirm if we just monitor it or do some specific intervention or could it be making her SOB and fatigued . I will also let her cancer doctor know . Please ask her if she is oK with the e consult /

## 2025-07-30 NOTE — TELEPHONE ENCOUNTER
"Nurse Triage SBAR    Is this a 2nd Level Triage? YES, LICENSED PRACTITIONER REVIEW IS REQUIRED    Situation: Urinary symptoms, increased SOB with activity    Background: Sees oncology for colon cancer. Urinary symptom onset 7/27. Reports bladder infections occurred   \"many many years ago.\"    Assessment: Home care contacting clinic with patient present to report urinary symptoms. Reports burning with urination and increased fatigue. Denies changes in urinary frequency, blood in urine or urinary odor. Denies abdominal pain or flank pain. Home care denies patient being disorientated or notably confused.     Reports increased SOB with activity such as going up the stairs that resolves at rest. Denies new or worsening swelling to lower extremities or body. Reports dizziness or lightheadedness at baseline but denies worsening. Denies chest pain. Home care denies audible wheezing.     Vitals:  Temp 97.7  HR 79  /68  O2 97% at rest.     Protocol Recommended Disposition:   Go to ED Now    Recommendation: Patient is seeing oncologist 7/31 and would like to leave urine sample at this visit. Patient is unable to be seen today and state she does not have transportation.   Routing as second level triage due to ED disposition. Advised if any worsening or new symptoms patient should contact nurse triage.     Routed to provider    Does the patient meet one of the following criteria for ADS visit consideration? 16+ years old, with an [facility] PCP     TIP  Providers, please consider if this condition is appropriate for management at one of our Acute and Diagnostic Services sites.     If patient is a good candidate, please use dotphrase <dot>triageresponse and select Refer to ADS to document.      Reason for Disposition   Cancer treatment in past six months (or has cancer now)    Additional Information   Negative: SEVERE difficulty breathing (e.g., struggling for each breath, speaks in single words, pulse > 120)   Negative: " "Breathing stopped and hasn't returned   Negative: Choking on something   Negative: Bluish (or gray) lips or face   Negative: Difficult to awaken or acting confused (e.g., disoriented, slurred speech)   Negative: Passed out (e.g., fainted, lost consciousness, blacked out and was not responding)   Negative: Wheezing started suddenly after medicine, an allergic food, or bee sting   Negative: Stridor (harsh sound while breathing in)   Negative: Slow, shallow and weak breathing   Negative: Sounds like a life-threatening emergency to the triager   Negative: Chest pain   Negative: Wheezing (high pitched whistling sound) and previous asthma attacks or use of asthma medicines   Negative: Breathing difficulty and within 14 days of COVID-19 EXPOSURE (close contact) with someone diagnosed with COVID-19 (e.g., COVID test positive)   Negative: Breathing difficulty and COVID-19 is widespread in the community   Negative: Breathing diffculty and only present when coughing   Negative: Breathing difficulty and only from stuffy nose   Negative: Breathing diffculty and only from stuffy nose or runny nose from common cold   Negative: MODERATE difficulty breathing (e.g., speaks in phrases, SOB even at rest, pulse 100-120) of new-onset or worse than normal   Negative: Oxygen level (e.g., pulse oximetry) 90% or lower   Negative: Wheezing can be heard across the room   Negative: Drooling or spitting out saliva (because can't swallow)   Negative: Any history of prior \"blood clot\" in leg or lungs   Negative: Illness requiring prolonged bedrest in past month (e.g., immobilization, long hospital stay)   Negative: Hip or leg fracture (broken bone) in past month (or had cast on leg or ankle in past month)   Negative: Major surgery in the past month   Negative: Long-distance travel in past month (e.g., car, bus, train, plane; with trip lasting 6 or more hours)    Protocols used: Breathing Difficulty-A-OH    "

## 2025-07-31 ENCOUNTER — HOSPITAL ENCOUNTER (OUTPATIENT)
Dept: RADIOLOGY | Facility: HOSPITAL | Age: OVER 89
End: 2025-07-31
Attending: INTERNAL MEDICINE
Payer: MEDICARE

## 2025-07-31 ENCOUNTER — LAB (OUTPATIENT)
Dept: INFUSION THERAPY | Facility: HOSPITAL | Age: OVER 89
End: 2025-07-31
Payer: MEDICARE

## 2025-07-31 ENCOUNTER — ONCOLOGY VISIT (OUTPATIENT)
Dept: ONCOLOGY | Facility: HOSPITAL | Age: OVER 89
End: 2025-07-31
Payer: MEDICARE

## 2025-07-31 ENCOUNTER — E-CONSULT (OUTPATIENT)
Dept: ENDOCRINOLOGY | Facility: CLINIC | Age: OVER 89
End: 2025-07-31

## 2025-07-31 ENCOUNTER — INFUSION THERAPY VISIT (OUTPATIENT)
Dept: INFUSION THERAPY | Facility: HOSPITAL | Age: OVER 89
End: 2025-07-31
Payer: MEDICARE

## 2025-07-31 VITALS
OXYGEN SATURATION: 97 % | WEIGHT: 124.2 LBS | HEART RATE: 84 BPM | TEMPERATURE: 97.8 F | RESPIRATION RATE: 19 BRPM | BODY MASS INDEX: 25.04 KG/M2 | SYSTOLIC BLOOD PRESSURE: 124 MMHG | HEIGHT: 59 IN | DIASTOLIC BLOOD PRESSURE: 61 MMHG

## 2025-07-31 DIAGNOSIS — C18.9 COLON ADENOCARCINOMA (H): Primary | ICD-10-CM

## 2025-07-31 DIAGNOSIS — R06.02 SOB (SHORTNESS OF BREATH): ICD-10-CM

## 2025-07-31 DIAGNOSIS — N39.0 RECURRENT UTI: ICD-10-CM

## 2025-07-31 DIAGNOSIS — C18.9 COLON ADENOCARCINOMA (H): ICD-10-CM

## 2025-07-31 DIAGNOSIS — C18.2 MALIGNANT NEOPLASM OF ASCENDING COLON (H): ICD-10-CM

## 2025-07-31 DIAGNOSIS — N18.32 STAGE 3B CHRONIC KIDNEY DISEASE (H): ICD-10-CM

## 2025-07-31 LAB
ALBUMIN SERPL BCG-MCNC: 3.3 G/DL (ref 3.5–5.2)
ALBUMIN UR-MCNC: 50 MG/DL
ALP SERPL-CCNC: 75 U/L (ref 40–150)
ALT SERPL W P-5'-P-CCNC: 16 U/L (ref 0–50)
ANION GAP SERPL CALCULATED.3IONS-SCNC: 15 MMOL/L (ref 7–15)
APPEARANCE UR: ABNORMAL
AST SERPL W P-5'-P-CCNC: 20 U/L (ref 0–45)
BACTERIA #/AREA URNS HPF: ABNORMAL /HPF
BASOPHILS # BLD AUTO: 0 10E3/UL (ref 0–0.2)
BASOPHILS NFR BLD AUTO: 0 %
BILIRUB SERPL-MCNC: 0.8 MG/DL
BILIRUB UR QL STRIP: NEGATIVE
BUN SERPL-MCNC: 22.5 MG/DL (ref 8–23)
CALCIUM SERPL-MCNC: 9.9 MG/DL (ref 8.8–10.4)
CHLORIDE SERPL-SCNC: 105 MMOL/L (ref 98–107)
COLOR UR AUTO: YELLOW
CREAT SERPL-MCNC: 1.78 MG/DL (ref 0.51–0.95)
EGFRCR SERPLBLD CKD-EPI 2021: 27 ML/MIN/1.73M2
EOSINOPHIL # BLD AUTO: 0 10E3/UL (ref 0–0.7)
EOSINOPHIL NFR BLD AUTO: 0 %
ERYTHROCYTE [DISTWIDTH] IN BLOOD BY AUTOMATED COUNT: 14.4 % (ref 10–15)
GLUCOSE SERPL-MCNC: 143 MG/DL (ref 70–99)
GLUCOSE UR STRIP-MCNC: NEGATIVE MG/DL
HCO3 SERPL-SCNC: 19 MMOL/L (ref 22–29)
HCT VFR BLD AUTO: 34.2 % (ref 35–47)
HGB BLD-MCNC: 11 G/DL (ref 11.7–15.7)
HGB UR QL STRIP: ABNORMAL
IMM GRANULOCYTES # BLD: 0.1 10E3/UL
IMM GRANULOCYTES NFR BLD: 1 %
KETONES UR STRIP-MCNC: NEGATIVE MG/DL
LEUKOCYTE ESTERASE UR QL STRIP: ABNORMAL
LYMPHOCYTES # BLD AUTO: 1.7 10E3/UL (ref 0.8–5.3)
LYMPHOCYTES NFR BLD AUTO: 19 %
MAGNESIUM SERPL-MCNC: 1 MG/DL (ref 1.7–2.3)
MCH RBC QN AUTO: 29.3 PG (ref 26.5–33)
MCHC RBC AUTO-ENTMCNC: 32.2 G/DL (ref 31.5–36.5)
MCV RBC AUTO: 91 FL (ref 78–100)
MONOCYTES # BLD AUTO: 0.7 10E3/UL (ref 0–1.3)
MONOCYTES NFR BLD AUTO: 8 %
MUCOUS THREADS #/AREA URNS LPF: PRESENT /LPF
NEUTROPHILS # BLD AUTO: 6.1 10E3/UL (ref 1.6–8.3)
NEUTROPHILS NFR BLD AUTO: 72 %
NITRATE UR QL: NEGATIVE
NRBC # BLD AUTO: 0 10E3/UL
NRBC BLD AUTO-RTO: 0 /100
PH UR STRIP: 5.5 [PH] (ref 5–7)
PLATELET # BLD AUTO: 222 10E3/UL (ref 150–450)
POTASSIUM SERPL-SCNC: 3.1 MMOL/L (ref 3.4–5.3)
PROT SERPL-MCNC: 5.9 G/DL (ref 6.4–8.3)
RBC # BLD AUTO: 3.75 10E6/UL (ref 3.8–5.2)
RBC URINE: 10 /HPF
SODIUM SERPL-SCNC: 139 MMOL/L (ref 135–145)
SP GR UR STRIP: 1.02 (ref 1–1.03)
SQUAMOUS EPITHELIAL: 1 /HPF
UROBILINOGEN UR STRIP-MCNC: NORMAL MG/DL
WBC # BLD AUTO: 8.6 10E3/UL (ref 4–11)
WBC CLUMPS #/AREA URNS HPF: PRESENT /HPF
WBC URINE: >182 /HPF

## 2025-07-31 PROCEDURE — 258N000003 HC RX IP 258 OP 636: Performed by: INTERNAL MEDICINE

## 2025-07-31 PROCEDURE — 85004 AUTOMATED DIFF WBC COUNT: CPT

## 2025-07-31 PROCEDURE — 80053 COMPREHEN METABOLIC PANEL: CPT

## 2025-07-31 PROCEDURE — 71046 X-RAY EXAM CHEST 2 VIEWS: CPT

## 2025-07-31 PROCEDURE — 81003 URINALYSIS AUTO W/O SCOPE: CPT

## 2025-07-31 PROCEDURE — 83735 ASSAY OF MAGNESIUM: CPT

## 2025-07-31 PROCEDURE — G0463 HOSPITAL OUTPT CLINIC VISIT: HCPCS | Performed by: INTERNAL MEDICINE

## 2025-07-31 PROCEDURE — 36415 COLL VENOUS BLD VENIPUNCTURE: CPT

## 2025-07-31 RX ORDER — DEXAMETHASONE 1 MG
1 TABLET ORAL
Qty: 60 TABLET | Refills: 2 | Status: SHIPPED | OUTPATIENT
Start: 2025-07-31

## 2025-07-31 RX ORDER — HEPARIN SODIUM (PORCINE) LOCK FLUSH IV SOLN 100 UNIT/ML 100 UNIT/ML
5 SOLUTION INTRAVENOUS
Status: DISCONTINUED | OUTPATIENT
Start: 2025-07-31 | End: 2025-07-31 | Stop reason: HOSPADM

## 2025-07-31 RX ADMIN — SODIUM CHLORIDE 368 MG: 9 INJECTION, SOLUTION INTRAVENOUS at 11:54

## 2025-07-31 ASSESSMENT — PAIN SCALES - GENERAL: PAINLEVEL_OUTOF10: NO PAIN (0)

## 2025-07-31 NOTE — LETTER
"7/31/2025      Alma Johns  [address] [place] [address]      Dear Colleague,    Thank you for referring your patient, Alma Johns, to the [facility] [place]. Please see a copy of my visit note below.    Oncology Rooming Note    July 31, 2025 10:34 AM   Alma Johns is a 89 year old female who presents for:    Chief Complaint   Patient presents with     Oncology Clinic Visit     Malignant neoplasm of ascending colon, Follow Up     Initial Vitals: /61 (BP Location: Left arm, Patient Position: Sitting, Cuff Size: Adult Regular)   Pulse 84   Temp 97.8  F (36.6  C) (Oral)   Resp 19   Ht 1.499 m (4' 11\")   Wt 56.3 kg (124 lb 3.2 oz)   LMP  (LMP Unknown)   SpO2 97%   BMI 25.09 kg/m   Estimated body mass index is 25.09 kg/m  as calculated from the following:    Height as of this encounter: 1.499 m (4' 11\").    Weight as of this encounter: 56.3 kg (124 lb 3.2 oz). Body surface area is 1.53 meters squared.  No Pain (0) Comment: Data Unavailable   No LMP recorded (lmp unknown). Patient has had a hysterectomy.  Allergies reviewed: Yes  Medications reviewed: Yes    Medications: MEDICATION REFILLS NEEDED TODAY. Provider was notified.  Pharmacy name entered into [facility]:    [facility] [place], [place] - [address] [place] [id number] [address] [facility] - [place], [place] - [address] [place] [address]        Clinical concerns: Patient was having stomach pain and was thinking about going back on chemo. Dr Friedell was notified.      Morgan Blunt MA              [facility] Hematology and Oncology Progress Note    Patient: Alma Johns  MRN: [id number]  Date of Service: Jul 31, 2025       Reason for Visit    I was consulted by   Lucy Ruano MD     For evaluation and management of newly diagnosed colon cancer.      Encounter Diagnoses Assessment and Plan:    Problem List Items Addressed This Visit       Colon adenocarcinoma (H)    Relevant Medications    dexAMETHasone " (DECADRON) 1 MG tablet     Patient with newly diagnosed apple core adenocarcinoma of the hepatic flexure.  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  Patient does not wish to have chemotherapy.  She had a surgical consultation on 12/30/2024.  Palliative surgery was deferred until the patient developed obstructive symptoms.  We again discussed palliative chemotherapy.  Patient has agreed to a trial of capecitabine.  Treatment plan has been entered.     Update 3/6/2025.  Patient returns for day 1 cycle 2 of capecitabine.  She is tolerating it without difficulty.  Her EGFR has improved to 41.  Calcium is slightly increased at 10.7.  She did have laser lithotripsy on 1/31/2025.  She will continue with capecitabine 1000 mg twice daily for 2 weeks on and 1 week off.  Follow-up is planned for 3 weeks at the start of her next cycle.    Update 4/29/2025 Patient returns after completing 2 cycles of capecitabine.  Unfortunately her EGFR still low at 24.  I advised her to hold capecitabine for 4 more weeks after which we will recheck her chemistry profile.  She had a recent ultrasound which shows an atrophic right kidney and a large staghorn calculus on the left.    Update 5/27/2025 patient returns for follow-up.  She has been off capecitabine chemotherapy for 4 weeks.  Unfortunately her renal function continues to decline and her EGFR is now at 18.  I advised a trial of panitumumab and treatment plan is entered.    Update 6/12/2025: Patient received her first dose of panitumumab on 6/2/2025.  She has had no adverse effects.  However she did not tolerate doxycycline prophylaxis.  She has discontinued that and so far has no evidence of skin rash.  PET CT scan of 6/10/2025 shows decreased FDG avidity in the lymph nodes below the diaphragm.  She will return on 6/16/2025 for day 15 of panitumumab.  She will return on 7/1/2025 to begin day 1 cycle 2 of panitumumab.    Update 7/1/2025.  Patient returns to continue  panitumumab.  She missed day 15 of cycle 1 as she was hospitalized for a TIA.  She has recovered from the TIA however she is now on aspirin and Plavix.  This will increase the risk of her bleeding from her colon cancer..  She is struggling with neck pain.  She was taking Celebrex for this but has discontinued while on antiplatelet therapy.  Advised continuing with Tylenol and using heat along with a soft cervical collar at night.  She will receive panitumumab today and return in 2 weeks for her next infusion.    Update 7/15/2025: Patient returns for follow-up.  She notes continued fatigue.  She believes it is related to her panitumumab infusion.  She will defer for 2 weeks before starting cycle 2.  Her renal function has improved.  This may be because she has discontinued Celebrex.    Update 7/31/2025.  Patient returns for follow-up.  She wishes to restart panitumumab.  She will begin cycle 2 today.  She will return in 2 weeks for day 15.  Reimaging is planned in September.          ______________________________________________________________________________    Staging History   Cancer Staging   Colon adenocarcinoma (H)  Staging form: Colon and Rectum, AJCC 8th Edition  - Clinical stage from 1/9/2025: Stage IVB (cTX, cNX, cM1b) - Signed by Friedell, Peter E, MD on 1/9/2025    ECOG Performance  1 - Can't do physically strenuous work, but fully ambulatory and can do light sedentary work.    History of Present Illness      Alma oJhns is a 89 year old woman with a history of hypercalcemia with elevated parathyroid hormone.  Over the last year she has had unexplained weight loss of about 20 pounds.  On 12/10/2024 she underwent CT scan of the abdomen and pelvis which showed probable lesion at the hepatic flexure of the colon and suspicious retroperitoneal adenopathy.  On 12/19/2024 she underwent colonoscopy which confirmed an adenocarcinoma of the colon.  Upper GI endoscopic ultrasound confirmed metastases in a upper  "abdominal lymph node adjacent to the pancreas near the mesenteric root.  Multiple pancreatic cysts appeared benign.      Diagnosis:  12/10/2024 CT scan of the abdomen shows wall thickening in the right transverse colon  12/19/2025 upper GI endoscopic ultrasound shows metastatic adenocarcinoma of the colon  Proteins are intact by immunohistochemistry  NGS panel shows   APC R283*  PIK3CA R38H  TMB score 5.115   mut/Mb  K-ramin NRAS BRAF mutations are not detected  1/6/2025 PET/CT scan shows FDG avidity in the colon tumor and FDG avid lymphadenopathy above and below the diaphragm  6/10/2025 PET CT scan shows decreased FDG avidity of lymph nodes above and below the diaphragm    Treatment:  2/3/2025 capecitabine 1000 mg twice daily  4/11/2025 capecitabine discontinued because of progressive renal insufficiency  6/2/2025 panitumumab started 6 mg/kg    Interval history:    Presently she feels fairly well.  Her appetite was decreased while taking doxycycline.  She stopped that drug after 1 week and her appetite improved.  She believes her appetite is improved on dexamethasone 1 mg daily.  Overall she lost about 1 pound in the last 2 weeks..  She is having no difficulty with bowel movements.  She has pain on urination and is being worked up for UTI.  She does not have cough or chest pain.  She does note shortness of breath on climbing stairs.  She was taking Celebrex  but discontinued as she was hospitalized for a TIA June 16 through 18.  She was placed on aspirin and Plavix and then discharged from the hospital.  She has no persistent neurologic deficit.        Review of systems.  Pertinent Findings are included in the History of Present Illness    Physical Exam    /61 (BP Location: Left arm, Patient Position: Sitting, Cuff Size: Adult Regular)   Pulse 84   Temp 97.8  F (36.6  C) (Oral)   Resp 19   Ht 1.499 m (4' 11\")   Wt 56.3 kg (124 lb 3.2 oz)   LMP  (LMP Unknown)   SpO2 97%   BMI 25.09 kg/m       GENERAL " APPEARANCE: 89 year-old woman in no apparent distress.  HEAD: Atraumatic; normocephalic; without lesions.  EYES: Conjunctiva, corneas and eyelids normal; pupils equal, round, reactive to light; No Icterus.  MOUTH/OROPHARYNX: Oral mucosa intact  NECK: Supple with no nodes.  LUNGS:  Clear to auscultation and percussion with no extra sounds.  HEART: Regular rhythm and rate; S1 and S2 normal; no murmurs noted.  ABDOMEN: Soft; no masses or tenderness, no hepatosplenomegaly.  NEUROLOGIC: Alert and oriented.  No obvious focal findings.  EXTREMITIES: No cyanosis, or edema.  SKIN: Increased bruising while on aspirin and Plavix no suspicious lesions noted on exposed skin.  PSYCHIATRIC: Mental status normal; no apparent psychiatric issues    Medications:    Current Outpatient Medications   Medication Sig Dispense Refill     alendronate (FOSAMAX) 70 MG tablet Take 1 tablet (70 mg) by mouth every 7 days 12 tablet 0     amLODIPine (NORVASC) 10 MG tablet Take 1 tablet (10 mg) by mouth daily 90 tablet 3     aspirin (ASA) 81 MG chewable tablet Take 81 mg by mouth daily.       atorvastatin (LIPITOR) 40 MG tablet Take 40 mg by mouth at bedtime.       celecoxib (CELEBREX) 200 MG capsule TAKE 1 CAPSULE BY MOUTH EVERY DAY 90 capsule 1     clopidogrel (PLAVIX) 75 MG tablet Take 75 mg by mouth daily.       dexAMETHasone (DECADRON) 1 MG tablet Take 1 tablet (1 mg) by mouth daily (with breakfast). 60 tablet 2     doxazosin (CARDURA) 4 MG tablet TAKE 1 TABLET BY MOUTH DAILY 90 tablet 3     escitalopram (LEXAPRO) 10 MG tablet Take 1 tablet (10 mg) by mouth daily. 90 tablet 3     escitalopram (LEXAPRO) 5 MG tablet Take 1 tablet (5 mg) by mouth daily. 90 tablet 3     latanoprost (XALATAN) 0.005 % ophthalmic solution [LATANOPROST (XALATAN) 0.005 % OPHTHALMIC SOLUTION] Administer 1 drop to both eyes bedtime.       loperamide (IMODIUM A-D) 2 MG tablet Take 1 tablet (2 mg) by mouth 4 times daily as needed for diarrhea. 30 tablet 2     LORazepam  (ATIVAN) 0.5 MG tablet Take 1 tablet (0.5 mg) by mouth every 6 hours. 30 tablet 0     therapeutic multivitamin (THERAGRAN) tablet [THERAPEUTIC MULTIVITAMIN (THERAGRAN) TABLET] Take 1 tablet by mouth daily.       timolol maleate (TIMOPTIC) 0.5 % ophthalmic solution [TIMOLOL MALEATE (TIMOPTIC) 0.5 % OPHTHALMIC SOLUTION] INSTILL 1 DROP INTO BOTH EYES Q EVENING  3     No current facility-administered medications for this visit.           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     OA (osteoarthritis)      Osteopenia      PONV (postoperative nausea and vomiting)      Postmenopausal      Retinal vein occlusion (H)      Thyroid disease      Vitamin D deficiency      Past Surgical History:   Procedure Laterality Date     APPENDECTOMY      Incidental     BIOPSY BREAST        SECTION      and      CHOLECYSTECTOMY       COLONOSCOPY N/A 2024    Procedure: Colonoscopy with biopsies and polypectomy, endoscopic marker;  Surgeon: Ron Hogan MD;  Location: [facility] OR     ENTEROSCOPY SMALL BOWEL N/A 2024    Procedure: Enteroscopy small bowel;  Surgeon: Ron Hogan MD;  Location: [facility] OR     ESOPHAGOSCOPY, GASTROSCOPY, DUODENOSCOPY (EGD), COMBINED N/A 2024    Procedure: ESOPHAGOGASTRODUODENOSCOPY, WITH FINE NEEDLE ASPIRATION BIOPSY, WITH ENDOSCOPIC ULTRASOUND GUIDANCE;  Surgeon: Ron Hogan MD;  Location: [facility] OR     HYSTERECTOMY TOTAL ABDOMINAL, BILATERAL SALPINGO-OOPHORECTOMY, COMBINED       KIDNEY STONE SURGERY      extraction     LUMBAR LAMINECTOMY       TOTAL HIP ARTHROPLASTY Left 2009     TOTAL KNEE  ARTHROPLASTY Right 01/01/2009     TUBAL LIGATION       No Known Allergies    No family history on file.  Social History     Socioeconomic History     Marital status: Single     Spouse name: None     Number of children: None     Years of education: None     Highest education level: None   Tobacco Use     Smoking status: Never     Passive exposure: Past     Smokeless tobacco: Never   Vaping Use     Vaping status: Never Used   Substance and Sexual Activity     Alcohol use: No     Drug use: No   Social History Narrative    Lives alone. 2 hip replacements and 1 knee replacement. Used to work in housekeeping at [employer]. Saw Dr Crabtree. Has a daughter and son, and grandchildren and great grandchildren.     Social Drivers of Health     Financial Resource Strain: Low Risk  (8/12/2024)    Financial Resource Strain      Within the past 12 months, have you or your family members you live with been unable to get utilities (heat, electricity) when it was really needed?: No   Food Insecurity: Low Risk  (8/12/2024)    Food Insecurity      Within the past 12 months, did you worry that your food would run out before you got money to buy more?: No      Within the past 12 months, did the food you bought just not last and you didn t have money to get more?: No   Transportation Needs: Low Risk  (8/12/2024)    Transportation Needs      Within the past 12 months, has lack of transportation kept you from medical appointments, getting your medicines, non-medical meetings or appointments, work, or from getting things that you need?: No   Physical Activity: Inactive (8/12/2024)    Exercise Vital Sign      Days of Exercise per Week: 0 days      Minutes of Exercise per Session: 0 min   Stress: No Stress Concern Present (8/12/2024)    [employer] [place] [employer] - Occupational Stress Questionnaire      Feeling of Stress : Only a little   Social Connections: Unknown (8/12/2024)    Social Connection and Isolation Panel [NHANES]       Frequency of Social Gatherings with Friends and Family: Once a week   Interpersonal Safety: Low Risk  (12/19/2024)    Interpersonal Safety      Do you feel physically and emotionally safe where you currently live?: Yes      Within the past 12 months, have you been hit, slapped, kicked or otherwise physically hurt by someone?: No      Within the past 12 months, have you been humiliated or emotionally abused in other ways by your partner or ex-partner?: No   Housing Stability: Low Risk  (8/12/2024)    Housing Stability      Do you have housing? : Yes      Are you worried about losing your housing?: No           Lab Results    Recent Results (from the past 240 hours)   Comprehensive metabolic panel    Collection Time: 07/31/25 10:16 AM   Result Value Ref Range    Sodium 139 135 - 145 mmol/L    Potassium 3.1 (L) 3.4 - 5.3 mmol/L    Carbon Dioxide (CO2) 19 (L) 22 - 29 mmol/L    Anion Gap 15 7 - 15 mmol/L    Urea Nitrogen 22.5 8.0 - 23.0 mg/dL    Creatinine 1.78 (H) 0.51 - 0.95 mg/dL    GFR Estimate 27 (L) >60 mL/min/1.73m2    Calcium 9.9 8.8 - 10.4 mg/dL    Chloride 105 98 - 107 mmol/L    Glucose 143 (H) 70 - 99 mg/dL    Alkaline Phosphatase 75 40 - 150 U/L    AST 20 0 - 45 U/L    ALT 16 0 - 50 U/L    Protein Total 5.9 (L) 6.4 - 8.3 g/dL    Albumin 3.3 (L) 3.5 - 5.2 g/dL    Bilirubin Total 0.8 <=1.2 mg/dL   Magnesium    Collection Time: 07/31/25 10:16 AM   Result Value Ref Range    Magnesium 1.0 (L) 1.7 - 2.3 mg/dL   CBC with platelets and differential    Collection Time: 07/31/25 10:16 AM   Result Value Ref Range    WBC Count 8.6 4.0 - 11.0 10e3/uL    RBC Count 3.75 (L) 3.80 - 5.20 10e6/uL    Hemoglobin 11.0 (L) 11.7 - 15.7 g/dL    Hematocrit 34.2 (L) 35.0 - 47.0 %    MCV 91 78 - 100 fL    MCH 29.3 26.5 - 33.0 pg    MCHC 32.2 31.5 - 36.5 g/dL    RDW 14.4 10.0 - 15.0 %    Platelet Count 222 150 - 450 10e3/uL    % Neutrophils 72 %    % Lymphocytes 19 %    % Monocytes 8 %    % Eosinophils 0 %    % Basophils 0 %    %  Immature Granulocytes 1 %    NRBCs per 100 WBC 0 <1 /100    Absolute Neutrophils 6.1 1.6 - 8.3 10e3/uL    Absolute Lymphocytes 1.7 0.8 - 5.3 10e3/uL    Absolute Monocytes 0.7 0.0 - 1.3 10e3/uL    Absolute Eosinophils 0.0 0.0 - 0.7 10e3/uL    Absolute Basophils 0.0 0.0 - 0.2 10e3/uL    Absolute Immature Granulocytes 0.1 <=0.4 10e3/uL    Absolute NRBCs 0.0 10e3/uL       Imaging Results    No results found.            I spent 54  minutes on the patient's visit today.  This included preparation for the visit, face-to-face time with the patient and documentation following the visit.  It did not include teaching or procedure time.    Signed by: Peter E. Friedell, MD          Again, thank you for allowing me to participate in the care of your patient.        Sincerely,        Peter E. Friedell, MD    Electronically signed

## 2025-07-31 NOTE — PROGRESS NOTES
"Oncology Rooming Note    July 31, 2025 10:34 AM   Alma Johns is a 89 year old female who presents for:    Chief Complaint   Patient presents with    Oncology Clinic Visit     Malignant neoplasm of ascending colon, Follow Up     Initial Vitals: /61 (BP Location: Left arm, Patient Position: Sitting, Cuff Size: Adult Regular)   Pulse 84   Temp 97.8  F (36.6  C) (Oral)   Resp 19   Ht 1.499 m (4' 11\")   Wt 56.3 kg (124 lb 3.2 oz)   LMP  (LMP Unknown)   SpO2 97%   BMI 25.09 kg/m   Estimated body mass index is 25.09 kg/m  as calculated from the following:    Height as of this encounter: 1.499 m (4' 11\").    Weight as of this encounter: 56.3 kg (124 lb 3.2 oz). Body surface area is 1.53 meters squared.  No Pain (0) Comment: Data Unavailable   No LMP recorded (lmp unknown). Patient has had a hysterectomy.  Allergies reviewed: Yes  Medications reviewed: Yes    Medications: MEDICATION REFILLS NEEDED TODAY. Provider was notified.  Pharmacy name entered into [facility]:    [facility] [place], [place] - [address] [facility] [id number] [address] [employer] - [place], [place] - [address] [place] [address]        Clinical concerns: Patient was having stomach pain and was thinking about going back on chemo. Dr Friedell was notified.      Morgan Blunt MA            "

## 2025-07-31 NOTE — PROGRESS NOTES
[facility] Hematology and Oncology Progress Note    Patient: Alma Johns  MRN: [id number]  Date of Service: Jul 31, 2025       Reason for Visit    I was consulted by   Lucy Ruano MD     For evaluation and management of newly diagnosed colon cancer.      Encounter Diagnoses Assessment and Plan:    Problem List Items Addressed This Visit       Colon adenocarcinoma (H)    Relevant Medications    dexAMETHasone (DECADRON) 1 MG tablet     Patient with newly diagnosed apple core adenocarcinoma of the hepatic flexure.  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  Patient does not wish to have chemotherapy.  She had a surgical consultation on 12/30/2024.  Palliative surgery was deferred until the patient developed obstructive symptoms.  We again discussed palliative chemotherapy.  Patient has agreed to a trial of capecitabine.  Treatment plan has been entered.     Update 3/6/2025.  Patient returns for day 1 cycle 2 of capecitabine.  She is tolerating it without difficulty.  Her EGFR has improved to 41.  Calcium is slightly increased at 10.7.  She did have laser lithotripsy on 1/31/2025.  She will continue with capecitabine 1000 mg twice daily for 2 weeks on and 1 week off.  Follow-up is planned for 3 weeks at the start of her next cycle.    Update 4/29/2025 Patient returns after completing 2 cycles of capecitabine.  Unfortunately her EGFR still low at 24.  I advised her to hold capecitabine for 4 more weeks after which we will recheck her chemistry profile.  She had a recent ultrasound which shows an atrophic right kidney and a large staghorn calculus on the left.    Update 5/27/2025 patient returns for follow-up.  She has been off capecitabine chemotherapy for 4 weeks.  Unfortunately her renal function continues to decline and her EGFR is now at 18.  I advised a trial of panitumumab and treatment plan is entered.    Update 6/12/2025: Patient received her first dose of panitumumab on 6/2/2025.  She has  had no adverse effects.  However she did not tolerate doxycycline prophylaxis.  She has discontinued that and so far has no evidence of skin rash.  PET CT scan of 6/10/2025 shows decreased FDG avidity in the lymph nodes below the diaphragm.  She will return on 6/16/2025 for day 15 of panitumumab.  She will return on 7/1/2025 to begin day 1 cycle 2 of panitumumab.    Update 7/1/2025.  Patient returns to continue panitumumab.  She missed day 15 of cycle 1 as she was hospitalized for a TIA.  She has recovered from the TIA however she is now on aspirin and Plavix.  This will increase the risk of her bleeding from her colon cancer..  She is struggling with neck pain.  She was taking Celebrex for this but has discontinued while on antiplatelet therapy.  Advised continuing with Tylenol and using heat along with a soft cervical collar at night.  She will receive panitumumab today and return in 2 weeks for her next infusion.    Update 7/15/2025: Patient returns for follow-up.  She notes continued fatigue.  She believes it is related to her panitumumab infusion.  She will defer for 2 weeks before starting cycle 2.  Her renal function has improved.  This may be because she has discontinued Celebrex.    Update 7/31/2025.  Patient returns for follow-up.  She wishes to restart panitumumab.  She will begin cycle 2 today.  She will return in 2 weeks for day 15.  Reimaging is planned in September.          ______________________________________________________________________________    Staging History   Cancer Staging   Colon adenocarcinoma (H)  Staging form: Colon and Rectum, AJCC 8th Edition  - Clinical stage from 1/9/2025: Stage IVB (cTX, cNX, cM1b) - Signed by Friedell, Peter E, MD on 1/9/2025    ECOG Performance  1 - Can't do physically strenuous work, but fully ambulatory and can do light sedentary work.    History of Present Illness      Alma Johns is a 89 year old woman with a history of hypercalcemia with elevated  parathyroid hormone.  Over the last year she has had unexplained weight loss of about 20 pounds.  On 12/10/2024 she underwent CT scan of the abdomen and pelvis which showed probable lesion at the hepatic flexure of the colon and suspicious retroperitoneal adenopathy.  On 12/19/2024 she underwent colonoscopy which confirmed an adenocarcinoma of the colon.  Upper GI endoscopic ultrasound confirmed metastases in a upper abdominal lymph node adjacent to the pancreas near the mesenteric root.  Multiple pancreatic cysts appeared benign.      Diagnosis:  12/10/2024 CT scan of the abdomen shows wall thickening in the right transverse colon  12/19/2025 upper GI endoscopic ultrasound shows metastatic adenocarcinoma of the colon  Proteins are intact by immunohistochemistry  NGS panel shows   APC R283*  PIK3CA R38H  TMB score 5.115   mut/Mb  K-ramin NRAS BRAF mutations are not detected  1/6/2025 PET/CT scan shows FDG avidity in the colon tumor and FDG avid lymphadenopathy above and below the diaphragm  6/10/2025 PET CT scan shows decreased FDG avidity of lymph nodes above and below the diaphragm    Treatment:  2/3/2025 capecitabine 1000 mg twice daily  4/11/2025 capecitabine discontinued because of progressive renal insufficiency  6/2/2025 panitumumab started 6 mg/kg    Interval history:    Presently she feels fairly well.  Her appetite was decreased while taking doxycycline.  She stopped that drug after 1 week and her appetite improved.  She believes her appetite is improved on dexamethasone 1 mg daily.  Overall she lost about 1 pound in the last 2 weeks..  She is having no difficulty with bowel movements.  She has pain on urination and is being worked up for UTI.  She does not have cough or chest pain.  She does note shortness of breath on climbing stairs.  She was taking Celebrex  but discontinued as she was hospitalized for a TIA June 16 through 18.  She was placed on aspirin and Plavix and then discharged from the hospital.   "She has no persistent neurologic deficit.        Review of systems.  Pertinent Findings are included in the History of Present Illness    Physical Exam    /61 (BP Location: Left arm, Patient Position: Sitting, Cuff Size: Adult Regular)   Pulse 84   Temp 97.8  F (36.6  C) (Oral)   Resp 19   Ht 1.499 m (4' 11\")   Wt 56.3 kg (124 lb 3.2 oz)   LMP  (LMP Unknown)   SpO2 97%   BMI 25.09 kg/m       GENERAL APPEARANCE: 89 year-old woman in no apparent distress.  HEAD: Atraumatic; normocephalic; without lesions.  EYES: Conjunctiva, corneas and eyelids normal; pupils equal, round, reactive to light; No Icterus.  MOUTH/OROPHARYNX: Oral mucosa intact  NECK: Supple with no nodes.  LUNGS:  Clear to auscultation and percussion with no extra sounds.  HEART: Regular rhythm and rate; S1 and S2 normal; no murmurs noted.  ABDOMEN: Soft; no masses or tenderness, no hepatosplenomegaly.  NEUROLOGIC: Alert and oriented.  No obvious focal findings.  EXTREMITIES: No cyanosis, or edema.  SKIN: Increased bruising while on aspirin and Plavix no suspicious lesions noted on exposed skin.  PSYCHIATRIC: Mental status normal; no apparent psychiatric issues    Medications:    Current Outpatient Medications   Medication Sig Dispense Refill    alendronate (FOSAMAX) 70 MG tablet Take 1 tablet (70 mg) by mouth every 7 days 12 tablet 0    amLODIPine (NORVASC) 10 MG tablet Take 1 tablet (10 mg) by mouth daily 90 tablet 3    aspirin (ASA) 81 MG chewable tablet Take 81 mg by mouth daily.      atorvastatin (LIPITOR) 40 MG tablet Take 40 mg by mouth at bedtime.      celecoxib (CELEBREX) 200 MG capsule TAKE 1 CAPSULE BY MOUTH EVERY DAY 90 capsule 1    clopidogrel (PLAVIX) 75 MG tablet Take 75 mg by mouth daily.      dexAMETHasone (DECADRON) 1 MG tablet Take 1 tablet (1 mg) by mouth daily (with breakfast). 60 tablet 2    doxazosin (CARDURA) 4 MG tablet TAKE 1 TABLET BY MOUTH DAILY 90 tablet 3    escitalopram (LEXAPRO) 10 MG tablet Take 1 tablet (10 " mg) by mouth daily. 90 tablet 3    escitalopram (LEXAPRO) 5 MG tablet Take 1 tablet (5 mg) by mouth daily. 90 tablet 3    latanoprost (XALATAN) 0.005 % ophthalmic solution [LATANOPROST (XALATAN) 0.005 % OPHTHALMIC SOLUTION] Administer 1 drop to both eyes bedtime.      loperamide (IMODIUM A-D) 2 MG tablet Take 1 tablet (2 mg) by mouth 4 times daily as needed for diarrhea. 30 tablet 2    LORazepam (ATIVAN) 0.5 MG tablet Take 1 tablet (0.5 mg) by mouth every 6 hours. 30 tablet 0    therapeutic multivitamin (THERAGRAN) tablet [THERAPEUTIC MULTIVITAMIN (THERAGRAN) TABLET] Take 1 tablet by mouth daily.      timolol maleate (TIMOPTIC) 0.5 % ophthalmic solution [TIMOLOL MALEATE (TIMOPTIC) 0.5 % OPHTHALMIC SOLUTION] INSTILL 1 DROP INTO BOTH EYES Q EVENING  3     No current facility-administered medications for this visit.           Past History    Past Medical History:   Diagnosis Date    Colon cancer (H)     Stage IV with presence in lymph nodes and other distant body parts    Complaint related to dreams 2019    Depressive disorder     Dyslipidemia     dyslipoproteinemia    Glaucoma     Hearing problem     Hypercalcemia     Hyperparathyroidism     Hypertension     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    OA (osteoarthritis)     Osteopenia     PONV (postoperative nausea and vomiting)     Postmenopausal     Retinal vein occlusion (H)     Thyroid disease     Vitamin D deficiency      Past Surgical History:   Procedure Laterality Date    APPENDECTOMY  1985    Incidental    BIOPSY BREAST       SECTION      and     CHOLECYSTECTOMY      COLONOSCOPY N/A 2024    Procedure: Colonoscopy with biopsies and polypectomy, endoscopic marker;  Surgeon: Ron Hogan MD;  Location: [facility] OR    ENTEROSCOPY SMALL BOWEL N/A 2024    Procedure: Enteroscopy small bowel;  Surgeon: Ron Hogan MD;  Location: [facility] OR    ESOPHAGOSCOPY, GASTROSCOPY, DUODENOSCOPY (EGD), COMBINED N/A 2024     Procedure: ESOPHAGOGASTRODUODENOSCOPY, WITH FINE NEEDLE ASPIRATION BIOPSY, WITH ENDOSCOPIC ULTRASOUND GUIDANCE;  Surgeon: Ron Hogan MD;  Location: [facility] OR    HYSTERECTOMY TOTAL ABDOMINAL, BILATERAL SALPINGO-OOPHORECTOMY, COMBINED  1985    KIDNEY STONE SURGERY      extraction    LUMBAR LAMINECTOMY  2014    TOTAL HIP ARTHROPLASTY Left 01/01/2009    TOTAL KNEE ARTHROPLASTY Right 01/01/2009    TUBAL LIGATION       No Known Allergies    No family history on file.  Social History     Socioeconomic History    Marital status: Single     Spouse name: None    Number of children: None    Years of education: None    Highest education level: None   Tobacco Use    Smoking status: Never     Passive exposure: Past    Smokeless tobacco: Never   Vaping Use    Vaping status: Never Used   Substance and Sexual Activity    Alcohol use: No    Drug use: No   Social History Narrative    Lives alone. 2 hip replacements and 1 knee replacement. Used to work in housekeeping at [employer]. Saw Dr Crabtree. Has a daughter and son, and grandchildren and great grandchildren.     Social Drivers of Health     Financial Resource Strain: Low Risk  (8/12/2024)    Financial Resource Strain     Within the past 12 months, have you or your family members you live with been unable to get utilities (heat, electricity) when it was really needed?: No   Food Insecurity: Low Risk  (8/12/2024)    Food Insecurity     Within the past 12 months, did you worry that your food would run out before you got money to buy more?: No     Within the past 12 months, did the food you bought just not last and you didn t have money to get more?: No   Transportation Needs: Low Risk  (8/12/2024)    Transportation Needs     Within the past 12 months, has lack of transportation kept you from medical appointments, getting your medicines, non-medical meetings or appointments, work, or from getting things that you need?: No   Physical Activity: Inactive (8/12/2024)     Exercise Vital Sign     Days of Exercise per Week: 0 days     Minutes of Exercise per Session: 0 min   Stress: No Stress Concern Present (8/12/2024)    [employer] - Occupational Stress Questionnaire     Feeling of Stress : Only a little   Social Connections: Unknown (8/12/2024)    Social Connection and Isolation Panel [NHANES]     Frequency of Social Gatherings with Friends and Family: Once a week   Interpersonal Safety: Low Risk  (12/19/2024)    Interpersonal Safety     Do you feel physically and emotionally safe where you currently live?: Yes     Within the past 12 months, have you been hit, slapped, kicked or otherwise physically hurt by someone?: No     Within the past 12 months, have you been humiliated or emotionally abused in other ways by your partner or ex-partner?: No   Housing Stability: Low Risk  (8/12/2024)    Housing Stability     Do you have housing? : Yes     Are you worried about losing your housing?: No           Lab Results    Recent Results (from the past 240 hours)   Comprehensive metabolic panel    Collection Time: 07/31/25 10:16 AM   Result Value Ref Range    Sodium 139 135 - 145 mmol/L    Potassium 3.1 (L) 3.4 - 5.3 mmol/L    Carbon Dioxide (CO2) 19 (L) 22 - 29 mmol/L    Anion Gap 15 7 - 15 mmol/L    Urea Nitrogen 22.5 8.0 - 23.0 mg/dL    Creatinine 1.78 (H) 0.51 - 0.95 mg/dL    GFR Estimate 27 (L) >60 mL/min/1.73m2    Calcium 9.9 8.8 - 10.4 mg/dL    Chloride 105 98 - 107 mmol/L    Glucose 143 (H) 70 - 99 mg/dL    Alkaline Phosphatase 75 40 - 150 U/L    AST 20 0 - 45 U/L    ALT 16 0 - 50 U/L    Protein Total 5.9 (L) 6.4 - 8.3 g/dL    Albumin 3.3 (L) 3.5 - 5.2 g/dL    Bilirubin Total 0.8 <=1.2 mg/dL   Magnesium    Collection Time: 07/31/25 10:16 AM   Result Value Ref Range    Magnesium 1.0 (L) 1.7 - 2.3 mg/dL   CBC with platelets and differential    Collection Time: 07/31/25 10:16 AM   Result Value Ref Range    WBC Count 8.6 4.0 - 11.0 10e3/uL    RBC Count 3.75  (L) 3.80 - 5.20 10e6/uL    Hemoglobin 11.0 (L) 11.7 - 15.7 g/dL    Hematocrit 34.2 (L) 35.0 - 47.0 %    MCV 91 78 - 100 fL    MCH 29.3 26.5 - 33.0 pg    MCHC 32.2 31.5 - 36.5 g/dL    RDW 14.4 10.0 - 15.0 %    Platelet Count 222 150 - 450 10e3/uL    % Neutrophils 72 %    % Lymphocytes 19 %    % Monocytes 8 %    % Eosinophils 0 %    % Basophils 0 %    % Immature Granulocytes 1 %    NRBCs per 100 WBC 0 <1 /100    Absolute Neutrophils 6.1 1.6 - 8.3 10e3/uL    Absolute Lymphocytes 1.7 0.8 - 5.3 10e3/uL    Absolute Monocytes 0.7 0.0 - 1.3 10e3/uL    Absolute Eosinophils 0.0 0.0 - 0.7 10e3/uL    Absolute Basophils 0.0 0.0 - 0.2 10e3/uL    Absolute Immature Granulocytes 0.1 <=0.4 10e3/uL    Absolute NRBCs 0.0 10e3/uL       Imaging Results    No results found.            I spent 54  minutes on the patient's visit today.  This included preparation for the visit, face-to-face time with the patient and documentation following the visit.  It did not include teaching or procedure time.    Signed by: Peter E. Friedell, MD

## 2025-07-31 NOTE — PROGRESS NOTES
Infusion Nursing Note:  Alma Johns presents today for cycle 2 day 1 panitumumab.    Patient seen by provider today: Yes: Dr. Friedell   present during visit today: Not Applicable.    Note: Alma arrived via wheelchair and in stable condition. PIV placed in left AC and good blood return noted. Treatment administered per orders. Magnesium 1.0 today, but per Dr. Friedell no replacement desired.      Intravenous Access:  Labs drawn without difficulty.  Peripheral IV placed.    Treatment Conditions:  Lab Results   Component Value Date    HGB 11.0 (L) 07/31/2025    WBC 8.6 07/31/2025    ANEU 6.1 07/31/2025     07/31/2025        Lab Results   Component Value Date     07/31/2025    POTASSIUM 3.1 (L) 07/31/2025    MAG 1.0 (L) 07/31/2025    CR 1.78 (H) 07/31/2025    JIMMY 9.9 07/31/2025    BILITOTAL 0.8 07/31/2025    ALBUMIN 3.3 (L) 07/31/2025    ALT 16 07/31/2025    AST 20 07/31/2025       Results reviewed, labs MET treatment parameters, ok to proceed with treatment.      Post Infusion Assessment:  Patient tolerated infusion without incident.  Blood return noted pre and post infusion.  Site patent and intact, free from redness, edema or discomfort.  No evidence of extravasations.  Access discontinued per protocol.       Discharge Plan:   Patient and/or family verbalized understanding of discharge instructions and all questions answered.  AVS to patient via [email or web address].  Patient will return 8/6 for next appointment.   Patient discharged in stable condition accompanied by: daughter.  Departure Mode: Wheelchair.      Patricia Bryant RN

## 2025-08-01 ENCOUNTER — LAB (OUTPATIENT)
Dept: LAB | Facility: HOSPITAL | Age: OVER 89
End: 2025-08-01
Payer: MEDICARE

## 2025-08-01 DIAGNOSIS — R93.89 ABNORMAL FINDINGS ON DIAGNOSTIC IMAGING OF OTHER SPECIFIED BODY STRUCTURES: ICD-10-CM

## 2025-08-01 DIAGNOSIS — R79.89 ABNORMAL TSH: ICD-10-CM

## 2025-08-01 LAB
T3 SERPL-MCNC: 119 NG/DL (ref 85–202)
T3FREE SERPL-MCNC: 3.2 PG/ML (ref 2–4.4)
T4 FREE SERPL-MCNC: 2.35 NG/DL (ref 0.9–1.7)
T4 SERPL-MCNC: 9.5 UG/DL (ref 4.5–11.7)
TSH SERPL DL<=0.005 MIU/L-ACNC: 0.01 UIU/ML (ref 0.3–4.2)

## 2025-08-01 NOTE — PROGRESS NOTES
"    7/31/2025     E-Consult has been accepted.    Interprofessional consultation requested by:  Lucy Ruano MD      Clinical Question/Purpose: MY CLINICAL QUESTION IS: progressively lower TSH with normal t3, t4 , and thyroid antibodies She has no symptoms of hyperthyroidism . Has hepatic flexure colon cancer which is metastatic in the local lymph nodes and is getting palliative chemo . PET scan doesn't appear to show spread elsewhere. The question is that is the progressively decreasing TSH consistent with euthyroid sick syndrome?  Recently she has had non specific symptoms of fatigue and SOB .Should I continue to monitor ? Look for central hypothyroidism ?     Patient assessment and information reviewed:   8/8/22 TSH 0.41  8/10/23 TSH 0.33  8/12/24 TSH 0.4  11/19/24 TSH 0.13  6/2/25 lst dose panitumumab   6/16/25 free T4 1.3  6/16/25 CT contrast \"Thyromegaly left-greater-than-right \" -- to my eye  left thyroid >> right , left abnormally large   6/16/-6/18/25 [facility] for TIA  6/26/25 TSH 0.06, free T3 2.2 , TPO < 10, TRAB < 1.1  7/18/25 TSH 0.01, free T3 3.3, TPO < 10, TSI < 1 , TRAB < 1.1    Current Outpatient Medications   Medication Sig Dispense Refill    alendronate (FOSAMAX) 70 MG tablet Take 1 tablet (70 mg) by mouth every 7 days 12 tablet 0    amLODIPine (NORVASC) 10 MG tablet Take 1 tablet (10 mg) by mouth daily 90 tablet 3    aspirin (ASA) 81 MG chewable tablet Take 81 mg by mouth daily.      atorvastatin (LIPITOR) 40 MG tablet Take 40 mg by mouth at bedtime.      celecoxib (CELEBREX) 200 MG capsule TAKE 1 CAPSULE BY MOUTH EVERY DAY 90 capsule 1    clopidogrel (PLAVIX) 75 MG tablet Take 75 mg by mouth daily.      dexAMETHasone (DECADRON) 1 MG tablet Take 1 tablet (1 mg) by mouth daily (with breakfast). 60 tablet 2    doxazosin (CARDURA) 4 MG tablet TAKE 1 TABLET BY MOUTH DAILY 90 tablet 3    escitalopram (LEXAPRO) 10 MG tablet Take 1 tablet (10 mg) by mouth daily. 90 tablet 3    " escitalopram (LEXAPRO) 5 MG tablet Take 1 tablet (5 mg) by mouth daily. 90 tablet 3    latanoprost (XALATAN) 0.005 % ophthalmic solution [LATANOPROST (XALATAN) 0.005 % OPHTHALMIC SOLUTION] Administer 1 drop to both eyes bedtime.      loperamide (IMODIUM A-D) 2 MG tablet Take 1 tablet (2 mg) by mouth 4 times daily as needed for diarrhea. 30 tablet 2    LORazepam (ATIVAN) 0.5 MG tablet Take 1 tablet (0.5 mg) by mouth every 6 hours. 30 tablet 0    therapeutic multivitamin (THERAGRAN) tablet [THERAPEUTIC MULTIVITAMIN (THERAGRAN) TABLET] Take 1 tablet by mouth daily.      timolol maleate (TIMOPTIC) 0.5 % ophthalmic solution [TIMOLOL MALEATE (TIMOPTIC) 0.5 % OPHTHALMIC SOLUTION] INSTILL 1 DROP INTO BOTH EYES Q EVENING  3     Recent Rx dex 1 mg /day 6/9-7/31/25    Goiter left > right is present on recent imaging , supporting that the abnormal TFTS are likely due to primary endogenous thyroid dysfunction  Iodine contrast exposure on 6/16/25 -- this may drive hyperthyroidism in susceptible individuals , such as those with underlying goiter. This could be reversible .   Low TSH has decreased from low normal baseline. . Free T4 is unknown.    History of corticosteroid- steroid could lower TSH     Recommendations:   is the progressively decreasing TSH consistent with euthyroid sick syndrome?  It could be but I don't think it is. Should I continue to monitor ?  Yes   Look for central hypothyroidism ?  You need free T4 when you check TSH.      Labs : TSH, free T4, total T3 (not free T3) now and in a month.  If either the free T4 or the T3 become high, endocrine consult.   Avoid CT contrast if you can  Thyroid ultrasound.     The recommendations provided in this E-Consult are based on a review of clinical data pertinent to the clinical question presented, without a review of the patient's complete medical record or, the benefit of a comprehensive in-person or virtual patient evaluation. This consultation should not replace the  clinical judgement and evaluation of the provider ordering this E-Consult. Any new clinical issues, or changes in patient status since the filing of this E-Consult will need to be taken into account when assessing these recommendations. Please contact me if you have further questions.    My total time spent reviewing clinical information and formulating assessment was 20 minutes.        Ghazala Worrell MD

## 2025-08-04 ENCOUNTER — PATIENT OUTREACH (OUTPATIENT)
Dept: CARE COORDINATION | Facility: CLINIC | Age: OVER 89
End: 2025-08-04
Payer: MEDICARE

## 2025-08-04 DIAGNOSIS — C18.9 COLON ADENOCARCINOMA (H): Primary | ICD-10-CM

## 2025-08-06 ENCOUNTER — HOSPITAL ENCOUNTER (OUTPATIENT)
Dept: ULTRASOUND IMAGING | Facility: HOSPITAL | Age: OVER 89
Discharge: HOME | End: 2025-08-06
Attending: INTERNAL MEDICINE
Payer: MEDICARE

## 2025-08-06 ENCOUNTER — PATIENT OUTREACH (OUTPATIENT)
Dept: CARE COORDINATION | Facility: CLINIC | Age: OVER 89
End: 2025-08-06

## 2025-08-06 ENCOUNTER — LAB (OUTPATIENT)
Dept: INFUSION THERAPY | Facility: HOSPITAL | Age: OVER 89
End: 2025-08-06
Payer: MEDICARE

## 2025-08-06 ENCOUNTER — ONCOLOGY VISIT (OUTPATIENT)
Dept: ONCOLOGY | Facility: HOSPITAL | Age: OVER 89
End: 2025-08-06
Payer: MEDICARE

## 2025-08-06 VITALS
WEIGHT: 123 LBS | DIASTOLIC BLOOD PRESSURE: 63 MMHG | HEART RATE: 69 BPM | OXYGEN SATURATION: 97 % | BODY MASS INDEX: 24.8 KG/M2 | HEIGHT: 59 IN | RESPIRATION RATE: 16 BRPM | TEMPERATURE: 98.4 F | SYSTOLIC BLOOD PRESSURE: 144 MMHG

## 2025-08-06 DIAGNOSIS — R35.0 URINARY FREQUENCY: ICD-10-CM

## 2025-08-06 DIAGNOSIS — C18.9 COLON ADENOCARCINOMA (H): ICD-10-CM

## 2025-08-06 DIAGNOSIS — R79.89 LOW TSH LEVEL: ICD-10-CM

## 2025-08-06 DIAGNOSIS — C18.9 COLON ADENOCARCINOMA (H): Primary | ICD-10-CM

## 2025-08-06 DIAGNOSIS — N30.00 ACUTE CYSTITIS WITHOUT HEMATURIA: ICD-10-CM

## 2025-08-06 DIAGNOSIS — R94.6 ABNORMAL RESULTS OF THYROID FUNCTION STUDIES: ICD-10-CM

## 2025-08-06 DIAGNOSIS — R79.89 ABNORMAL TSH: ICD-10-CM

## 2025-08-06 LAB
ALBUMIN SERPL BCG-MCNC: 3.2 G/DL (ref 3.5–5.2)
ALBUMIN UR-MCNC: 20 MG/DL
ALP SERPL-CCNC: 66 U/L (ref 40–150)
ALT SERPL W P-5'-P-CCNC: 13 U/L (ref 0–50)
ANION GAP SERPL CALCULATED.3IONS-SCNC: 15 MMOL/L (ref 7–15)
APPEARANCE UR: ABNORMAL
AST SERPL W P-5'-P-CCNC: 14 U/L (ref 0–45)
BACTERIA #/AREA URNS HPF: ABNORMAL /HPF
BASOPHILS # BLD AUTO: 0 10E3/UL (ref 0–0.2)
BASOPHILS NFR BLD AUTO: 0 %
BILIRUB SERPL-MCNC: 0.6 MG/DL
BILIRUB UR QL STRIP: NEGATIVE
BUN SERPL-MCNC: 25.1 MG/DL (ref 8–23)
CALCIUM SERPL-MCNC: 10.2 MG/DL (ref 8.8–10.4)
CEA SERPL-MCNC: 3.5 NG/ML
CHLORIDE SERPL-SCNC: 106 MMOL/L (ref 98–107)
COLOR UR AUTO: YELLOW
CREAT SERPL-MCNC: 1.46 MG/DL (ref 0.51–0.95)
EGFRCR SERPLBLD CKD-EPI 2021: 34 ML/MIN/1.73M2
EOSINOPHIL # BLD AUTO: 0 10E3/UL (ref 0–0.7)
EOSINOPHIL NFR BLD AUTO: 0 %
ERYTHROCYTE [DISTWIDTH] IN BLOOD BY AUTOMATED COUNT: 14.6 % (ref 10–15)
GLUCOSE SERPL-MCNC: 137 MG/DL (ref 70–99)
GLUCOSE UR STRIP-MCNC: NEGATIVE MG/DL
HCO3 SERPL-SCNC: 22 MMOL/L (ref 22–29)
HCT VFR BLD AUTO: 34.5 % (ref 35–47)
HGB BLD-MCNC: 11 G/DL (ref 11.7–15.7)
HGB UR QL STRIP: ABNORMAL
IMM GRANULOCYTES # BLD: 0.2 10E3/UL
IMM GRANULOCYTES NFR BLD: 2 %
KETONES UR STRIP-MCNC: NEGATIVE MG/DL
LEUKOCYTE ESTERASE UR QL STRIP: ABNORMAL
LYMPHOCYTES # BLD AUTO: 2.1 10E3/UL (ref 0.8–5.3)
LYMPHOCYTES NFR BLD AUTO: 15 %
MAGNESIUM SERPL-MCNC: 1.1 MG/DL (ref 1.7–2.3)
MCH RBC QN AUTO: 29.2 PG (ref 26.5–33)
MCHC RBC AUTO-ENTMCNC: 31.9 G/DL (ref 31.5–36.5)
MCV RBC AUTO: 92 FL (ref 78–100)
MONOCYTES # BLD AUTO: 1.1 10E3/UL (ref 0–1.3)
MONOCYTES NFR BLD AUTO: 8 %
NEUTROPHILS # BLD AUTO: 10.4 10E3/UL (ref 1.6–8.3)
NEUTROPHILS NFR BLD AUTO: 75 %
NITRATE UR QL: NEGATIVE
NRBC # BLD AUTO: 0 10E3/UL
NRBC BLD AUTO-RTO: 0 /100
PH UR STRIP: 6.5 [PH] (ref 5–7)
PLATELET # BLD AUTO: 359 10E3/UL (ref 150–450)
POTASSIUM SERPL-SCNC: 3 MMOL/L (ref 3.4–5.3)
PROT SERPL-MCNC: 5.9 G/DL (ref 6.4–8.3)
RBC # BLD AUTO: 3.77 10E6/UL (ref 3.8–5.2)
RBC URINE: 0 /HPF
SODIUM SERPL-SCNC: 143 MMOL/L (ref 135–145)
SP GR UR STRIP: 1.01 (ref 1–1.03)
SQUAMOUS EPITHELIAL: 5 /HPF
T4 FREE SERPL-MCNC: 2.38 NG/DL (ref 0.9–1.7)
UROBILINOGEN UR STRIP-MCNC: NORMAL MG/DL
WBC # BLD AUTO: 13.7 10E3/UL (ref 4–11)
WBC CLUMPS #/AREA URNS HPF: PRESENT /HPF
WBC URINE: >182 /HPF

## 2025-08-06 PROCEDURE — 83735 ASSAY OF MAGNESIUM: CPT

## 2025-08-06 PROCEDURE — 82947 ASSAY GLUCOSE BLOOD QUANT: CPT

## 2025-08-06 PROCEDURE — 85004 AUTOMATED DIFF WBC COUNT: CPT

## 2025-08-06 PROCEDURE — G0463 HOSPITAL OUTPT CLINIC VISIT: HCPCS | Performed by: NURSE PRACTITIONER

## 2025-08-06 PROCEDURE — 76536 US EXAM OF HEAD AND NECK: CPT

## 2025-08-06 PROCEDURE — 82378 CARCINOEMBRYONIC ANTIGEN: CPT

## 2025-08-06 PROCEDURE — 84439 ASSAY OF FREE THYROXINE: CPT

## 2025-08-06 PROCEDURE — G2211 COMPLEX E/M VISIT ADD ON: HCPCS | Performed by: NURSE PRACTITIONER

## 2025-08-06 PROCEDURE — 81001 URINALYSIS AUTO W/SCOPE: CPT | Performed by: NURSE PRACTITIONER

## 2025-08-06 PROCEDURE — 99215 OFFICE O/P EST HI 40 MIN: CPT | Performed by: NURSE PRACTITIONER

## 2025-08-06 PROCEDURE — 87086 URINE CULTURE/COLONY COUNT: CPT | Performed by: NURSE PRACTITIONER

## 2025-08-06 PROCEDURE — 36415 COLL VENOUS BLD VENIPUNCTURE: CPT

## 2025-08-06 RX ORDER — CIPROFLOXACIN 250 MG/1
250 TABLET, FILM COATED ORAL DAILY
Qty: 5 TABLET | Refills: 0 | Status: SHIPPED | OUTPATIENT
Start: 2025-08-06 | End: 2025-08-11

## 2025-08-06 ASSESSMENT — PAIN SCALES - GENERAL: PAINLEVEL_OUTOF10: NO PAIN (0)

## 2025-08-07 ENCOUNTER — TELEPHONE (OUTPATIENT)
Dept: ONCOLOGY | Facility: HOSPITAL | Age: OVER 89
End: 2025-08-07
Payer: MEDICARE

## 2025-08-07 ENCOUNTER — PATIENT OUTREACH (OUTPATIENT)
Dept: ONCOLOGY | Facility: HOSPITAL | Age: OVER 89
End: 2025-08-07
Payer: MEDICARE

## 2025-08-07 LAB — BACTERIA UR CULT: NORMAL

## 2025-08-11 SDOH — HEALTH STABILITY: PHYSICAL HEALTH: ON AVERAGE, HOW MANY MINUTES DO YOU ENGAGE IN EXERCISE AT THIS LEVEL?: 0 MIN

## 2025-08-11 SDOH — HEALTH STABILITY: PHYSICAL HEALTH: ON AVERAGE, HOW MANY DAYS PER WEEK DO YOU ENGAGE IN MODERATE TO STRENUOUS EXERCISE (LIKE A BRISK WALK)?: 0 DAYS

## 2025-08-11 ASSESSMENT — SOCIAL DETERMINANTS OF HEALTH (SDOH): HOW OFTEN DO YOU GET TOGETHER WITH FRIENDS OR RELATIVES?: ONCE A WEEK

## 2025-08-14 ENCOUNTER — OFFICE VISIT (OUTPATIENT)
Dept: ENDOCRINOLOGY | Facility: CLINIC | Age: OVER 89
End: 2025-08-14
Attending: INTERNAL MEDICINE
Payer: MEDICARE

## 2025-08-14 ENCOUNTER — LAB (OUTPATIENT)
Dept: INFUSION THERAPY | Facility: HOSPITAL | Age: OVER 89
End: 2025-08-14
Attending: INTERNAL MEDICINE
Payer: MEDICARE

## 2025-08-14 ENCOUNTER — OFFICE VISIT (OUTPATIENT)
Dept: INTERNAL MEDICINE | Facility: CLINIC | Age: OVER 89
End: 2025-08-14
Payer: MEDICARE

## 2025-08-14 VITALS
DIASTOLIC BLOOD PRESSURE: 56 MMHG | HEART RATE: 57 BPM | BODY MASS INDEX: 24.39 KG/M2 | HEIGHT: 59 IN | OXYGEN SATURATION: 97 % | SYSTOLIC BLOOD PRESSURE: 118 MMHG | WEIGHT: 121 LBS

## 2025-08-14 VITALS
DIASTOLIC BLOOD PRESSURE: 56 MMHG | OXYGEN SATURATION: 97 % | WEIGHT: 121.6 LBS | BODY MASS INDEX: 24.52 KG/M2 | HEART RATE: 60 BPM | HEIGHT: 59 IN | SYSTOLIC BLOOD PRESSURE: 96 MMHG | TEMPERATURE: 96.9 F

## 2025-08-14 VITALS
DIASTOLIC BLOOD PRESSURE: 62 MMHG | SYSTOLIC BLOOD PRESSURE: 137 MMHG | OXYGEN SATURATION: 96 % | TEMPERATURE: 97.4 F | RESPIRATION RATE: 16 BRPM | HEART RATE: 63 BPM

## 2025-08-14 DIAGNOSIS — C18.9 COLON ADENOCARCINOMA (H): Primary | ICD-10-CM

## 2025-08-14 DIAGNOSIS — E05.00 GOITER WITH HYPERTHYROIDISM: ICD-10-CM

## 2025-08-14 DIAGNOSIS — C18.9 COLON ADENOCARCINOMA (H): ICD-10-CM

## 2025-08-14 DIAGNOSIS — N18.4 CKD (CHRONIC KIDNEY DISEASE) STAGE 4, GFR 15-29 ML/MIN (H): Primary | ICD-10-CM

## 2025-08-14 DIAGNOSIS — E04.1 THYROID NODULE: ICD-10-CM

## 2025-08-14 DIAGNOSIS — E05.90 HYPERTHYROIDISM: ICD-10-CM

## 2025-08-14 DIAGNOSIS — I10 ESSENTIAL HYPERTENSION: ICD-10-CM

## 2025-08-14 DIAGNOSIS — N19 RENAL FAILURE, UNSPECIFIED CHRONICITY: ICD-10-CM

## 2025-08-14 DIAGNOSIS — Z13.6 SCREENING FOR CARDIOVASCULAR CONDITION: ICD-10-CM

## 2025-08-14 DIAGNOSIS — G31.84 MCI (MILD COGNITIVE IMPAIRMENT): ICD-10-CM

## 2025-08-14 DIAGNOSIS — Z23 NEED FOR VACCINATION: ICD-10-CM

## 2025-08-14 LAB
ALBUMIN SERPL BCG-MCNC: 3.6 G/DL (ref 3.5–5.2)
ALP SERPL-CCNC: 56 U/L (ref 40–150)
ALT SERPL W P-5'-P-CCNC: 21 U/L (ref 0–50)
ANION GAP SERPL CALCULATED.3IONS-SCNC: 16 MMOL/L (ref 7–15)
AST SERPL W P-5'-P-CCNC: 18 U/L (ref 0–45)
BASOPHILS # BLD AUTO: 0.04 10E3/UL (ref 0–0.2)
BASOPHILS NFR BLD AUTO: 0.2 %
BILIRUB SERPL-MCNC: 1.3 MG/DL
BUN SERPL-MCNC: 23.9 MG/DL (ref 8–23)
CALCIUM SERPL-MCNC: 9.8 MG/DL (ref 8.8–10.4)
CHLORIDE SERPL-SCNC: 103 MMOL/L (ref 98–107)
CREAT SERPL-MCNC: 1.41 MG/DL (ref 0.51–0.95)
EGFRCR SERPLBLD CKD-EPI 2021: 35 ML/MIN/1.73M2
EOSINOPHIL # BLD AUTO: <0.03 10E3/UL (ref 0–0.7)
EOSINOPHIL NFR BLD AUTO: 0 %
ERYTHROCYTE [DISTWIDTH] IN BLOOD BY AUTOMATED COUNT: 14.5 % (ref 10–15)
GLUCOSE SERPL-MCNC: 156 MG/DL (ref 70–99)
HCO3 SERPL-SCNC: 21 MMOL/L (ref 22–29)
HCT VFR BLD AUTO: 36.4 % (ref 35–47)
HGB BLD-MCNC: 11.7 G/DL (ref 11.7–15.7)
IMM GRANULOCYTES # BLD: 0.28 10E3/UL
IMM GRANULOCYTES NFR BLD: 1.5 %
LYMPHOCYTES # BLD AUTO: 1.48 10E3/UL (ref 0.8–5.3)
LYMPHOCYTES NFR BLD AUTO: 7.7 %
MCH RBC QN AUTO: 29.5 PG (ref 26.5–33)
MCHC RBC AUTO-ENTMCNC: 32.1 G/DL (ref 31.5–36.5)
MCV RBC AUTO: 91.9 FL (ref 78–100)
MONOCYTES # BLD AUTO: 0.57 10E3/UL (ref 0–1.3)
MONOCYTES NFR BLD AUTO: 3 %
NEUTROPHILS # BLD AUTO: 16.92 10E3/UL (ref 1.6–8.3)
NEUTROPHILS NFR BLD AUTO: 87.6 %
NRBC # BLD AUTO: <0.03 10E3/UL
NRBC BLD AUTO-RTO: 0 /100
PLATELET # BLD AUTO: 281 10E3/UL (ref 150–450)
POTASSIUM SERPL-SCNC: 3.7 MMOL/L (ref 3.4–5.3)
PROT SERPL-MCNC: 6 G/DL (ref 6.4–8.3)
RBC # BLD AUTO: 3.96 10E6/UL (ref 3.8–5.2)
SODIUM SERPL-SCNC: 140 MMOL/L (ref 135–145)
WBC # BLD AUTO: 19.29 10E3/UL (ref 4–11)

## 2025-08-14 PROCEDURE — 258N000003 HC RX IP 258 OP 636: Performed by: INTERNAL MEDICINE

## 2025-08-14 PROCEDURE — 82310 ASSAY OF CALCIUM: CPT

## 2025-08-14 PROCEDURE — 250N000011 HC RX IP 250 OP 636: Mod: JW | Performed by: INTERNAL MEDICINE

## 2025-08-14 PROCEDURE — 99204 OFFICE O/P NEW MOD 45 MIN: CPT | Mod: GC

## 2025-08-14 PROCEDURE — 85004 AUTOMATED DIFF WBC COUNT: CPT

## 2025-08-14 PROCEDURE — 1126F AMNT PAIN NOTED NONE PRSNT: CPT

## 2025-08-14 PROCEDURE — 3074F SYST BP LT 130 MM HG: CPT

## 2025-08-14 PROCEDURE — 3078F DIAST BP <80 MM HG: CPT

## 2025-08-14 PROCEDURE — 36415 COLL VENOUS BLD VENIPUNCTURE: CPT

## 2025-08-14 RX ORDER — METHIMAZOLE 5 MG/1
5 TABLET ORAL DAILY
Qty: 90 TABLET | Refills: 3 | Status: SHIPPED | OUTPATIENT
Start: 2025-08-14

## 2025-08-14 RX ORDER — AMLODIPINE BESYLATE 5 MG/1
5 TABLET ORAL DAILY
Qty: 90 TABLET | Refills: 3 | Status: SHIPPED | OUTPATIENT
Start: 2025-08-14

## 2025-08-14 RX ADMIN — PANITUMUMAB 368 MG: 400 SOLUTION INTRAVENOUS at 13:47

## 2025-08-14 ASSESSMENT — PATIENT HEALTH QUESTIONNAIRE - PHQ9
SUM OF ALL RESPONSES TO PHQ QUESTIONS 1-9: 9
SUM OF ALL RESPONSES TO PHQ QUESTIONS 1-9: 9

## 2025-08-14 ASSESSMENT — PAIN SCALES - GENERAL: PAINLEVEL_OUTOF10: NO PAIN (0)

## 2025-08-26 DIAGNOSIS — F41.9 ANXIETY DUE TO INVASIVE PROCEDURE: ICD-10-CM

## 2025-08-27 RX ORDER — LORAZEPAM 0.5 MG/1
0.5 TABLET ORAL EVERY 6 HOURS
Qty: 30 TABLET | Refills: 0 | Status: SHIPPED | OUTPATIENT
Start: 2025-08-27

## 2025-08-28 ENCOUNTER — ONCOLOGY VISIT (OUTPATIENT)
Dept: ONCOLOGY | Facility: HOSPITAL | Age: OVER 89
End: 2025-08-28
Payer: MEDICARE

## 2025-08-28 ENCOUNTER — TELEPHONE (OUTPATIENT)
Dept: INTERNAL MEDICINE | Facility: CLINIC | Age: OVER 89
End: 2025-08-28
Payer: MEDICARE

## 2025-08-28 ENCOUNTER — INFUSION THERAPY VISIT (OUTPATIENT)
Dept: INFUSION THERAPY | Facility: HOSPITAL | Age: OVER 89
End: 2025-08-28
Payer: MEDICARE

## 2025-08-28 ENCOUNTER — DOCUMENTATION ONLY (OUTPATIENT)
Dept: ONCOLOGY | Facility: HOSPITAL | Age: OVER 89
End: 2025-08-28

## 2025-08-28 ENCOUNTER — LAB (OUTPATIENT)
Dept: INFUSION THERAPY | Facility: HOSPITAL | Age: OVER 89
End: 2025-08-28
Payer: MEDICARE

## 2025-08-28 VITALS
OXYGEN SATURATION: 96 % | HEART RATE: 97 BPM | SYSTOLIC BLOOD PRESSURE: 134 MMHG | BODY MASS INDEX: 24.6 KG/M2 | RESPIRATION RATE: 16 BRPM | WEIGHT: 122 LBS | TEMPERATURE: 98.8 F | DIASTOLIC BLOOD PRESSURE: 63 MMHG | HEIGHT: 59 IN

## 2025-08-28 DIAGNOSIS — C18.9 COLON ADENOCARCINOMA (H): Primary | ICD-10-CM

## 2025-08-28 DIAGNOSIS — C18.2 MALIGNANT NEOPLASM OF ASCENDING COLON (H): ICD-10-CM

## 2025-08-28 LAB
ALBUMIN SERPL BCG-MCNC: 3.4 G/DL (ref 3.5–5.2)
ALP SERPL-CCNC: 59 U/L (ref 40–150)
ALT SERPL W P-5'-P-CCNC: 18 U/L (ref 0–50)
ANION GAP SERPL CALCULATED.3IONS-SCNC: 15 MMOL/L (ref 7–15)
AST SERPL W P-5'-P-CCNC: 16 U/L (ref 0–45)
BASOPHILS # BLD AUTO: 0.03 10E3/UL (ref 0–0.2)
BASOPHILS NFR BLD AUTO: 0.2 %
BILIRUB SERPL-MCNC: 1.1 MG/DL
BUN SERPL-MCNC: 19.6 MG/DL (ref 8–23)
CALCIUM SERPL-MCNC: 8.6 MG/DL (ref 8.8–10.4)
CHLORIDE SERPL-SCNC: 108 MMOL/L (ref 98–107)
CREAT SERPL-MCNC: 1.28 MG/DL (ref 0.51–0.95)
EGFRCR SERPLBLD CKD-EPI 2021: 40 ML/MIN/1.73M2
EOSINOPHIL # BLD AUTO: 0.06 10E3/UL (ref 0–0.7)
EOSINOPHIL NFR BLD AUTO: 0.4 %
ERYTHROCYTE [DISTWIDTH] IN BLOOD BY AUTOMATED COUNT: 14.6 % (ref 10–15)
GLUCOSE SERPL-MCNC: 170 MG/DL (ref 70–99)
HCO3 SERPL-SCNC: 20 MMOL/L (ref 22–29)
HCT VFR BLD AUTO: 33.7 % (ref 35–47)
HGB BLD-MCNC: 11.3 G/DL (ref 11.7–15.7)
IMM GRANULOCYTES # BLD: 0.12 10E3/UL
IMM GRANULOCYTES NFR BLD: 0.8 %
LYMPHOCYTES # BLD AUTO: 1.38 10E3/UL (ref 0.8–5.3)
LYMPHOCYTES NFR BLD AUTO: 9.5 %
MAGNESIUM SERPL-MCNC: 0.6 MG/DL (ref 1.7–2.3)
MCH RBC QN AUTO: 31 PG (ref 26.5–33)
MCHC RBC AUTO-ENTMCNC: 33.5 G/DL (ref 31.5–36.5)
MCV RBC AUTO: 92.3 FL (ref 78–100)
MONOCYTES # BLD AUTO: 0.51 10E3/UL (ref 0–1.3)
MONOCYTES NFR BLD AUTO: 3.5 %
NEUTROPHILS # BLD AUTO: 12.45 10E3/UL (ref 1.6–8.3)
NEUTROPHILS NFR BLD AUTO: 85.6 %
NRBC # BLD AUTO: <0.03 10E3/UL
NRBC BLD AUTO-RTO: 0 /100
PLATELET # BLD AUTO: 214 10E3/UL (ref 150–450)
POTASSIUM SERPL-SCNC: 3.2 MMOL/L (ref 3.4–5.3)
PROT SERPL-MCNC: 6 G/DL (ref 6.4–8.3)
RBC # BLD AUTO: 3.65 10E6/UL (ref 3.8–5.2)
SODIUM SERPL-SCNC: 143 MMOL/L (ref 135–145)
T3FREE SERPL-MCNC: 3.1 PG/ML (ref 2–4.4)
T4 FREE SERPL-MCNC: 2.4 NG/DL (ref 0.9–1.7)
TSH SERPL DL<=0.005 MIU/L-ACNC: <0.01 UIU/ML (ref 0.3–4.2)
WBC # BLD AUTO: 14.55 10E3/UL (ref 4–11)

## 2025-08-28 PROCEDURE — 36415 COLL VENOUS BLD VENIPUNCTURE: CPT

## 2025-08-28 PROCEDURE — G0463 HOSPITAL OUTPT CLINIC VISIT: HCPCS | Performed by: INTERNAL MEDICINE

## 2025-08-28 PROCEDURE — 85004 AUTOMATED DIFF WBC COUNT: CPT

## 2025-08-28 PROCEDURE — 84481 FREE ASSAY (FT-3): CPT | Performed by: INTERNAL MEDICINE

## 2025-08-28 PROCEDURE — 82310 ASSAY OF CALCIUM: CPT

## 2025-08-28 PROCEDURE — 83735 ASSAY OF MAGNESIUM: CPT | Performed by: NURSE PRACTITIONER

## 2025-08-28 PROCEDURE — 250N000011 HC RX IP 250 OP 636: Performed by: INTERNAL MEDICINE

## 2025-08-28 PROCEDURE — 258N000003 HC RX IP 258 OP 636: Performed by: INTERNAL MEDICINE

## 2025-08-28 RX ORDER — DIPHENHYDRAMINE HYDROCHLORIDE 50 MG/ML
50 INJECTION, SOLUTION INTRAMUSCULAR; INTRAVENOUS
Start: 2025-09-11

## 2025-08-28 RX ORDER — HEPARIN SODIUM (PORCINE) LOCK FLUSH IV SOLN 100 UNIT/ML 100 UNIT/ML
5 SOLUTION INTRAVENOUS
OUTPATIENT
Start: 2025-08-28

## 2025-08-28 RX ORDER — MAGNESIUM SULFATE 4 G/50ML
4 INJECTION INTRAVENOUS ONCE
Start: 2025-08-28 | End: 2025-08-28

## 2025-08-28 RX ORDER — METHYLPREDNISOLONE SODIUM SUCCINATE 40 MG/ML
40 INJECTION INTRAMUSCULAR; INTRAVENOUS
Start: 2025-09-11

## 2025-08-28 RX ORDER — HEPARIN SODIUM,PORCINE 10 UNIT/ML
5-20 VIAL (ML) INTRAVENOUS DAILY PRN
OUTPATIENT
Start: 2025-09-11

## 2025-08-28 RX ORDER — MAGNESIUM SULFATE 4 G/50ML
4 INJECTION INTRAVENOUS ONCE
Status: CANCELLED
Start: 2025-08-28 | End: 2025-08-28

## 2025-08-28 RX ORDER — DIPHENHYDRAMINE HYDROCHLORIDE 50 MG/ML
25 INJECTION, SOLUTION INTRAMUSCULAR; INTRAVENOUS
Start: 2025-09-11

## 2025-08-28 RX ORDER — METHYLPREDNISOLONE SODIUM SUCCINATE 40 MG/ML
40 INJECTION INTRAMUSCULAR; INTRAVENOUS
Start: 2025-08-28

## 2025-08-28 RX ORDER — ALBUTEROL SULFATE 90 UG/1
1-2 INHALANT RESPIRATORY (INHALATION)
Start: 2025-08-28

## 2025-08-28 RX ORDER — HEPARIN SODIUM,PORCINE 10 UNIT/ML
5-20 VIAL (ML) INTRAVENOUS DAILY PRN
OUTPATIENT
Start: 2025-08-28

## 2025-08-28 RX ORDER — LORAZEPAM 2 MG/ML
0.5 INJECTION INTRAMUSCULAR EVERY 4 HOURS PRN
OUTPATIENT
Start: 2025-09-11

## 2025-08-28 RX ORDER — EPINEPHRINE 1 MG/ML
0.3 INJECTION, SOLUTION INTRAMUSCULAR; SUBCUTANEOUS EVERY 5 MIN PRN
OUTPATIENT
Start: 2025-09-11

## 2025-08-28 RX ORDER — EPINEPHRINE 1 MG/ML
0.3 INJECTION, SOLUTION INTRAMUSCULAR; SUBCUTANEOUS EVERY 5 MIN PRN
OUTPATIENT
Start: 2025-08-28

## 2025-08-28 RX ORDER — DIPHENHYDRAMINE HYDROCHLORIDE 50 MG/ML
50 INJECTION, SOLUTION INTRAMUSCULAR; INTRAVENOUS
Start: 2025-08-28

## 2025-08-28 RX ORDER — LORAZEPAM 2 MG/ML
0.5 INJECTION INTRAMUSCULAR EVERY 4 HOURS PRN
OUTPATIENT
Start: 2025-08-28

## 2025-08-28 RX ORDER — MEPERIDINE HYDROCHLORIDE 25 MG/ML
25 INJECTION INTRAMUSCULAR; INTRAVENOUS; SUBCUTANEOUS
OUTPATIENT
Start: 2025-08-28

## 2025-08-28 RX ORDER — ALBUTEROL SULFATE 0.83 MG/ML
2.5 SOLUTION RESPIRATORY (INHALATION)
OUTPATIENT
Start: 2025-08-28

## 2025-08-28 RX ORDER — MAGNESIUM SULFATE 4 G/50ML
4 INJECTION INTRAVENOUS ONCE
Status: DISCONTINUED | OUTPATIENT
Start: 2025-08-28 | End: 2025-08-28

## 2025-08-28 RX ORDER — MEPERIDINE HYDROCHLORIDE 25 MG/ML
25 INJECTION INTRAMUSCULAR; INTRAVENOUS; SUBCUTANEOUS
OUTPATIENT
Start: 2025-09-11

## 2025-08-28 RX ORDER — HEPARIN SODIUM (PORCINE) LOCK FLUSH IV SOLN 100 UNIT/ML 100 UNIT/ML
5 SOLUTION INTRAVENOUS
OUTPATIENT
Start: 2025-09-11

## 2025-08-28 RX ORDER — ALBUTEROL SULFATE 90 UG/1
1-2 INHALANT RESPIRATORY (INHALATION)
Start: 2025-09-11

## 2025-08-28 RX ORDER — MAGNESIUM SULFATE HEPTAHYDRATE 40 MG/ML
2 INJECTION, SOLUTION INTRAVENOUS ONCE
Status: COMPLETED | OUTPATIENT
Start: 2025-08-28 | End: 2025-08-28

## 2025-08-28 RX ORDER — DIPHENHYDRAMINE HYDROCHLORIDE 50 MG/ML
25 INJECTION, SOLUTION INTRAMUSCULAR; INTRAVENOUS
Start: 2025-08-28

## 2025-08-28 RX ORDER — ALBUTEROL SULFATE 0.83 MG/ML
2.5 SOLUTION RESPIRATORY (INHALATION)
OUTPATIENT
Start: 2025-09-11

## 2025-08-28 RX ADMIN — SODIUM CHLORIDE 400 MG: 9 INJECTION, SOLUTION INTRAVENOUS at 15:55

## 2025-08-28 RX ADMIN — MAGNESIUM SULFATE HEPTAHYDRATE 2 G: 2 INJECTION, SOLUTION INTRAVENOUS at 14:58

## 2025-08-28 ASSESSMENT — PAIN SCALES - GENERAL: PAINLEVEL_OUTOF10: NO PAIN (0)

## 2025-09-04 ENCOUNTER — TELEPHONE (OUTPATIENT)
Dept: INTERNAL MEDICINE | Facility: CLINIC | Age: OVER 89
End: 2025-09-04
Payer: MEDICARE

## 2025-09-04 ENCOUNTER — TELEPHONE (OUTPATIENT)
Dept: ONCOLOGY | Facility: HOSPITAL | Age: OVER 89
End: 2025-09-04
Payer: MEDICARE

## 2025-09-04 ENCOUNTER — NURSE TRIAGE (OUTPATIENT)
Dept: INTERNAL MEDICINE | Facility: CLINIC | Age: OVER 89
End: 2025-09-04
Payer: MEDICARE

## (undated) DEVICE — NDL SCLEROTHERAPY 25GA CARR-LOCK  00711811

## (undated) DEVICE — PAD CHUX UNDERPAD 23X24" 7136

## (undated) DEVICE — ENDO ENDO DISTAL MAJ-2315

## (undated) DEVICE — PACK ENDOSCOPY GI CUSTOM UMMC

## (undated) DEVICE — ENDO MARKER 5ML SOLUTION FOR GI TRACT BX00711836

## (undated) DEVICE — SUCTION MANIFOLD NEPTUNE 2 SYS 4 PORT 0702-020-000

## (undated) DEVICE — SOL WATER IRRIG 1000ML BOTTLE 2F7114

## (undated) DEVICE — BITE BLOCK ENDO W/DENTAL RIM 54FR SBT-114-100

## (undated) DEVICE — ENDO SNARE EXACTO COLD 9MM LOOP 2.4MMX230CM 00711115

## (undated) DEVICE — ENDO TUBING CO2 SMARTCAP STERILE DISP 100145CO2EXT

## (undated) DEVICE — KIT CONNECTOR FOR OLYMPUS ENDOSCOPES DEFENDO 100310

## (undated) DEVICE — ENDO CAP AND TUBING STERILE FOR ENDOGATOR  100130

## (undated) DEVICE — ENDO TRAP POLYP E-TRAP 00711099

## (undated) DEVICE — Device

## (undated) DEVICE — KIT ENDO FIRST STEP DISINFECTANT 200ML W/POUCH EP-4

## (undated) DEVICE — ENDO FORCEP ENDOJAW BIOPSY 2.8MMX230CM FB-220U

## (undated) DEVICE — NEEDLE BIOPSY ECHOTIP ACUCORE 22 GA ULTRASOUND G59746

## (undated) RX ORDER — EPHEDRINE SULFATE 50 MG/ML
INJECTION, SOLUTION INTRAMUSCULAR; INTRAVENOUS; SUBCUTANEOUS
Status: DISPENSED
Start: 2024-12-19